# Patient Record
Sex: FEMALE | Race: WHITE | NOT HISPANIC OR LATINO | Employment: OTHER | URBAN - METROPOLITAN AREA
[De-identification: names, ages, dates, MRNs, and addresses within clinical notes are randomized per-mention and may not be internally consistent; named-entity substitution may affect disease eponyms.]

---

## 2017-01-23 ENCOUNTER — OFFICE VISIT (OUTPATIENT)
Dept: ORTHOPEDIC SURGERY | Facility: CLINIC | Age: 67
End: 2017-01-23

## 2017-01-23 VITALS — WEIGHT: 172 LBS | BODY MASS INDEX: 29.37 KG/M2 | HEIGHT: 64 IN

## 2017-01-23 DIAGNOSIS — Z96.653 STATUS POST TOTAL BILATERAL KNEE REPLACEMENT: ICD-10-CM

## 2017-01-23 DIAGNOSIS — R42 VERTIGO: ICD-10-CM

## 2017-01-23 DIAGNOSIS — R52 PAIN: Primary | ICD-10-CM

## 2017-01-23 DIAGNOSIS — IMO0002 BURSITIS/TENDONITIS, SHOULDER: ICD-10-CM

## 2017-01-23 PROCEDURE — 73030 X-RAY EXAM OF SHOULDER: CPT | Performed by: ORTHOPAEDIC SURGERY

## 2017-01-23 PROCEDURE — 99213 OFFICE O/P EST LOW 20 MIN: CPT | Performed by: ORTHOPAEDIC SURGERY

## 2017-01-23 PROCEDURE — 20610 DRAIN/INJ JOINT/BURSA W/O US: CPT | Performed by: ORTHOPAEDIC SURGERY

## 2017-01-23 RX ORDER — LIDOCAINE HYDROCHLORIDE 10 MG/ML
4 INJECTION, SOLUTION INFILTRATION; PERINEURAL
Status: COMPLETED | OUTPATIENT
Start: 2017-01-23 | End: 2017-01-23

## 2017-01-23 RX ORDER — BETAMETHASONE SODIUM PHOSPHATE AND BETAMETHASONE ACETATE 3; 3 MG/ML; MG/ML
6 INJECTION, SUSPENSION INTRA-ARTICULAR; INTRALESIONAL; INTRAMUSCULAR; SOFT TISSUE
Status: COMPLETED | OUTPATIENT
Start: 2017-01-23 | End: 2017-01-23

## 2017-01-23 RX ADMIN — BETAMETHASONE SODIUM PHOSPHATE AND BETAMETHASONE ACETATE 6 MG: 3; 3 INJECTION, SUSPENSION INTRA-ARTICULAR; INTRALESIONAL; INTRAMUSCULAR; SOFT TISSUE at 13:56

## 2017-01-23 RX ADMIN — LIDOCAINE HYDROCHLORIDE 4 ML: 10 INJECTION, SOLUTION INFILTRATION; PERINEURAL at 13:56

## 2017-01-23 NOTE — PROGRESS NOTES
Subjective: Left shoulder pain     Patient ID: Juan Pablo Hernandez is a 66 y.o. female.    Chief Complaint:    History of Present Illness 66-year-old female is seen for his left shoulder became symptomatic after fall approximately 4 weeks ago and then a second fall 2 weeks ago.  She is now having intermittent pain with any type of activities of daily living involving the left shoulder.       Social History     Occupational History   • Not on file.     Social History Main Topics   • Smoking status: Never Smoker   • Smokeless tobacco: Never Used   • Alcohol use No   • Drug use: No   • Sexual activity: Defer      Review of Systems   Constitutional: Negative for chills, diaphoresis, fever and unexpected weight change.   HENT: Negative for hearing loss, nosebleeds, sore throat and tinnitus.    Eyes: Negative for pain and visual disturbance.   Respiratory: Negative for cough, shortness of breath and wheezing.    Cardiovascular: Negative for chest pain and palpitations.   Gastrointestinal: Negative for abdominal pain, diarrhea, nausea and vomiting.   Endocrine: Negative for cold intolerance, heat intolerance and polydipsia.   Genitourinary: Negative for difficulty urinating, dysuria and hematuria.   Musculoskeletal: Positive for arthralgias, back pain and myalgias. Negative for joint swelling.   Skin: Negative for rash and wound.   Allergic/Immunologic: Negative for environmental allergies.   Neurological: Negative for dizziness, syncope and numbness.   Hematological: Does not bruise/bleed easily.   Psychiatric/Behavioral: Negative for dysphoric mood and sleep disturbance. The patient is not nervous/anxious.          Past Medical History   Diagnosis Date   • Anxiety    • Asthma    • CHF (congestive heart failure)    • Chronic pain disorder    • Chronic UTI    • Depression    • Diabetes    • GERD (gastroesophageal reflux disease)    • H/O CHF    • HTN (hypertension)    • Hyperlipidemia    • Incontinence    • Injury of back       spinal stenosis, chronic back pain   • Low back pain    • Lumbosacral disc disease    • MRSA infection (methicillin-resistant Staphylococcus aureus)      to left foot states approx 12-13 years ago    • Osteoarthritis    • Sleep apnea      uses cpap   • Spinal stenosis    • Stroke      pt states PMD has said she may be having mini strokes   • Vertigo      Past Surgical History   Procedure Laterality Date   • Hysterectomy     • Cholecystectomy open     • Total knee arthroplasty Bilateral    • Colonoscopy     • Endoscopy       Family History   Problem Relation Age of Onset   • Lung disease Mother    • Cancer Mother    • Heart disease Father    • Diabetes Paternal Aunt    • Diabetes Paternal Uncle    • Diabetes Paternal Grandmother    • Diabetes Paternal Grandfather          Objective:  There were no vitals filed for this visit.  Last 3 weights    01/23/17  1330   Weight: 172 lb (78 kg)     Body mass index is 29.52 kg/(m^2).       Ortho Exam  AP lateral and outlet view of the shoulder completely within normal limits showing no acute or chronic changes.  On exam there is no motor deficit good distal pulses.  Marked pain with abduction and extension against resistance.  Pain with internal rotation.  Negative Pittsburgh's test.    Assessment:       1. Pain    2. Status post total bilateral knee replacement    3. Vertigo    4. Bursitis/tendonitis, shoulder          Plan:      believe her dealing with the bursitis and tendinitis of the shoulders I will inject the left shoulder through the posterior portal 4 cc lidocaine 1 cc Celestone as she is diabetic.  Post                                                activity instructions given to the patient.    she still symptomatic in 4 weeks she is to call we'll set up an MRI.                                                                                                                                                                                                                                                                                       Work Status:    KEN query complete.    Orders:  Orders Placed This Encounter   Procedures   • Large Joint Arthrocentesis   • XR shoulder 2+ vw left       Medications:  No orders of the defined types were placed in this encounter.      Followup:  Return if symptoms worsen or fail to improve.    Large Joint Arthrocentesis  Date/Time: 1/23/2017 1:56 PM  Consent given by: patient  Site marked: site marked  Timeout: Immediately prior to procedure a time out was called to verify the correct patient, procedure, equipment, support staff and site/side marked as required   Supporting Documentation  Indications: pain   Procedure Details  Location: shoulder - L glenohumeral  Preparation: Patient was prepped and draped in the usual sterile fashion  Needle size: 22 G  Medications administered: 4 mL lidocaine 1 %; 6 mg betamethasone acetate-betamethasone sodium phosphate 6 (3-3) MG/ML  Patient tolerance: patient tolerated the procedure well with no immediate complications          Dragon transcription disclaimer     Much of this encounter note is an electronic transcription/translation of spoken language to printed text. The electronic translation of spoken language may permit erroneous, or at times, nonsensical words or phrases to be inadvertently transcribed. Although I have reviewed the note for such errors, some may still exist.

## 2017-01-23 NOTE — MR AVS SNAPSHOT
Juan Pablo S David   1/23/2017 1:30 PM   Office Visit    Dept Phone:  [phone]   Encounter #:  [id number]    Provider:  Cali Hartman MD   Department:  [facility]                Your Full Care Plan              Your Updated Medication List          This list is accurate as of: 1/23/17  2:05 PM.  Always use your most recent med list.                acetaminophen 325 MG tablet   Commonly known as:  TYLENOL   Take 2 tablets by mouth Every 6 (Six) Hours As Needed for mild pain (1-3) or moderate pain (4-6).       * albuterol 1.25 MG/3ML nebulizer solution   Commonly known as:  ACCUNEB       * albuterol 108 (90 BASE) MCG/ACT inhaler   Commonly known as:  PROVENTIL HFA;VENTOLIN HFA       ALLEGRA-D 24 HOUR 180-240 MG per 24 hr tablet   Generic drug:  fexofenadine-pseudoephedrine       amitriptyline 10 MG tablet   Commonly known as:  ELAVIL       baclofen 10 MG tablet   Commonly known as:  LIORESAL       estradiol 0.1 MG/GM vaginal cream   Commonly known as:  ESTRACE       fenofibrate 160 MG tablet       fluticasone-salmeterol 250-50 MCG/DOSE DISKUS   Commonly known as:  ADVAIR       hyoscyamine sulfate 0.125 MG tablet dispersible disintegrating tablet   Commonly known as:  ANASPAZ       meclizine 25 MG tablet   Commonly known as:  ANTIVERT       meloxicam 15 MG tablet   Commonly known as:  MOBIC       metFORMIN 500 MG tablet   Commonly known as:  GLUCOPHAGE   Take 1 tablet by mouth 2 (Two) Times a Day With Meals.       metoprolol succinate XL 25 MG 24 hr tablet   Commonly known as:  TOPROL XL   Take 1 tablet by mouth Daily.       montelukast 10 MG tablet   Commonly known as:  SINGULAIR       NASACORT ALLERGY 24HR 55 MCG/ACT nasal inhaler   Generic drug:  Triamcinolone Acetonide       neomycin-polymyxin-hydrocortisone 1 % solution otic solution   Commonly known as:  CORTISPORIN       omeprazole 40 MG capsule   Commonly known as:  priLOSEC       PARoxetine 40 MG tablet    "  Commonly known as:  PAXIL   Take 0.5 tablets by mouth Daily.       pregabalin 75 MG capsule   Commonly known as:  LYRICA       traMADol 50 MG tablet   Commonly known as:  ULTRAM       Vitamin D3 68937 UNITS capsule   Take 1 capsule by mouth Every 7 (Seven) Days.       * Notice:  This list has 2 medication(s) that are the same as other medications prescribed for you. Read the directions carefully, and ask your doctor or other care provider to review them with you.            We Performed the Following     Large Joint Arthrocentesis     XR shoulder 2+ vw left       You Were Diagnosed With        Codes Comments    Pain    -  Primary ICD-10-CM: R52  ICD-9-CM: 780.96       Instructions     None    Patient Instructions History      Upcoming Appointments     Visit Type Date Time Department    FOLLOW UP 1/23/2017  1:30 PM [facility] [place]    FOLLOW UP SLEEP CLINIC 1/31/2017  1:00 PM [facility]      MyChart Signup     Our records indicate that you have declined [facility]. If you would like to sign up for [facility], please email [email or web address] or call [phone] to obtain an activation code.             Other Info from Your Visit           Your Appointments     Jan 31, 2017  1:00 PM EST   Follow Up Sleep Clinic with [facility] SLEEP [facility] PROVIDER SCHEDULE   [facility]    [address] [place] [id number] [phone]            1.  If you are currently on a CPAP or BiPAP please bring in your SD card or memory card.  2.  If you are experiencing problems with your current mask, please bring your mask with you to your appointment.                Allergies     Augmentin [Amoxicillin-pot Clavulanate]      Avelox [Moxifloxacin]      Codeine      Morphine And Related      Penicillins      Sulfa Antibiotics        Reason for Visit     Left Shoulder - Pain           Vital Signs     Height Weight Body Mass Index Smoking Status          64\" " (162.6 cm) 172 lb (78 kg) 29.52 kg/m2 Never Smoker        Problems and Diagnoses Noted     Pain    -  Primary      Results     XR shoulder 2+ vw left

## 2017-01-31 ENCOUNTER — APPOINTMENT (OUTPATIENT)
Dept: SLEEP MEDICINE | Facility: HOSPITAL | Age: 67
End: 2017-01-31

## 2017-02-20 ENCOUNTER — OFFICE VISIT (OUTPATIENT)
Dept: ORTHOPEDIC SURGERY | Facility: CLINIC | Age: 67
End: 2017-02-20

## 2017-02-20 VITALS — BODY MASS INDEX: 29.19 KG/M2 | HEIGHT: 64 IN | WEIGHT: 171 LBS

## 2017-02-20 DIAGNOSIS — M65.30 TRIGGER FINGER, ACQUIRED: Primary | ICD-10-CM

## 2017-02-20 DIAGNOSIS — IMO0002 BURSITIS/TENDONITIS, SHOULDER: ICD-10-CM

## 2017-02-20 PROCEDURE — 99212 OFFICE O/P EST SF 10 MIN: CPT | Performed by: ORTHOPAEDIC SURGERY

## 2017-02-20 RX ORDER — AMITRIPTYLINE HYDROCHLORIDE 25 MG/1
TABLET, FILM COATED ORAL
COMMUNITY
Start: 2017-01-24 | End: 2017-10-04

## 2017-02-20 RX ORDER — HYDROCODONE BITARTRATE AND ACETAMINOPHEN 5; 325 MG/1; MG/1
1 TABLET ORAL EVERY 6 HOURS PRN
Qty: 30 TABLET | Refills: 0 | Status: SHIPPED | OUTPATIENT
Start: 2017-02-20 | End: 2017-03-27 | Stop reason: SDUPTHER

## 2017-02-20 RX ORDER — BUDESONIDE AND FORMOTEROL FUMARATE DIHYDRATE 160; 4.5 UG/1; UG/1
AEROSOL RESPIRATORY (INHALATION)
COMMUNITY
Start: 2017-02-16 | End: 2020-05-21 | Stop reason: HOSPADM

## 2017-02-20 RX ORDER — SITAGLIPTIN AND METFORMIN HYDROCHLORIDE 1000; 50 MG/1; MG/1
TABLET, FILM COATED, EXTENDED RELEASE ORAL 2 TIMES DAILY
COMMUNITY
Start: 2017-02-10 | End: 2018-01-19 | Stop reason: HOSPADM

## 2017-02-20 RX ORDER — TRAMADOL HYDROCHLORIDE 50 MG/1
50 TABLET ORAL EVERY 4 HOURS PRN
Qty: 30 TABLET | Refills: 0 | Status: SHIPPED | OUTPATIENT
Start: 2017-02-20 | End: 2017-12-28 | Stop reason: SDUPTHER

## 2017-02-20 NOTE — PROGRESS NOTES
Subjective: Left shoulder pain     Patient ID: Juan Pablo Hernandez is a 66 y.o. female.    Chief Complaint:    History of Present Illness patient returns with a left shoulder still symptomatic the cortisone injection given about 4 weeks ago offered minimal relief.       Social History     Occupational History   • Not on file.     Social History Main Topics   • Smoking status: Never Smoker   • Smokeless tobacco: Never Used   • Alcohol use No   • Drug use: No   • Sexual activity: Defer      Review of Systems   Constitutional: Negative for chills, diaphoresis, fever and unexpected weight change.   HENT: Negative for hearing loss, nosebleeds, sore throat and tinnitus.    Eyes: Negative for pain and visual disturbance.   Respiratory: Negative for cough, shortness of breath and wheezing.    Cardiovascular: Negative for chest pain and palpitations.   Gastrointestinal: Negative for abdominal pain, diarrhea, nausea and vomiting.   Endocrine: Negative for cold intolerance, heat intolerance and polydipsia.   Genitourinary: Negative for difficulty urinating, dysuria and hematuria.   Musculoskeletal: Positive for arthralgias and myalgias. Negative for joint swelling.   Skin: Negative for rash and wound.   Allergic/Immunologic: Negative for environmental allergies.   Neurological: Negative for dizziness, syncope and numbness.   Hematological: Does not bruise/bleed easily.   Psychiatric/Behavioral: Negative for dysphoric mood and sleep disturbance. The patient is not nervous/anxious.          Past Medical History   Diagnosis Date   • Anxiety    • Asthma    • CHF (congestive heart failure)    • Chronic pain disorder    • Chronic UTI    • Depression    • Diabetes    • GERD (gastroesophageal reflux disease)    • H/O CHF    • HTN (hypertension)    • Hyperlipidemia    • Incontinence    • Injury of back      spinal stenosis, chronic back pain   • Low back pain    • Lumbosacral disc disease    • MRSA infection (methicillin-resistant  Staphylococcus aureus)      to left foot states approx 12-13 years ago    • Osteoarthritis    • Sleep apnea      uses cpap   • Spinal stenosis    • Stroke      pt states PMD has said she may be having mini strokes   • Vertigo      Past Surgical History   Procedure Laterality Date   • Hysterectomy     • Cholecystectomy open     • Total knee arthroplasty Bilateral    • Colonoscopy     • Endoscopy       Family History   Problem Relation Age of Onset   • Lung disease Mother    • Cancer Mother    • Heart disease Father    • Diabetes Paternal Aunt    • Diabetes Paternal Uncle    • Diabetes Paternal Grandmother    • Diabetes Paternal Grandfather          Objective:  There were no vitals filed for this visit.  Last 3 weights    02/20/17  0958   Weight: 171 lb (77.6 kg)     Body mass index is 29.35 kg/(m^2).       Ortho Exam  there is no motor deficit good distal pulses.  She has marked weakness with abduction and extension against resistance with pain with internal rotation.    Assessment:       1. Trigger finger, acquired    2. Bursitis/tendonitis, shoulder          Plan:      we'll proceed with an MRI to fully evaluate the rotator cuff return after completed.      Work Status:    [employer] query complete.    Orders:  Orders Placed This Encounter   Procedures   • MRI Shoulder Left Without Contrast       Medications:  New Medications Ordered This Visit   Medications   • amitriptyline (ELAVIL) 25 MG tablet   • SYMBICORT 160-4.5 MCG/ACT inhaler   • JANUMET XR  MG tablet sustained-release 24 hour       Followup:  Return in about 2 weeks (around 3/6/2017).          Dragon transcription disclaimer     Much of this encounter note is an electronic transcription/translation of spoken language to printed text. The electronic translation of spoken language may permit erroneous, or at times, nonsensical words or phrases to be inadvertently transcribed. Although I have reviewed the note for such errors, some may still exist.

## 2017-03-02 ENCOUNTER — OFFICE VISIT (OUTPATIENT)
Dept: ORTHOPEDIC SURGERY | Facility: CLINIC | Age: 67
End: 2017-03-02

## 2017-03-02 DIAGNOSIS — M75.112 INCOMPLETE TEAR OF LEFT ROTATOR CUFF: Primary | ICD-10-CM

## 2017-03-02 DIAGNOSIS — M67.922 BICEPS TENDINOPATHY, LEFT: ICD-10-CM

## 2017-03-02 DIAGNOSIS — IMO0002 BURSITIS/TENDONITIS, SHOULDER: ICD-10-CM

## 2017-03-02 PROCEDURE — 99212 OFFICE O/P EST SF 10 MIN: CPT | Performed by: ORTHOPAEDIC SURGERY

## 2017-03-02 RX ORDER — PIOGLITAZONEHYDROCHLORIDE 30 MG/1
30 TABLET ORAL
COMMUNITY
Start: 2017-02-23 | End: 2018-01-19 | Stop reason: HOSPADM

## 2017-03-02 NOTE — PROGRESS NOTES
Subjective: Rotator cuff tendinopathy and biceps tendinopathy with glenohumeral DJD     Patient ID: Juan Pablo Hernandez is a 66 y.o. female.    Chief Complaint:    History of Present Illness patient returns with results of the MRI which show near 90% tear of the supraspinatus tendon and biceps tendinitis and tendinopathy of the shoulder.  X-rays done in the office again show DJD of the glenohumeral joint.  She still having significant pain discomfort with activities of daily living.  I reviewed the report which I personally reviewed with the patient.       Social History     Occupational History   • Not on file.     Social History Main Topics   • Smoking status: Never Smoker   • Smokeless tobacco: Never Used   • Alcohol use No   • Drug use: No   • Sexual activity: Defer      Review of Systems   Constitutional: Negative for chills, diaphoresis, fever and unexpected weight change.   HENT: Negative for hearing loss, nosebleeds, sore throat and tinnitus.    Eyes: Negative for pain and visual disturbance.   Respiratory: Negative for cough, shortness of breath and wheezing.    Cardiovascular: Negative for chest pain and palpitations.   Gastrointestinal: Negative for abdominal pain, diarrhea, nausea and vomiting.   Endocrine: Negative for cold intolerance, heat intolerance and polydipsia.   Genitourinary: Negative for difficulty urinating, dysuria and hematuria.   Musculoskeletal: Negative for arthralgias, joint swelling and myalgias.   Skin: Negative for rash and wound.   Allergic/Immunologic: Negative for environmental allergies.   Neurological: Negative for dizziness, syncope and numbness.   Hematological: Does not bruise/bleed easily.   Psychiatric/Behavioral: Negative for dysphoric mood and sleep disturbance. The patient is not nervous/anxious.    All other systems reviewed and are negative.        Past Medical History   Diagnosis Date   • Anxiety    • Asthma    • CHF (congestive heart failure)    • Chronic pain disorder     • Chronic UTI    • Depression    • Diabetes    • GERD (gastroesophageal reflux disease)    • H/O CHF    • HTN (hypertension)    • Hyperlipidemia    • Incontinence    • Injury of back      spinal stenosis, chronic back pain   • Low back pain    • Lumbosacral disc disease    • MRSA infection (methicillin-resistant Staphylococcus aureus)      to left foot states approx 12-13 years ago    • Osteoarthritis    • Sleep apnea      uses cpap   • Spinal stenosis    • Stroke      pt states PMD has said she may be having mini strokes   • Vertigo      Past Surgical History   Procedure Laterality Date   • Hysterectomy     • Cholecystectomy open     • Total knee arthroplasty Bilateral    • Colonoscopy     • Endoscopy       Family History   Problem Relation Age of Onset   • Lung disease Mother    • Cancer Mother    • Heart disease Father    • Diabetes Paternal Aunt    • Diabetes Paternal Uncle    • Diabetes Paternal Grandmother    • Diabetes Paternal Grandfather          Objective:  There were no vitals filed for this visit.  There were no vitals filed for this visit.  There is no height or weight on file to calculate BMI.       Ortho Exam  still having significant pain discomfort with any type of active range of motion.    Assessment:       1. Incomplete tear of left rotator cuff    2. Bursitis/tendonitis, shoulder    3. Biceps tendinopathy, left          Plan:      at this point I think her only option if she's having more pain she is well-known the live with his reverse shoulder.  With the extent of the tear damage to the rotator cuff and the biceps tendon with a mild to moderate glenohumeral arthritis of the reverse shoulder is indicated.  Discussed this with her as far as risk and benefits length the rehabilitation recovery.  She wants to wait until she gets secondary insurance to proceed with the surgical social call back when she is ready.      Work Status:    KEN query complete.    Orders:  No orders of the defined  types were placed in this encounter.      Medications:  New Medications Ordered This Visit   Medications   • pioglitazone (ACTOS) 30 MG tablet       Followup:  Return if symptoms worsen or fail to improve.          Dragon transcription disclaimer     Much of this encounter note is an electronic transcription/translation of spoken language to printed text. The electronic translation of spoken language may permit erroneous, or at times, nonsensical words or phrases to be inadvertently transcribed. Although I have reviewed the note for such errors, some may still exist.

## 2017-03-27 DIAGNOSIS — M25.50 POLYARTHRALGIA: Primary | ICD-10-CM

## 2017-03-27 RX ORDER — HYDROCODONE BITARTRATE AND ACETAMINOPHEN 5; 325 MG/1; MG/1
1 TABLET ORAL EVERY 6 HOURS PRN
Qty: 30 TABLET | Refills: 0 | Status: SHIPPED | OUTPATIENT
Start: 2017-03-27 | End: 2017-04-11 | Stop reason: SDDI

## 2017-04-04 ENCOUNTER — TELEPHONE (OUTPATIENT)
Dept: ORTHOPEDIC SURGERY | Facility: CLINIC | Age: 67
End: 2017-04-04

## 2017-10-04 ENCOUNTER — OFFICE VISIT (OUTPATIENT)
Dept: ORTHOPEDIC SURGERY | Facility: CLINIC | Age: 67
End: 2017-10-04

## 2017-10-04 DIAGNOSIS — Z96.651 HISTORY OF TOTAL RIGHT KNEE REPLACEMENT: ICD-10-CM

## 2017-10-04 DIAGNOSIS — M19.071 PRIMARY OSTEOARTHRITIS OF RIGHT FOOT: ICD-10-CM

## 2017-10-04 DIAGNOSIS — S93.491A SPRAIN OF ANTERIOR TALOFIBULAR LIGAMENT OF RIGHT ANKLE, INITIAL ENCOUNTER: ICD-10-CM

## 2017-10-04 DIAGNOSIS — R52 PAIN: Primary | ICD-10-CM

## 2017-10-04 PROCEDURE — 73610 X-RAY EXAM OF ANKLE: CPT | Performed by: NURSE PRACTITIONER

## 2017-10-04 PROCEDURE — 99213 OFFICE O/P EST LOW 20 MIN: CPT | Performed by: NURSE PRACTITIONER

## 2017-10-04 PROCEDURE — 73562 X-RAY EXAM OF KNEE 3: CPT | Performed by: NURSE PRACTITIONER

## 2017-10-04 NOTE — PROGRESS NOTES
Subjective:     Patient ID: Juan Pablo Hernandez is a 67 y.o. female.    Chief Complaint: Follow-up right knee pain, right ankle pain after injury on September 30, 2017, history of total knee right December 2014    History of Present Illness    Ms. Almeida is a 67-year-old female who is new patient to this APRN and presents with a family member and reported four-day history of fall onto right knee and twisted right ankle.  Reports that she believes that her right knee gave out on her however has been experiencing low back pain at the right side 2.  She has been seen in Dr. Amaral's office in past and received epidural injections approximately one year ago.  States that she has not had the copayment money to go back to Dr. Rutherford's office however will call to make an appointment.  She is verbalized concerns after falling to injury at her right knee.  She also is experiencing pain at the lateral aspect of her right ankle.  She notices bruising swelling at her right ankle.  She's had total knee replacements of bilateral knees the right knee was completed in December 2014 by Dr. Hartman.  She is seen by Dr. Hartman this year in February or March for bilateral shoulder symptoms secondary to falls.  She was last seen by Dr. Hartman for right leg and low back pain in December 2016.  MRI completed of lumbar spine.  Denies presence of all other concerns present this time.       Social History     Occupational History   • Not on file.     Social History Main Topics   • Smoking status: Never Smoker   • Smokeless tobacco: Never Used   • Alcohol use No   • Drug use: No   • Sexual activity: Defer      Past Medical History:   Diagnosis Date   • Anxiety    • Asthma    • CHF (congestive heart failure)    • Chronic pain disorder    • Chronic UTI    • Depression    • Diabetes    • GERD (gastroesophageal reflux disease)    • H/O CHF    • HTN (hypertension)    • Hyperlipidemia    • Incontinence    • Injury of back     spinal stenosis, chronic back  pain   • Low back pain    • Lumbosacral disc disease    • MRSA infection (methicillin-resistant Staphylococcus aureus)     to left foot states approx 12-13 years ago    • Osteoarthritis    • Sleep apnea     uses cpap   • Spinal stenosis    • Stroke     pt states PMD has said she may be having mini strokes   • Vertigo      Past Surgical History:   Procedure Laterality Date   • CHOLECYSTECTOMY OPEN     • COLONOSCOPY     • ENDOSCOPY     • HYSTERECTOMY     • TOTAL KNEE ARTHROPLASTY Bilateral        Family History   Problem Relation Age of Onset   • Lung disease Mother    • Cancer Mother    • Heart disease Father    • Diabetes Paternal Aunt    • Diabetes Paternal Uncle    • Diabetes Paternal Grandmother    • Diabetes Paternal Grandfather          Review of Systems   Constitutional: Negative for chills, diaphoresis, fever and unexpected weight change.   HENT: Negative for hearing loss, nosebleeds, sore throat and tinnitus.    Eyes: Negative for pain and visual disturbance.   Respiratory: Negative for cough, shortness of breath and wheezing.    Cardiovascular: Negative for chest pain and palpitations.   Gastrointestinal: Negative for abdominal pain, diarrhea, nausea and vomiting.   Endocrine: Negative for cold intolerance, heat intolerance and polydipsia.   Genitourinary: Negative for difficulty urinating, dysuria and hematuria.   Musculoskeletal: Positive for arthralgias and joint swelling. Negative for myalgias.   Skin: Negative for rash and wound.   Allergic/Immunologic: Negative for environmental allergies.   Neurological: Negative for dizziness, syncope and numbness.   Hematological: Does not bruise/bleed easily.   Psychiatric/Behavioral: Negative for dysphoric mood and sleep disturbance. The patient is not nervous/anxious.            Objective:  Physical Exam    General: No acute distress.  Eyes: conjunctiva clear; pupils equally round and reactive  ENT: external ears and nose atraumatic; oropharynx clear  CV: no  peripheral edema  Resp: normal respiratory effort  Skin: no rashes or wounds; normal turgor  Psych: mood and affect appropriate; recent and remote memory intact    There were no vitals filed for this visit.  There were no vitals filed for this visit.  There is no height or weight on file to calculate BMI.     Right Ankle Exam   Swelling: mild    Tenderness   The patient is experiencing tenderness in the ATF.        Range of Motion   Dorsiflexion: 25   Plantar flexion: 40   Inversion: 40   Eversion: 20     Muscle Strength   Dorsiflexion:  4/5  Plantar flexion:  4/5  Anterior tibial:  4/5  Posterior tibial:  4/5  Gastrocsoleus:  4/5  Peroneal muscle:  4/5    Tests   Anterior drawer: negative  Varus tilt: negative    Other   Erythema: absent  Scars: absent  Sensation: normal  Pulse: present     Comments:  Ecchymosis noted over lateral aspect ankle      Right Knee Exam     Tenderness   The patient is experiencing tenderness in the medial joint line.    Range of Motion   Extension: 5   Flexion: 100     Tests   Manish:  Medial - negative Lateral - negative  Lachman:  Anterior - 1+    Posterior - negative  Drawer:       Anterior - negative    Posterior - negative  Varus: negative  Valgus: negative    Other   Erythema: absent  Scars: present  Sensation: normal  Pulse: present  Swelling: mild  Other tests: no effusion present              Imaging:  Right Knee X-Ray  Indication: Pain    AP, Lateral, and [place] views    Findings:  No fracture  No bony lesion  Normal soft tissues  Implant well positioned, normal anatomical alignment, no evidence of prosthetic loosening     Prior studies were available for comparison.  Right Ankle X-Ray  Indication: Pain  Views: AP, Lateral, [place]  Findings:  No fracture  No bony lesion  Soft tissues normal  Osteoarthritic changes noted lateral malleolus, calcaneal heal osteophyte  No prior studies available for comparison.      Assessment:       1. Pain    2. Sprain of anterior talofibular  ligament of right ankle, initial encounter    3. Primary osteoarthritis of right foot    4. History of total right knee replacement          Plan:  1.  Discussed plan of care with patient.  Will be fitted for dry text hinge brace that she is able to wear to help with right knee stability and prevent it from giving out on her and her falling again.  2.  We'll provide her with a Aircast that she can fit into her shoe which will help with stability of that right ankle while healing.  Encouraged her to continue with range of motion activities to help strengthen that right ankle.  She may also continue with rest ice elevation for comfort and decreased swelling.  We'll plan to see her back in 3 weeks.  Patient verbalizes understanding of all information agrees with plan of care.  Denies other concerns present this time.  Orders:  Orders Placed This Encounter   Procedures   • XR Ankle 3+ View Right   • XR Knee 3+ View With [place] Right       KEN query complete.    Dragon transcription disclaimer     Much of this encounter note is an electronic transcription/translation of spoken language to printed text. The electronic translation of spoken language may permit erroneous, or at times, nonsensical words or phrases to be inadvertently transcribed. Although I have reviewed the note for such errors, some may still exist.  Procedures

## 2017-10-05 PROBLEM — S93.491A SPRAIN OF ANTERIOR TALOFIBULAR LIGAMENT OF RIGHT ANKLE: Status: ACTIVE | Noted: 2017-10-05

## 2017-10-05 PROBLEM — Z96.651 HISTORY OF TOTAL RIGHT KNEE REPLACEMENT: Status: ACTIVE | Noted: 2017-10-05

## 2017-10-05 PROBLEM — M19.071 PRIMARY OSTEOARTHRITIS OF RIGHT FOOT: Status: ACTIVE | Noted: 2017-10-05

## 2017-10-11 ENCOUNTER — TELEPHONE (OUTPATIENT)
Dept: ORTHOPEDIC SURGERY | Facility: CLINIC | Age: 67
End: 2017-10-11

## 2017-10-11 NOTE — TELEPHONE ENCOUNTER
States Mom has been in bed for the past 2 days, never got out of bed at all.  Messed with self and the bed and had laid in it.  She states she doped herself up and never got up.   She wanted to know what medication we rxed for mom.  I checked chart and there was no meds given at her office visit with Candis Hernandez.    She states she will call PCP and discuss this with them.  Request in the future to not give mom and medication.

## 2017-10-23 ENCOUNTER — TRANSCRIBE ORDERS (OUTPATIENT)
Dept: ADMINISTRATIVE | Facility: HOSPITAL | Age: 67
End: 2017-10-23

## 2017-10-23 ENCOUNTER — HOSPITAL ENCOUNTER (OUTPATIENT)
Dept: GENERAL RADIOLOGY | Facility: HOSPITAL | Age: 67
Discharge: HOME | End: 2017-10-23
Admitting: NURSE PRACTITIONER

## 2017-10-23 DIAGNOSIS — R31.21 ASYMPTOMATIC MICROSCOPIC HEMATURIA: ICD-10-CM

## 2017-10-23 DIAGNOSIS — R31.21 ASYMPTOMATIC MICROSCOPIC HEMATURIA: Primary | ICD-10-CM

## 2017-10-23 PROCEDURE — 74000 HC ABDOMEN KUB: CPT

## 2017-11-21 ENCOUNTER — HOSPITAL ENCOUNTER (EMERGENCY)
Facility: HOSPITAL | Age: 67
Discharge: HOME | End: 2017-11-21
Attending: EMERGENCY MEDICINE | Admitting: EMERGENCY MEDICINE

## 2017-11-21 ENCOUNTER — APPOINTMENT (OUTPATIENT)
Dept: GENERAL RADIOLOGY | Facility: HOSPITAL | Age: 67
End: 2017-11-21

## 2017-11-21 VITALS
WEIGHT: 171 LBS | HEIGHT: 64 IN | TEMPERATURE: 98.2 F | BODY MASS INDEX: 29.19 KG/M2 | SYSTOLIC BLOOD PRESSURE: 148 MMHG | RESPIRATION RATE: 20 BRPM | HEART RATE: 96 BPM | DIASTOLIC BLOOD PRESSURE: 76 MMHG | OXYGEN SATURATION: 94 %

## 2017-11-21 DIAGNOSIS — M54.50 CHRONIC LOW BACK PAIN WITHOUT SCIATICA, UNSPECIFIED BACK PAIN LATERALITY: ICD-10-CM

## 2017-11-21 DIAGNOSIS — G89.29 CHRONIC LOW BACK PAIN WITHOUT SCIATICA, UNSPECIFIED BACK PAIN LATERALITY: ICD-10-CM

## 2017-11-21 DIAGNOSIS — S20.221A CONTUSION OF RIGHT SIDE OF BACK, INITIAL ENCOUNTER: ICD-10-CM

## 2017-11-21 DIAGNOSIS — N39.0 ACUTE UTI: Primary | ICD-10-CM

## 2017-11-21 LAB
BACTERIA UR QL AUTO: ABNORMAL /HPF
BILIRUB UR QL STRIP: NEGATIVE
CLARITY UR: ABNORMAL
COLOR UR: YELLOW
GLUCOSE UR STRIP-MCNC: NEGATIVE MG/DL
HGB UR QL STRIP.AUTO: ABNORMAL
HYALINE CASTS UR QL AUTO: ABNORMAL /LPF
KETONES UR QL STRIP: ABNORMAL
LEUKOCYTE ESTERASE UR QL STRIP.AUTO: ABNORMAL
NITRITE UR QL STRIP: POSITIVE
PH UR STRIP.AUTO: 6 [PH] (ref 4.5–8)
PROT UR QL STRIP: NEGATIVE
RBC # UR: ABNORMAL /HPF
REF LAB TEST METHOD: ABNORMAL
SP GR UR STRIP: 1.01 (ref 1–1.03)
SQUAMOUS #/AREA URNS HPF: ABNORMAL /HPF
UROBILINOGEN UR QL STRIP: ABNORMAL
WBC UR QL AUTO: ABNORMAL /HPF

## 2017-11-21 PROCEDURE — 87186 SC STD MICRODIL/AGAR DIL: CPT | Performed by: EMERGENCY MEDICINE

## 2017-11-21 PROCEDURE — 99283 EMERGENCY DEPT VISIT LOW MDM: CPT

## 2017-11-21 PROCEDURE — 99284 EMERGENCY DEPT VISIT MOD MDM: CPT | Performed by: EMERGENCY MEDICINE

## 2017-11-21 PROCEDURE — 72072 X-RAY EXAM THORAC SPINE 3VWS: CPT

## 2017-11-21 PROCEDURE — 87086 URINE CULTURE/COLONY COUNT: CPT | Performed by: EMERGENCY MEDICINE

## 2017-11-21 PROCEDURE — 81001 URINALYSIS AUTO W/SCOPE: CPT | Performed by: EMERGENCY MEDICINE

## 2017-11-21 PROCEDURE — 73562 X-RAY EXAM OF KNEE 3: CPT

## 2017-11-21 RX ORDER — CEFUROXIME AXETIL 250 MG/1
500 TABLET ORAL ONCE
Status: COMPLETED | OUTPATIENT
Start: 2017-11-21 | End: 2017-11-21

## 2017-11-21 RX ORDER — CEFUROXIME AXETIL 500 MG/1
500 TABLET ORAL 2 TIMES DAILY
Qty: 14 TABLET | Refills: 0 | Status: SHIPPED | OUTPATIENT
Start: 2017-11-21 | End: 2017-11-28

## 2017-11-21 RX ORDER — TRAMADOL HYDROCHLORIDE 50 MG/1
100 TABLET ORAL ONCE
Status: COMPLETED | OUTPATIENT
Start: 2017-11-21 | End: 2017-11-21

## 2017-11-21 RX ADMIN — TRAMADOL HYDROCHLORIDE 100 MG: 50 TABLET, FILM COATED ORAL at 11:55

## 2017-11-21 RX ADMIN — CEFUROXIME AXETIL 500 MG: 250 TABLET ORAL at 11:55

## 2017-11-21 NOTE — ED PROVIDER NOTES
Subjective   Patient is a 67 y.o. female presenting with female genitourinary complaint.   History provided by:  Patient  Female  Problem   Primary symptoms comment: urinary frequency. There has been no fever. Associated symptoms include frequency. Pertinent negatives include no anorexia, no diaphoresis, no abdominal swelling, no abdominal pain, no constipation, no diarrhea, no nausea, no vomiting, no light-headedness and no dizziness. She has tried nothing for the symptoms. Sexual activity: non-contributory.     HPI Narrative:Ms Juan Pablo Hernandez is a 47-year-old white female who presents secondary to frequent urination, back pain and knee pain.  All of these symptoms are chronic however patient reports her urinary frequency increased 1 week ago.  Patient reports she slipped and fell off the toilet 2 days ago.  Impact on her upper back.  Daughter reports patient has bruising present.  Patient complains of back pain.  Patient also complains of right knee pain.  She denies any injury.  Patient has history of chronic back pain as well as chronic knee pain.  Patient presents for evaluation.        Review of Systems   Constitutional: Negative.  Negative for appetite change, diaphoresis and fever.   HENT: Negative.    Eyes: Negative.    Respiratory: Negative for cough, chest tightness, shortness of breath and wheezing.    Cardiovascular: Negative for chest pain, palpitations and leg swelling.   Gastrointestinal: Negative for abdominal pain, anorexia, constipation, diarrhea, nausea and vomiting.   Genitourinary: Positive for frequency. Negative for difficulty urinating, flank pain and hematuria.   Musculoskeletal: Negative.    Skin: Negative.  Negative for color change, pallor and rash.   Neurological: Negative.  Negative for dizziness, seizures, syncope, light-headedness and headaches.   Psychiatric/Behavioral: Negative.  Negative for agitation, behavioral problems and hallucinations.       Past Medical History:    Diagnosis Date   • Anxiety    • Asthma    • CHF (congestive heart failure)    • Chronic pain disorder    • Chronic UTI    • Depression    • Diabetes    • GERD (gastroesophageal reflux disease)    • H/O CHF    • HTN (hypertension)    • Hyperlipidemia    • Incontinence    • Injury of back     spinal stenosis, chronic back pain   • Low back pain    • Lumbosacral disc disease    • MRSA infection (methicillin-resistant Staphylococcus aureus)     to left foot states approx 12-13 years ago    • Osteoarthritis    • Sleep apnea     uses cpap   • Spinal stenosis    • Stroke     pt states PMD has said she may be having mini strokes   • Vertigo        Allergies   Allergen Reactions   • Augmentin [Amoxicillin-Pot Clavulanate]    • Avelox [Moxifloxacin]    • Codeine    • Morphine And Related    • Penicillins    • Sulfa Antibiotics        Past Surgical History:   Procedure Laterality Date   • CHOLECYSTECTOMY OPEN     • COLONOSCOPY     • ENDOSCOPY     • HYSTERECTOMY     • TOTAL KNEE ARTHROPLASTY Bilateral        Family History   Problem Relation Age of Onset   • Lung disease Mother    • Cancer Mother    • Heart disease Father    • Diabetes Paternal Aunt    • Diabetes Paternal Uncle    • Diabetes Paternal Grandmother    • Diabetes Paternal Grandfather        Social History     Social History   • Marital status:      Spouse name: N/A   • Number of children: N/A   • Years of education: N/A     Social History Main Topics   • Smoking status: Never Smoker   • Smokeless tobacco: Never Used   • Alcohol use No   • Drug use: No   • Sexual activity: Defer     Other Topics Concern   • None     Social History Narrative           Objective   Physical Exam   Constitutional: She is oriented to person, place, and time. She appears well-developed and well-nourished. No distress.   67-year-old white female laying in bed.  Patient is overweight.  She appears in fair overall health.  She does not appear in any distress.  She is accompanied by  "her daughter.   HENT:   Head: Normocephalic and atraumatic.   Right Ear: External ear normal.   Left Ear: External ear normal.   Nose: Nose normal.   Mouth/Throat: Oropharynx is clear and moist.   Eyes: Conjunctivae and EOM are normal. Pupils are equal, round, and reactive to light.   Neck: Normal range of motion. Neck supple.   Cardiovascular: Normal rate, regular rhythm, normal heart sounds and intact distal pulses.  Exam reveals no gallop and no friction rub.    No murmur heard.  Pulmonary/Chest: Effort normal and breath sounds normal.   Abdominal: Soft. Bowel sounds are normal. She exhibits no distension. There is no tenderness.   Musculoskeletal: Normal range of motion.        Thoracic back: She exhibits tenderness and bony tenderness. She exhibits no swelling, no edema and no deformity.        Back:    Neurological: She is alert and oriented to person, place, and time.   Skin: Skin is warm and dry. She is not diaphoretic.   Psychiatric: She has a normal mood and affect. Her behavior is normal.   Nursing note and vitals reviewed.      Procedures         ED Course  ED Course   Comment By Time   11/21/17  9:38 AM  Patient has multiple complaints but majority of which seem chronic.  Will obtain UA to check for infection.  Obtaining x-rays of T-spine and right knee. Jabier Palomares MD 11/21 [id number]   11/21/17  11:30 AM  UA shows evidence of UTI.  Will place on by mouth antibiotics.  X-ray of T-spine and knee do not show any evidence of acute injury. Jabier Palomares MD 11/21 [id number]   11/21/17  11:52 AM  Patient became angry that I had not given her pain medication per nurse Haylie.  She threatened to \"put her foot up his ass\". Patient then informed nursing that she is out of her tramadol.  I will give her a tramadol tablet in the ER.  I recommended she follow-up with a neurosurgeon and possibly pain management for her chronic pain issues.  I will not be prescribing Ultram for her.  I'm treating her UTI. Jabier FULLER" MD Jelani 11/21 1155      Labs Reviewed   URINALYSIS W/ CULTURE IF INDICATED - Abnormal; Notable for the following:        Result Value    Appearance, UA Slightly Cloudy (*)     Ketones, UA 40 mg/dL (2+) (*)     Blood, UA Trace (*)     Leuk Esterase, UA Moderate (2+) (*)     Nitrite, UA Positive (*)     Urobilinogen, UA 2.0 E.U./dL (*)     All other components within normal limits   URINALYSIS, MICROSCOPIC ONLY - Abnormal; Notable for the following:     RBC, UA 0-2 (*)     WBC, UA 13-20 (*)     Bacteria, UA 2+ (*)     Squamous Epithelial Cells, UA 3-6 (*)     All other components within normal limits   URINE CULTURE                 MDM  Number of Diagnoses or Management Options  Acute UTI: new and requires workup  Contusion of right side of back, initial encounter: new and requires workup     Amount and/or Complexity of Data Reviewed  Clinical lab tests: reviewed and ordered  Tests in the radiology section of CPT®: reviewed and ordered  Independent visualization of images, tracings, or specimens: yes    Risk of Complications, Morbidity, and/or Mortality  Presenting problems: low  Diagnostic procedures: moderate  Management options: low    Patient Progress  Patient progress: stable      Final diagnoses:   Acute UTI   Contusion of right side of back, initial encounter   Chronic low back pain without sciatica, unspecified back pain laterality            Jabier Palomares MD  11/21/17 1637       Jabier Palomares MD  11/21/17 [id number]

## 2017-11-21 NOTE — DISCHARGE INSTRUCTIONS
Medication as directed.  Continue current medications.  Plenty of fluids.  Follow-up with Dr. Zhang as above.  Follow-up with our [facility] for refills of your pain medication.  Recommend following up with pain management for your chronic pain issues.  Return to ED for medical emergencies.

## 2017-11-24 LAB
BACTERIA SPEC AEROBE CULT: ABNORMAL

## 2017-12-06 ENCOUNTER — TRANSCRIBE ORDERS (OUTPATIENT)
Dept: ADMINISTRATIVE | Facility: HOSPITAL | Age: 67
End: 2017-12-06

## 2017-12-06 DIAGNOSIS — M54.16 LUMBAR RADICULOPATHY: Primary | ICD-10-CM

## 2017-12-07 ENCOUNTER — OFFICE VISIT (OUTPATIENT)
Dept: ORTHOPEDIC SURGERY | Facility: CLINIC | Age: 67
End: 2017-12-07

## 2017-12-07 VITALS — WEIGHT: 174 LBS | BODY MASS INDEX: 29.71 KG/M2 | HEIGHT: 64 IN

## 2017-12-07 DIAGNOSIS — R52 PAIN: Primary | ICD-10-CM

## 2017-12-07 DIAGNOSIS — Z96.651 HISTORY OF TOTAL RIGHT KNEE REPLACEMENT: ICD-10-CM

## 2017-12-07 PROCEDURE — 99214 OFFICE O/P EST MOD 30 MIN: CPT | Performed by: ORTHOPAEDIC SURGERY

## 2017-12-07 RX ORDER — METHYLPREDNISOLONE 4 MG/1
TABLET ORAL
Qty: 21 TABLET | Refills: 0 | Status: SHIPPED | OUTPATIENT
Start: 2017-12-07 | End: 2017-12-28

## 2017-12-07 NOTE — PROGRESS NOTES
Subjective: Status post right total knee     Patient ID: Juan Pablo Hernandez is a 67 y.o. female.    Chief Complaint:    History of Present Illness 67 show female known to me having undergone a right total knee almost 10 years ago presents for evaluation of the knee which she is injured and recent falls in the past couple of months.  His had several falls in the now having rather intense pain in the right knee.  Was seen in the emergency room couple of weeks ago an x-ray and now presents here.  Complains of significant medial discomfort.       Social History     Occupational History   • Not on file.     Social History Main Topics   • Smoking status: Never Smoker   • Smokeless tobacco: Never Used   • Alcohol use No   • Drug use: No   • Sexual activity: Defer      Review of Systems   Constitutional: Negative for chills, diaphoresis, fever and unexpected weight change.   HENT: Negative for hearing loss, nosebleeds, sore throat and tinnitus.    Eyes: Negative for pain and visual disturbance.   Respiratory: Negative for cough, shortness of breath and wheezing.    Cardiovascular: Negative for chest pain and palpitations.   Gastrointestinal: Negative for abdominal pain, diarrhea, nausea and vomiting.   Endocrine: Negative for cold intolerance, heat intolerance and polydipsia.   Genitourinary: Negative for difficulty urinating, dysuria and hematuria.   Musculoskeletal: Positive for arthralgias and myalgias. Negative for joint swelling.   Skin: Negative for rash and wound.   Allergic/Immunologic: Negative for environmental allergies.   Neurological: Negative for dizziness, syncope and numbness.   Hematological: Does not bruise/bleed easily.   Psychiatric/Behavioral: Negative for dysphoric mood and sleep disturbance. The patient is not nervous/anxious.          Past Medical History:   Diagnosis Date   • Anxiety    • Asthma    • CHF (congestive heart failure)    • Chronic pain disorder    • Chronic UTI    • Depression    •  Diabetes    • GERD (gastroesophageal reflux disease)    • H/O CHF    • HTN (hypertension)    • Hyperlipidemia    • Incontinence    • Injury of back     spinal stenosis, chronic back pain   • Low back pain    • Lumbosacral disc disease    • MRSA infection (methicillin-resistant Staphylococcus aureus)     to left foot states approx 12-13 years ago    • Osteoarthritis    • Sleep apnea     uses cpap   • Spinal stenosis    • Stroke     pt states PMD has said she may be having mini strokes   • Vertigo      Past Surgical History:   Procedure Laterality Date   • CHOLECYSTECTOMY OPEN     • COLONOSCOPY     • ENDOSCOPY     • HYSTERECTOMY     • TOTAL KNEE ARTHROPLASTY Bilateral      Family History   Problem Relation Age of Onset   • Lung disease Mother    • Cancer Mother    • Heart disease Father    • Diabetes Paternal Aunt    • Diabetes Paternal Uncle    • Diabetes Paternal Grandmother    • Diabetes Paternal Grandfather          Objective:  There were no vitals filed for this visit.  Last 3 weights    12/07/17  1434   Weight: 78.9 kg (174 lb)     Body mass index is 30.34 kg/(m^2).       Ortho Exam  AP lateral sunrise view done at the hospital at the time of her injury reviewed by me show no acute or chronic changes.  Position of the implant is near anatomical.  She is alert and oriented ×3.  The right knee does show an abrasion on the anterior lateral aspect of the knee but there is no effusion or swelling.  She has 0-125° of motion with no instability throughout the arc of motion no patella subluxation.  Skin is cool to touch.  Calf is nontender.  Quad function is 5 over 5.  tenderness medially again but there is no instability.  Patient is currently taking meloxicam without any improvement in her symptoms.    Assessment:       1. Pain    2. History of total right knee replacement          Plan:      reviewed the x-ray findings and physical findings with the patient.  We'll begin a steroid pack followed by resumption of the  meloxicam.  Return to see me in 3 weeks      Work Status:    KEN query complete.    Orders:  No orders of the defined types were placed in this encounter.      Medications:  New Medications Ordered This Visit   Medications   • MethylPREDNISolone (MEDROL, AMY,) 4 MG tablet     Sig: Use as directed by package instructions     Dispense:  21 tablet     Refill:  0       Followup:  Return in about 3 weeks (around 12/28/2017).          Dragon transcription disclaimer     Much of this encounter note is an electronic transcription/translation of spoken language to printed text. The electronic translation of spoken language may permit erroneous, or at times, nonsensical words or phrases to be inadvertently transcribed. Although I have reviewed the note for such errors, some may still exist.

## 2017-12-12 ENCOUNTER — HOSPITAL ENCOUNTER (OUTPATIENT)
Dept: MRI IMAGING | Facility: HOSPITAL | Age: 67
Discharge: HOME | End: 2017-12-12
Admitting: NURSE PRACTITIONER

## 2017-12-12 DIAGNOSIS — M54.16 LUMBAR RADICULOPATHY: ICD-10-CM

## 2017-12-12 PROCEDURE — 72148 MRI LUMBAR SPINE W/O DYE: CPT

## 2017-12-28 ENCOUNTER — OFFICE VISIT (OUTPATIENT)
Dept: ORTHOPEDIC SURGERY | Facility: CLINIC | Age: 67
End: 2017-12-28

## 2017-12-28 VITALS — HEIGHT: 63 IN | WEIGHT: 171 LBS | BODY MASS INDEX: 30.3 KG/M2

## 2017-12-28 DIAGNOSIS — Z96.651 HISTORY OF TOTAL RIGHT KNEE REPLACEMENT: Primary | ICD-10-CM

## 2017-12-28 PROCEDURE — 99213 OFFICE O/P EST LOW 20 MIN: CPT | Performed by: ORTHOPAEDIC SURGERY

## 2017-12-28 RX ORDER — TRAMADOL HYDROCHLORIDE 50 MG/1
50 TABLET ORAL EVERY 4 HOURS PRN
Qty: 30 TABLET | Refills: 0 | Status: SHIPPED | OUTPATIENT
Start: 2017-12-28 | End: 2018-01-02 | Stop reason: SDUPTHER

## 2017-12-28 RX ORDER — NITROFURANTOIN 25; 75 MG/1; MG/1
100 CAPSULE ORAL 2 TIMES DAILY
COMMUNITY
End: 2018-01-19 | Stop reason: HOSPADM

## 2017-12-28 NOTE — PROGRESS NOTES
Subjective: Right knee pain     Patient ID: Juan Pablo Hernandez is a 67 y.o. female.    Chief Complaint:    History of Present Illness patient returns in follow-up after having her knee injected on the last visit with lidocaine and cortisone.  His noted no improvement in her symptoms whatsoever.       Social History     Occupational History   • Not on file.     Social History Main Topics   • Smoking status: Never Smoker   • Smokeless tobacco: Never Used   • Alcohol use No   • Drug use: No   • Sexual activity: Defer      Review of Systems   Constitutional: Negative for chills, diaphoresis, fever and unexpected weight change.   HENT: Negative for hearing loss, nosebleeds, sore throat and tinnitus.    Eyes: Negative for pain and visual disturbance.   Respiratory: Negative for cough, shortness of breath and wheezing.    Cardiovascular: Negative for chest pain and palpitations.   Gastrointestinal: Negative for abdominal pain, diarrhea, nausea and vomiting.   Endocrine: Negative for cold intolerance, heat intolerance and polydipsia.   Genitourinary: Negative for difficulty urinating, dysuria and hematuria.   Musculoskeletal: Positive for arthralgias, joint swelling and myalgias.   Skin: Negative for rash and wound.   Allergic/Immunologic: Negative for environmental allergies.   Neurological: Negative for dizziness, syncope and numbness.   Hematological: Does not bruise/bleed easily.   Psychiatric/Behavioral: Negative for dysphoric mood and sleep disturbance. The patient is not nervous/anxious.          Past Medical History:   Diagnosis Date   • Anxiety    • Asthma    • CHF (congestive heart failure)    • Chronic pain disorder    • Chronic UTI    • Depression    • Diabetes    • GERD (gastroesophageal reflux disease)    • H/O CHF    • HTN (hypertension)    • Hyperlipidemia    • Incontinence    • Injury of back     spinal stenosis, chronic back pain   • Low back pain    • Lumbosacral disc disease    • MRSA infection  (methicillin-resistant Staphylococcus aureus)     to left foot states approx 12-13 years ago    • Osteoarthritis    • Sleep apnea     uses cpap   • Spinal stenosis    • Stroke     pt states PMD has said she may be having mini strokes   • Vertigo      Past Surgical History:   Procedure Laterality Date   • CHOLECYSTECTOMY OPEN     • COLONOSCOPY     • ENDOSCOPY     • HYSTERECTOMY     • TOTAL KNEE ARTHROPLASTY Bilateral      Family History   Problem Relation Age of Onset   • Lung disease Mother    • Cancer Mother    • Heart disease Father    • Diabetes Paternal Aunt    • Diabetes Paternal Uncle    • Diabetes Paternal Grandmother    • Diabetes Paternal Grandfather          Objective:  There were no vitals filed for this visit.  Last 3 weights    12/28/17  1419   Weight: 77.6 kg (171 lb)     Body mass index is 30.29 kg/(m^2).       Ortho Exam  she is alert and oriented ×3.  The right knee shows no swelling effusion erythema.  There is no warmth to touch.  There is 125° of motion with no instability.  Quad function problem 500 Is nontender negative Homans.  No sensory loss.  No instability throughout the arc of motion no patella subluxation.  His been taking the meloxicam without any GI side effects but no resolution of her symptoms.  The discomfort severely limits her activities of daily living.    Assessment:       1. History of total right knee replacement          Plan:      I will proceed with a CT scan to rule out loosening of the prosthesis and will order inflammatory markers to rule out infection return after completion      Work Status:    KEN query complete.    Orders:  Orders Placed This Encounter   Procedures   • CT knee right wo contrast   • C-reactive Protein   • Sedimentation Rate   • CBC & Differential       Medications:      Followup:  Return in about 2 weeks (around 1/11/2018).          Dragon transcription disclaimer     Much of this encounter note is an electronic transcription/translation of spoken  language to printed text. The electronic translation of spoken language may permit erroneous, or at times, nonsensical words or phrases to be inadvertently transcribed. Although I have reviewed the note for such errors, some may still exist.

## 2018-01-02 RX ORDER — TRAMADOL HYDROCHLORIDE 50 MG/1
TABLET ORAL
Qty: 30 TABLET | Refills: 0 | Status: SHIPPED | OUTPATIENT
Start: 2018-01-02 | End: 2018-01-09 | Stop reason: SDUPTHER

## 2018-01-04 ENCOUNTER — APPOINTMENT (OUTPATIENT)
Dept: CT IMAGING | Facility: HOSPITAL | Age: 68
End: 2018-01-04
Attending: ORTHOPAEDIC SURGERY

## 2018-01-09 RX ORDER — TRAMADOL HYDROCHLORIDE 50 MG/1
50 TABLET ORAL EVERY 6 HOURS PRN
Qty: 30 TABLET | Refills: 0 | Status: SHIPPED | OUTPATIENT
Start: 2018-01-09 | End: 2018-01-19 | Stop reason: HOSPADM

## 2018-01-11 ENCOUNTER — APPOINTMENT (OUTPATIENT)
Dept: CT IMAGING | Facility: HOSPITAL | Age: 68
End: 2018-01-11
Attending: ORTHOPAEDIC SURGERY

## 2018-01-13 ENCOUNTER — APPOINTMENT (OUTPATIENT)
Dept: GENERAL RADIOLOGY | Facility: HOSPITAL | Age: 68
End: 2018-01-13

## 2018-01-13 ENCOUNTER — APPOINTMENT (OUTPATIENT)
Dept: CT IMAGING | Facility: HOSPITAL | Age: 68
End: 2018-01-13

## 2018-01-13 ENCOUNTER — HOSPITAL ENCOUNTER (INPATIENT)
Facility: HOSPITAL | Age: 68
LOS: 6 days | Discharge: NURSING, REHAB OR LONG-TERM CARE | End: 2018-01-19
Attending: EMERGENCY MEDICINE | Admitting: INTERNAL MEDICINE

## 2018-01-13 DIAGNOSIS — S72.401A CLOSED FRACTURE OF DISTAL END OF RIGHT FEMUR, UNSPECIFIED FRACTURE MORPHOLOGY, INITIAL ENCOUNTER (HCC): Primary | ICD-10-CM

## 2018-01-13 DIAGNOSIS — E11.9 TYPE 2 DIABETES MELLITUS WITHOUT COMPLICATION, WITHOUT LONG-TERM CURRENT USE OF INSULIN (HCC): ICD-10-CM

## 2018-01-13 LAB
ABO GROUP BLD: NORMAL
ALBUMIN SERPL-MCNC: 3.9 G/DL (ref 3.5–5.2)
ALBUMIN/GLOB SERPL: 1.2 G/DL
ALP SERPL-CCNC: 43 U/L (ref 40–129)
ALT SERPL W P-5'-P-CCNC: 12 U/L (ref 5–33)
ANION GAP SERPL CALCULATED.3IONS-SCNC: 15.6 MMOL/L
APTT PPP: 30.6 SECONDS (ref 24.3–38.1)
AST SERPL-CCNC: 24 U/L (ref 5–32)
BACTERIA UR QL AUTO: ABNORMAL /HPF
BASOPHILS # BLD AUTO: 0.04 10*3/MM3 (ref 0–0.2)
BASOPHILS NFR BLD AUTO: 0.3 % (ref 0–2)
BILIRUB SERPL-MCNC: 0.5 MG/DL (ref 0.2–1.2)
BILIRUB UR QL STRIP: NEGATIVE
BLD GP AB SCN SERPL QL: NEGATIVE
BUN BLD-MCNC: 13 MG/DL (ref 8–23)
BUN/CREAT SERPL: 19.4 (ref 7–25)
CALCIUM SPEC-SCNC: 9.4 MG/DL (ref 8.8–10.5)
CHLORIDE SERPL-SCNC: 96 MMOL/L (ref 98–107)
CLARITY UR: ABNORMAL
CO2 SERPL-SCNC: 27.4 MMOL/L (ref 22–29)
COLOR UR: YELLOW
CREAT BLD-MCNC: 0.67 MG/DL (ref 0.57–1)
DEPRECATED RDW RBC AUTO: 55.8 FL (ref 37–54)
EOSINOPHIL # BLD AUTO: 0.01 10*3/MM3 (ref 0.1–0.3)
EOSINOPHIL NFR BLD AUTO: 0.1 % (ref 0–4)
ERYTHROCYTE [DISTWIDTH] IN BLOOD BY AUTOMATED COUNT: 18.6 % (ref 11.5–14.5)
GFR SERPL CREATININE-BSD FRML MDRD: 88 ML/MIN/1.73
GLOBULIN UR ELPH-MCNC: 3.2 GM/DL
GLUCOSE BLD-MCNC: 99 MG/DL (ref 65–99)
GLUCOSE BLDC GLUCOMTR-MCNC: 173 MG/DL (ref 70–130)
GLUCOSE BLDC GLUCOMTR-MCNC: 97 MG/DL (ref 70–130)
GLUCOSE UR STRIP-MCNC: NEGATIVE MG/DL
HCT VFR BLD AUTO: 36.3 % (ref 37–47)
HGB BLD-MCNC: 11.8 G/DL (ref 12–16)
HGB UR QL STRIP.AUTO: ABNORMAL
HYALINE CASTS UR QL AUTO: ABNORMAL /LPF
IMM GRANULOCYTES # BLD: 0.06 10*3/MM3 (ref 0–0.03)
IMM GRANULOCYTES NFR BLD: 0.5 % (ref 0–0.5)
INR PPP: 1.09 (ref 0.9–1.1)
KETONES UR QL STRIP: ABNORMAL
LEUKOCYTE ESTERASE UR QL STRIP.AUTO: ABNORMAL
LYMPHOCYTES # BLD AUTO: 1.99 10*3/MM3 (ref 0.6–4.8)
LYMPHOCYTES NFR BLD AUTO: 15.7 % (ref 20–45)
MCH RBC QN AUTO: 27.1 PG (ref 27–31)
MCHC RBC AUTO-ENTMCNC: 32.5 G/DL (ref 31–37)
MCV RBC AUTO: 83.3 FL (ref 81–99)
MONOCYTES # BLD AUTO: 1.07 10*3/MM3 (ref 0–1)
MONOCYTES NFR BLD AUTO: 8.4 % (ref 3–8)
MUCOUS THREADS URNS QL MICRO: ABNORMAL /HPF
NEUTROPHILS # BLD AUTO: 9.5 10*3/MM3 (ref 1.5–8.3)
NEUTROPHILS NFR BLD AUTO: 75 % (ref 45–70)
NITRITE UR QL STRIP: POSITIVE
NRBC BLD MANUAL-RTO: 0 /100 WBC (ref 0–0)
PH UR STRIP.AUTO: 5.5 [PH] (ref 4.5–8)
PLATELET # BLD AUTO: 306 10*3/MM3 (ref 140–500)
PMV BLD AUTO: 10.3 FL (ref 7.4–10.4)
POTASSIUM BLD-SCNC: 4.4 MMOL/L (ref 3.5–5.2)
PROT SERPL-MCNC: 7.1 G/DL (ref 6–8.5)
PROT UR QL STRIP: NEGATIVE
PROTHROMBIN TIME: 14.1 SECONDS (ref 12.1–15)
RBC # BLD AUTO: 4.36 10*6/MM3 (ref 4.2–5.4)
RBC # UR: ABNORMAL /HPF
REF LAB TEST METHOD: ABNORMAL
RH BLD: POSITIVE
SODIUM BLD-SCNC: 139 MMOL/L (ref 136–145)
SP GR UR STRIP: 1.02 (ref 1–1.03)
SQUAMOUS #/AREA URNS HPF: ABNORMAL /HPF
UROBILINOGEN UR QL STRIP: ABNORMAL
WBC CLUMPS # UR AUTO: ABNORMAL /HPF
WBC NRBC COR # BLD: 12.67 10*3/MM3 (ref 4.8–10.8)
WBC UR QL AUTO: ABNORMAL /HPF

## 2018-01-13 PROCEDURE — 93005 ELECTROCARDIOGRAM TRACING: CPT | Performed by: EMERGENCY MEDICINE

## 2018-01-13 PROCEDURE — 63710000001 INSULIN ASPART PER 5 UNITS: Performed by: INTERNAL MEDICINE

## 2018-01-13 PROCEDURE — 82962 GLUCOSE BLOOD TEST: CPT

## 2018-01-13 PROCEDURE — 99285 EMERGENCY DEPT VISIT HI MDM: CPT

## 2018-01-13 PROCEDURE — 85025 COMPLETE CBC W/AUTO DIFF WBC: CPT | Performed by: EMERGENCY MEDICINE

## 2018-01-13 PROCEDURE — 94799 UNLISTED PULMONARY SVC/PX: CPT

## 2018-01-13 PROCEDURE — 81001 URINALYSIS AUTO W/SCOPE: CPT | Performed by: EMERGENCY MEDICINE

## 2018-01-13 PROCEDURE — 99222 1ST HOSP IP/OBS MODERATE 55: CPT | Performed by: INTERNAL MEDICINE

## 2018-01-13 PROCEDURE — 94640 AIRWAY INHALATION TREATMENT: CPT

## 2018-01-13 PROCEDURE — 73560 X-RAY EXAM OF KNEE 1 OR 2: CPT

## 2018-01-13 PROCEDURE — 80053 COMPREHEN METABOLIC PANEL: CPT | Performed by: EMERGENCY MEDICINE

## 2018-01-13 PROCEDURE — 85730 THROMBOPLASTIN TIME PARTIAL: CPT | Performed by: EMERGENCY MEDICINE

## 2018-01-13 PROCEDURE — 86850 RBC ANTIBODY SCREEN: CPT | Performed by: EMERGENCY MEDICINE

## 2018-01-13 PROCEDURE — 87186 SC STD MICRODIL/AGAR DIL: CPT | Performed by: EMERGENCY MEDICINE

## 2018-01-13 PROCEDURE — 73552 X-RAY EXAM OF FEMUR 2/>: CPT

## 2018-01-13 PROCEDURE — 86901 BLOOD TYPING SEROLOGIC RH(D): CPT | Performed by: EMERGENCY MEDICINE

## 2018-01-13 PROCEDURE — 73700 CT LOWER EXTREMITY W/O DYE: CPT

## 2018-01-13 PROCEDURE — 93010 ELECTROCARDIOGRAM REPORT: CPT | Performed by: INTERNAL MEDICINE

## 2018-01-13 PROCEDURE — 99284 EMERGENCY DEPT VISIT MOD MDM: CPT | Performed by: EMERGENCY MEDICINE

## 2018-01-13 PROCEDURE — 87086 URINE CULTURE/COLONY COUNT: CPT | Performed by: EMERGENCY MEDICINE

## 2018-01-13 PROCEDURE — 86900 BLOOD TYPING SEROLOGIC ABO: CPT | Performed by: EMERGENCY MEDICINE

## 2018-01-13 PROCEDURE — 85610 PROTHROMBIN TIME: CPT | Performed by: EMERGENCY MEDICINE

## 2018-01-13 PROCEDURE — 71045 X-RAY EXAM CHEST 1 VIEW: CPT

## 2018-01-13 RX ORDER — BISACODYL 10 MG
10 SUPPOSITORY, RECTAL RECTAL DAILY PRN
Status: DISCONTINUED | OUTPATIENT
Start: 2018-01-13 | End: 2018-01-19 | Stop reason: HOSPADM

## 2018-01-13 RX ORDER — PANTOPRAZOLE SODIUM 40 MG/1
40 TABLET, DELAYED RELEASE ORAL EVERY MORNING
Status: DISCONTINUED | OUTPATIENT
Start: 2018-01-14 | End: 2018-01-19 | Stop reason: HOSPADM

## 2018-01-13 RX ORDER — BACLOFEN 10 MG/1
10 TABLET ORAL DAILY
Status: DISCONTINUED | OUTPATIENT
Start: 2018-01-13 | End: 2018-01-19 | Stop reason: HOSPADM

## 2018-01-13 RX ORDER — CHOLECALCIFEROL (VITAMIN D3) 125 MCG
5 CAPSULE ORAL NIGHTLY
Status: DISCONTINUED | OUTPATIENT
Start: 2018-01-13 | End: 2018-01-19 | Stop reason: HOSPADM

## 2018-01-13 RX ORDER — PAROXETINE HYDROCHLORIDE 20 MG/1
20 TABLET, FILM COATED ORAL DAILY
Status: DISCONTINUED | OUTPATIENT
Start: 2018-01-13 | End: 2018-01-18

## 2018-01-13 RX ORDER — SODIUM CHLORIDE 9 MG/ML
40 INJECTION, SOLUTION INTRAVENOUS AS NEEDED
Status: DISCONTINUED | OUTPATIENT
Start: 2018-01-13 | End: 2018-01-19 | Stop reason: HOSPADM

## 2018-01-13 RX ORDER — ONDANSETRON 2 MG/ML
4 INJECTION INTRAMUSCULAR; INTRAVENOUS EVERY 6 HOURS PRN
Status: DISCONTINUED | OUTPATIENT
Start: 2018-01-13 | End: 2018-01-19 | Stop reason: HOSPADM

## 2018-01-13 RX ORDER — BISACODYL 5 MG/1
5 TABLET, DELAYED RELEASE ORAL DAILY PRN
Status: DISCONTINUED | OUTPATIENT
Start: 2018-01-13 | End: 2018-01-19 | Stop reason: HOSPADM

## 2018-01-13 RX ORDER — METOPROLOL SUCCINATE 25 MG/1
25 TABLET, EXTENDED RELEASE ORAL DAILY
Status: DISCONTINUED | OUTPATIENT
Start: 2018-01-13 | End: 2018-01-19 | Stop reason: HOSPADM

## 2018-01-13 RX ORDER — SODIUM CHLORIDE 0.9 % (FLUSH) 0.9 %
10 SYRINGE (ML) INJECTION AS NEEDED
Status: DISCONTINUED | OUTPATIENT
Start: 2018-01-13 | End: 2018-01-19 | Stop reason: HOSPADM

## 2018-01-13 RX ORDER — ACETAMINOPHEN 325 MG/1
650 TABLET ORAL EVERY 6 HOURS PRN
Status: DISCONTINUED | OUTPATIENT
Start: 2018-01-13 | End: 2018-01-19 | Stop reason: HOSPADM

## 2018-01-13 RX ORDER — MEPERIDINE HYDROCHLORIDE 25 MG/ML
25 INJECTION INTRAMUSCULAR; INTRAVENOUS; SUBCUTANEOUS ONCE
Status: COMPLETED | OUTPATIENT
Start: 2018-01-13 | End: 2018-01-13

## 2018-01-13 RX ORDER — BUDESONIDE AND FORMOTEROL FUMARATE DIHYDRATE 80; 4.5 UG/1; UG/1
2 AEROSOL RESPIRATORY (INHALATION)
Status: DISCONTINUED | OUTPATIENT
Start: 2018-01-13 | End: 2018-01-19 | Stop reason: HOSPADM

## 2018-01-13 RX ORDER — SODIUM CHLORIDE 0.9 % (FLUSH) 0.9 %
1-10 SYRINGE (ML) INJECTION AS NEEDED
Status: DISCONTINUED | OUTPATIENT
Start: 2018-01-13 | End: 2018-01-19 | Stop reason: HOSPADM

## 2018-01-13 RX ORDER — NICOTINE POLACRILEX 4 MG
15 LOZENGE BUCCAL
Status: DISCONTINUED | OUTPATIENT
Start: 2018-01-13 | End: 2018-01-19 | Stop reason: HOSPADM

## 2018-01-13 RX ORDER — DEXTROSE MONOHYDRATE 25 G/50ML
25 INJECTION, SOLUTION INTRAVENOUS
Status: DISCONTINUED | OUTPATIENT
Start: 2018-01-13 | End: 2018-01-19 | Stop reason: HOSPADM

## 2018-01-13 RX ORDER — ALBUTEROL SULFATE 2.5 MG/3ML
2.5 SOLUTION RESPIRATORY (INHALATION) EVERY 6 HOURS PRN
Status: DISCONTINUED | OUTPATIENT
Start: 2018-01-13 | End: 2018-01-15 | Stop reason: DRUGHIGH

## 2018-01-13 RX ORDER — TRAMADOL HYDROCHLORIDE 50 MG/1
TABLET ORAL
Status: COMPLETED
Start: 2018-01-13 | End: 2018-01-13

## 2018-01-13 RX ORDER — ALBUTEROL SULFATE 90 UG/1
2 AEROSOL, METERED RESPIRATORY (INHALATION) EVERY 4 HOURS PRN
Status: DISCONTINUED | OUTPATIENT
Start: 2018-01-13 | End: 2018-01-15 | Stop reason: RX

## 2018-01-13 RX ORDER — TRAMADOL HYDROCHLORIDE 50 MG/1
50 TABLET ORAL EVERY 6 HOURS PRN
Status: DISCONTINUED | OUTPATIENT
Start: 2018-01-13 | End: 2018-01-16

## 2018-01-13 RX ADMIN — BUDESONIDE AND FORMOTEROL FUMARATE DIHYDRATE 2 PUFF: 80; 4.5 AEROSOL RESPIRATORY (INHALATION) at 20:18

## 2018-01-13 RX ADMIN — INSULIN ASPART 2 UNITS: 100 INJECTION, SOLUTION INTRAVENOUS; SUBCUTANEOUS at 21:55

## 2018-01-13 RX ADMIN — Medication: at 22:00

## 2018-01-13 RX ADMIN — TRAMADOL HYDROCHLORIDE 50 MG: 50 TABLET, FILM COATED ORAL at 23:57

## 2018-01-13 RX ADMIN — MEPERIDINE HYDROCHLORIDE 25 MG: 25 INJECTION, SOLUTION INTRAMUSCULAR; INTRAVENOUS; SUBCUTANEOUS at 16:08

## 2018-01-13 RX ADMIN — BACLOFEN 10 MG: 10 TABLET ORAL at 18:30

## 2018-01-13 RX ADMIN — TRAMADOL HYDROCHLORIDE 50 MG: 50 TABLET, FILM COATED ORAL at 17:55

## 2018-01-13 RX ADMIN — PAROXETINE HYDROCHLORIDE 20 MG: 20 TABLET, FILM COATED ORAL at 18:30

## 2018-01-13 RX ADMIN — ACETAMINOPHEN 650 MG: 325 TABLET, FILM COATED ORAL at 20:49

## 2018-01-13 RX ADMIN — METOPROLOL SUCCINATE 25 MG: 25 TABLET, EXTENDED RELEASE ORAL at 18:30

## 2018-01-13 NOTE — PLAN OF CARE
Problem: Patient Care Overview (Adult)  Goal: Plan of Care Review  Outcome: Ongoing (interventions implemented as appropriate)      Problem: Pain, Acute (Adult)  Goal: Identify Related Risk Factors and Signs and Symptoms  Outcome: Ongoing (interventions implemented as appropriate)    Goal: Acceptable Pain Control/Comfort Level  Outcome: Ongoing (interventions implemented as appropriate)      Problem: Fall Risk (Adult)  Goal: Identify Related Risk Factors and Signs and Symptoms  Outcome: Ongoing (interventions implemented as appropriate)    Goal: Absence of Falls  Outcome: Ongoing (interventions implemented as appropriate)

## 2018-01-13 NOTE — ED NOTES
"Spoke with patients daughter on the phone. Daughter states that patients right knee has been painful and swollen. Patient was seen by Dr. Hartman in December for her knee and his exam was inconclusive and they are now waiting for an MRI. Daughter states that patient slipped out of her van 2 days ago on Thursday and landed on her right knee. Daughter states that she did not injure anything else but has had multiple previous falls. Daughter also states that patient started having confusion \"during the summer and has been worsening since.\"      Gilda Proctor RN  01/13/18 [id number]    "

## 2018-01-13 NOTE — H&P
[facility] HOSPITALIST     Linden Zhang MD    CHIEF COMPLAINT: right above knee pain    HISTORY OF PRESENT ILLNESS:    Patient Is a 67-year-old pleasant female presented to ER for right knee pain.  She had been having issues with right knee pain over several weeks and have been seeing her orthopedist plans for possible MRI to evaluate prior knee replacement.  However on Thursday she twisted her knee stepping out of the car with subsequent pain.  Pain persisted so she came to the ER today where it was noted that she had a distal femur fracture near the replacement and she has been admitted for further care.  Currently, she states that the pain is about a 5 out of 10. No other issues. Daughter at bedside reports intermittent confusion since the summer. Denies CP, SOB, n/v/d, fever or chills.       Past Medical History:   Diagnosis Date   • Anxiety    • Asthma    • CHF (congestive heart failure)    • Chronic pain disorder    • Chronic UTI    • Depression    • Diabetes    • GERD (gastroesophageal reflux disease)    • H/O CHF    • HTN (hypertension)    • Hyperlipidemia    • Incontinence    • Injury of back     spinal stenosis, chronic back pain   • Low back pain    • Lumbosacral disc disease    • MRSA infection (methicillin-resistant Staphylococcus aureus)     to left foot states approx 12-13 years ago    • Osteoarthritis    • Sleep apnea     uses cpap   • Spinal stenosis    • Stroke     pt states PMD has said she may be having mini strokes   • Vertigo      Past Surgical History:   Procedure Laterality Date   • CHOLECYSTECTOMY OPEN     • COLONOSCOPY     • ENDOSCOPY     • HYSTERECTOMY     • TOTAL KNEE ARTHROPLASTY Bilateral      Family History   Problem Relation Age of Onset   • Lung disease Mother    • Cancer Mother    • Heart disease Father    • Diabetes Paternal Aunt    • Diabetes Paternal Uncle    • Diabetes Paternal Grandmother    • Diabetes Paternal Grandfather      Social History   Substance  Use Topics   • Smoking status: Never Smoker   • Smokeless tobacco: Never Used   • Alcohol use No     Prescriptions Prior to Admission   Medication Sig Dispense Refill Last Dose   • acetaminophen (TYLENOL) 325 MG tablet Take 2 tablets by mouth Every 6 (Six) Hours As Needed for mild pain (1-3) or moderate pain (4-6). 60 tablet 0 Past Month at Unknown time   • albuterol (ACCUNEB) 1.25 MG/3ML nebulizer solution Inhale 1 ampule 2 (Two) Times a Day. Albuterol Sulfate NEBU; Patient Sig: Albuterol Sulfate NEBU inhale 2 puffs twice daily; 0; 18-Sep-2012; Active   1/13/2018 at Unknown time   • albuterol (PROVENTIL HFA;VENTOLIN HFA) 108 (90 BASE) MCG/ACT inhaler Inhale 2 puffs Every 4 (Four) Hours As Needed for wheezing.   1/13/2018 at Unknown time   • baclofen (LIORESAL) 10 MG tablet Take 10 mg by mouth Daily.   Past Week at Unknown time   • Cholecalciferol (VITAMIN D3) 97145 UNITS capsule Take 1 capsule by mouth Every 7 (Seven) Days. (Patient taking differently: Take 50,000 Units by mouth Every 7 (Seven) Days. Takes on mondays) 4 capsule 0 Past Week at Unknown time   • estradiol (ESTRACE) 0.1 MG/GM vaginal cream Insert 1 g into the vagina Daily.   Past Week at Unknown time   • fenofibrate 160 MG tablet Take 160 mg by mouth Daily.   1/12/2018 at Unknown time   • fluticasone-salmeterol (ADVAIR) 250-50 MCG/DOSE DISKUS Inhale 2 puffs Every 12 (Twelve) Hours.   1/13/2018 at Unknown time   • JANUMET XR  MG tablet sustained-release 24 hour 2 (Two) Times a Day.   1/12/2018 at Unknown time   • meloxicam (MOBIC) 15 MG tablet Take  by mouth daily.   1/12/2018 at Unknown time   • metoprolol succinate XL (TOPROL XL) 25 MG 24 hr tablet Take 1 tablet by mouth Daily. 30 tablet 0 Past Week at Unknown time   • omeprazole (priLOSEC) 40 MG capsule Take 40 mg by mouth Daily.   1/12/2018 at Unknown time   • PARoxetine (PAXIL) 40 MG tablet Take 0.5 tablets by mouth Daily. (Patient taking differently: Take 40 mg by mouth Daily.) 15 tablet 0  "Past Week at Unknown time   • pioglitazone (ACTOS) 30 MG tablet 30 mg.   1/12/2018 at Unknown time   • pregabalin (LYRICA) 75 MG capsule Take 150 mg by mouth 2 (Two) Times a Day.   1/12/2018 at Unknown time   • SYMBICORT 160-4.5 MCG/ACT inhaler    1/12/2018 at Unknown time   • traMADol (ULTRAM) 50 MG tablet Take 1 tablet by mouth Every 6 (Six) Hours As Needed for Moderate Pain . 30 tablet 0 1/13/2018 at Unknown time   • hyoscyamine sulfate (ANASPAZ) 0.125 MG tablet dispersible disintegrating tablet Take 125 mcg by mouth Every 4 (Four) Hours As Needed.   Unknown at Unknown time   • metFORMIN (GLUCOPHAGE) 500 MG tablet Take 1 tablet by mouth 2 (Two) Times a Day With Meals. 60 tablet 0 Taking   • nitrofurantoin, macrocrystal-monohydrate, (MACROBID) 100 MG capsule Take 100 mg by mouth 2 (Two) Times a Day.   Taking     Allergies:  Augmentin [amoxicillin-pot clavulanate]; Avelox [moxifloxacin]; Codeine; Morphine and related; Penicillins; and Sulfa antibiotics    REVIEW OF SYSTEMS:  Please see the above history of present illness for pertinent positives and negatives.  The remainder of the patient's systems have been reviewed and are negative.    Vital Signs  Temp:  [98.2 °F (36.8 °C)-99.3 °F (37.4 °C)] 99.3 °F (37.4 °C)  Heart Rate:  [] 108  Resp:  [16-20] 20  BP: (119-150)/() 127/68  Oxygen Therapy  SpO2: 94 %  Pulse Oximetry Type: Continuous  O2 Device: room air}  Body mass index is 29.88 kg/(m^2).  Flowsheet Rows         First Filed Value    Admission Height  160.2 cm (63.07\") Documented at 01/13/2018 1254    Admission Weight  76.7 kg (169 lb) Documented at 01/13/2018 1254             Physical Exam:  Physical Exam   Constitutional: Patient appears well-developed and well-nourished and in no acute distress   HEENT:   Head: Normocephalic and atraumatic.   Eyes:  Pupils are equal, round, and reactive to light. EOM are intact. Sclera are anicteric and non-injected.  Mouth and Throat: Patient has moist mucous " membranes. Oropharynx is clear of any erythema or exudate.     Neck: Neck supple. No JVD present. No thyromegaly present. No lymphadenopathy present.  Cardiovascular: Regular rate, regular rhythm, S1 normal and S2 normal.  Exam reveals no gallop and no friction rub.  No murmur heard.  Pulmonary/Chest: Lungs are clear to auscultation bilaterally. No respiratory distress. No wheezes. No rhonchi. No rales.   Abdominal: Soft. Bowel sounds are normal. No distension and no mass. There is no hepatosplenomegaly. There is no tenderness.   Musculoskeletal:TTP over right knee with decreased ROM  Extremities: No edema. Pulses are palpable in all 4 extremities.  Neurological: Patient is alert and oriented to person, place, and time. no focal deficits.  Skin: Skin is warm. No rash noted. Nails show no clubbing.  No cyanosis or erythema.     Results Review:    I reviewed the patient's new clinical results.  Lab Results (most recent)     Procedure Component Value Units Date/Time    CBC & Differential [[id number]] Collected:  01/13/18 1523    Specimen:  Blood Updated:  01/13/18 1534    Narrative:       The following orders were created for panel order CBC & Differential.  Procedure                               Abnormality         Status                     ---------                               -----------         ------                     CBC Auto Differential[[id number]]        Abnormal            Final result                 Please view results for these tests on the individual orders.    CBC Auto Differential [[id number]]  (Abnormal) Collected:  01/13/18 1523    Specimen:  Blood Updated:  01/13/18 1534     WBC 12.67 (H) 10*3/mm3      RBC 4.36 10*6/mm3      Hemoglobin 11.8 (L) g/dL      Hematocrit 36.3 (L) %      MCV 83.3 fL      MCH 27.1 pg      MCHC 32.5 g/dL      RDW 18.6 (H) %      RDW-SD 55.8 (H) fl      MPV 10.3 fL      Platelets 306 10*3/mm3      Neutrophil % 75.0 (H) %      Lymphocyte % 15.7 (L) %      Monocyte % 8.4 (H)  %      Eosinophil % 0.1 %      Basophil % 0.3 %      Immature Grans % 0.5 %      Neutrophils, Absolute 9.50 (H) 10*3/mm3      Lymphocytes, Absolute 1.99 10*3/mm3      Monocytes, Absolute 1.07 (H) 10*3/mm3      Eosinophils, Absolute 0.01 (L) 10*3/mm3      Basophils, Absolute 0.04 10*3/mm3      Immature Grans, Absolute 0.06 (H) 10*3/mm3      nRBC 0.0 /100 WBC     Protime-INR [[id number]]  (Normal) Collected:  01/13/18 1523    Specimen:  Blood Updated:  01/13/18 1543     Protime 14.1 Seconds      INR 1.09    Narrative:       Therapeutic Ranges for INR: 2.0-3.0 (PT 20-30)                              2.5-3.5 (PT 25-34)    aPTT [[id number]]  (Normal) Collected:  01/13/18 1523    Specimen:  Blood Updated:  01/13/18 1543     PTT 30.6 seconds     Narrative:       PTT = The equivalent PTT values for the therapeutic range of heparin levels at 0.1 to 0.7 U/ml are 53 to 110 seconds.    Comprehensive Metabolic Panel [[id number]]  (Abnormal) Collected:  01/13/18 1523    Specimen:  Blood Updated:  01/13/18 1549     Glucose 99 mg/dL      BUN 13 mg/dL      Creatinine 0.67 mg/dL      Sodium 139 mmol/L      Potassium 4.4 mmol/L      Chloride 96 (L) mmol/L      CO2 27.4 mmol/L      Calcium 9.4 mg/dL      Total Protein 7.1 g/dL      Albumin 3.90 g/dL      ALT (SGPT) 12 U/L      AST (SGOT) 24 U/L      Alkaline Phosphatase 43 U/L      Total Bilirubin 0.5 mg/dL      eGFR Non African Amer 88 mL/min/1.73      Globulin 3.2 gm/dL      A/G Ratio 1.2 g/dL      BUN/Creatinine Ratio 19.4     Anion Gap 15.6 mmol/L           Imaging Results (most recent)     Procedure Component Value Units Date/Time    XR Knee 1 or 2 View Right [[id number]] Updated:  01/13/18 1526    XR Chest 1 View [[id number]] Updated:  01/13/18 1528    XR Femur 2 View Right [[id number]] Updated:  01/13/18 1646    CT Lower Extremity Right Without Contrast [[id number]] Updated:  01/13/18 1706            ECG/EMG Results (most recent)     Procedure Component Value Units Date/Time    ECG 12  Lead [[id number]] Collected:  01/13/18 1512     Updated:  01/13/18 1514            Assessment/Plan     Right femur fracture with right knee replacement  -ortho consulted  -Plain film reviewed with distal femur fx near knee replacement  -CT knee per them for eval  -pre-op labs reviewed, medically seems ok for surgery although moderate risk  -pain control     HTN, HLD  -chronic, c/w home meds and BB    Asthma  -chronic  -no acute exacerbation    Type II DM  -chronic  -hold oral meds  -SSI with adjustments prn    GERD  -chronic  -c/w PPI    MARGARET  -non-comliant with CPAP at home    MIld dementia  -at baseline per daughter  -delerium and fall precautions    Chonic pain  -management as above    DVT-SCDs until surgery plans finalized    I discussed the patients findings and my recommendations with patient and family.     Nahun Zacarias DO  01/13/18  5:43 PM    Time:

## 2018-01-13 NOTE — ED PROVIDER NOTES
Subjective   History of Present Illness  History of Present Illness    Chief complaint: Knee pain and swelling    Location: Right knee    Quality/Severity:  Severe    Timing/Duration: Patient had right knee replacement approximately 10 years ago and has been having some pain in the knee for several months.  2 days ago the patient fell getting out of an automobile and injured the right knee.    Modifying Factors: Dementia    Associated Symptoms: Unable to bear weight due to the pain    Narrative: The patient is a 67-year-old white female who presents as noted above.  The patient presented via EMS without any family members.  It was immediately obvious that the patient had significant dementia as she could not accurately explained her current presentation or the exact timing of the injury.  The  was contacted by phone and relayed that the patient was being seen for right knee pain by Dr. Hartman.  The  also related that the patient injured her knee 2 days ago and has not been able to bear weight since.  The patient's current dementia is at baseline per family members.    Review of Systems   Unable to perform ROS: Dementia       Past Medical History:   Diagnosis Date   • Anxiety    • Asthma    • CHF (congestive heart failure)    • Chronic pain disorder    • Chronic UTI    • Depression    • Diabetes    • GERD (gastroesophageal reflux disease)    • H/O CHF    • HTN (hypertension)    • Hyperlipidemia    • Incontinence    • Injury of back     spinal stenosis, chronic back pain   • Low back pain    • Lumbosacral disc disease    • MRSA infection (methicillin-resistant Staphylococcus aureus)     to left foot states approx 12-13 years ago    • Osteoarthritis    • Sleep apnea     uses cpap   • Spinal stenosis    • Stroke     pt states PMD has said she may be having mini strokes   • Vertigo        Allergies   Allergen Reactions   • Augmentin [Amoxicillin-Pot Clavulanate]    • Avelox [Moxifloxacin]    • Codeine    •  Morphine And Related    • Penicillins    • Sulfa Antibiotics        Past Surgical History:   Procedure Laterality Date   • CHOLECYSTECTOMY OPEN     • COLONOSCOPY     • ENDOSCOPY     • HYSTERECTOMY     • TOTAL KNEE ARTHROPLASTY Bilateral        Family History   Problem Relation Age of Onset   • Lung disease Mother    • Cancer Mother    • Heart disease Father    • Diabetes Paternal Aunt    • Diabetes Paternal Uncle    • Diabetes Paternal Grandmother    • Diabetes Paternal Grandfather        Social History     Social History   • Marital status:      Spouse name: N/A   • Number of children: N/A   • Years of education: N/A     Social History Main Topics   • Smoking status: Never Smoker   • Smokeless tobacco: Never Used   • Alcohol use No   • Drug use: No   • Sexual activity: Defer     Other Topics Concern   • None     Social History Narrative           Objective   Physical Exam   Constitutional: She appears well-developed and well-nourished.   HENT:   Head: Normocephalic and atraumatic.   Eyes: Conjunctivae and EOM are normal.   Neck: Normal range of motion. Neck supple. No thyromegaly present.   Cardiovascular: Normal rate, regular rhythm and normal heart sounds.    No murmur heard.  Pulmonary/Chest: Effort normal and breath sounds normal. No respiratory distress. She has no wheezes. She has no rales.   Abdominal: Soft. Bowel sounds are normal. She exhibits no distension. There is no tenderness.   Musculoskeletal: Normal range of motion. She exhibits no edema.   The right knee does demonstrate an old surgical scar consistent with prior knee replacement.  There is moderate swelling of the joint along with a significant effusion.  Mild heat noted.  Decreased range of motion due to pain.  The neuromuscular vascular exams are intact distally.   Lymphadenopathy:     She has no cervical adenopathy.   Neurological: She is alert.   The patient is pleasantly confused and disoriented to time.   Skin: Skin is warm and dry.  No rash noted.   Nursing note and vitals reviewed.      Final diagnoses:   Closed fracture of distal end of right femur, unspecified fracture morphology, initial encounter       Procedures         ED Course  ED Course   Comment By Time   01/13/18, 2:58 PM  My contemporaneous wet read on the knee x-ray did show a fracture of the distal femur around the previously placed hardware.  Minimal displacement. Manish Dwyer MD 01/13 1459   01/13/18, 3:14 PM  Case and findings discussed with Dr. Moy Sanchez who agreed that the patient needed admission for further evaluation/treatment.  Preoperative orders entered and the EKG was reviewed at 1515 hrs. and showed a normal sinus rhythm with a rate of 99 bpm.  QRS complexes, ST segments and T waves all unremarkable.  Family will be contacted concerning the need for admission. Manish Dwyer MD 01/13 1515   01/13/18, 4:04 PM  Case and findings discussed with the hospitalist, Dr. Zacarias, who agreed that the patient's condition warranted admission for further treatment.  Patient and family agreeable to admission. Manish Dwyer MD 01/13 1605                  MDM  Number of Diagnoses or Management Options  Closed fracture of distal end of right femur, unspecified fracture morphology, initial encounter: new and requires workup     Amount and/or Complexity of Data Reviewed  Clinical lab tests: ordered and reviewed  Tests in the radiology section of CPT®: ordered and reviewed  Review and summarize past medical records: yes (Orthopedic office records reviewed)  Discuss the patient with other providers: yes  Independent visualization of images, tracings, or specimens: yes    Risk of Complications, Morbidity, and/or Mortality  Presenting problems: high  Diagnostic procedures: high  Management options: high    Critical Care  Total time providing critical care: < 30 minutes    Patient Progress  Patient progress: stable    Labs this visit  Lab Results (last 24 hours)      Procedure Component Value Units Date/Time    CBC & Differential [[id number]] Collected:  01/13/18 1523    Specimen:  Blood Updated:  01/13/18 1534    Narrative:       The following orders were created for panel order CBC & Differential.  Procedure                               Abnormality         Status                     ---------                               -----------         ------                     CBC Auto Differential[[id number]]        Abnormal            Final result                 Please view results for these tests on the individual orders.    Comprehensive Metabolic Panel [[id number]]  (Abnormal) Collected:  01/13/18 1523    Specimen:  Blood Updated:  01/13/18 1549     Glucose 99 mg/dL      BUN 13 mg/dL      Creatinine 0.67 mg/dL      Sodium 139 mmol/L      Potassium 4.4 mmol/L      Chloride 96 (L) mmol/L      CO2 27.4 mmol/L      Calcium 9.4 mg/dL      Total Protein 7.1 g/dL      Albumin 3.90 g/dL      ALT (SGPT) 12 U/L      AST (SGOT) 24 U/L      Alkaline Phosphatase 43 U/L      Total Bilirubin 0.5 mg/dL      eGFR Non African Amer 88 mL/min/1.73      Globulin 3.2 gm/dL      A/G Ratio 1.2 g/dL      BUN/Creatinine Ratio 19.4     Anion Gap 15.6 mmol/L     Protime-INR [[id number]]  (Normal) Collected:  01/13/18 1523    Specimen:  Blood Updated:  01/13/18 1543     Protime 14.1 Seconds      INR 1.09    Narrative:       Therapeutic Ranges for INR: 2.0-3.0 (PT 20-30)                              2.5-3.5 (PT 25-34)    aPTT [[id number]]  (Normal) Collected:  01/13/18 1523    Specimen:  Blood Updated:  01/13/18 1543     PTT 30.6 seconds     Narrative:       PTT = The equivalent PTT values for the therapeutic range of heparin levels at 0.1 to 0.7 U/ml are 53 to 110 seconds.    CBC Auto Differential [[id number]]  (Abnormal) Collected:  01/13/18 1523    Specimen:  Blood Updated:  01/13/18 1534     WBC 12.67 (H) 10*3/mm3      RBC 4.36 10*6/mm3      Hemoglobin 11.8 (L) g/dL      Hematocrit 36.3 (L) %      MCV 83.3  fL      MCH 27.1 pg      MCHC 32.5 g/dL      RDW 18.6 (H) %      RDW-SD 55.8 (H) fl      MPV 10.3 fL      Platelets 306 10*3/mm3      Neutrophil % 75.0 (H) %      Lymphocyte % 15.7 (L) %      Monocyte % 8.4 (H) %      Eosinophil % 0.1 %      Basophil % 0.3 %      Immature Grans % 0.5 %      Neutrophils, Absolute 9.50 (H) 10*3/mm3      Lymphocytes, Absolute 1.99 10*3/mm3      Monocytes, Absolute 1.07 (H) 10*3/mm3      Eosinophils, Absolute 0.01 (L) 10*3/mm3      Basophils, Absolute 0.04 10*3/mm3      Immature Grans, Absolute 0.06 (H) 10*3/mm3      nRBC 0.0 /100 WBC         Prescribed on discharge             Medication List      Notice     No changes were made to your prescriptions during this visit.        All lab results, imaging results and other tests were reviewed by Manish Dwyer MD and unless otherwise specified were found to be unremarkable.      Final diagnoses:   Closed fracture of distal end of right femur, unspecified fracture morphology, initial encounter            Manish Dwyer MD  01/13/18 0734

## 2018-01-14 LAB
ANION GAP SERPL CALCULATED.3IONS-SCNC: 15.8 MMOL/L
BUN BLD-MCNC: 16 MG/DL (ref 8–23)
BUN/CREAT SERPL: 22.5 (ref 7–25)
CALCIUM SPEC-SCNC: 8.6 MG/DL (ref 8.8–10.5)
CHLORIDE SERPL-SCNC: 97 MMOL/L (ref 98–107)
CO2 SERPL-SCNC: 26.2 MMOL/L (ref 22–29)
CREAT BLD-MCNC: 0.71 MG/DL (ref 0.57–1)
DEPRECATED RDW RBC AUTO: 55.1 FL (ref 37–54)
ERYTHROCYTE [DISTWIDTH] IN BLOOD BY AUTOMATED COUNT: 18.3 % (ref 11.5–14.5)
ERYTHROCYTE [SEDIMENTATION RATE] IN BLOOD: 38 MM/HR (ref 0–20)
GFR SERPL CREATININE-BSD FRML MDRD: 82 ML/MIN/1.73
GLUCOSE BLD-MCNC: 129 MG/DL (ref 65–99)
GLUCOSE BLDC GLUCOMTR-MCNC: 111 MG/DL (ref 70–130)
GLUCOSE BLDC GLUCOMTR-MCNC: 128 MG/DL (ref 70–130)
GLUCOSE BLDC GLUCOMTR-MCNC: 139 MG/DL (ref 70–130)
GLUCOSE BLDC GLUCOMTR-MCNC: 92 MG/DL (ref 70–130)
HCT VFR BLD AUTO: 34.3 % (ref 37–47)
HGB BLD-MCNC: 11 G/DL (ref 12–16)
MCH RBC QN AUTO: 26.5 PG (ref 27–31)
MCHC RBC AUTO-ENTMCNC: 32.1 G/DL (ref 31–37)
MCV RBC AUTO: 82.7 FL (ref 81–99)
PLATELET # BLD AUTO: 293 10*3/MM3 (ref 140–500)
PMV BLD AUTO: 10.3 FL (ref 7.4–10.4)
POTASSIUM BLD-SCNC: 3.8 MMOL/L (ref 3.5–5.2)
RBC # BLD AUTO: 4.15 10*6/MM3 (ref 4.2–5.4)
SODIUM BLD-SCNC: 139 MMOL/L (ref 136–145)
WBC NRBC COR # BLD: 9.99 10*3/MM3 (ref 4.8–10.8)

## 2018-01-14 PROCEDURE — 99232 SBSQ HOSP IP/OBS MODERATE 35: CPT | Performed by: INTERNAL MEDICINE

## 2018-01-14 PROCEDURE — 82962 GLUCOSE BLOOD TEST: CPT

## 2018-01-14 PROCEDURE — 85027 COMPLETE CBC AUTOMATED: CPT | Performed by: INTERNAL MEDICINE

## 2018-01-14 PROCEDURE — 94799 UNLISTED PULMONARY SVC/PX: CPT

## 2018-01-14 PROCEDURE — 86140 C-REACTIVE PROTEIN: CPT | Performed by: ORTHOPAEDIC SURGERY

## 2018-01-14 PROCEDURE — 80048 BASIC METABOLIC PNL TOTAL CA: CPT | Performed by: INTERNAL MEDICINE

## 2018-01-14 PROCEDURE — 85652 RBC SED RATE AUTOMATED: CPT | Performed by: ORTHOPAEDIC SURGERY

## 2018-01-14 RX ORDER — OXYCODONE HYDROCHLORIDE AND ACETAMINOPHEN 5; 325 MG/1; MG/1
TABLET ORAL
Status: COMPLETED
Start: 2018-01-14 | End: 2018-01-14

## 2018-01-14 RX ORDER — SODIUM CHLORIDE 9 MG/ML
125 INJECTION, SOLUTION INTRAVENOUS CONTINUOUS
Status: DISCONTINUED | OUTPATIENT
Start: 2018-01-14 | End: 2018-01-15

## 2018-01-14 RX ORDER — MEPERIDINE HYDROCHLORIDE 25 MG/ML
25 INJECTION INTRAMUSCULAR; INTRAVENOUS; SUBCUTANEOUS ONCE
Status: COMPLETED | OUTPATIENT
Start: 2018-01-14 | End: 2018-01-14

## 2018-01-14 RX ORDER — OXYCODONE HYDROCHLORIDE AND ACETAMINOPHEN 5; 325 MG/1; MG/1
1 TABLET ORAL EVERY 4 HOURS PRN
Status: DISCONTINUED | OUTPATIENT
Start: 2018-01-14 | End: 2018-01-16

## 2018-01-14 RX ORDER — MEPERIDINE HYDROCHLORIDE 25 MG/ML
25 INJECTION INTRAMUSCULAR; INTRAVENOUS; SUBCUTANEOUS 2 TIMES DAILY PRN
Status: DISPENSED | OUTPATIENT
Start: 2018-01-14 | End: 2018-01-17

## 2018-01-14 RX ADMIN — TRAMADOL HYDROCHLORIDE 50 MG: 50 TABLET, FILM COATED ORAL at 12:50

## 2018-01-14 RX ADMIN — OXYCODONE HYDROCHLORIDE AND ACETAMINOPHEN 1 TABLET: 5; 325 TABLET ORAL at 18:11

## 2018-01-14 RX ADMIN — ACETAMINOPHEN 650 MG: 325 TABLET, FILM COATED ORAL at 02:42

## 2018-01-14 RX ADMIN — SODIUM CHLORIDE 125 ML/HR: 9 INJECTION, SOLUTION INTRAVENOUS at 13:56

## 2018-01-14 RX ADMIN — MEPERIDINE HYDROCHLORIDE 25 MG: 25 INJECTION, SOLUTION INTRAMUSCULAR; INTRAVENOUS; SUBCUTANEOUS at 22:20

## 2018-01-14 RX ADMIN — PAROXETINE HYDROCHLORIDE 20 MG: 20 TABLET, FILM COATED ORAL at 12:50

## 2018-01-14 RX ADMIN — BACLOFEN 10 MG: 10 TABLET ORAL at 12:51

## 2018-01-14 RX ADMIN — METOPROLOL SUCCINATE 25 MG: 25 TABLET, EXTENDED RELEASE ORAL at 12:51

## 2018-01-14 RX ADMIN — BUDESONIDE AND FORMOTEROL FUMARATE DIHYDRATE 2 PUFF: 80; 4.5 AEROSOL RESPIRATORY (INHALATION) at 19:37

## 2018-01-14 RX ADMIN — PANTOPRAZOLE SODIUM 40 MG: 40 TABLET, DELAYED RELEASE ORAL at 06:15

## 2018-01-14 RX ADMIN — TRAMADOL HYDROCHLORIDE 50 MG: 50 TABLET, FILM COATED ORAL at 06:15

## 2018-01-14 RX ADMIN — SODIUM CHLORIDE 125 ML/HR: 9 INJECTION, SOLUTION INTRAVENOUS at 05:27

## 2018-01-14 RX ADMIN — BUDESONIDE AND FORMOTEROL FUMARATE DIHYDRATE 2 PUFF: 80; 4.5 AEROSOL RESPIRATORY (INHALATION) at 08:03

## 2018-01-14 RX ADMIN — MEPERIDINE HYDROCHLORIDE 25 MG: 25 INJECTION, SOLUTION INTRAMUSCULAR; INTRAVENOUS; SUBCUTANEOUS at 03:17

## 2018-01-14 NOTE — PLAN OF CARE
Problem: Patient Care Overview (Adult)  Goal: Discharge Needs Assessment  Outcome: Ongoing (interventions implemented as appropriate)   01/13/18 1724 01/13/18 1731 01/14/18 1457   Discharge Needs Assessment   Discharge Planning Comments --  --  Patient did use [facility] in 2016.    Living Environment   Transportation Available --  car --    Self-Care   Equipment Currently Used at Home bath bench;rollator;walker, rolling --  --

## 2018-01-14 NOTE — PROGRESS NOTES
"Hospitalist Team      Patient Care Team:  Linden Zhang MD as PCP - General  Linden Zhang MD as PCP - Family Medicine        Chief Complaint:  Slipped and fell and hurt right knee    Subjective    Reports that pain is better after shot of demerol. No other new issues. NO CP, n/v/d, fever or chills.     Objective    Vital Signs  Temp:  [98.2 °F (36.8 °C)-99.3 °F (37.4 °C)] 98.4 °F (36.9 °C)  Heart Rate:  [] 85  Resp:  [16-20] 18  BP: (119-150)/() 130/72  Oxygen Therapy  SpO2: 95 %  Pulse Oximetry Type: Intermittent  O2 Device: room air}  Flowsheet Rows         First Filed Value    Admission Height  160.2 cm (63.07\") Documented at 01/13/2018 1254    Admission Weight  76.7 kg (169 lb) Documented at 01/13/2018 1254            Physical Exam:    General Appearance:    Alert, cooperative, in no acute distress   Head:    Normocephalic, without obvious abnormality, atraumatic   Eyes:            Lids and lashes normal, conjunctivae and sclerae normal, no   icterus, no pallor, corneas clear, PERRLA               Back:     No kyphosis present, no scoliosis present, no skin lesions,       erythema or scars, no tenderness to percussion or                   palpation,   range of motion normal   Lungs:     Clear to auscultation,respirations regular, even and                   unlabored    Heart:    Regular rhythm and normal rate, normal S1 and S2, no            murmur, no gallop, no rub, no click               Extremities:   TTP over right knee with decreased ROM   Pulses:   Pulses palpable and equal bilaterally   Skin:   No bleeding, bruising or rash       Neurologic:   Cranial nerves 2 - 12 grossly intact, sensation intact, DTR        present and equal bilaterally       Results Review:     I reviewed the patient's new clinical results.    Lab Results (last 24 hours)     Procedure Component Value Units Date/Time    CBC & Differential [[id number]] Collected:  01/13/18 1523    Specimen:  Blood Updated:  01/13/18 " 1534    Narrative:       The following orders were created for panel order CBC & Differential.  Procedure                               Abnormality         Status                     ---------                               -----------         ------                     CBC Auto Differential[[id number]]        Abnormal            Final result                 Please view results for these tests on the individual orders.    CBC Auto Differential [[id number]]  (Abnormal) Collected:  01/13/18 1523    Specimen:  Blood Updated:  01/13/18 1534     WBC 12.67 (H) 10*3/mm3      RBC 4.36 10*6/mm3      Hemoglobin 11.8 (L) g/dL      Hematocrit 36.3 (L) %      MCV 83.3 fL      MCH 27.1 pg      MCHC 32.5 g/dL      RDW 18.6 (H) %      RDW-SD 55.8 (H) fl      MPV 10.3 fL      Platelets 306 10*3/mm3      Neutrophil % 75.0 (H) %      Lymphocyte % 15.7 (L) %      Monocyte % 8.4 (H) %      Eosinophil % 0.1 %      Basophil % 0.3 %      Immature Grans % 0.5 %      Neutrophils, Absolute 9.50 (H) 10*3/mm3      Lymphocytes, Absolute 1.99 10*3/mm3      Monocytes, Absolute 1.07 (H) 10*3/mm3      Eosinophils, Absolute 0.01 (L) 10*3/mm3      Basophils, Absolute 0.04 10*3/mm3      Immature Grans, Absolute 0.06 (H) 10*3/mm3      nRBC 0.0 /100 WBC     Protime-INR [[id number]]  (Normal) Collected:  01/13/18 1523    Specimen:  Blood Updated:  01/13/18 1543     Protime 14.1 Seconds      INR 1.09    Narrative:       Therapeutic Ranges for INR: 2.0-3.0 (PT 20-30)                              2.5-3.5 (PT 25-34)    aPTT [[id number]]  (Normal) Collected:  01/13/18 1523    Specimen:  Blood Updated:  01/13/18 1543     PTT 30.6 seconds     Narrative:       PTT = The equivalent PTT values for the therapeutic range of heparin levels at 0.1 to 0.7 U/ml are 53 to 110 seconds.    Comprehensive Metabolic Panel [[id number]]  (Abnormal) Collected:  01/13/18 1523    Specimen:  Blood Updated:  01/13/18 1549     Glucose 99 mg/dL      BUN 13 mg/dL      Creatinine 0.67 mg/dL       Sodium 139 mmol/L      Potassium 4.4 mmol/L      Chloride 96 (L) mmol/L      CO2 27.4 mmol/L      Calcium 9.4 mg/dL      Total Protein 7.1 g/dL      Albumin 3.90 g/dL      ALT (SGPT) 12 U/L      AST (SGOT) 24 U/L      Alkaline Phosphatase 43 U/L      Total Bilirubin 0.5 mg/dL      eGFR Non African Amer 88 mL/min/1.73      Globulin 3.2 gm/dL      A/G Ratio 1.2 g/dL      BUN/Creatinine Ratio 19.4     Anion Gap 15.6 mmol/L     POC Glucose Once [[id number]]  (Normal) Collected:  01/13/18 1751    Specimen:  Blood Updated:  01/13/18 1757     Glucose 97 mg/dL     Narrative:       Meter: [id number] : [id number] Mynor Mccray CNA Float    Urine Culture - Urine, Urine, Clean Catch [[id number]] Collected:  01/13/18 1848    Specimen:  Urine from Urine, Catheter Updated:  01/13/18 1938    Urinalysis With / Culture If Indicated - Urine, Catheter [[id number]]  (Abnormal) Collected:  01/13/18 1848    Specimen:  Urine from Urine, Catheter Updated:  01/13/18 2033     Color, UA Yellow     Appearance, UA Cloudy (A)     pH, UA 5.5     Specific Gravity, UA 1.025     Glucose, UA Negative     Ketones, UA 40 mg/dL (2+) (A)     Bilirubin, UA Negative     Blood, UA Moderate (2+) (A)     Protein, UA Negative     Leuk Esterase, UA Moderate (2+) (A)     Nitrite, UA Positive (A)     Urobilinogen, UA 0.2 E.U./dL    Urinalysis, Microscopic Only - Urine, Clean Catch [[id number]]  (Abnormal) Collected:  01/13/18 1848    Specimen:  Urine from Urine, Catheter Updated:  01/13/18 2033     RBC, UA Too Numerous to Count (A) /HPF      WBC, UA Too Numerous to Count (A) /HPF      Bacteria, UA 3+ (A) /HPF      Squamous Epithelial Cells, UA 0-2 /HPF      Hyaline Casts, UA None Seen /LPF      Mucus, UA Small/1+ (A) /HPF      WBC Clumps, UA Moderate/2+ /HPF      Methodology Manual Light Microscopy    POC Glucose Once [[id number]]  (Abnormal) Collected:  01/13/18 2108    Specimen:  Blood Updated:  01/13/18 2116     Glucose 173 (H) mg/dL     Narrative:        Meter: [id number] : [id number] Roberto Lagunas CNA    CBC (No Diff) [[id number]]  (Abnormal) Collected:  01/14/18 0354    Specimen:  Blood Updated:  01/14/18 0420     WBC 9.99 10*3/mm3      RBC 4.15 (L) 10*6/mm3      Hemoglobin 11.0 (L) g/dL      Hematocrit 34.3 (L) %      MCV 82.7 fL      MCH 26.5 (L) pg      MCHC 32.1 g/dL      RDW 18.3 (H) %      RDW-SD 55.1 (H) fl      MPV 10.3 fL      Platelets 293 10*3/mm3     Basic Metabolic Panel [[id number]]  (Abnormal) Collected:  01/14/18 0354    Specimen:  Blood Updated:  01/14/18 0442     Glucose 129 (H) mg/dL      BUN 16 mg/dL      Creatinine 0.71 mg/dL      Sodium 139 mmol/L      Potassium 3.8 mmol/L      Chloride 97 (L) mmol/L      CO2 26.2 mmol/L      Calcium 8.6 (L) mg/dL      eGFR Non African Amer 82 mL/min/1.73      BUN/Creatinine Ratio 22.5     Anion Gap 15.8 mmol/L     Narrative:       GFR Normal >60  Chronic Kidney Disease <60  Kidney Failure <15          Imaging Results (last 24 hours)     Procedure Component Value Units Date/Time    CT Lower Extremity Right Without Contrast [[id number]] Updated:  01/13/18 1706    XR Knee 1 or 2 View Right [[id number]] Collected:  01/14/18 0803     Updated:  01/14/18 0806    Narrative:       INDICATION: Knee pain after slipping and falling on the ice last night.  Previous knee replacement.     TECHNIQUE: 2 views the right knee were obtained     FINDINGS: A knee prosthesis is seen with satisfactory alignment and  position. There is an acute fracture of the distal femur just at the  upper and of the femoral prosthesis. The fracture is mildly displaced  with minimal apex anterior angulation. The tibial component is  unremarkable. No radiodense foreign bodies are seen.       Impression:       Acute fracture of the distal femur just above the femoral  component of the knee prosthesis as described above.     This report was finalized on 1/14/2018 8:04 AM by Dr. Jose Neri MD.       XR Chest 1 View [[id number]] Collected:  01/14/18  0804     Updated:  01/14/18 0807    Narrative:       INDICATION: Distal femoral fracture. Presurgical evaluation.     TECHNIQUE: A single AP view the chest was obtained     FINDINGS: Mild cardiomegaly is noted. Both lungs are fully expanded and  clear with normal vascular markings. No pleural fluid is seen.       Impression:       Mild cardiomegaly. No active disease     This report was finalized on 1/14/2018 8:05 AM by Dr. Jose Neri MD.       XR Femur 2 View Right [[id number]] Collected:  01/14/18 0805     Updated:  01/14/18 0808    Narrative:       INDICATION: Leg pain after slipping and falling on the ice last night     TECHNIQUE: 4 views of the right femur were obtained     FINDINGS: There is a fracture of the distal femur just above the femoral  component of the knee prosthesis. The fracture is obliquely oriented.  There is mild displacement and minimal apex anterior angulation. No  additional femoral fractures are seen. Diffuse small vessel  atherosclerotic calcification is seen in the S after a. Mild  degenerative changes are seen at the right hip.       Impression:       Minimally angulated mildly displaced distal femoral fracture     This report was finalized on 1/14/2018 8:06 AM by Dr. Jose Neri MD.                 ECG  personally by physician  ECG/EMG Results (most recent)     Procedure Component Value Units Date/Time    ECG 12 Lead [[id number]] Collected:  01/13/18 1512     Updated:  01/13/18 2232    Narrative:       RR Interval= 606 ms  MO Interval= 128 ms  QRSD Interval= 72 ms  QT Interval= 344 ms  QTc Interval= 442 ms  Heart Rate= 99 ms  P Axis= 27 deg  QRS Axis= 41 deg  T Wave Axis= 184 deg  I: 40 Axis= 36 deg  T: 40 Axis= 54 deg  ST Axis= 209 deg  SINUS RHYTHM  BORDERLINE REPOLARIZATION ABNORMALITY  NO SIGNIFICANT CHANGE FROM PREVIOUS ECG  Electronically Signed by:  Tara Avitia ([facility]) 13-Jan-2018 22:32:21  Date and Time of Study: 2018-01-13 15:12:47          Medication Review:   I have  reviewed the patient's current medication list    Current Facility-Administered Medications:   •  acetaminophen (TYLENOL) tablet 650 mg, 650 mg, Oral, Q6H PRN, Nahun Zacarias, DO, 650 mg at 01/14/18 0242  •  albuterol (PROVENTIL HFA;VENTOLIN HFA) inhaler 2 puff, 2 puff, Inhalation, Q4H PRN, Nahun Zacarias, DO  •  albuterol (PROVENTIL) nebulizer solution 0.083% 2.5 mg/3mL, 2.5 mg, Nebulization, Q6H PRN, Nahun Zacarias, DO  •  baclofen (LIORESAL) tablet 10 mg, 10 mg, Oral, Daily, Nahun Zacarias, DO, 10 mg at 01/13/18 1830  •  bisacodyl (DULCOLAX) EC tablet 5 mg, 5 mg, Oral, Daily PRN, Nahun Zacarias, DO  •  bisacodyl (DULCOLAX) suppository 10 mg, 10 mg, Rectal, Daily PRN, Nahun Zacarias, DO  •  budesonide-formoterol (SYMBICORT) 80-4.5 MCG/ACT inhaler 2 puff, 2 puff, Inhalation, BID - RT, Nahun Zacarias, DO, 2 puff at 01/14/18 0803  •  dextrose (D50W) solution 25 g, 25 g, Intravenous, Q15 Min PRN, Nahun Zacarias, DO  •  dextrose (GLUTOSE) oral gel 15 g, 15 g, Oral, Q15 Min PRN, Nahun Zacarias, DO  •  glucagon (GLUCAGEN) injection 1 mg, 1 mg, Subcutaneous, Q15 Min PRN, Nahun Zacarias, DO  •  insulin aspart (novoLOG) injection 0-7 Units, 0-7 Units, Subcutaneous, 4x Daily AC & at Bedtime, Nahun Zacarias, DO, 2 Units at 01/13/18 2155  •  melatonin tablet 5 mg, 5 mg, Oral, Nightly, Nahun Zacarias, DO  •  metoprolol succinate XL (TOPROL-XL) 24 hr tablet 25 mg, 25 mg, Oral, Daily, Nahun Zacarias, DO, 25 mg at 01/13/18 1830  •  ondansetron (ZOFRAN) injection 4 mg, 4 mg, Intravenous, Q6H PRN, Nahun Zacarias, DO  •  pantoprazole (PROTONIX) EC tablet 40 mg, 40 mg, Oral, QAM, Nahun Zacarias, , 40 mg at 01/14/18 0615  •  PARoxetine (PAXIL) tablet 20 mg, 20 mg, Oral, Daily, Nahun Zacarias DO, 20 mg at 01/13/18 1830  •  sodium chloride 0.9 % flush 1-10 mL, 1-10 mL, Intravenous, PRN, Nahun Zacarias, DO  •  Insert peripheral IV, , , Once **AND** sodium chloride 0.9 % flush 10 mL, 10 mL, Intravenous, PRN, Manish Dwyer MD  •  sodium chloride 0.9 % infusion 40  mL, 40 mL, Intravenous, PRN, Nahun Zacarias DO  •  sodium chloride 0.9 % infusion, 125 mL/hr, Intravenous, Continuous, Nahun Zacarias DO, Last Rate: 125 mL/hr at 01/14/18 0527, 125 mL/hr at 01/14/18 0527  •  traMADol (ULTRAM) tablet 50 mg, 50 mg, Oral, Q6H PRN, Nahun Zacarias DO, 50 mg at 01/14/18 0615      Assessment/Plan     Acute Right distal femur fracture with h/o right knee replacement  -ortho consulted, unsure of plans for surgery thus far   -Plain film reviewed with distal femur fx near knee replacement  -CT knee pending  -pre-op labs reviewed, medically seems ok for surgery although moderate risk  -pain control      HTN, HLD  -chronic, c/w home meds and BB     Asthma  -chronic  -no acute exacerbation     Type II DM  -chronic  -hold oral meds  -SSI with adjustments prn     GERD  -chronic  -c/w PPI     MARGARET  -non-comliant with CPAP at home     MIld dementia  -at baseline per daughter  -delerium and fall precautions     Chonic pain  -management as above     DVT-SCDs until surgery plans finalized    Plan for disposition:pending plans for surgical intervention, may need rehab   Nahun Zacarias DO  01/14/18  8:22 AM      Time:

## 2018-01-14 NOTE — PLAN OF CARE
Problem: Pain, Chronic (Adult)  Goal: Identify Related Risk Factors and Signs and Symptoms  Outcome: Outcome(s) achieved Date Met: 01/14/18 01/14/18 1753   Pain, Chronic   Related Risk Factors (Chronic Pain) disease process   Signs and Symptoms (Chronic Pain) abnormal posturing/positioning;fear of reinjury;verbalization of pain/discomfort for a prolonged time period;verbalization of pain descriptors     Goal: Acceptable Pain Control/Comfort Level  Outcome: Ongoing (interventions implemented as appropriate)   01/14/18 1753   Pain, Chronic (Adult)   Acceptable Pain Control/Comfort Level making progress toward outcome

## 2018-01-14 NOTE — NURSING NOTE
Discharge Planning Assessment   [place]     Patient Name: Juan Pablo Hernandez  MRN: [id number]  Today's Date: 1/14/2018    Admit Date: 1/13/2018          Discharge Needs Assessment       01/14/18 1441    Discharge Needs Assessment    Discharge Planning Comments CM spoke with patient's daughter as patient does have some reported Dementia.  Patient lives with her  who is a farmer.  Spouse not only cares for patient, but also cares for his 95  yr old mother.  Patient has walker/cane/shower chair at home.  Would like for [facility] after surgery.  Patient uses [facility] in [facility], no issues paying for medications.  Denies information on living duff.  [facility].  No discharge needs at this time.  Will continue to follow.             Discharge Plan       01/14/18 1445    Case Management/Social Work Plan    Plan Possible [facility]    Patient/Family In Agreement With Plan yes    Additional Comments CM spoke with patient's daughter as patient does have some reported Dementia.  Patient lives with her  who is a farmer.  Spouse not only cares for patient, but also cares for his 95  yr old mother.  Patient has walker/cane/shower chair at home.  Would like for [facility] to go to [facility] for [facility]hab after surgery.  Patient uses [facility] in [facility], no issues paying for medications.  Denies information on living duff.  [facility].  No discharge needs at this time.  Will continue to follow.         Discharge Placement     Facility/Agency Request Status Selected? Address Phone Number Fax Number    [facility] Pending - No Request Sent     [id number] [place] [address] [phone]        Tanvir Gutiérrez RN 1/14/2018 14:47    Referral made to [facility], will await return call.                             Demographic Summary     None            Functional Status     None            Psychosocial     None            Abuse/Neglect     None            Legal     None            Substance Abuse     None            Patient Forms     None          Tanvir Gutiérrez RN

## 2018-01-14 NOTE — NURSING NOTE
Called Dr. Zacarias patient complains of pain 10 of 10.Tylenol 650 mg and Ultram 50 mg had already been given and were not due. stated to give the Tylenol early.Then called back  And advised Demerol 25 mg had been ordered for a 1 time dose.I administered med.

## 2018-01-14 NOTE — PLAN OF CARE
Problem: Patient Care Overview (Adult)  Goal: Discharge Needs Assessment  Outcome: Ongoing (interventions implemented as appropriate)   01/13/18 1724 01/13/18 1731 01/14/18 1447   Discharge Needs Assessment   Discharge Planning Comments --  --  CM spoke with patient's daughter as patient does have some reported Dementia. Patient lives with her  who is a farmer. Spouse not only cares for patient, but also cares for his 95 yr old mother. Patient has walker/cane/shower chair at home. Would like for patient to go to [facility] floor for rehab after surgery. Patient uses [facility] in [facility], no issues paying for medications. Denies information on living duff. Will make referral to [facility]. No discharge needs at this time. Will continue to follow.    Living Environment   Transportation Available --  car --    Self-Care   Equipment Currently Used at Home bath bench;rollator;walker, rolling --  --

## 2018-01-14 NOTE — PLAN OF CARE
Problem: Patient Care Overview (Adult)  Goal: Plan of Care Review  Outcome: Ongoing (interventions implemented as appropriate)   01/14/18 1744   Coping/Psychosocial Response Interventions   Plan Of Care Reviewed With patient   Patient Care Overview   Progress no change   Outcome Evaluation   Outcome Summary/Follow up Plan Patient awaiting surgery. Patient not voiding much however bladder scan amount was almost zero. Chronic pain and new fracture pain required an increase in pain meds. Demerol and percocet ordered PRN. Possibly having surgery tomorrow pending Dr. Newberry speaking with other members of his treatment team. Immobilizer place on right leg.     Goal: Adult Individualization and Mutuality  Outcome: Ongoing (interventions implemented as appropriate)   01/14/18 1744   Individualization   Patient Specific Goals Pain management   Patient Specific Interventions Percocet and Demerol ordered for pain management     Goal: Discharge Needs Assessment  Outcome: Ongoing (interventions implemented as appropriate)   01/14/18 1744   Discharge Needs Assessment   Concerns To Be Addressed denies needs/concerns at this time       Problem: Fall Risk (Adult)  Goal: Identify Related Risk Factors and Signs and Symptoms  Outcome: Outcome(s) achieved Date Met: 01/14/18 01/13/18 7366   Fall Risk   Fall Risk: Related Risk Factors age-related changes;confusion/agitation;gait/mobility problems   Fall Risk: Signs and Symptoms presence of risk factors     Goal: Absence of Falls  Outcome: Ongoing (interventions implemented as appropriate)   01/14/18 1744   Fall Risk (Adult)   Absence of Falls making progress toward outcome       Problem: Orthopaedic Fracture (Adult)  Goal: Signs and Symptoms of Listed Potential Problems Will be Absent or Manageable (Orthopaedic Fracture)  Outcome: Ongoing (interventions implemented as appropriate)   01/14/18 1744   Orthopaedic Fracture   Problems Assessed (Orthopaedic Fracture) all   Problems Present  (Orthopaedic Fracture) functional deficit/ self-care deficit;pain;situational response       Problem: Pressure Ulcer Risk (Jules Scale) (Adult,Obstetrics,Pediatric)  Goal: Identify Related Risk Factors and Signs and Symptoms  Outcome: Outcome(s) achieved Date Met: 01/14/18 01/14/18 1744   Pressure Ulcer Risk (Jules Scale)   Related Risk Factors (Pressure Ulcer Risk (Jules Scale)) cognitive impairment;fluid intake inadequate;mobility impaired;nutritional deficiencies     Goal: Skin Integrity  Outcome: Ongoing (interventions implemented as appropriate)   01/14/18 1744   Pressure Ulcer Risk (Jules Scale) (Adult,Obstetrics,Pediatric)   Skin Integrity making progress toward outcome       Problem: Perioperative Period (Adult)  Goal: Signs and Symptoms of Listed Potential Problems Will be Absent or Manageable (Perioperative Period)  Outcome: Ongoing (interventions implemented as appropriate)   01/14/18 1744   Perioperative Period   Problems Assessed (Perioperative Period) all   Problems Present (Perioperative Period) pain;situational response       Problem: Anxiety (Adult)  Goal: Identify Related Risk Factors and Signs and Symptoms  Outcome: Outcome(s) achieved Date Met: 01/14/18 01/14/18 1744   Anxiety   Related Risk Factors (Anxiety) pain;prognosis/treatment plan;prolonged waiting  (waiting due to surgery scheduling)   Signs and Symptoms (Anxiety) apprehension/being worried;preoccupation     Goal: Reduction/Resolution  Outcome: Ongoing (interventions implemented as appropriate)   01/14/18 1744   Anxiety (Adult)   Reduction/Resolution making progress toward outcome

## 2018-01-14 NOTE — SIGNIFICANT NOTE
01/14/18 1137   Rehab Treatment   Discipline speech language pathologist     Completed swallow screen per nursing referral as patient states has difficulty swallowing liquids at times and has had this problem for years. Review of chart indicates functional swallow per nursing assessment, MBS completed in December 2016 with only one episode of shallow penetration of thins on 1/6 trials with spontaneous clearance. Rec pt to take small drinks and prepare to swallow for which she spontaneously demonstrated at that time. No need for a formal clinical swallow eval indicated at this time. Pt currently NPO for possible surgery today.     Lida Benítez, MS, CCC-SLP

## 2018-01-15 ENCOUNTER — ANESTHESIA (OUTPATIENT)
Dept: PERIOP | Facility: HOSPITAL | Age: 68
End: 2018-01-15

## 2018-01-15 ENCOUNTER — ANESTHESIA EVENT (OUTPATIENT)
Dept: PERIOP | Facility: HOSPITAL | Age: 68
End: 2018-01-15

## 2018-01-15 ENCOUNTER — APPOINTMENT (OUTPATIENT)
Dept: GENERAL RADIOLOGY | Facility: HOSPITAL | Age: 68
End: 2018-01-15

## 2018-01-15 LAB
ANION GAP SERPL CALCULATED.3IONS-SCNC: 11.3 MMOL/L
BACTERIA SPEC AEROBE CULT: ABNORMAL
BACTERIA SPEC AEROBE CULT: ABNORMAL
BASOPHILS # BLD AUTO: 0.04 10*3/MM3 (ref 0–0.2)
BASOPHILS NFR BLD AUTO: 0.5 % (ref 0–2)
BUN BLD-MCNC: 14 MG/DL (ref 8–23)
BUN/CREAT SERPL: 24.6 (ref 7–25)
CALCIUM SPEC-SCNC: 7.8 MG/DL (ref 8.8–10.5)
CHLORIDE SERPL-SCNC: 106 MMOL/L (ref 98–107)
CO2 SERPL-SCNC: 24.7 MMOL/L (ref 22–29)
CREAT BLD-MCNC: 0.57 MG/DL (ref 0.57–1)
CRP SERPL-MCNC: 6.58 MG/DL (ref 0–0.5)
DEPRECATED RDW RBC AUTO: 56.8 FL (ref 37–54)
EOSINOPHIL # BLD AUTO: 0.24 10*3/MM3 (ref 0.1–0.3)
EOSINOPHIL NFR BLD AUTO: 3 % (ref 0–4)
ERYTHROCYTE [DISTWIDTH] IN BLOOD BY AUTOMATED COUNT: 18.4 % (ref 11.5–14.5)
GFR SERPL CREATININE-BSD FRML MDRD: 105 ML/MIN/1.73
GLUCOSE BLD-MCNC: 117 MG/DL (ref 65–99)
GLUCOSE BLDC GLUCOMTR-MCNC: 108 MG/DL (ref 70–130)
GLUCOSE BLDC GLUCOMTR-MCNC: 146 MG/DL (ref 70–130)
GLUCOSE BLDC GLUCOMTR-MCNC: 86 MG/DL (ref 70–130)
GLUCOSE BLDC GLUCOMTR-MCNC: 87 MG/DL (ref 70–130)
GLUCOSE BLDC GLUCOMTR-MCNC: 89 MG/DL (ref 70–130)
GLUCOSE BLDC GLUCOMTR-MCNC: 92 MG/DL (ref 70–130)
HCT VFR BLD AUTO: 31.2 % (ref 37–47)
HGB BLD-MCNC: 10 G/DL (ref 12–16)
IMM GRANULOCYTES # BLD: 0.03 10*3/MM3 (ref 0–0.03)
IMM GRANULOCYTES NFR BLD: 0.4 % (ref 0–0.5)
LYMPHOCYTES # BLD AUTO: 1.92 10*3/MM3 (ref 0.6–4.8)
LYMPHOCYTES NFR BLD AUTO: 24.1 % (ref 20–45)
MCH RBC QN AUTO: 27 PG (ref 27–31)
MCHC RBC AUTO-ENTMCNC: 32.1 G/DL (ref 31–37)
MCV RBC AUTO: 84.1 FL (ref 81–99)
MONOCYTES # BLD AUTO: 0.7 10*3/MM3 (ref 0–1)
MONOCYTES NFR BLD AUTO: 8.8 % (ref 3–8)
NEUTROPHILS # BLD AUTO: 5.04 10*3/MM3 (ref 1.5–8.3)
NEUTROPHILS NFR BLD AUTO: 63.2 % (ref 45–70)
NRBC BLD MANUAL-RTO: 0 /100 WBC (ref 0–0)
PLATELET # BLD AUTO: 248 10*3/MM3 (ref 140–500)
PMV BLD AUTO: 10.4 FL (ref 7.4–10.4)
POTASSIUM BLD-SCNC: 3.4 MMOL/L (ref 3.5–5.2)
RBC # BLD AUTO: 3.71 10*6/MM3 (ref 4.2–5.4)
SODIUM BLD-SCNC: 142 MMOL/L (ref 136–145)
WBC NRBC COR # BLD: 7.97 10*3/MM3 (ref 4.8–10.8)

## 2018-01-15 PROCEDURE — C1713 ANCHOR/SCREW BN/BN,TIS/BN: HCPCS | Performed by: ORTHOPAEDIC SURGERY

## 2018-01-15 PROCEDURE — 99222 1ST HOSP IP/OBS MODERATE 55: CPT | Performed by: ORTHOPAEDIC SURGERY

## 2018-01-15 PROCEDURE — 25010000002 PHENYLEPHRINE PER 1 ML: Performed by: NURSE ANESTHETIST, CERTIFIED REGISTERED

## 2018-01-15 PROCEDURE — 0QSB04Z REPOSITION RIGHT LOWER FEMUR WITH INTERNAL FIXATION DEVICE, OPEN APPROACH: ICD-10-PCS | Performed by: ORTHOPAEDIC SURGERY

## 2018-01-15 PROCEDURE — 25010000002 ONDANSETRON PER 1 MG: Performed by: NURSE ANESTHETIST, CERTIFIED REGISTERED

## 2018-01-15 PROCEDURE — 25010000002 KETOROLAC TROMETHAMINE PER 15 MG: Performed by: NURSE ANESTHETIST, CERTIFIED REGISTERED

## 2018-01-15 PROCEDURE — 25010000002 PROPOFOL 10 MG/ML EMULSION: Performed by: NURSE ANESTHETIST, CERTIFIED REGISTERED

## 2018-01-15 PROCEDURE — 25010000002 VANCOMYCIN PER 500 MG: Performed by: ORTHOPAEDIC SURGERY

## 2018-01-15 PROCEDURE — 73560 X-RAY EXAM OF KNEE 1 OR 2: CPT

## 2018-01-15 PROCEDURE — 82962 GLUCOSE BLOOD TEST: CPT

## 2018-01-15 PROCEDURE — 73552 X-RAY EXAM OF FEMUR 2/>: CPT

## 2018-01-15 PROCEDURE — 27514 TREATMENT OF THIGH FRACTURE: CPT | Performed by: ORTHOPAEDIC SURGERY

## 2018-01-15 PROCEDURE — 80048 BASIC METABOLIC PNL TOTAL CA: CPT | Performed by: INTERNAL MEDICINE

## 2018-01-15 PROCEDURE — 94799 UNLISTED PULMONARY SVC/PX: CPT

## 2018-01-15 PROCEDURE — 25010000002 MIDAZOLAM PER 1 MG: Performed by: NURSE ANESTHETIST, CERTIFIED REGISTERED

## 2018-01-15 PROCEDURE — 85025 COMPLETE CBC W/AUTO DIFF WBC: CPT | Performed by: INTERNAL MEDICINE

## 2018-01-15 PROCEDURE — 25010000002 VANCOMYCIN HCL IN NACL 1.25-0.9 GM/250ML-% SOLUTION: Performed by: ORTHOPAEDIC SURGERY

## 2018-01-15 PROCEDURE — 99232 SBSQ HOSP IP/OBS MODERATE 35: CPT | Performed by: HOSPITALIST

## 2018-01-15 DEVICE — IMPLANTABLE DEVICE: Type: IMPLANTABLE DEVICE | Site: FEMUR | Status: FUNCTIONAL

## 2018-01-15 RX ORDER — PROPOFOL 10 MG/ML
VIAL (ML) INTRAVENOUS CONTINUOUS PRN
Status: DISCONTINUED | OUTPATIENT
Start: 2018-01-15 | End: 2018-01-15 | Stop reason: SURG

## 2018-01-15 RX ORDER — ALBUTEROL SULFATE 2.5 MG/3ML
2.5 SOLUTION RESPIRATORY (INHALATION) EVERY 4 HOURS PRN
Status: DISCONTINUED | OUTPATIENT
Start: 2018-01-15 | End: 2018-01-19 | Stop reason: HOSPADM

## 2018-01-15 RX ORDER — FENTANYL CITRATE 50 UG/ML
25 INJECTION, SOLUTION INTRAMUSCULAR; INTRAVENOUS AS NEEDED
Status: DISCONTINUED | OUTPATIENT
Start: 2018-01-15 | End: 2018-01-15 | Stop reason: HOSPADM

## 2018-01-15 RX ORDER — SODIUM CHLORIDE 0.9 % (FLUSH) 0.9 %
3 SYRINGE (ML) INJECTION AS NEEDED
Status: DISCONTINUED | OUTPATIENT
Start: 2018-01-15 | End: 2018-01-15

## 2018-01-15 RX ORDER — BUPIVACAINE HYDROCHLORIDE 5 MG/ML
INJECTION, SOLUTION EPIDURAL; INTRACAUDAL AS NEEDED
Status: DISCONTINUED | OUTPATIENT
Start: 2018-01-15 | End: 2018-01-15 | Stop reason: SURG

## 2018-01-15 RX ORDER — VANCOMYCIN HYDROCHLORIDE
15 ONCE
Status: COMPLETED | OUTPATIENT
Start: 2018-01-15 | End: 2018-01-15

## 2018-01-15 RX ORDER — MIDAZOLAM HYDROCHLORIDE 1 MG/ML
2 INJECTION INTRAMUSCULAR; INTRAVENOUS
Status: DISCONTINUED | OUTPATIENT
Start: 2018-01-15 | End: 2018-01-15

## 2018-01-15 RX ORDER — KETAMINE HYDROCHLORIDE 100 MG/ML
INJECTION INTRAMUSCULAR; INTRAVENOUS AS NEEDED
Status: DISCONTINUED | OUTPATIENT
Start: 2018-01-15 | End: 2018-01-15 | Stop reason: SURG

## 2018-01-15 RX ORDER — MORPHINE SULFATE 1 MG/ML
1 INJECTION, SOLUTION EPIDURAL; INTRATHECAL; INTRAVENOUS EVERY 4 HOURS PRN
Status: DISCONTINUED | OUTPATIENT
Start: 2018-01-15 | End: 2018-01-15

## 2018-01-15 RX ORDER — OXYCODONE AND ACETAMINOPHEN 10; 325 MG/1; MG/1
1 TABLET ORAL EVERY 4 HOURS PRN
Status: DISCONTINUED | OUTPATIENT
Start: 2018-01-15 | End: 2018-01-16

## 2018-01-15 RX ORDER — VANCOMYCIN HYDROCHLORIDE
15 ONCE
Status: COMPLETED | OUTPATIENT
Start: 2018-01-16 | End: 2018-01-16

## 2018-01-15 RX ORDER — SODIUM CHLORIDE 9 MG/ML
100 INJECTION, SOLUTION INTRAVENOUS CONTINUOUS
Status: DISCONTINUED | OUTPATIENT
Start: 2018-01-15 | End: 2018-01-15

## 2018-01-15 RX ORDER — ONDANSETRON 2 MG/ML
4 INJECTION INTRAMUSCULAR; INTRAVENOUS ONCE AS NEEDED
Status: COMPLETED | OUTPATIENT
Start: 2018-01-15 | End: 2018-01-15

## 2018-01-15 RX ORDER — NALOXONE HCL 0.4 MG/ML
0.4 VIAL (ML) INJECTION
Status: DISCONTINUED | OUTPATIENT
Start: 2018-01-15 | End: 2018-01-15

## 2018-01-15 RX ORDER — PROMETHAZINE HYDROCHLORIDE 25 MG/1
25 SUPPOSITORY RECTAL ONCE AS NEEDED
Status: DISCONTINUED | OUTPATIENT
Start: 2018-01-15 | End: 2018-01-15 | Stop reason: HOSPADM

## 2018-01-15 RX ORDER — ACETAMINOPHEN 500 MG
1000 TABLET ORAL ONCE
Status: COMPLETED | OUTPATIENT
Start: 2018-01-15 | End: 2018-01-15

## 2018-01-15 RX ORDER — SODIUM CHLORIDE, SODIUM LACTATE, POTASSIUM CHLORIDE, CALCIUM CHLORIDE 600; 310; 30; 20 MG/100ML; MG/100ML; MG/100ML; MG/100ML
1000 INJECTION, SOLUTION INTRAVENOUS CONTINUOUS PRN
Status: DISCONTINUED | OUTPATIENT
Start: 2018-01-15 | End: 2018-01-19 | Stop reason: HOSPADM

## 2018-01-15 RX ORDER — PREGABALIN 75 MG/1
150 CAPSULE ORAL EVERY 12 HOURS SCHEDULED
Status: DISCONTINUED | OUTPATIENT
Start: 2018-01-15 | End: 2018-01-16

## 2018-01-15 RX ORDER — SODIUM CHLORIDE 0.9 % (FLUSH) 0.9 %
1-10 SYRINGE (ML) INJECTION AS NEEDED
Status: DISCONTINUED | OUTPATIENT
Start: 2018-01-15 | End: 2018-01-15

## 2018-01-15 RX ORDER — PROMETHAZINE HYDROCHLORIDE 25 MG/1
25 TABLET ORAL ONCE AS NEEDED
Status: DISCONTINUED | OUTPATIENT
Start: 2018-01-15 | End: 2018-01-15 | Stop reason: HOSPADM

## 2018-01-15 RX ORDER — LIDOCAINE HYDROCHLORIDE 10 MG/ML
0.5 INJECTION, SOLUTION INFILTRATION; PERINEURAL ONCE AS NEEDED
Status: DISCONTINUED | OUTPATIENT
Start: 2018-01-15 | End: 2018-01-15

## 2018-01-15 RX ORDER — PREGABALIN 75 MG/1
150 CAPSULE ORAL ONCE
Status: COMPLETED | OUTPATIENT
Start: 2018-01-15 | End: 2018-01-15

## 2018-01-15 RX ORDER — SODIUM CHLORIDE 9 MG/ML
INJECTION, SOLUTION INTRAVENOUS AS NEEDED
Status: DISCONTINUED | OUTPATIENT
Start: 2018-01-15 | End: 2018-01-15 | Stop reason: HOSPADM

## 2018-01-15 RX ORDER — VANCOMYCIN HYDROCHLORIDE
15 ONCE
Status: DISCONTINUED | OUTPATIENT
Start: 2018-01-16 | End: 2018-01-15 | Stop reason: ALTCHOICE

## 2018-01-15 RX ORDER — PROMETHAZINE HYDROCHLORIDE 25 MG/ML
6.25 INJECTION, SOLUTION INTRAMUSCULAR; INTRAVENOUS ONCE AS NEEDED
Status: DISCONTINUED | OUTPATIENT
Start: 2018-01-15 | End: 2018-01-15 | Stop reason: HOSPADM

## 2018-01-15 RX ORDER — ONDANSETRON 2 MG/ML
4 INJECTION INTRAMUSCULAR; INTRAVENOUS ONCE AS NEEDED
Status: DISCONTINUED | OUTPATIENT
Start: 2018-01-15 | End: 2018-01-15 | Stop reason: HOSPADM

## 2018-01-15 RX ORDER — SODIUM CHLORIDE, SODIUM LACTATE, POTASSIUM CHLORIDE, CALCIUM CHLORIDE 600; 310; 30; 20 MG/100ML; MG/100ML; MG/100ML; MG/100ML
75 INJECTION, SOLUTION INTRAVENOUS CONTINUOUS
Status: DISCONTINUED | OUTPATIENT
Start: 2018-01-15 | End: 2018-01-16

## 2018-01-15 RX ORDER — FAMOTIDINE 10 MG/ML
20 INJECTION, SOLUTION INTRAVENOUS
Status: DISCONTINUED | OUTPATIENT
Start: 2018-01-15 | End: 2018-01-15

## 2018-01-15 RX ORDER — KETOROLAC TROMETHAMINE 30 MG/ML
INJECTION, SOLUTION INTRAMUSCULAR; INTRAVENOUS AS NEEDED
Status: DISCONTINUED | OUTPATIENT
Start: 2018-01-15 | End: 2018-01-15 | Stop reason: SURG

## 2018-01-15 RX ORDER — MIDAZOLAM HYDROCHLORIDE 1 MG/ML
1 INJECTION INTRAMUSCULAR; INTRAVENOUS
Status: DISCONTINUED | OUTPATIENT
Start: 2018-01-15 | End: 2018-01-15

## 2018-01-15 RX ORDER — CEFUROXIME AXETIL 250 MG/1
500 TABLET ORAL EVERY 12 HOURS SCHEDULED
Status: DISCONTINUED | OUTPATIENT
Start: 2018-01-15 | End: 2018-01-19 | Stop reason: HOSPADM

## 2018-01-15 RX ORDER — SODIUM CHLORIDE 9 MG/ML
40 INJECTION, SOLUTION INTRAVENOUS AS NEEDED
Status: DISCONTINUED | OUTPATIENT
Start: 2018-01-15 | End: 2018-01-15

## 2018-01-15 RX ORDER — ONDANSETRON 2 MG/ML
INJECTION INTRAMUSCULAR; INTRAVENOUS AS NEEDED
Status: DISCONTINUED | OUTPATIENT
Start: 2018-01-15 | End: 2018-01-15 | Stop reason: SURG

## 2018-01-15 RX ORDER — OXYCODONE HCL 10 MG/1
10 TABLET, FILM COATED, EXTENDED RELEASE ORAL ONCE
Status: COMPLETED | OUTPATIENT
Start: 2018-01-15 | End: 2018-01-15

## 2018-01-15 RX ORDER — PANTOPRAZOLE SODIUM 20 MG/1
40 TABLET, DELAYED RELEASE ORAL ONCE
Status: COMPLETED | OUTPATIENT
Start: 2018-01-15 | End: 2018-01-15

## 2018-01-15 RX ADMIN — MIDAZOLAM HYDROCHLORIDE 1 MG: 1 INJECTION, SOLUTION INTRAMUSCULAR; INTRAVENOUS at 14:34

## 2018-01-15 RX ADMIN — OXYCODONE HYDROCHLORIDE 10 MG: 10 TABLET, FILM COATED, EXTENDED RELEASE ORAL at 13:22

## 2018-01-15 RX ADMIN — BUPIVACAINE HYDROCHLORIDE 3 ML: 5 INJECTION, SOLUTION EPIDURAL; INTRACAUDAL; PERINEURAL at 14:46

## 2018-01-15 RX ADMIN — VANCOMYCIN HYDROCHLORIDE 1250 MG: 1 INJECTION, POWDER, LYOPHILIZED, FOR SOLUTION INTRAVENOUS at 13:47

## 2018-01-15 RX ADMIN — MEPERIDINE HYDROCHLORIDE 25 MG: 25 INJECTION, SOLUTION INTRAMUSCULAR; INTRAVENOUS; SUBCUTANEOUS at 08:23

## 2018-01-15 RX ADMIN — BUDESONIDE AND FORMOTEROL FUMARATE DIHYDRATE 2 PUFF: 80; 4.5 AEROSOL RESPIRATORY (INHALATION) at 09:22

## 2018-01-15 RX ADMIN — KETAMINE HYDROCHLORIDE 10 MG: 100 INJECTION INTRAMUSCULAR; INTRAVENOUS at 14:44

## 2018-01-15 RX ADMIN — ACETAMINOPHEN 1000 MG: 500 TABLET, FILM COATED ORAL at 13:22

## 2018-01-15 RX ADMIN — CEFUROXIME AXETIL 500 MG: 250 TABLET ORAL at 20:25

## 2018-01-15 RX ADMIN — MEPERIDINE HYDROCHLORIDE 25 MG: 25 INJECTION, SOLUTION INTRAMUSCULAR; INTRAVENOUS; SUBCUTANEOUS at 21:57

## 2018-01-15 RX ADMIN — PREGABALIN 150 MG: 75 CAPSULE ORAL at 20:24

## 2018-01-15 RX ADMIN — PREGABALIN 150 MG: 75 CAPSULE ORAL at 13:22

## 2018-01-15 RX ADMIN — FAMOTIDINE 20 MG: 10 INJECTION, SOLUTION INTRAVENOUS at 14:07

## 2018-01-15 RX ADMIN — SODIUM CHLORIDE, POTASSIUM CHLORIDE, SODIUM LACTATE AND CALCIUM CHLORIDE 1000 ML: 600; 310; 30; 20 INJECTION, SOLUTION INTRAVENOUS at 12:30

## 2018-01-15 RX ADMIN — Medication 5 MG: at 20:24

## 2018-01-15 RX ADMIN — KETAMINE HYDROCHLORIDE 10 MG: 100 INJECTION INTRAMUSCULAR; INTRAVENOUS at 14:42

## 2018-01-15 RX ADMIN — PANTOPRAZOLE SODIUM 20 MG: 40 TABLET, DELAYED RELEASE ORAL at 13:36

## 2018-01-15 RX ADMIN — PHENYLEPHRINE HYDROCHLORIDE 50 MCG: 10 INJECTION INTRAVENOUS at 15:53

## 2018-01-15 RX ADMIN — SODIUM CHLORIDE, POTASSIUM CHLORIDE, SODIUM LACTATE AND CALCIUM CHLORIDE 75 ML/HR: 600; 310; 30; 20 INJECTION, SOLUTION INTRAVENOUS at 17:48

## 2018-01-15 RX ADMIN — PROPOFOL 50 MCG/KG/MIN: 10 INJECTION, EMULSION INTRAVENOUS at 15:17

## 2018-01-15 RX ADMIN — KETOROLAC TROMETHAMINE 30 MG: 30 INJECTION INTRAMUSCULAR; INTRAVENOUS at 16:27

## 2018-01-15 RX ADMIN — METOPROLOL SUCCINATE 25 MG: 25 TABLET, EXTENDED RELEASE ORAL at 17:49

## 2018-01-15 RX ADMIN — PAROXETINE HYDROCHLORIDE 20 MG: 20 TABLET, FILM COATED ORAL at 17:50

## 2018-01-15 RX ADMIN — ONDANSETRON 4 MG: 2 INJECTION, SOLUTION INTRAMUSCULAR; INTRAVENOUS at 14:07

## 2018-01-15 RX ADMIN — BUDESONIDE AND FORMOTEROL FUMARATE DIHYDRATE 2 PUFF: 80; 4.5 AEROSOL RESPIRATORY (INHALATION) at 21:47

## 2018-01-15 RX ADMIN — ONDANSETRON 4 MG: 2 INJECTION, SOLUTION INTRAMUSCULAR; INTRAVENOUS at 15:15

## 2018-01-15 RX ADMIN — OXYCODONE HYDROCHLORIDE AND ACETAMINOPHEN 1 TABLET: 10; 325 TABLET ORAL at 20:24

## 2018-01-15 RX ADMIN — PHENYLEPHRINE HYDROCHLORIDE 50 MCG: 10 INJECTION INTRAVENOUS at 16:04

## 2018-01-15 RX ADMIN — SODIUM CHLORIDE 125 ML/HR: 9 INJECTION, SOLUTION INTRAVENOUS at 06:24

## 2018-01-15 RX ADMIN — PHENYLEPHRINE HYDROCHLORIDE 50 MCG: 10 INJECTION INTRAVENOUS at 15:46

## 2018-01-15 RX ADMIN — PANTOPRAZOLE SODIUM 40 MG: 20 TABLET, DELAYED RELEASE ORAL at 13:22

## 2018-01-15 RX ADMIN — BACLOFEN 10 MG: 10 TABLET ORAL at 17:50

## 2018-01-15 NOTE — PLAN OF CARE
Problem: Patient Care Overview (Adult)  Goal: Plan of Care Review  Outcome: Ongoing (interventions implemented as appropriate)   01/15/18 4705   Coping/Psychosocial Response Interventions   Plan Of Care Reviewed With patient   Patient Care Overview   Progress no change   Outcome Evaluation   Outcome Summary/Follow up Plan patient stable awaiting procedure, vs stable.     Goal: Adult Individualization and Mutuality  Outcome: Ongoing (interventions implemented as appropriate)      Problem: Perioperative Period (Adult)  Goal: Signs and Symptoms of Listed Potential Problems Will be Absent or Manageable (Perioperative Period)  Outcome: Ongoing (interventions implemented as appropriate)

## 2018-01-15 NOTE — PLAN OF CARE
Problem: Patient Care Overview (Adult)  Goal: Adult Individualization and Mutuality  Outcome: Ongoing (interventions implemented as appropriate)   01/15/18 1485   Individualization   Patient Specific Interventions appropriate interventions implemented as needed

## 2018-01-15 NOTE — PLAN OF CARE
Problem: Patient Care Overview (Adult)  Goal: Plan of Care Review  Outcome: Ongoing (interventions implemented as appropriate)   01/15/18 0128   Coping/Psychosocial Response Interventions   Plan Of Care Reviewed With patient   Outcome Evaluation   Outcome Summary/Follow up Plan continue home inhaler

## 2018-01-15 NOTE — PLAN OF CARE
Problem: Perioperative Period (Adult)  Goal: Signs and Symptoms of Listed Potential Problems Will be Absent or Manageable (Perioperative Period)  Outcome: Ongoing (interventions implemented as appropriate)   01/15/18 1710   Perioperative Period   Problems Assessed (Perioperative Period) all   Problems Present (Perioperative Period) none

## 2018-01-15 NOTE — CONSULTS
"Adult Nutrition  Assessment/PES    Patient Name:  Juan Pablo Hernandez  YOB: 1950  MRN: [id number]  Admit Date:  1/13/2018    Assessment Date:  1/15/2018    Comments:  Advance diet as indicated post op. Will cont to follow for education and po.          Reason for Assessment       01/15/18 1258    Reason for Assessment    Reason For Assessment/Visit nurse/nurse practitioner consult    Identified At Risk By Screening Criteria MST SCORE 2+    Endocrine DM    Ortho Fracture   hx TKR Dec     Pulmonary/Critical Care Asthma   hx            Data Eval/ Notes       01/15/18 1259     Notes    Note Spoke w pt who swabbing mouth, NKFA, denies restrictions at home, has dentures but not with her. This summer she reports 202# but knows she has lost unintentionally reports gets full easier.             Nutrition/Diet History       01/15/18 1300    Nutrition/Diet History    Patient Reported Diet/Restrictions/Preferences regular    Factors Affecting Nutritional Intake Factors    Appetite Early Satiety            Anthropometrics       01/15/18 1312    Anthropometrics    RD Documented Current Weight  76.5 kg (168 lb 10.4 oz)    Anthropometrics (Special Considerations)    RD Calculated BMI (kg/m2) 29.8    Usual Body Weight (UBW)    Weight Loss Time Frame reports loss from 202 down to 168 however EMR data shows 171 1 yr ago and 2 months ago    Body Mass Index (BMI)    BMI Grade 25 - 29.9 - overweight            Labs/Tests/Procedures/Meds       01/15/18 1313    Labs/Tests/Procedures/Meds    Labs/Tests Review Reviewed;Glucose;K+    Medication Review Reviewed, pertinent;Insulin;Other (comment)   melatonin              Estimated/Assessed Needs       01/15/18 1313    Calculation Measurements    Weight Used For Calculations 76.5 kg (168 lb 10.4 oz)    Height Used for Calculations 1.6 m (5' 3\")    Estimated/Assessed Energy Needs    Energy Need Method Horry-St Himaor    Age 67    RMR (Horry-St. Jeor Equation) " 1264.12    Activity Factors ([place]olph St Randall)  Confined to bed  1.2    Estimated Kcal Range  1516 kcal    170 gm CHO, 45% kcal     Estimated/Assessed Protein Needs    Weight Used for Protein Calculation 76.5 kg (168 lb 10.4 oz)    Protein (gm/kg) 1.1    1.1 Gm Protein (gm) 84.15    Estimated/Assessed Fluid Needs    Fluid Need Method RDA method    RDA Method (mL)  1516            Nutrition Prescription Ordered       01/15/18 1315    Nutrition Prescription PO    Current PO Diet NPO            Evaluation of Received Nutrient/Fluid Intake       01/15/18 1315    Evaluation of Received Nutrient/Fluid Intake    Nutrition Delivered Fluid Evaluation    Fluid Intake Evaluation    Oral Fluid (mL) 300    Total Fluid Intake (mL) 300    % Fluid Needs 20    PO Evaluation    Number of Meals 1    % PO Intake 75            Problem/Interventions:        Problem 1       01/15/18 1317    Nutrition Diagnoses Problem 1    Problem 1 Limited Adherence to Diet Modifications    Etiology (related to) Factors Affecting Nutrition    Signs/Symptoms (evidenced by) Other (comment)   reports no diet restrictions at home                    Intervention Goal       01/15/18 1318    Intervention Goal    General Provide information regarding MNT for treatment/condition    PO Establish PO    Weight No significant weight loss            Nutrition Intervention       01/15/18 1318    Nutrition Intervention    RD/Tech Action Interview for preference;Follow Tx progress            Nutrition Prescription       01/15/18 1318    Nutrition Prescription PO    PO Prescription --   diet as indicated post op            Education/Evaluation       01/15/18 1319    Education    Education Other (comment)   NPO today for possible surgery. Will follow for edu needs.     Monitor/Evaluation    Monitor Per protocol;I&O;PO intake;Pertinent labs;Weight   no d/c needs identified at this time        Electronically signed by:  Renetta Crouch RD  01/15/18 1:19 PM

## 2018-01-15 NOTE — PLAN OF CARE
Problem: Patient Care Overview (Adult)  Goal: Plan of Care Review  Outcome: Ongoing (interventions implemented as appropriate)   01/15/18 1360   Coping/Psychosocial Response Interventions   Plan Of Care Reviewed With patient   Patient Care Overview   Progress improving   Outcome Evaluation   Outcome Summary/Follow up Plan vss. no complaints of pain at this time. will continue to monitor

## 2018-01-15 NOTE — ANESTHESIA POSTPROCEDURE EVALUATION
Patient: Juan Pablo Hernandez    Procedure Summary     Date Anesthesia Start Anesthesia Stop Room / Location    01/15/18 1506 1659 [facility] / [facility]       Procedure Diagnosis Surgeon Provider    FEMUR DISTAL OPEN REDUCTION INTERNAL FIXATION (Right Thigh) Closed fracture of distal end of right femur, unspecified fracture morphology, initial encounter  (Closed fracture of distal end of right femur, unspecified fracture morphology, initial encounter [S72.401A]) MD Thuy Stevens CRNA          Anesthesia Type: spinal  Last vitals  BP   142/79 (01/15/18 1700)   Temp   97.8 °F (36.6 °C) (01/15/18 1656)   Pulse   78 (01/15/18 1700)   Resp   16 (01/15/18 1700)     SpO2   100 % (01/15/18 1700)     Post Anesthesia Care and Evaluation    Patient location during evaluation: PHASE II  Patient participation: complete - patient participated  Level of consciousness: awake and alert  Pain management: adequate  Airway patency: patent  Anesthetic complications: No anesthetic complications  PONV Status: none  Cardiovascular status: acceptable and hemodynamically stable  Respiratory status: acceptable  Hydration status: acceptable

## 2018-01-15 NOTE — PLAN OF CARE
Problem: Patient Care Overview (Adult)  Goal: Plan of Care Review  Outcome: Ongoing (interventions implemented as appropriate)      Problem: Fall Risk (Adult)  Goal: Absence of Falls  Outcome: Ongoing (interventions implemented as appropriate)      Problem: Orthopaedic Fracture (Adult)  Goal: Signs and Symptoms of Listed Potential Problems Will be Absent or Manageable (Orthopaedic Fracture)  Outcome: Ongoing (interventions implemented as appropriate)      Problem: Pressure Ulcer Risk (Jules Scale) (Adult,Obstetrics,Pediatric)  Goal: Skin Integrity  Outcome: Ongoing (interventions implemented as appropriate)      Problem: Perioperative Period (Adult)  Goal: Signs and Symptoms of Listed Potential Problems Will be Absent or Manageable (Perioperative Period)  Outcome: Outcome(s) achieved Date Met: 01/15/18      Problem: Anxiety (Adult)  Goal: Reduction/Resolution  Outcome: Outcome(s) achieved Date Met: 01/15/18      Problem: Pain, Chronic (Adult)  Goal: Acceptable Pain Control/Comfort Level  Outcome: Ongoing (interventions implemented as appropriate)

## 2018-01-15 NOTE — NURSING NOTE
"Continued Stay Note  [facility] [place]     Patient Name: Juan Pablo Hernandez  MRN: [id number]  Today's Date: 1/15/2018    Admit Date: 1/13/2018          Discharge Plan       01/15/18 1036    Case Management/Social Work Plan    Additional Comments Spoke with patient.  \"I think Dr Newberry and Dr Hartman are going to do surgery together today on me.\"  will continue to follow              Discharge Codes     None            Elaine Cisneros RN    "

## 2018-01-15 NOTE — PAYOR COMM NOTE
"Esau Hernandez (67 y.o. Female)     ATTN: NURSE REVIEWER  REF#[id number]  [facility]. PLEASE REPLY TO LIT PARKER AT FAX#[phone] OR PHONE#[phone]. THANK YOU.       Date of Birth Social Security Number Address Home Phone MRN    1950  [id number] [address] [phone] [id number]    Judaism Marital Status          Judaism        Admission Date Admission Type Admitting Provider Attending Provider Department, Room/Bed    1/13/18 Emergency Rell, Nahun ERICKSON, Nahun Guerrier,  [facility] OR, [facility] OR/OR    Discharge Date Discharge Disposition Discharge Destination                      Attending Provider: Nahun Zacarias DO     Allergies:  Augmentin [Amoxicillin-pot Clavulanate], Avelox [Moxifloxacin], Codeine, Morphine And Related, Penicillins, Sulfa Antibiotics    Isolation:  None   Infection:  None   Code Status:  FULL    Ht:  160 cm (63\")   Wt:  76.5 kg (168 lb 10.4 oz)    Admission Cmt:  None   Principal Problem:  None                Active Insurance as of 1/13/2018     Primary Coverage     Payor Plan Insurance Group Employer/Plan Group    [facility] MEDICARE REPLACEMENT [facility]      Payor Plan Address Payor Plan Phone Number Effective From Effective To    [address] [address] [phone] 1/2/2018     [place] [address]       Subscriber Name Subscriber Birth Date Member ID       ESAU HERNANDEZ 1950 [id number]           Secondary Coverage     Payor Plan Insurance Group Employer/Plan Group    [facility] [place] [facility]      Payor Plan Address Payor Plan Phone Number Effective From Effective To    [address] [address]  11/1/2017     [place] [address]       Subscriber Name Subscriber Birth Date Member ID       ESAU HERNANDEZ 1950 [id number]                 Emergency Contacts      (Rel.) Home Phone Work Phone Mobile Phone    Jazzy Walters (Daughter) -- [phone]    " Daniel Hernandez (Spouse) [phone] -- --               History & Physical      Nahun Zacarias DO at 1/13/2018  5:43 PM          [facility] HOSPITALIST     Linden Zhang MD    CHIEF COMPLAINT: right above knee pain    HISTORY OF PRESENT ILLNESS:    Patient Is a 67-year-old pleasant female presented to ER for right knee pain.  She had been having issues with right knee pain over several weeks and have been seeing her orthopedist plans for possible MRI to evaluate prior knee replacement.  However on Thursday she twisted her knee stepping out of the car with subsequent pain.  Pain persisted so she came to the ER today where it was noted that she had a distal femur fracture near the replacement and she has been admitted for further care.  Currently, she states that the pain is about a 5 out of 10. No other issues. Daughter at bedside reports intermittent confusion since the summer. Denies CP, SOB, n/v/d, fever or chills.       Past Medical History:   Diagnosis Date   • Anxiety    • Asthma    • CHF (congestive heart failure)    • Chronic pain disorder    • Chronic UTI    • Depression    • Diabetes    • GERD (gastroesophageal reflux disease)    • H/O CHF    • HTN (hypertension)    • Hyperlipidemia    • Incontinence    • Injury of back     spinal stenosis, chronic back pain   • Low back pain    • Lumbosacral disc disease    • MRSA infection (methicillin-resistant Staphylococcus aureus)     to left foot states approx 12-13 years ago    • Osteoarthritis    • Sleep apnea     uses cpap   • Spinal stenosis    • Stroke     pt states PMD has said she may be having mini strokes   • Vertigo      Past Surgical History:   Procedure Laterality Date   • CHOLECYSTECTOMY OPEN     • COLONOSCOPY     • ENDOSCOPY     • HYSTERECTOMY     • TOTAL KNEE ARTHROPLASTY Bilateral      Family History   Problem Relation Age of Onset   • Lung disease Mother    • Cancer Mother    • Heart disease Father    • Diabetes Paternal Aunt     • Diabetes Paternal Uncle    • Diabetes Paternal Grandmother    • Diabetes Paternal Grandfather      Social History   Substance Use Topics   • Smoking status: Never Smoker   • Smokeless tobacco: Never Used   • Alcohol use No     Prescriptions Prior to Admission   Medication Sig Dispense Refill Last Dose   • acetaminophen (TYLENOL) 325 MG tablet Take 2 tablets by mouth Every 6 (Six) Hours As Needed for mild pain (1-3) or moderate pain (4-6). 60 tablet 0 Past Month at Unknown time   • albuterol (ACCUNEB) 1.25 MG/3ML nebulizer solution Inhale 1 ampule 2 (Two) Times a Day. Albuterol Sulfate NEBU; Patient Sig: Albuterol Sulfate NEBU inhale 2 puffs twice daily; 0; 18-Sep-2012; Active   1/13/2018 at Unknown time   • albuterol (PROVENTIL HFA;VENTOLIN HFA) 108 (90 BASE) MCG/ACT inhaler Inhale 2 puffs Every 4 (Four) Hours As Needed for wheezing.   1/13/2018 at Unknown time   • baclofen (LIORESAL) 10 MG tablet Take 10 mg by mouth Daily.   Past Week at Unknown time   • Cholecalciferol (VITAMIN D3) 85437 UNITS capsule Take 1 capsule by mouth Every 7 (Seven) Days. (Patient taking differently: Take 50,000 Units by mouth Every 7 (Seven) Days. Takes on mondays) 4 capsule 0 Past Week at Unknown time   • estradiol (ESTRACE) 0.1 MG/GM vaginal cream Insert 1 g into the vagina Daily.   Past Week at Unknown time   • fenofibrate 160 MG tablet Take 160 mg by mouth Daily.   1/12/2018 at Unknown time   • fluticasone-salmeterol (ADVAIR) 250-50 MCG/DOSE DISKUS Inhale 2 puffs Every 12 (Twelve) Hours.   1/13/2018 at Unknown time   • JANUMET XR  MG tablet sustained-release 24 hour 2 (Two) Times a Day.   1/12/2018 at Unknown time   • meloxicam (MOBIC) 15 MG tablet Take  by mouth daily.   1/12/2018 at Unknown time   • metoprolol succinate XL (TOPROL XL) 25 MG 24 hr tablet Take 1 tablet by mouth Daily. 30 tablet 0 Past Week at Unknown time   • omeprazole (priLOSEC) 40 MG capsule Take 40 mg by mouth Daily.   1/12/2018 at Unknown time   •  "PARoxetine (PAXIL) 40 MG tablet Take 0.5 tablets by mouth Daily. (Patient taking differently: Take 40 mg by mouth Daily.) 15 tablet 0 Past Week at Unknown time   • pioglitazone (ACTOS) 30 MG tablet 30 mg.   1/12/2018 at Unknown time   • pregabalin (LYRICA) 75 MG capsule Take 150 mg by mouth 2 (Two) Times a Day.   1/12/2018 at Unknown time   • SYMBICORT 160-4.5 MCG/ACT inhaler    1/12/2018 at Unknown time   • traMADol (ULTRAM) 50 MG tablet Take 1 tablet by mouth Every 6 (Six) Hours As Needed for Moderate Pain . 30 tablet 0 1/13/2018 at Unknown time   • hyoscyamine sulfate (ANASPAZ) 0.125 MG tablet dispersible disintegrating tablet Take 125 mcg by mouth Every 4 (Four) Hours As Needed.   Unknown at Unknown time   • metFORMIN (GLUCOPHAGE) 500 MG tablet Take 1 tablet by mouth 2 (Two) Times a Day With Meals. 60 tablet 0 Taking   • nitrofurantoin, macrocrystal-monohydrate, (MACROBID) 100 MG capsule Take 100 mg by mouth 2 (Two) Times a Day.   Taking     Allergies:  Augmentin [amoxicillin-pot clavulanate]; Avelox [moxifloxacin]; Codeine; Morphine and related; Penicillins; and Sulfa antibiotics    REVIEW OF SYSTEMS:  Please see the above history of present illness for pertinent positives and negatives.  The remainder of the patient's systems have been reviewed and are negative.    Vital Signs  Temp:  [98.2 °F (36.8 °C)-99.3 °F (37.4 °C)] 99.3 °F (37.4 °C)  Heart Rate:  [] 108  Resp:  [16-20] 20  BP: (119-150)/() 127/68  Oxygen Therapy  SpO2: 94 %  Pulse Oximetry Type: Continuous  O2 Device: room air}  Body mass index is 29.88 kg/(m^2).  Flowsheet Rows         First Filed Value    Admission Height  160.2 cm (63.07\") Documented at 01/13/2018 1254    Admission Weight  76.7 kg (169 lb) Documented at 01/13/2018 1254             Physical Exam:  Physical Exam   Constitutional: Patient appears well-developed and well-nourished and in no acute distress   HEENT:   Head: Normocephalic and atraumatic.   Eyes:  Pupils are " equal, round, and reactive to light. EOM are intact. Sclera are anicteric and non-injected.  Mouth and Throat: Patient has moist mucous membranes. Oropharynx is clear of any erythema or exudate.     Neck: Neck supple. No JVD present. No thyromegaly present. No lymphadenopathy present.  Cardiovascular: Regular rate, regular rhythm, S1 normal and S2 normal.  Exam reveals no gallop and no friction rub.  No murmur heard.  Pulmonary/Chest: Lungs are clear to auscultation bilaterally. No respiratory distress. No wheezes. No rhonchi. No rales.   Abdominal: Soft. Bowel sounds are normal. No distension and no mass. There is no hepatosplenomegaly. There is no tenderness.   Musculoskeletal:TTP over right knee with decreased ROM  Extremities: No edema. Pulses are palpable in all 4 extremities.  Neurological: Patient is alert and oriented to person, place, and time. no focal deficits.  Skin: Skin is warm. No rash noted. Nails show no clubbing.  No cyanosis or erythema.     Results Review:    I reviewed the patient's new clinical results.  Lab Results (most recent)     Procedure Component Value Units Date/Time    CBC & Differential [[id number]] Collected:  01/13/18 1523    Specimen:  Blood Updated:  01/13/18 1534    Narrative:       The following orders were created for panel order CBC & Differential.  Procedure                               Abnormality         Status                     ---------                               -----------         ------                     CBC Auto Differential[[id number]]        Abnormal            Final result                 Please view results for these tests on the individual orders.    CBC Auto Differential [[id number]]  (Abnormal) Collected:  01/13/18 1523    Specimen:  Blood Updated:  01/13/18 1534     WBC 12.67 (H) 10*3/mm3      RBC 4.36 10*6/mm3      Hemoglobin 11.8 (L) g/dL      Hematocrit 36.3 (L) %      MCV 83.3 fL      MCH 27.1 pg      MCHC 32.5 g/dL      RDW 18.6 (H) %      RDW-SD  55.8 (H) fl      MPV 10.3 fL      Platelets 306 10*3/mm3      Neutrophil % 75.0 (H) %      Lymphocyte % 15.7 (L) %      Monocyte % 8.4 (H) %      Eosinophil % 0.1 %      Basophil % 0.3 %      Immature Grans % 0.5 %      Neutrophils, Absolute 9.50 (H) 10*3/mm3      Lymphocytes, Absolute 1.99 10*3/mm3      Monocytes, Absolute 1.07 (H) 10*3/mm3      Eosinophils, Absolute 0.01 (L) 10*3/mm3      Basophils, Absolute 0.04 10*3/mm3      Immature Grans, Absolute 0.06 (H) 10*3/mm3      nRBC 0.0 /100 WBC     Protime-INR [[id number]]  (Normal) Collected:  01/13/18 1523    Specimen:  Blood Updated:  01/13/18 1543     Protime 14.1 Seconds      INR 1.09    Narrative:       Therapeutic Ranges for INR: 2.0-3.0 (PT 20-30)                              2.5-3.5 (PT 25-34)    aPTT [[id number]]  (Normal) Collected:  01/13/18 1523    Specimen:  Blood Updated:  01/13/18 1543     PTT 30.6 seconds     Narrative:       PTT = The equivalent PTT values for the therapeutic range of heparin levels at 0.1 to 0.7 U/ml are 53 to 110 seconds.    Comprehensive Metabolic Panel [[id number]]  (Abnormal) Collected:  01/13/18 1523    Specimen:  Blood Updated:  01/13/18 1549     Glucose 99 mg/dL      BUN 13 mg/dL      Creatinine 0.67 mg/dL      Sodium 139 mmol/L      Potassium 4.4 mmol/L      Chloride 96 (L) mmol/L      CO2 27.4 mmol/L      Calcium 9.4 mg/dL      Total Protein 7.1 g/dL      Albumin 3.90 g/dL      ALT (SGPT) 12 U/L      AST (SGOT) 24 U/L      Alkaline Phosphatase 43 U/L      Total Bilirubin 0.5 mg/dL      eGFR Non African Amer 88 mL/min/1.73      Globulin 3.2 gm/dL      A/G Ratio 1.2 g/dL      BUN/Creatinine Ratio 19.4     Anion Gap 15.6 mmol/L           Imaging Results (most recent)     Procedure Component Value Units Date/Time    XR Knee 1 or 2 View Right [[id number]] Updated:  01/13/18 1526    XR Chest 1 View [[id number]] Updated:  01/13/18 1528    XR Femur 2 View Right [[id number]] Updated:  01/13/18 1646    CT Lower Extremity Right Without  Contrast [[id number]] Updated:  01/13/18 1706            ECG/EMG Results (most recent)     Procedure Component Value Units Date/Time    ECG 12 Lead [[id number]] Collected:  01/13/18 1512     Updated:  01/13/18 1514            Assessment/Plan     Right femur fracture with right knee replacement  -ortho consulted  -Plain film reviewed with distal femur fx near knee replacement  -CT knee per them for eval  -pre-op labs reviewed, medically seems ok for surgery although moderate risk  -pain control     HTN, HLD  -chronic, c/w home meds and BB    Asthma  -chronic  -no acute exacerbation    Type II DM  -chronic  -hold oral meds  -SSI with adjustments prn    GERD  -chronic  -c/w PPI    MARGARET  -non-comliant with CPAP at home    MIld dementia  -at baseline per daughter  -delerium and fall precautions    Chonic pain  -management as above    DVT-SCDs until surgery plans finalized    I discussed the patients findings and my recommendations with patient and family.     Nahun Zacarias DO  01/13/18  5:43 PM    Time:        Electronically signed by Nahun Zacarias DO at 1/13/2018  5:53 PM        Vital Signs (last 72 hrs)       01/12 0700  -  01/13 0659 01/13 0700  -  01/14 0659 01/14 0700  -  01/15 0659 01/15 0700  -  01/15 1542   Most Recent    Temp (°F)   98.2 -  99.3    98.4 -  98.7       98.4 (36.9)    Heart Rate   88 -  108    77 -  89    72 -  86     85    Resp   16 -  20    18 -  20    18 -  20     18    BP   119/78 -  150/98    114/70 -  143/82    158/82 -  172/85     158/82    SpO2 (%)   93 -  98    92 -  97    94 -  98     96          Hospital Medications (all)       Dose Frequency Start End    acetaminophen (TYLENOL) tablet 1,000 mg 1,000 mg Once 1/15/2018 1/15/2018    Sig - Route: Take 2 tablets by mouth 1 (One) Time. - Oral    acetaminophen (TYLENOL) tablet 650 mg (MAR Hold) ((MAR Hold) since 1/15/2018 12:27 PM) 650 mg Every 6 Hours PRN 1/13/2018     Sig - Route: Take 2 tablets by mouth Every 6 (Six) Hours As Needed for Mild  Pain  or Moderate Pain . - Oral    albuterol (PROVENTIL) nebulizer solution 0.083% 2.5 mg/3mL (MAR Hold) ((MAR Hold) since 1/15/2018 12:27 PM) 2.5 mg Every 4 Hours PRN 1/15/2018     Sig - Route: Take 2.5 mg by nebulization Every 4 (Four) Hours As Needed for Wheezing or Shortness of Air. - Nebulization    baclofen (LIORESAL) tablet 10 mg (MAR Hold) ((MAR Hold) since 1/15/2018 12:27 PM) 10 mg Daily 1/13/2018     Sig - Route: Take 1 tablet by mouth Daily. - Oral    bisacodyl (DULCOLAX) EC tablet 5 mg (MAR Hold) ((MAR Hold) since 1/15/2018 12:27 PM) 5 mg Daily PRN 1/13/2018     Sig - Route: Take 1 tablet by mouth Daily As Needed for Constipation. - Oral    bisacodyl (DULCOLAX) suppository 10 mg (MAR Hold) ((MAR Hold) since 1/15/2018 12:27 PM) 10 mg Daily PRN 1/13/2018     Sig - Route: Insert 1 suppository into the rectum Daily As Needed for Constipation. - Rectal    budesonide-formoterol (SYMBICORT) 80-4.5 MCG/ACT inhaler 2 puff (MAR Hold) ((MAR Hold) since 1/15/2018 12:27 PM) 2 puff 2 Times Daily - RT 1/13/2018     Sig - Route: Inhale 2 puffs 2 (Two) Times a Day. - Inhalation    dextrose (D50W) solution 25 g (MAR Hold) ((MAR Hold) since 1/15/2018 12:27 PM) 25 g Every 15 Minutes PRN 1/13/2018     Sig - Route: Infuse 50 mL into a venous catheter Every 15 (Fifteen) Minutes As Needed for Low Blood Sugar (Blood Sugar Less Than 70, Patient Has IV Access - Unresponsive, NPO or Unable To Safely Swallow). - Intravenous    dextrose (GLUTOSE) oral gel 15 g (MAR Hold) ((MAR Hold) since 1/15/2018 12:27 PM) 15 g Every 15 Minutes PRN 1/13/2018     Sig - Route: Take 15 g by mouth Every 15 (Fifteen) Minutes As Needed for Low Blood Sugar (Blood Sugar Less Than 70, Patient Alert, Is Not NPO & Can Safely Swallow). - Oral    famotidine (PEPCID) injection 20 mg 20 mg 60 Minutes Pre-Op 1/15/2018     Sig - Route: Infuse 2 mL into a venous catheter 60 Minutes Prior to Surgery. - Intravenous    glucagon (GLUCAGEN) injection 1 mg (MAR Hold)  ((MAR Hold) since 1/15/2018 12:27 PM) 1 mg Every 15 Minutes PRN 1/13/2018     Sig - Route: Inject 1 mg under the skin Every 15 (Fifteen) Minutes As Needed (Blood Glucose Less Than 70 - Patient Without IV Access - Unresponsive, NPO or Unable To Safely Swallow). - Subcutaneous    insulin aspart (novoLOG) injection 0-7 Units (MAR Hold) ((MAR Hold) since 1/15/2018 12:27 PM) 0-7 Units 4 Times Daily Before Meals & Nightly 1/13/2018     Sig - Route: Inject 0-7 Units under the skin 4 (Four) Times a Day Before Meals & at Bedtime. - Subcutaneous    lactated ringers infusion 1,000 mL 1,000 mL Continuous PRN 1/15/2018     Sig - Route: Infuse 1,000 mL into a venous catheter Continuous As Needed (Start Prior to Surgery). - Intravenous    lidocaine (XYLOCAINE) 1 % injection 0.5 mL 0.5 mL Once As Needed 1/15/2018     Sig - Route: Inject 0.5 mL into the skin 1 (One) Time As Needed (Prior to IV Insertion). - Intradermal    melatonin 5 MG sublingual tablet  - ADS Override Pull   1/13/2018 1/13/2018    Notes to Pharmacy: Created by cabinet override    melatonin tablet 5 mg (MAR Hold) ((MAR Hold) since 1/15/2018 12:27 PM) 5 mg Nightly 1/13/2018     Sig - Route: Take 1 tablet by mouth Every Night. - Oral    meperidine (DEMEROL) injection 25 mg 25 mg Once 1/13/2018 1/13/2018    Sig - Route: Infuse 1 mL into a venous catheter 1 (One) Time. - Intravenous    meperidine (DEMEROL) injection 25 mg 25 mg Once 1/14/2018 1/14/2018    Sig - Route: Infuse 1 mL into a venous catheter 1 (One) Time. - Intravenous    meperidine (DEMEROL) injection 25 mg (MAR Hold) ((MAR Hold) since 1/15/2018 12:27 PM) 25 mg 2 Times Daily PRN 1/14/2018 1/17/2018    Sig - Route: Infuse 1 mL into a venous catheter 2 (Two) Times a Day As Needed for Severe Pain . - Intravenous    metoprolol succinate XL (TOPROL-XL) 24 hr tablet 25 mg 25 mg Daily 1/13/2018     Sig - Route: Take 1 tablet by mouth Daily. - Oral    midazolam (VERSED) injection 1 mg 1 mg Every 5 Minutes PRN  "1/15/2018     Sig - Route: Infuse 1 mL into a venous catheter Every 5 (Five) Minutes As Needed for Anxiety (Max 4mg midazolam TOTAL). - Intravenous    Linked Group 1:  \"Or\" Linked Group Details        midazolam (VERSED) injection 2 mg 2 mg Every 5 Minutes PRN 1/15/2018     Sig - Route: Infuse 2 mL into a venous catheter Every 5 (Five) Minutes As Needed for Anxiety (Max 4mg midazolam TOTAL). - Intravenous    Linked Group 1:  \"Or\" Linked Group Details        ondansetron (ZOFRAN) injection 4 mg (MAR Hold) ((MAR Hold) since 1/15/2018 12:27 PM) 4 mg Every 6 Hours PRN 1/13/2018     Sig - Route: Infuse 2 mL into a venous catheter Every 6 (Six) Hours As Needed for Nausea or Vomiting. - Intravenous    ondansetron (ZOFRAN) injection 4 mg 4 mg Once As Needed 1/15/2018 1/15/2018    Sig - Route: Infuse 2 mL into a venous catheter 1 (One) Time As Needed for Nausea or Vomiting. - Intravenous    oxyCODONE (oxyCONTIN) 12 hr tablet 10 mg 10 mg Once 1/15/2018 1/15/2018    Sig - Route: Take 1 tablet by mouth 1 (One) Time. - Oral    oxyCODONE-acetaminophen (PERCOCET) 5-325 MG per tablet 1 tablet (MAR Hold) ((MAR Hold) since 1/15/2018 12:27 PM) 1 tablet Every 4 Hours PRN 1/14/2018 1/24/2018    Sig - Route: Take 1 tablet by mouth Every 4 (Four) Hours As Needed for Moderate Pain  or Severe Pain . - Oral    pantoprazole (PROTONIX) EC tablet 40 mg 40 mg Every Morning 1/14/2018     Sig - Route: Take 2 tablets by mouth Every Morning. - Oral    pantoprazole (PROTONIX) EC tablet 40 mg 40 mg Once 1/15/2018 1/15/2018    Sig - Route: Take 2 tablets by mouth 1 (One) Time. - Oral    PARoxetine (PAXIL) tablet 20 mg (MAR Hold) ((MAR Hold) since 1/15/2018 12:27 PM) 20 mg Daily 1/13/2018     Sig - Route: Take 1 tablet by mouth Daily. - Oral    pregabalin (LYRICA) capsule 150 mg 150 mg Once 1/15/2018 1/15/2018    Sig - Route: Take 2 capsules by mouth 1 (One) Time. - Oral    sodium chloride 0.9 % flush 1-10 mL (MAR Hold) ((MAR Hold) since 1/15/2018 12:27 " "PM) 1-10 mL As Needed 1/13/2018     Sig - Route: Infuse 1-10 mL into a venous catheter As Needed for Line Care. - Intravenous    sodium chloride 0.9 % flush 1-10 mL 1-10 mL As Needed 1/15/2018     Sig - Route: Infuse 1-10 mL into a venous catheter As Needed for Line Care. - Intravenous    sodium chloride 0.9 % flush 10 mL (MAR Hold) ((MAR Hold) since 1/15/2018 12:27 PM) 10 mL As Needed 1/13/2018     Sig - Route: Infuse 10 mL into a venous catheter As Needed for Line Care. - Intravenous    Linked Group 2:  \"And\" Linked Group Details        sodium chloride 0.9 % flush 3 mL 3 mL As Needed 1/15/2018     Sig - Route: Infuse 3 mL into a venous catheter As Needed for Line Care. - Intravenous    sodium chloride 0.9 % infusion 40 mL (MAR Hold) ((MAR Hold) since 1/15/2018 12:27 PM) 40 mL As Needed 1/13/2018     Sig - Route: Infuse 40 mL into a venous catheter As Needed for Line Care. - Intravenous    sodium chloride 0.9 % infusion 40 mL 40 mL As Needed 1/15/2018     Sig - Route: Infuse 40 mL into a venous catheter As Needed for Line Care. - Intravenous    sodium chloride 0.9 % infusion 125 mL/hr Continuous 1/14/2018     Sig - Route: Infuse 125 mL/hr into a venous catheter Continuous. - Intravenous    traMADol (ULTRAM) tablet 50 mg (MAR Hold) ((MAR Hold) since 1/15/2018 12:27 PM) 50 mg Every 6 Hours PRN 1/13/2018     Sig - Route: Take 1 tablet by mouth Every 6 (Six) Hours As Needed for Moderate Pain . - Oral    vancomycin IVPB 1250 mg in 250 mL NS (premix) 15 mg/kg × 76.5 kg Once 1/15/2018 1/15/2018    Sig - Route: Infuse 250 mL into a venous catheter 1 (One) Time. - Intravenous    albuterol (PROVENTIL HFA;VENTOLIN HFA) inhaler 2 puff (Discontinued) 2 puff Every 4 Hours PRN 1/13/2018 1/15/2018    Sig - Route: Inhale 2 puffs Every 4 (Four) Hours As Needed for Wheezing. - Inhalation    Reason for Discontinue: Availability    albuterol (PROVENTIL) nebulizer solution 0.083% 2.5 mg/3mL (Discontinued) 2.5 mg Every 6 Hours PRN " 1/13/2018 1/15/2018    Sig - Route: Take 2.5 mg by nebulization Every 6 (Six) Hours As Needed for Wheezing or Shortness of Air. - Nebulization    Reason for Discontinue: Dose adjustment          Blood Administration Record     None        Lab Results (last 72 hours)     Procedure Component Value Units Date/Time    CBC & Differential [[id number]] Collected:  01/13/18 1523    Specimen:  Blood Updated:  01/13/18 1534    Narrative:       The following orders were created for panel order CBC & Differential.  Procedure                               Abnormality         Status                     ---------                               -----------         ------                     CBC Auto Differential[[id number]]        Abnormal            Final result                 Please view results for these tests on the individual orders.    CBC Auto Differential [[id number]]  (Abnormal) Collected:  01/13/18 1523    Specimen:  Blood Updated:  01/13/18 1534     WBC 12.67 (H) 10*3/mm3      RBC 4.36 10*6/mm3      Hemoglobin 11.8 (L) g/dL      Hematocrit 36.3 (L) %      MCV 83.3 fL      MCH 27.1 pg      MCHC 32.5 g/dL      RDW 18.6 (H) %      RDW-SD 55.8 (H) fl      MPV 10.3 fL      Platelets 306 10*3/mm3      Neutrophil % 75.0 (H) %      Lymphocyte % 15.7 (L) %      Monocyte % 8.4 (H) %      Eosinophil % 0.1 %      Basophil % 0.3 %      Immature Grans % 0.5 %      Neutrophils, Absolute 9.50 (H) 10*3/mm3      Lymphocytes, Absolute 1.99 10*3/mm3      Monocytes, Absolute 1.07 (H) 10*3/mm3      Eosinophils, Absolute 0.01 (L) 10*3/mm3      Basophils, Absolute 0.04 10*3/mm3      Immature Grans, Absolute 0.06 (H) 10*3/mm3      nRBC 0.0 /100 WBC     Protime-INR [[id number]]  (Normal) Collected:  01/13/18 1523    Specimen:  Blood Updated:  01/13/18 1543     Protime 14.1 Seconds      INR 1.09    Narrative:       Therapeutic Ranges for INR: 2.0-3.0 (PT 20-30)                              2.5-3.5 (PT 25-34)    aPTT [[id number]]  (Normal) Collected:   01/13/18 1523    Specimen:  Blood Updated:  01/13/18 1543     PTT 30.6 seconds     Narrative:       PTT = The equivalent PTT values for the therapeutic range of heparin levels at 0.1 to 0.7 U/ml are 53 to 110 seconds.    Comprehensive Metabolic Panel [[id number]]  (Abnormal) Collected:  01/13/18 1523    Specimen:  Blood Updated:  01/13/18 1549     Glucose 99 mg/dL      BUN 13 mg/dL      Creatinine 0.67 mg/dL      Sodium 139 mmol/L      Potassium 4.4 mmol/L      Chloride 96 (L) mmol/L      CO2 27.4 mmol/L      Calcium 9.4 mg/dL      Total Protein 7.1 g/dL      Albumin 3.90 g/dL      ALT (SGPT) 12 U/L      AST (SGOT) 24 U/L      Alkaline Phosphatase 43 U/L      Total Bilirubin 0.5 mg/dL      eGFR Non African Amer 88 mL/min/1.73      Globulin 3.2 gm/dL      A/G Ratio 1.2 g/dL      BUN/Creatinine Ratio 19.4     Anion Gap 15.6 mmol/L     POC Glucose Once [[id number]]  (Normal) Collected:  01/13/18 1751    Specimen:  Blood Updated:  01/13/18 1757     Glucose 97 mg/dL     Narrative:       Meter: [id number] : [id number] Mynor Mccray CNA Float    Urinalysis With / Culture If Indicated - Urine, Catheter [[id number]]  (Abnormal) Collected:  01/13/18 1848    Specimen:  Urine from Urine, Catheter Updated:  01/13/18 2033     Color, UA Yellow     Appearance, UA Cloudy (A)     pH, UA 5.5     Specific Gravity, UA 1.025     Glucose, UA Negative     Ketones, UA 40 mg/dL (2+) (A)     Bilirubin, UA Negative     Blood, UA Moderate (2+) (A)     Protein, UA Negative     Leuk Esterase, UA Moderate (2+) (A)     Nitrite, UA Positive (A)     Urobilinogen, UA 0.2 E.U./dL    Urinalysis, Microscopic Only - Urine, Clean Catch [[id number]]  (Abnormal) Collected:  01/13/18 1848    Specimen:  Urine from Urine, Catheter Updated:  01/13/18 2033     RBC, UA Too Numerous to Count (A) /HPF      WBC, UA Too Numerous to Count (A) /HPF      Bacteria, UA 3+ (A) /HPF      Squamous Epithelial Cells, UA 0-2 /HPF      Hyaline Casts, UA None Seen /LPF      Mucus,  UA Small/1+ (A) /HPF      WBC Clumps, UA Moderate/2+ /HPF      Methodology Manual Light Microscopy    POC Glucose Once [[id number]]  (Abnormal) Collected:  01/13/18 2108    Specimen:  Blood Updated:  01/13/18 2116     Glucose 173 (H) mg/dL     Narrative:       Meter: [id number] : [id number] Roberto Lagunas CNA    CBC (No Diff) [[id number]]  (Abnormal) Collected:  01/14/18 0354    Specimen:  Blood Updated:  01/14/18 0420     WBC 9.99 10*3/mm3      RBC 4.15 (L) 10*6/mm3      Hemoglobin 11.0 (L) g/dL      Hematocrit 34.3 (L) %      MCV 82.7 fL      MCH 26.5 (L) pg      MCHC 32.1 g/dL      RDW 18.3 (H) %      RDW-SD 55.1 (H) fl      MPV 10.3 fL      Platelets 293 10*3/mm3     Basic Metabolic Panel [[id number]]  (Abnormal) Collected:  01/14/18 0354    Specimen:  Blood Updated:  01/14/18 0442     Glucose 129 (H) mg/dL      BUN 16 mg/dL      Creatinine 0.71 mg/dL      Sodium 139 mmol/L      Potassium 3.8 mmol/L      Chloride 97 (L) mmol/L      CO2 26.2 mmol/L      Calcium 8.6 (L) mg/dL      eGFR Non African Amer 82 mL/min/1.73      BUN/Creatinine Ratio 22.5     Anion Gap 15.8 mmol/L     Narrative:       GFR Normal >60  Chronic Kidney Disease <60  Kidney Failure <15    POC Glucose Once [[id number]]  (Abnormal) Collected:  01/14/18 0823    Specimen:  Blood Updated:  01/14/18 0829     Glucose 139 (H) mg/dL     Narrative:       Meter: [id number] : [id number] Mynor Pacheco RN    POC Glucose Once [[id number]]  (Normal) Collected:  01/14/18 1204    Specimen:  Blood Updated:  01/14/18 1211     Glucose 92 mg/dL     Narrative:       Meter: [id number] : [id number] Mynor Pacheco RN    Sedimentation Rate [[id number]]  (Abnormal) Collected:  01/14/18 1422    Specimen:  Blood Updated:  01/14/18 1438     Sed Rate 38 (H) mm/hr     POC Glucose Once [[id number]]  (Normal) Collected:  01/14/18 1643    Specimen:  Blood Updated:  01/14/18 1650     Glucose 111 mg/dL     Narrative:       Meter: [id number] : [id number] Lashanda RANDLE  ASSISTANT    POC Glucose Once [[id number]]  (Normal) Collected:  01/14/18 2032    Specimen:  Blood Updated:  01/14/18 2038     Glucose 128 mg/dL     Narrative:       Meter: [id number] : [id number] Rogersbyron Atsudillo NURSING ASSISTANT    CBC & Differential [[id number]] Collected:  01/15/18 0401    Specimen:  Blood Updated:  01/15/18 0433    Narrative:       The following orders were created for panel order CBC & Differential.  Procedure                               Abnormality         Status                     ---------                               -----------         ------                     CBC Auto Differential[[id number]]        Abnormal            Final result                 Please view results for these tests on the individual orders.    CBC Auto Differential [[id number]]  (Abnormal) Collected:  01/15/18 0401    Specimen:  Blood Updated:  01/15/18 0433     WBC 7.97 10*3/mm3      RBC 3.71 (L) 10*6/mm3      Hemoglobin 10.0 (L) g/dL      Hematocrit 31.2 (L) %      MCV 84.1 fL      MCH 27.0 pg      MCHC 32.1 g/dL      RDW 18.4 (H) %      RDW-SD 56.8 (H) fl      MPV 10.4 fL      Platelets 248 10*3/mm3      Neutrophil % 63.2 %      Lymphocyte % 24.1 %      Monocyte % 8.8 (H) %      Eosinophil % 3.0 %      Basophil % 0.5 %      Immature Grans % 0.4 %      Neutrophils, Absolute 5.04 10*3/mm3      Lymphocytes, Absolute 1.92 10*3/mm3      Monocytes, Absolute 0.70 10*3/mm3      Eosinophils, Absolute 0.24 10*3/mm3      Basophils, Absolute 0.04 10*3/mm3      Immature Grans, Absolute 0.03 10*3/mm3      nRBC 0.0 /100 WBC     Basic Metabolic Panel [[id number]]  (Abnormal) Collected:  01/15/18 0401    Specimen:  Blood Updated:  01/15/18 0511     Glucose 117 (H) mg/dL      BUN 14 mg/dL      Creatinine 0.57 mg/dL      Sodium 142 mmol/L      Potassium 3.4 (L) mmol/L      Chloride 106 mmol/L      CO2 24.7 mmol/L      Calcium 7.8 (L) mg/dL      eGFR Non African Amer 105 mL/min/1.73      BUN/Creatinine Ratio 24.6     Anion Gap 11.3  mmol/L     Narrative:       GFR Normal >60  Chronic Kidney Disease <60  Kidney Failure <15    POC Glucose Once [[id number]]  (Normal) Collected:  01/15/18 0713    Specimen:  Blood Updated:  01/15/18 0723     Glucose 108 mg/dL     Narrative:       Meter: [id number] : [id number] Clarence Rojas NURSING ASSISTANT    C-reactive Protein [[id number]]  (Abnormal) Collected:  01/14/18 1422    Specimen:  Blood Updated:  01/15/18 0758     C-Reactive Protein 6.58 (H) mg/dL     Urine Culture - Urine, Urine, Clean Catch [[id number]]  (Abnormal)  (Susceptibility) Collected:  01/13/18 1848    Specimen:  Urine from Urine, Catheter Updated:  01/15/18 0901     Urine Culture --      >100,000 CFU/mL Escherichia coli (A)    Susceptibility      Escherichia coli     LORNA     Ampicillin <=2 ug/ml Susceptible     Ampicillin + Sulbactam <=2 ug/ml Susceptible     Cefazolin <=4 ug/ml Susceptible     Cefepime <=1 ug/ml Susceptible     Ceftriaxone <=1 ug/ml Susceptible     Ciprofloxacin >=4 ug/ml Resistant     Ertapenem <=0.5 ug/ml Susceptible     Gentamicin <=1 ug/ml Susceptible     Levofloxacin >=8 ug/ml Resistant     Nitrofurantoin <=16 ug/ml Susceptible     Piperacillin + Tazobactam <=4 ug/ml Susceptible     Tetracycline <=1 ug/ml Susceptible     Trimethoprim + Sulfamethoxazole <=20 ug/ml Susceptible                    POC Glucose Once [[id number]]  (Normal) Collected:  01/15/18 1135    Specimen:  Blood Updated:  01/15/18 1144     Glucose 87 mg/dL     Narrative:       Meter: [id number] : [id number] Clarence Rojas NURSING ASSISTANT    POC Glucose Once [[id number]]  (Normal) Collected:  01/15/18 1321    Specimen:  Blood Updated:  01/15/18 1335     Glucose 89 mg/dL     Narrative:       Meter: [id number] : [id number] Juan Ramon Rader RN    POC Glucose Once [[id number]]  (Normal) Collected:  01/15/18 1515    Specimen:  Blood Updated:  01/15/18 1523     Glucose 92 mg/dL     Narrative:       Meter: [id number] : [id number] Dhiraj Coreas RN           Imaging Results (last 72 hours)     Procedure Component Value Units Date/Time    XR Knee 1 or 2 View Right [[id number]] Collected:  01/14/18 0803     Updated:  01/14/18 0806    Narrative:       INDICATION: Knee pain after slipping and falling on the ice last night.  Previous knee replacement.     TECHNIQUE: 2 views the right knee were obtained     FINDINGS: A knee prosthesis is seen with satisfactory alignment and  position. There is an acute fracture of the distal femur just at the  upper and of the femoral prosthesis. The fracture is mildly displaced  with minimal apex anterior angulation. The tibial component is  unremarkable. No radiodense foreign bodies are seen.       Impression:       Acute fracture of the distal femur just above the femoral  component of the knee prosthesis as described above.     This report was finalized on 1/14/2018 8:04 AM by Dr. Jose Neri MD.       XR Chest 1 View [[id number]] Collected:  01/14/18 0804     Updated:  01/14/18 0807    Narrative:       INDICATION: Distal femoral fracture. Presurgical evaluation.     TECHNIQUE: A single AP view the chest was obtained     FINDINGS: Mild cardiomegaly is noted. Both lungs are fully expanded and  clear with normal vascular markings. No pleural fluid is seen.       Impression:       Mild cardiomegaly. No active disease     This report was finalized on 1/14/2018 8:05 AM by Dr. Jose Neri MD.       XR Femur 2 View Right [[id number]] Collected:  01/14/18 0805     Updated:  01/14/18 0808    Narrative:       INDICATION: Leg pain after slipping and falling on the ice last night     TECHNIQUE: 4 views of the right femur were obtained     FINDINGS: There is a fracture of the distal femur just above the femoral  component of the knee prosthesis. The fracture is obliquely oriented.  There is mild displacement and minimal apex anterior angulation. No  additional femoral fractures are seen. Diffuse small vessel  atherosclerotic calcification is  seen in the S after a. Mild  degenerative changes are seen at the right hip.       Impression:       Minimally angulated mildly displaced distal femoral fracture     This report was finalized on 1/14/2018 8:06 AM by Dr. Jose Neri MD.       CT Lower Extremity Right Without Contrast [[id number]] Collected:  01/15/18 0844     Updated:  01/15/18 0855    Narrative:       CT RIGHT KNEE, 01/13/2018:     HISTORY:  67-year-old female admitted to the hospital through the ED today with a  right distal femur fracture above the right knee arthroplasty. Slipped  and fell on ice last evening.     TECHNIQUE:  Axial CT images of the right knee beginning at the distal third femoral  shaft level extending to the proximal third of the tibia and fibula with  multiplanar image reconstruction. Radiation dose reduction techniques  were utilized, which may include automated exposure control and/or  exposure modulation based on body size. Previous CT and radionuclide  cardiac imaging studies here in the last year: 0.     FINDINGS:  The images confirm the presence of a nondisplaced acute oblique fracture  across the distal metaphysis of the femur. The fracture extends into the  upper and medial margin of the medial femoral condyle above the level of  the arthroplasty, but no additional significant condylar or  intracondylar distal fracture extension is demonstrated.     Right total knee arthroplasty components appear well-positioned and well  seated. There is a probable small knee joint effusion. No definite acute  fracture of the visualized proximal tibia or fibula is seen.       Impression:       1. Acute nondisplaced oblique fracture across the distal metaphysis of  the femur as detailed above. Fracture extends into the upper and medial  margin of the medial femoral condyle, but no other condylar or  intercondylar extension is seen.  2. Well-positioned right total knee arthroplasty.     This report was finalized on 1/15/2018 8:53 AM by  "Dr. Lang Corona MD.             ECG/EMG Results (last 72 hours)     Procedure Component Value Units Date/Time    ECG 12 Lead [[id number]] Collected:  01/13/18 1512     Updated:  01/13/18 2232    Narrative:       RR Interval= 606 ms  MN Interval= 128 ms  QRSD Interval= 72 ms  QT Interval= 344 ms  QTc Interval= 442 ms  Heart Rate= 99 ms  P Axis= 27 deg  QRS Axis= 41 deg  T Wave Axis= 184 deg  I: 40 Axis= 36 deg  T: 40 Axis= 54 deg  ST Axis= 209 deg  SINUS RHYTHM  BORDERLINE REPOLARIZATION ABNORMALITY  NO SIGNIFICANT CHANGE FROM PREVIOUS ECG  Electronically Signed by:  Tara Avitia ([facility]) 13-Jan-2018 22:32:21  Date and Time of Study: 2018-01-13 15:12:47          Ventilator/Non-Invasive Ventilation Settings     None        Operative/Procedure Notes (last 72 hours) (Notes from 1/12/2018  3:42 PM through 1/15/2018  3:42 PM)     No notes of this type exist for this encounter.           Physician Progress Notes (last 72 hours) (Notes from 1/12/2018  3:43 PM through 1/15/2018  3:43 PM)      Nahun Zacarias DO at 1/14/2018  8:22 AM  Version 1 of 1         Hospitalist Team      Patient Care Team:  Linden Zhang MD as PCP - General  Linden Zhang MD as PCP - Family Medicine        Chief Complaint:  Slipped and fell and hurt right knee    Subjective    Reports that pain is better after shot of demerol. No other new issues. NO CP, n/v/d, fever or chills.     Objective    Vital Signs  Temp:  [98.2 °F (36.8 °C)-99.3 °F (37.4 °C)] 98.4 °F (36.9 °C)  Heart Rate:  [] 85  Resp:  [16-20] 18  BP: (119-150)/() 130/72  Oxygen Therapy  SpO2: 95 %  Pulse Oximetry Type: Intermittent  O2 Device: room air}  Flowsheet Rows         First Filed Value    Admission Height  160.2 cm (63.07\") Documented at 01/13/2018 1254    Admission Weight  76.7 kg (169 lb) Documented at 01/13/2018 1254            Physical Exam:    General Appearance:    Alert, cooperative, in no acute distress   Head:    Normocephalic, without obvious " abnormality, atraumatic   Eyes:            Lids and lashes normal, conjunctivae and sclerae normal, no   icterus, no pallor, corneas clear, PERRLA               Back:     No kyphosis present, no scoliosis present, no skin lesions,       erythema or scars, no tenderness to percussion or                   palpation,   range of motion normal   Lungs:     Clear to auscultation,respirations regular, even and                   unlabored    Heart:    Regular rhythm and normal rate, normal S1 and S2, no            murmur, no gallop, no rub, no click               Extremities:   TTP over right knee with decreased ROM   Pulses:   Pulses palpable and equal bilaterally   Skin:   No bleeding, bruising or rash       Neurologic:   Cranial nerves 2 - 12 grossly intact, sensation intact, DTR        present and equal bilaterally       Results Review:     I reviewed the patient's new clinical results.    Lab Results (last 24 hours)     Procedure Component Value Units Date/Time    CBC & Differential [[id number]] Collected:  01/13/18 1523    Specimen:  Blood Updated:  01/13/18 1534    Narrative:       The following orders were created for panel order CBC & Differential.  Procedure                               Abnormality         Status                     ---------                               -----------         ------                     CBC Auto Differential[[id number]]        Abnormal            Final result                 Please view results for these tests on the individual orders.    CBC Auto Differential [[id number]]  (Abnormal) Collected:  01/13/18 1523    Specimen:  Blood Updated:  01/13/18 1534     WBC 12.67 (H) 10*3/mm3      RBC 4.36 10*6/mm3      Hemoglobin 11.8 (L) g/dL      Hematocrit 36.3 (L) %      MCV 83.3 fL      MCH 27.1 pg      MCHC 32.5 g/dL      RDW 18.6 (H) %      RDW-SD 55.8 (H) fl      MPV 10.3 fL      Platelets 306 10*3/mm3      Neutrophil % 75.0 (H) %      Lymphocyte % 15.7 (L) %      Monocyte % 8.4 (H) %       Eosinophil % 0.1 %      Basophil % 0.3 %      Immature Grans % 0.5 %      Neutrophils, Absolute 9.50 (H) 10*3/mm3      Lymphocytes, Absolute 1.99 10*3/mm3      Monocytes, Absolute 1.07 (H) 10*3/mm3      Eosinophils, Absolute 0.01 (L) 10*3/mm3      Basophils, Absolute 0.04 10*3/mm3      Immature Grans, Absolute 0.06 (H) 10*3/mm3      nRBC 0.0 /100 WBC     Protime-INR [[id number]]  (Normal) Collected:  01/13/18 1523    Specimen:  Blood Updated:  01/13/18 1543     Protime 14.1 Seconds      INR 1.09    Narrative:       Therapeutic Ranges for INR: 2.0-3.0 (PT 20-30)                              2.5-3.5 (PT 25-34)    aPTT [[id number]]  (Normal) Collected:  01/13/18 1523    Specimen:  Blood Updated:  01/13/18 1543     PTT 30.6 seconds     Narrative:       PTT = The equivalent PTT values for the therapeutic range of heparin levels at 0.1 to 0.7 U/ml are 53 to 110 seconds.    Comprehensive Metabolic Panel [[id number]]  (Abnormal) Collected:  01/13/18 1523    Specimen:  Blood Updated:  01/13/18 1549     Glucose 99 mg/dL      BUN 13 mg/dL      Creatinine 0.67 mg/dL      Sodium 139 mmol/L      Potassium 4.4 mmol/L      Chloride 96 (L) mmol/L      CO2 27.4 mmol/L      Calcium 9.4 mg/dL      Total Protein 7.1 g/dL      Albumin 3.90 g/dL      ALT (SGPT) 12 U/L      AST (SGOT) 24 U/L      Alkaline Phosphatase 43 U/L      Total Bilirubin 0.5 mg/dL      eGFR Non African Amer 88 mL/min/1.73      Globulin 3.2 gm/dL      A/G Ratio 1.2 g/dL      BUN/Creatinine Ratio 19.4     Anion Gap 15.6 mmol/L     POC Glucose Once [[id number]]  (Normal) Collected:  01/13/18 1751    Specimen:  Blood Updated:  01/13/18 1757     Glucose 97 mg/dL     Narrative:       Meter: [id number] : [id number] Mynor Mccray CNA Float    Urine Culture - Urine, Urine, Clean Catch [[id number]] Collected:  01/13/18 1848    Specimen:  Urine from Urine, Catheter Updated:  01/13/18 1938    Urinalysis With / Culture If Indicated - Urine, Catheter [[id number]]  (Abnormal)  Collected:  01/13/18 1848    Specimen:  Urine from Urine, Catheter Updated:  01/13/18 2033     Color, UA Yellow     Appearance, UA Cloudy (A)     pH, UA 5.5     Specific Gravity, UA 1.025     Glucose, UA Negative     Ketones, UA 40 mg/dL (2+) (A)     Bilirubin, UA Negative     Blood, UA Moderate (2+) (A)     Protein, UA Negative     Leuk Esterase, UA Moderate (2+) (A)     Nitrite, UA Positive (A)     Urobilinogen, UA 0.2 E.U./dL    Urinalysis, Microscopic Only - Urine, Clean Catch [[id number]]  (Abnormal) Collected:  01/13/18 1848    Specimen:  Urine from Urine, Catheter Updated:  01/13/18 2033     RBC, UA Too Numerous to Count (A) /HPF      WBC, UA Too Numerous to Count (A) /HPF      Bacteria, UA 3+ (A) /HPF      Squamous Epithelial Cells, UA 0-2 /HPF      Hyaline Casts, UA None Seen /LPF      Mucus, UA Small/1+ (A) /HPF      WBC Clumps, UA Moderate/2+ /HPF      Methodology Manual Light Microscopy    POC Glucose Once [[id number]]  (Abnormal) Collected:  01/13/18 2108    Specimen:  Blood Updated:  01/13/18 2116     Glucose 173 (H) mg/dL     Narrative:       Meter: [id number] : [id number] Roberto Lagunas CNA    CBC (No Diff) [[id number]]  (Abnormal) Collected:  01/14/18 0354    Specimen:  Blood Updated:  01/14/18 0420     WBC 9.99 10*3/mm3      RBC 4.15 (L) 10*6/mm3      Hemoglobin 11.0 (L) g/dL      Hematocrit 34.3 (L) %      MCV 82.7 fL      MCH 26.5 (L) pg      MCHC 32.1 g/dL      RDW 18.3 (H) %      RDW-SD 55.1 (H) fl      MPV 10.3 fL      Platelets 293 10*3/mm3     Basic Metabolic Panel [[id number]]  (Abnormal) Collected:  01/14/18 0354    Specimen:  Blood Updated:  01/14/18 0442     Glucose 129 (H) mg/dL      BUN 16 mg/dL      Creatinine 0.71 mg/dL      Sodium 139 mmol/L      Potassium 3.8 mmol/L      Chloride 97 (L) mmol/L      CO2 26.2 mmol/L      Calcium 8.6 (L) mg/dL      eGFR Non African Amer 82 mL/min/1.73      BUN/Creatinine Ratio 22.5     Anion Gap 15.8 mmol/L     Narrative:       GFR Normal >60  Chronic  Kidney Disease <60  Kidney Failure <15          Imaging Results (last 24 hours)     Procedure Component Value Units Date/Time    CT Lower Extremity Right Without Contrast [[id number]] Updated:  01/13/18 1706    XR Knee 1 or 2 View Right [[id number]] Collected:  01/14/18 0803     Updated:  01/14/18 0806    Narrative:       INDICATION: Knee pain after slipping and falling on the ice last night.  Previous knee replacement.     TECHNIQUE: 2 views the right knee were obtained     FINDINGS: A knee prosthesis is seen with satisfactory alignment and  position. There is an acute fracture of the distal femur just at the  upper and of the femoral prosthesis. The fracture is mildly displaced  with minimal apex anterior angulation. The tibial component is  unremarkable. No radiodense foreign bodies are seen.       Impression:       Acute fracture of the distal femur just above the femoral  component of the knee prosthesis as described above.     This report was finalized on 1/14/2018 8:04 AM by Dr. Jose Neri MD.       XR Chest 1 View [[id number]] Collected:  01/14/18 0804     Updated:  01/14/18 0807    Narrative:       INDICATION: Distal femoral fracture. Presurgical evaluation.     TECHNIQUE: A single AP view the chest was obtained     FINDINGS: Mild cardiomegaly is noted. Both lungs are fully expanded and  clear with normal vascular markings. No pleural fluid is seen.       Impression:       Mild cardiomegaly. No active disease     This report was finalized on 1/14/2018 8:05 AM by Dr. Jose Neri MD.       XR Femur 2 View Right [[id number]] Collected:  01/14/18 0805     Updated:  01/14/18 0808    Narrative:       INDICATION: Leg pain after slipping and falling on the ice last night     TECHNIQUE: 4 views of the right femur were obtained     FINDINGS: There is a fracture of the distal femur just above the femoral  component of the knee prosthesis. The fracture is obliquely oriented.  There is mild displacement and minimal apex  anterior angulation. No  additional femoral fractures are seen. Diffuse small vessel  atherosclerotic calcification is seen in the S after a. Mild  degenerative changes are seen at the right hip.       Impression:       Minimally angulated mildly displaced distal femoral fracture     This report was finalized on 1/14/2018 8:06 AM by Dr. Jose Neri MD.                 ECG  personally by physician  ECG/EMG Results (most recent)     Procedure Component Value Units Date/Time    ECG 12 Lead [[id number]] Collected:  01/13/18 1512     Updated:  01/13/18 2232    Narrative:       RR Interval= 606 ms  SC Interval= 128 ms  QRSD Interval= 72 ms  QT Interval= 344 ms  QTc Interval= 442 ms  Heart Rate= 99 ms  P Axis= 27 deg  QRS Axis= 41 deg  T Wave Axis= 184 deg  I: 40 Axis= 36 deg  T: 40 Axis= 54 deg  ST Axis= 209 deg  SINUS RHYTHM  BORDERLINE REPOLARIZATION ABNORMALITY  NO SIGNIFICANT CHANGE FROM PREVIOUS ECG  Electronically Signed by:  Tara Avitia ([facility]) 13-Jan-2018 22:32:21  Date and Time of Study: 2018-01-13 15:12:47          Medication Review:   I have reviewed the patient's current medication list    Current Facility-Administered Medications:   •  acetaminophen (TYLENOL) tablet 650 mg, 650 mg, Oral, Q6H PRN, Nahun Zacarias, DO, 650 mg at 01/14/18 0242  •  albuterol (PROVENTIL HFA;VENTOLIN HFA) inhaler 2 puff, 2 puff, Inhalation, Q4H PRN, Nahun Zacarias, DO  •  albuterol (PROVENTIL) nebulizer solution 0.083% 2.5 mg/3mL, 2.5 mg, Nebulization, Q6H PRN, Nahun Zacarias, DO  •  baclofen (LIORESAL) tablet 10 mg, 10 mg, Oral, Daily, Nahun Zacarias, DO, 10 mg at 01/13/18 1830  •  bisacodyl (DULCOLAX) EC tablet 5 mg, 5 mg, Oral, Daily PRN, Nahun Zacarias, DO  •  bisacodyl (DULCOLAX) suppository 10 mg, 10 mg, Rectal, Daily PRN, Nahun Zacarias, DO  •  budesonide-formoterol (SYMBICORT) 80-4.5 MCG/ACT inhaler 2 puff, 2 puff, Inhalation, BID - RT, Nahun Zacarias, DO, 2 puff at 01/14/18 0803  •  dextrose (D50W) solution 25 g, 25 g, Intravenous, Q15  Min PRN, Nahun Zacarias, DO  •  dextrose (GLUTOSE) oral gel 15 g, 15 g, Oral, Q15 Min PRN, Nahun Zacarias, DO  •  glucagon (GLUCAGEN) injection 1 mg, 1 mg, Subcutaneous, Q15 Min PRN, Nahun Zacarias, DO  •  insulin aspart (novoLOG) injection 0-7 Units, 0-7 Units, Subcutaneous, 4x Daily AC & at Bedtime, Nahun Zacarias, DO, 2 Units at 01/13/18 2155  •  melatonin tablet 5 mg, 5 mg, Oral, Nightly, Nahun Zacarias, DO  •  metoprolol succinate XL (TOPROL-XL) 24 hr tablet 25 mg, 25 mg, Oral, Daily, Nahun Zacarias, DO, 25 mg at 01/13/18 1830  •  ondansetron (ZOFRAN) injection 4 mg, 4 mg, Intravenous, Q6H PRN, Nahun Zacarias, DO  •  pantoprazole (PROTONIX) EC tablet 40 mg, 40 mg, Oral, QAM, Nahun Zacarias, DO, 40 mg at 01/14/18 0615  •  PARoxetine (PAXIL) tablet 20 mg, 20 mg, Oral, Daily, Nahun Zacarias, DO, 20 mg at 01/13/18 1830  •  sodium chloride 0.9 % flush 1-10 mL, 1-10 mL, Intravenous, PRN, Nahun Zacarias, DO  •  Insert peripheral IV, , , Once **AND** sodium chloride 0.9 % flush 10 mL, 10 mL, Intravenous, PRN, Manish Dwyre MD  •  sodium chloride 0.9 % infusion 40 mL, 40 mL, Intravenous, PRN, Nahun Zacarias, DO  •  sodium chloride 0.9 % infusion, 125 mL/hr, Intravenous, Continuous, Nahun Zacarias, DO, Last Rate: 125 mL/hr at 01/14/18 0527, 125 mL/hr at 01/14/18 0527  •  traMADol (ULTRAM) tablet 50 mg, 50 mg, Oral, Q6H PRN, Nahun Zacarias, DO, 50 mg at 01/14/18 0615      Assessment/Plan     Acute Right distal femur fracture with h/o right knee replacement  -ortho consulted, unsure of plans for surgery thus far   -Plain film reviewed with distal femur fx near knee replacement  -CT knee pending  -pre-op labs reviewed, medically seems ok for surgery although moderate risk  -pain control      HTN, HLD  -chronic, c/w home meds and BB     Asthma  -chronic  -no acute exacerbation     Type II DM  -chronic  -hold oral meds  -SSI with adjustments prn     GERD  -chronic  -c/w PPI     MARGARET  -non-comliant with CPAP at home     MIld dementia  -at  baseline per daughter  -delerium and fall precautions     Chonic pain  -management as above     DVT-SCDs until surgery plans finalized    Plan for disposition:pending plans for surgical intervention, may need rehab   Nahun Zacarias DO  01/14/18  8:22 AM      Time:      Electronically signed by Nahun Zacarias DO at 1/14/2018  8:27 AM           Consult Notes (last 72 hours) (Notes from 1/12/2018  3:43 PM through 1/15/2018  3:43 PM)      Moy Newberry MD at 1/15/2018 10:49 AM  Version 1 of 1     Consult Orders:    1. Inpatient Consult to Orthopedic Surgery [[id number]] ordered by Nahun Zacarias DO at 01/13/18 1622                 Orthopedic Consult      Patient: Juan Pablo Hernandez    Date of Admission: 1/13/2018 12:51 PM    YOB: 1950    Medical Record Number: [id number]    Attending Physician: Nahun Zacarias DO  Consulting Physician: Dr Moy Newberry      Chief Complaints: Closed fracture of distal end of right femur, unspecified fracture morphology, initial encounter [S72.401A]      History of Present Illness: 67 y.o. female admitted to [facility], [place], operated by [facility] to services of Nahun Zacarias DO with Closed fracture of distal end of right femur, unspecified fracture morphology, initial encounter [S72.401A]. I was consulted for further evaluation and treatment.  Patient states that she has been having pain in her right knee for the last 7 years, worse over the last 2 years, she recently followed up with Dr. Hartman who initially put her total knee replacement at the end of December and they were waiting for CT scan and inflammatory markers to evaluate the knee replacement itself.  Patient states she had an acute onset of increased pain after a fall on Thursday night, landed on her right knee, immediate onset of pain and swelling, limited ability to bear weight.  She was brought to [facility] for Further Evaluation on Saturday and Noted to Have a Nondisplaced Fracture of Her Distal Femur.  Patient rates  current level of pain as a 7-8 out of 10, sharp in nature, exacerbated with any attempts at motion of the knee or local pressure over the leg, minimal improvement with rest, moderate improvement with IV pain medication as well as Percocet.  Denies associated numbness or tingling right lower extremity, denies fevers chills or sweats, denies redness or warmth over her knee.  Denies any referred pain proximally to the hip or distally to her ankle.  Denies any pain or issues and her left lower extremity or bilateral upper extremities.     Allergies   Allergen Reactions   • Augmentin [Amoxicillin-Pot Clavulanate]    • Avelox [Moxifloxacin]    • Codeine    • Morphine And Related    • Penicillins    • Sulfa Antibiotics        Medications:   Home Medications:  Prescriptions Prior to Admission   Medication Sig Dispense Refill Last Dose   • acetaminophen (TYLENOL) 325 MG tablet Take 2 tablets by mouth Every 6 (Six) Hours As Needed for mild pain (1-3) or moderate pain (4-6). 60 tablet 0 Past Month at Unknown time   • albuterol (ACCUNEB) 1.25 MG/3ML nebulizer solution Inhale 1 ampule 2 (Two) Times a Day. Albuterol Sulfate NEBU; Patient Sig: Albuterol Sulfate NEBU inhale 2 puffs twice daily; 0; 18-Sep-2012; Active   1/13/2018 at Unknown time   • albuterol (PROVENTIL HFA;VENTOLIN HFA) 108 (90 BASE) MCG/ACT inhaler Inhale 2 puffs Every 4 (Four) Hours As Needed for wheezing.   1/13/2018 at Unknown time   • baclofen (LIORESAL) 10 MG tablet Take 10 mg by mouth Daily.   Past Week at Unknown time   • Cholecalciferol (VITAMIN D3) 71630 UNITS capsule Take 1 capsule by mouth Every 7 (Seven) Days. (Patient taking differently: Take 50,000 Units by mouth Every 7 (Seven) Days. Takes on mondays) 4 capsule 0 Past Week at Unknown time   • estradiol (ESTRACE) 0.1 MG/GM vaginal cream Insert 1 g into the vagina Daily.   Past Week at Unknown time   • fenofibrate 160 MG tablet Take 160 mg by mouth Daily.   1/12/2018 at Unknown time   •  fluticasone-salmeterol (ADVAIR) 250-50 MCG/DOSE DISKUS Inhale 2 puffs Every 12 (Twelve) Hours.   1/13/2018 at Unknown time   • JANUMET XR  MG tablet sustained-release 24 hour 2 (Two) Times a Day.   1/12/2018 at Unknown time   • meloxicam (MOBIC) 15 MG tablet Take  by mouth daily.   1/12/2018 at Unknown time   • metoprolol succinate XL (TOPROL XL) 25 MG 24 hr tablet Take 1 tablet by mouth Daily. 30 tablet 0 Past Week at Unknown time   • omeprazole (priLOSEC) 40 MG capsule Take 40 mg by mouth Daily.   1/12/2018 at Unknown time   • PARoxetine (PAXIL) 40 MG tablet Take 0.5 tablets by mouth Daily. (Patient taking differently: Take 40 mg by mouth Daily.) 15 tablet 0 Past Week at Unknown time   • pioglitazone (ACTOS) 30 MG tablet 30 mg.   1/12/2018 at Unknown time   • pregabalin (LYRICA) 75 MG capsule Take 150 mg by mouth 2 (Two) Times a Day.   1/12/2018 at Unknown time   • SYMBICORT 160-4.5 MCG/ACT inhaler    1/12/2018 at Unknown time   • traMADol (ULTRAM) 50 MG tablet Take 1 tablet by mouth Every 6 (Six) Hours As Needed for Moderate Pain . 30 tablet 0 1/13/2018 at Unknown time   • hyoscyamine sulfate (ANASPAZ) 0.125 MG tablet dispersible disintegrating tablet Take 125 mcg by mouth Every 4 (Four) Hours As Needed.   Unknown at Unknown time   • metFORMIN (GLUCOPHAGE) 500 MG tablet Take 1 tablet by mouth 2 (Two) Times a Day With Meals. 60 tablet 0 Taking   • nitrofurantoin, macrocrystal-monohydrate, (MACROBID) 100 MG capsule Take 100 mg by mouth 2 (Two) Times a Day.   Taking       Current Medications:  Scheduled Meds:  baclofen 10 mg Oral Daily   budesonide-formoterol 2 puff Inhalation BID - RT   insulin aspart 0-7 Units Subcutaneous 4x Daily AC & at Bedtime   melatonin 5 mg Oral Nightly   metoprolol succinate XL 25 mg Oral Daily   pantoprazole 40 mg Oral QAM   PARoxetine 20 mg Oral Daily     Continuous Infusions:  sodium chloride 125 mL/hr Last Rate: 125 mL/hr (01/15/18 0624)     PRN Meds:.•  acetaminophen  •   albuterol  •  bisacodyl  •  bisacodyl  •  dextrose  •  dextrose  •  glucagon (human recombinant)  •  meperidine  •  ondansetron  •  oxyCODONE-acetaminophen  •  sodium chloride  •  Insert peripheral IV **AND** sodium chloride  •  sodium chloride  •  traMADol       Past Medical History:   Diagnosis Date   • Anxiety    • Asthma    • CHF (congestive heart failure)    • Chronic pain disorder    • Chronic UTI    • Depression    • Diabetes    • GERD (gastroesophageal reflux disease)    • H/O CHF    • HTN (hypertension)    • Hyperlipidemia    • Incontinence    • Injury of back     spinal stenosis, chronic back pain   • Low back pain    • Lumbosacral disc disease    • MRSA infection (methicillin-resistant Staphylococcus aureus)     to left foot states approx 12-13 years ago    • Osteoarthritis    • Sleep apnea     uses cpap   • Spinal stenosis    • Stroke     pt states PMD has said she may be having mini strokes   • Vertigo         Past Surgical History:   Procedure Laterality Date   • CHOLECYSTECTOMY OPEN     • COLONOSCOPY     • ENDOSCOPY     • HYSTERECTOMY     • TOTAL KNEE ARTHROPLASTY Bilateral         Social History     Occupational History   • Not on file.     Social History Main Topics   • Smoking status: Never Smoker   • Smokeless tobacco: Never Used   • Alcohol use No   • Drug use: No   • Sexual activity: Defer    Social History     Social History Narrative        Family History   Problem Relation Age of Onset   • Lung disease Mother    • Cancer Mother    • Heart disease Father    • Diabetes Paternal Aunt    • Diabetes Paternal Uncle    • Diabetes Paternal Grandmother    • Diabetes Paternal Grandfather          Review of Systems:   HEENT: Patient denies any headaches, vision changes, change in hearing, or tinnitus, Patient denies any rhinorrhea,epistaxis, sinus pain, mouth or dental problems, sore throat or hoarseness, or dysphagia  Pulmonary: Patient denies any cough, congestion, SOA, or wheezing  Cardiovascular:  Patient denies any chest pain, dyspnea, palpitations, weakness, intolerance of exercise, varicosities, swelling of extremities, known murmur  Gastrointestinal:  Patient denies nausea, vomiting, diarrhea, constipation, loss  of appetite, change in appetite, dysphagia, gas, heartburn, melena, change in bowel habits, use of laxatives or other drugs to alter the function of the gastrointestinal tract.  Genital/Urinary: Patient denies dysuria, change in color of urine, change in frequency of urination, pain with urgency, incontinence, retention, or nocturia.  Musculoskeletal: Patient denies increased warmth; redness; or swelling of joints; does note pain in right knee as noted in history of present illness.  Neurological: Patient denies dizziness, tremor, ataxia, difficulty in speaking, change in speech, paresthesia, loss of sensation, seizures, syncope, changes in memory.  Endocrine system: Patient denies tremors, palpitations, intolerance of heat or cold, polyuria, polydipsia, polyphagia, diaphoresis, exophthalmos, or goiter.  Psychological: Patient denies thoughts/plans or harming self or other; depression,  insomnia, night terrors, marbin, memory loss, disorientation.  Skin: Patient denies any bruising, rashes, discoloration, pruritus, wounds, ulcers, decubiti, changes in the hair or nails  Hematopoietic: Patient denies history of spontaneous or excessive bleeding, epistaxis, hematuria, melena, fatigue, enlarged or tender lymph nodes, pallor, history of anemia.    Physical Exam: 67 y.o. female  Vitals:    01/14/18 1946 01/15/18 0002 01/15/18 0635 01/15/18 0922   BP: 114/70 125/72 143/82    BP Location: Right arm Right arm Right arm    Patient Position: Lying Lying Lying    Pulse: 77 77 88 72   Resp: 20 20 20 20   Temp: 98.4 °F (36.9 °C) 98.5 °F (36.9 °C) 98.4 °F (36.9 °C)    TempSrc: Oral Oral Oral    SpO2: 94% 92% 97% 98%   Weight:       Height:         General Appearance:    Alert, cooperative, in no acute distress                       Head:    Normocephalic, without obvious abnormality, atraumatic   Eyes:            Lids and lashes normal, conjunctivae and sclerae normal, no   icterus, no pallor, corneas clear, PERRLA   Ears:    Ears appear intact with no abnormalities noted   Throat:   No oral lesions, no thrush, oral mucosa moist   Neck:   No adenopathy, supple, trachea midline, no thyromegaly, no   carotid bruit, no JVD   Back:     No kyphosis present, no scoliosis present, no skin lesions,      erythema or scars, no tenderness to percussion or                   palpation,   range of motion normal   Lungs:     Clear to auscultation,respirations regular, even and                  unlabored    Heart:    Regular rhythm and normal rate, normal S1 and S2, no            murmur, no gallop, no rub, no click   Chest Wall:    No abnormalities observed   Abdomen:     Normal bowel sounds, no masses, no organomegaly, soft        non-tender, non-distended, no guarding, no rebound                tenderness   Rectal:     Deferred   Extremities:   Tenderness noted at Right distal femur with moderate soft tissue swelling appreciated, mild ecchymosis noted, allergies to range knee secondary to pain.  No hip or groin pain on gentle log roll exam, patient is able to dorsiflex and plantarflex right ankle 4-5 strength.  Positive sensation light touch all discretions right foot symmetric to the left..  Moves all remaining extremities well, no edema, no cyanosis, no redness   Pulses:   Pulses palpable and equal bilaterally   Skin:   No bleeding, bruising or rash   Lymph nodes:   No palpable adenopathy   Neurologic:   Cranial nerves 2 - 12 grossly intact, sensation intact, DTR       present and equal bilaterally           Diagnostic Tests:  Admission on 01/13/2018   Component Date Value Ref Range Status   • Glucose 01/13/2018 99  65 - 99 mg/dL Final   • BUN 01/13/2018 13  8 - 23 mg/dL Final   • Creatinine 01/13/2018 0.67  0.57 - 1.00 mg/dL Final   •  Sodium 01/13/2018 139  136 - 145 mmol/L Final   • Potassium 01/13/2018 4.4  3.5 - 5.2 mmol/L Final   • Chloride 01/13/2018 96* 98 - 107 mmol/L Final   • CO2 01/13/2018 27.4  22.0 - 29.0 mmol/L Final   • Calcium 01/13/2018 9.4  8.8 - 10.5 mg/dL Final   • Total Protein 01/13/2018 7.1  6.0 - 8.5 g/dL Final   • Albumin 01/13/2018 3.90  3.50 - 5.20 g/dL Final   • ALT (SGPT) 01/13/2018 12  5 - 33 U/L Final   • AST (SGOT) 01/13/2018 24  5 - 32 U/L Final   • Alkaline Phosphatase 01/13/2018 43  40 - 129 U/L Final   • Total Bilirubin 01/13/2018 0.5  0.2 - 1.2 mg/dL Final   • eGFR Non African Amer 01/13/2018 88  >60 mL/min/1.73 Final   • Globulin 01/13/2018 3.2  gm/dL Final   • A/G Ratio 01/13/2018 1.2  g/dL Final   • BUN/Creatinine Ratio 01/13/2018 19.4  7.0 - 25.0 Final   • Anion Gap 01/13/2018 15.6  mmol/L Final   • Protime 01/13/2018 14.1  12.1 - 15.0 Seconds Final   • INR 01/13/2018 1.09  0.90 - 1.10 Final   • PTT 01/13/2018 30.6  24.3 - 38.1 seconds Final   • Color, UA 01/13/2018 Yellow  Yellow, Straw Final   • Appearance, UA 01/13/2018 Cloudy* Clear Final   • pH, UA 01/13/2018 5.5  4.5 - 8.0 Final   • Specific Gravity, UA 01/13/2018 1.025  1.003 - 1.030 Final   • Glucose, UA 01/13/2018 Negative  Negative Final   • Ketones, UA 01/13/2018 40 mg/dL (2+)* Negative, 80 mg/dL (3+), >=160 mg/dL (4+) Final   • Bilirubin, UA 01/13/2018 Negative  Negative Final   • Blood, UA 01/13/2018 Moderate (2+)* Negative Final   • Protein, UA 01/13/2018 Negative  Negative Final   • Leuk Esterase, UA 01/13/2018 Moderate (2+)* Negative Final   • Nitrite, UA 01/13/2018 Positive* Negative Final   • Urobilinogen, UA 01/13/2018 0.2 E.U./dL  0.2 - 1.0 E.U./dL Final   • ABO Type 01/13/2018 O   Final   • RH type 01/13/2018 Positive   Final   • Antibody Screen 01/13/2018 Negative   Final   • WBC 01/13/2018 12.67* 4.80 - 10.80 10*3/mm3 Final   • RBC 01/13/2018 4.36  4.20 - 5.40 10*6/mm3 Final   • Hemoglobin 01/13/2018 11.8* 12.0 - 16.0 g/dL Final   •  Hematocrit 01/13/2018 36.3* 37.0 - 47.0 % Final   • MCV 01/13/2018 83.3  81.0 - 99.0 fL Final   • MCH 01/13/2018 27.1  27.0 - 31.0 pg Final   • MCHC 01/13/2018 32.5  31.0 - 37.0 g/dL Final   • RDW 01/13/2018 18.6* 11.5 - 14.5 % Final   • RDW-SD 01/13/2018 55.8* 37.0 - 54.0 fl Final   • MPV 01/13/2018 10.3  7.4 - 10.4 fL Final   • Platelets 01/13/2018 306  140 - 500 10*3/mm3 Final   • Neutrophil % 01/13/2018 75.0* 45.0 - 70.0 % Final   • Lymphocyte % 01/13/2018 15.7* 20.0 - 45.0 % Final   • Monocyte % 01/13/2018 8.4* 3.0 - 8.0 % Final   • Eosinophil % 01/13/2018 0.1  0.0 - 4.0 % Final   • Basophil % 01/13/2018 0.3  0.0 - 2.0 % Final   • Immature Grans % 01/13/2018 0.5  0.0 - 0.5 % Final   • Neutrophils, Absolute 01/13/2018 9.50* 1.50 - 8.30 10*3/mm3 Final   • Lymphocytes, Absolute 01/13/2018 1.99  0.60 - 4.80 10*3/mm3 Final   • Monocytes, Absolute 01/13/2018 1.07* 0.00 - 1.00 10*3/mm3 Final   • Eosinophils, Absolute 01/13/2018 0.01* 0.10 - 0.30 10*3/mm3 Final   • Basophils, Absolute 01/13/2018 0.04  0.00 - 0.20 10*3/mm3 Final   • Immature Grans, Absolute 01/13/2018 0.06* 0.00 - 0.03 10*3/mm3 Final   • nRBC 01/13/2018 0.0  0.0 - 0.0 /100 WBC Final   • Glucose 01/13/2018 97  70 - 130 mg/dL Final   • RBC, UA 01/13/2018 Too Numerous to Count* None Seen /HPF Final   • WBC, UA 01/13/2018 Too Numerous to Count* None Seen /HPF Final   • Bacteria, UA 01/13/2018 3+* None Seen /HPF Final   • Squamous Epithelial Cells, UA 01/13/2018 0-2  None Seen, 0-2 /HPF Final   • Hyaline Casts, UA 01/13/2018 None Seen  None Seen /LPF Final   • Mucus, UA 01/13/2018 Small/1+* None Seen, Trace /HPF Final   • WBC Clumps, UA 01/13/2018 Moderate/2+  None Seen /HPF Final   • Methodology 01/13/2018 Manual Light Microscopy   Final   • Urine Culture 01/13/2018 >100,000 CFU/mL Escherichia coli*  Final   • Glucose 01/13/2018 173* 70 - 130 mg/dL Final   • WBC 01/14/2018 9.99  4.80 - 10.80 10*3/mm3 Final   • RBC 01/14/2018 4.15* 4.20 - 5.40 10*6/mm3 Final    • Hemoglobin 01/14/2018 11.0* 12.0 - 16.0 g/dL Final   • Hematocrit 01/14/2018 34.3* 37.0 - 47.0 % Final   • MCV 01/14/2018 82.7  81.0 - 99.0 fL Final   • MCH 01/14/2018 26.5* 27.0 - 31.0 pg Final   • MCHC 01/14/2018 32.1  31.0 - 37.0 g/dL Final   • RDW 01/14/2018 18.3* 11.5 - 14.5 % Final   • RDW-SD 01/14/2018 55.1* 37.0 - 54.0 fl Final   • MPV 01/14/2018 10.3  7.4 - 10.4 fL Final   • Platelets 01/14/2018 293  140 - 500 10*3/mm3 Final   • Glucose 01/14/2018 129* 65 - 99 mg/dL Final   • BUN 01/14/2018 16  8 - 23 mg/dL Final   • Creatinine 01/14/2018 0.71  0.57 - 1.00 mg/dL Final   • Sodium 01/14/2018 139  136 - 145 mmol/L Final   • Potassium 01/14/2018 3.8  3.5 - 5.2 mmol/L Final   • Chloride 01/14/2018 97* 98 - 107 mmol/L Final   • CO2 01/14/2018 26.2  22.0 - 29.0 mmol/L Final   • Calcium 01/14/2018 8.6* 8.8 - 10.5 mg/dL Final   • eGFR Non African Amer 01/14/2018 82  >60 mL/min/1.73 Final   • BUN/Creatinine Ratio 01/14/2018 22.5  7.0 - 25.0 Final   • Anion Gap 01/14/2018 15.8  mmol/L Final   • Glucose 01/14/2018 139* 70 - 130 mg/dL Final   • Glucose 01/14/2018 92  70 - 130 mg/dL Final   • Sed Rate 01/14/2018 38* 0 - 20 mm/hr Final   • C-Reactive Protein 01/14/2018 6.58* 0.00 - 0.50 mg/dL Final   • Glucose 01/14/2018 111  70 - 130 mg/dL Final   • Glucose 01/14/2018 128  70 - 130 mg/dL Final   • Glucose 01/15/2018 117* 65 - 99 mg/dL Final   • BUN 01/15/2018 14  8 - 23 mg/dL Final   • Creatinine 01/15/2018 0.57  0.57 - 1.00 mg/dL Final   • Sodium 01/15/2018 142  136 - 145 mmol/L Final   • Potassium 01/15/2018 3.4* 3.5 - 5.2 mmol/L Final   • Chloride 01/15/2018 106  98 - 107 mmol/L Final   • CO2 01/15/2018 24.7  22.0 - 29.0 mmol/L Final   • Calcium 01/15/2018 7.8* 8.8 - 10.5 mg/dL Final   • eGFR Non African Amer 01/15/2018 105  >60 mL/min/1.73 Final   • BUN/Creatinine Ratio 01/15/2018 24.6  7.0 - 25.0 Final   • Anion Gap 01/15/2018 11.3  mmol/L Final   • WBC 01/15/2018 7.97  4.80 - 10.80 10*3/mm3 Final   • RBC  01/15/2018 3.71* 4.20 - 5.40 10*6/mm3 Final   • Hemoglobin 01/15/2018 10.0* 12.0 - 16.0 g/dL Final   • Hematocrit 01/15/2018 31.2* 37.0 - 47.0 % Final   • MCV 01/15/2018 84.1  81.0 - 99.0 fL Final   • MCH 01/15/2018 27.0  27.0 - 31.0 pg Final   • MCHC 01/15/2018 32.1  31.0 - 37.0 g/dL Final   • RDW 01/15/2018 18.4* 11.5 - 14.5 % Final   • RDW-SD 01/15/2018 56.8* 37.0 - 54.0 fl Final   • MPV 01/15/2018 10.4  7.4 - 10.4 fL Final   • Platelets 01/15/2018 248  140 - 500 10*3/mm3 Final   • Neutrophil % 01/15/2018 63.2  45.0 - 70.0 % Final   • Lymphocyte % 01/15/2018 24.1  20.0 - 45.0 % Final   • Monocyte % 01/15/2018 8.8* 3.0 - 8.0 % Final   • Eosinophil % 01/15/2018 3.0  0.0 - 4.0 % Final   • Basophil % 01/15/2018 0.5  0.0 - 2.0 % Final   • Immature Grans % 01/15/2018 0.4  0.0 - 0.5 % Final   • Neutrophils, Absolute 01/15/2018 5.04  1.50 - 8.30 10*3/mm3 Final   • Lymphocytes, Absolute 01/15/2018 1.92  0.60 - 4.80 10*3/mm3 Final   • Monocytes, Absolute 01/15/2018 0.70  0.00 - 1.00 10*3/mm3 Final   • Eosinophils, Absolute 01/15/2018 0.24  0.10 - 0.30 10*3/mm3 Final   • Basophils, Absolute 01/15/2018 0.04  0.00 - 0.20 10*3/mm3 Final   • Immature Grans, Absolute 01/15/2018 0.03  0.00 - 0.03 10*3/mm3 Final   • nRBC 01/15/2018 0.0  0.0 - 0.0 /100 WBC Final   • Glucose 01/15/2018 108  70 - 130 mg/dL Final     Xr Femur 2 View Right    Result Date: 1/14/2018  Narrative: INDICATION: Leg pain after slipping and falling on the ice last night  TECHNIQUE: 4 views of the right femur were obtained  FINDINGS: There is a fracture of the distal femur just above the femoral component of the knee prosthesis. The fracture is obliquely oriented. There is mild displacement and minimal apex anterior angulation. No additional femoral fractures are seen. Diffuse small vessel atherosclerotic calcification is seen in the S after a. Mild degenerative changes are seen at the right hip.      Impression: Minimally angulated mildly displaced distal  femoral fracture  This report was finalized on 1/14/2018 8:06 AM by Dr. Jose Neri MD.      Xr Knee 1 Or 2 View Right    Result Date: 1/14/2018  Narrative: INDICATION: Knee pain after slipping and falling on the ice last night. Previous knee replacement.  TECHNIQUE: 2 views the right knee were obtained  FINDINGS: A knee prosthesis is seen with satisfactory alignment and position. There is an acute fracture of the distal femur just at the upper and of the femoral prosthesis. The fracture is mildly displaced with minimal apex anterior angulation. The tibial component is unremarkable. No radiodense foreign bodies are seen.      Impression: Acute fracture of the distal femur just above the femoral component of the knee prosthesis as described above.  This report was finalized on 1/14/2018 8:04 AM by Dr. Jose Neri MD.      Xr Chest 1 View    Result Date: 1/14/2018  Narrative: INDICATION: Distal femoral fracture. Presurgical evaluation.  TECHNIQUE: A single AP view the chest was obtained  FINDINGS: Mild cardiomegaly is noted. Both lungs are fully expanded and clear with normal vascular markings. No pleural fluid is seen.      Impression: Mild cardiomegaly. No active disease  This report was finalized on 1/14/2018 8:05 AM by Dr. Jose Neri MD.      Ct Lower Extremity Right Without Contrast    Result Date: 1/15/2018  Narrative: CT RIGHT KNEE, 01/13/2018:  HISTORY: 67-year-old female admitted to the hospital through the ED today with a right distal femur fracture above the right knee arthroplasty. Slipped and fell on ice last evening.  TECHNIQUE: Axial CT images of the right knee beginning at the distal third femoral shaft level extending to the proximal third of the tibia and fibula with multiplanar image reconstruction. Radiation dose reduction techniques were utilized, which may include automated exposure control and/or exposure modulation based on body size. Previous CT and radionuclide cardiac imaging studies  here in the last year: 0.  FINDINGS: The images confirm the presence of a nondisplaced acute oblique fracture across the distal metaphysis of the femur. The fracture extends into the upper and medial margin of the medial femoral condyle above the level of the arthroplasty, but no additional significant condylar or intracondylar distal fracture extension is demonstrated.  Right total knee arthroplasty components appear well-positioned and well seated. There is a probable small knee joint effusion. No definite acute fracture of the visualized proximal tibia or fibula is seen.      Impression: 1. Acute nondisplaced oblique fracture across the distal metaphysis of the femur as detailed above. Fracture extends into the upper and medial margin of the medial femoral condyle, but no other condylar or intercondylar extension is seen. 2. Well-positioned right total knee arthroplasty.  This report was finalized on 1/15/2018 8:53 AM by Dr. Lang Corona MD.        Assessment:  Patient Active Problem List   Diagnosis   • Mental status change   • Altered mental status   • Primary osteoarthritis of right foot   • Sprain of anterior talofibular ligament of right ankle   • History of total right knee replacement   • Closed fracture of right distal femur           Plan:  The patient voiced understanding of the risks, benefits, and alternative forms of treatment that were discussed and the patient consents to proceed with right distal femur open reduction internal fixation and all associated procedures.  I reviewed details of the procedure as well as risks benefits and alternatives of the procedure with the patient  with the risks including not limited to neurovascular damage, bleeding, infection, loss of motion, weakness, stiffness, instability, nonunion, malunion, chronic pain, continued pain and total knee arthroplasty, foot drop, hardware prominence, DVT, pulmonary embolus, death, stroke, complex regional pain syndrome,  myocardial infarction, need for additional procedures.  Patient understood all these had all questions answered prior to consenting to proceed with surgery.  No guarantees were given regarding results of the procedure.        Date: 1/15/2018    Moy Newberry MD      Dictated utilizing Dragon dictation       Electronically signed by Moy Newberry MD at 1/15/2018 10:53 AM

## 2018-01-15 NOTE — ANESTHESIA PROCEDURE NOTES
Spinal Block    Patient location during procedure: pre-op  Start Time: 1/15/2018 2:40 PM  Stop Time: 1/15/2018 2:47 PM  Indication:at surgeon's request  Performed By  CRNA: NATALIIA ALEXANDER  Preanesthetic Checklist  Completed: patient identified, surgical consent, pre-op evaluation, timeout performed, IV checked, risks and benefits discussed and monitors and equipment checked  Spinal Block Prep:  Patient Position:sitting  Sterile Tech:cap, gloves, gown, mask and sterile barriers  Prep:Betadine and Chloraprep  Patient Monitoring:blood pressure monitoring, continuous pulse oximetry and EKG  Spinal Block Procedure  Approach:midline  Guidance:landmark technique  Location:L3-L4  Needle Type:Sprotte  Needle Gauge:24 G  Placement of Spinal needle event:cerebrospinal fluid aspirated  Paresthesia: no  Fluid Appearance:clear  Post Assessment  Patient Tolerance:patient tolerated the procedure well with no apparent complications  Complications no

## 2018-01-15 NOTE — ANESTHESIA PREPROCEDURE EVALUATION
Anesthesia Evaluation     Patient summary reviewed and Nursing notes reviewed   no history of anesthetic complications:  NPO Solid Status: > 8 hours  NPO Liquid Status: > 8 hours     Airway   Mallampati: II  TM distance: >3 FB  Neck ROM: full  Dental    (+) edentulous    Pulmonary - normal exam    breath sounds clear to auscultation  (+) asthma, sleep apnea (noncompliant with CPAP),   Cardiovascular - normal exam  Exercise tolerance: poor (<4 METS)    ECG reviewed  Rhythm: regular  Rate: normal    (+) hypertension well controlled, CHF, hyperlipidemia    ROS comment: (SR) . SINUS RHYTHM  (REPB) . BORDERLINE REPOLARIZATION ABNORMALITY    Neuro/Psych  (+) TIA (pt states PMD has said she may be having mini strokes), numbness, psychiatric history Depression,     (-) CVA  GI/Hepatic/Renal/Endo    (+) obesity,  GERD well controlled, renal disease (Chronic UTI), diabetes mellitus type 2 well controlled,     Musculoskeletal     (+) back pain (Spinal stenosis), chronic pain,   Abdominal   (+) obese,    Substance History - negative use     OB/GYN          Other   (+) arthritis (OA)                                           Anesthesia Plan    ASA 3     spinal     Anesthetic plan and risks discussed with patient.  Use of blood products discussed with patient  Consented to blood products.

## 2018-01-15 NOTE — PROGRESS NOTES
"Hospitalist Team      Patient Care Team:  Linden Zhang MD as PCP - General  Linden Zhang MD as PCP - Family Medicine        Chief Complaint:  F/U right distal femur fracture s/p fall    Subjective    Interval History and ROS:     Patient is post-op and notes she is feeling \"fine\". She notes her leg is still numb from surgery. She denies any SOA, CP or N/V.  She is tolerating her diet and is afebrile.       Objective    Vital Signs  Temp:  [98.4 °F (36.9 °C)-98.5 °F (36.9 °C)] 98.4 °F (36.9 °C)  Heart Rate:  [72-88] 85  Resp:  [18-20] 18  BP: (114-158)/(70-82) 158/82  Oxygen Therapy  SpO2: 94 %  Pulse Oximetry Type: Intermittent  O2 Device: room air     Flowsheet Rows         First Filed Value    Admission Height  160.2 cm (63.07\") Documented at 01/13/2018 1254    Admission Weight  76.7 kg (169 lb) Documented at 01/13/2018 1254            Physical Exam:  Constitutional: Patient appears well-developed and well-nourished and in no acute distress   HEENT:   Head: Normocephalic and atraumatic.   Eyes:  EOM are intact. Sclera are anicteric and non-injected.  Mouth and Throat: Patient has moist mucous membranes.     Neck: Neck supple. No lymphadenopathy present.  Cardiovascular: Regular rate, regular rhythm, S1 normal and S2 normal.  Exam reveals no gallop and no friction rub.  No murmur heard.  Pulmonary/Chest: Lungs are clear to auscultation bilaterally. No respiratory distress. No wheezes. No rhonchi. No rales.   Abdominal: Soft. Bowel sounds are normal. There is no tenderness.   Musculoskeletal: Normal Muscle tone  Extremities: No edema noted. Pulses are palpable in all 4 extremities. Dressing to right hip intact.  Neurological: Patient is alert and oriented to person, place, and time.   Skin: Skin is warm. No rash noted. Nails show no clubbing.  No cyanosis or erythema.      Results Review:     I reviewed the patient's new clinical results.    Lab Results (last 24 hours)     Procedure Component Value Units " Date/Time    Sedimentation Rate [[id number]]  (Abnormal) Collected:  01/14/18 1422    Specimen:  Blood Updated:  01/14/18 1438     Sed Rate 38 (H) mm/hr     POC Glucose Once [[id number]]  (Normal) Collected:  01/14/18 1643    Specimen:  Blood Updated:  01/14/18 1650     Glucose 111 mg/dL     Narrative:       Meter: [id number] : [id number] Lashanda Olvera NURSING ASSISTANT    POC Glucose Once [[id number]]  (Normal) Collected:  01/14/18 2032    Specimen:  Blood Updated:  01/14/18 2038     Glucose 128 mg/dL     Narrative:       Meter: [id number] : [id number] Ken Astudillo NURSING ASSISTANT    CBC & Differential [[id number]] Collected:  01/15/18 0401    Specimen:  Blood Updated:  01/15/18 0433    Narrative:       The following orders were created for panel order CBC & Differential.  Procedure                               Abnormality         Status                     ---------                               -----------         ------                     CBC Auto Differential[[id number]]        Abnormal            Final result                 Please view results for these tests on the individual orders.    CBC Auto Differential [[id number]]  (Abnormal) Collected:  01/15/18 0401    Specimen:  Blood Updated:  01/15/18 0433     WBC 7.97 10*3/mm3      RBC 3.71 (L) 10*6/mm3      Hemoglobin 10.0 (L) g/dL      Hematocrit 31.2 (L) %      MCV 84.1 fL      MCH 27.0 pg      MCHC 32.1 g/dL      RDW 18.4 (H) %      RDW-SD 56.8 (H) fl      MPV 10.4 fL      Platelets 248 10*3/mm3      Neutrophil % 63.2 %      Lymphocyte % 24.1 %      Monocyte % 8.8 (H) %      Eosinophil % 3.0 %      Basophil % 0.5 %      Immature Grans % 0.4 %      Neutrophils, Absolute 5.04 10*3/mm3      Lymphocytes, Absolute 1.92 10*3/mm3      Monocytes, Absolute 0.70 10*3/mm3      Eosinophils, Absolute 0.24 10*3/mm3      Basophils, Absolute 0.04 10*3/mm3      Immature Grans, Absolute 0.03 10*3/mm3      nRBC 0.0 /100 WBC     Basic Metabolic Panel [[id number]]   (Abnormal) Collected:  01/15/18 0401    Specimen:  Blood Updated:  01/15/18 0511     Glucose 117 (H) mg/dL      BUN 14 mg/dL      Creatinine 0.57 mg/dL      Sodium 142 mmol/L      Potassium 3.4 (L) mmol/L      Chloride 106 mmol/L      CO2 24.7 mmol/L      Calcium 7.8 (L) mg/dL      eGFR Non African Amer 105 mL/min/1.73      BUN/Creatinine Ratio 24.6     Anion Gap 11.3 mmol/L     Narrative:       GFR Normal >60  Chronic Kidney Disease <60  Kidney Failure <15    POC Glucose Once [[id number]]  (Normal) Collected:  01/15/18 0713    Specimen:  Blood Updated:  01/15/18 0723     Glucose 108 mg/dL     Narrative:       Meter: [id number] : [id number] Clarence Rojas NURSING ASSISTANT    C-reactive Protein [[id number]]  (Abnormal) Collected:  01/14/18 1422    Specimen:  Blood Updated:  01/15/18 0758     C-Reactive Protein 6.58 (H) mg/dL     Urine Culture - Urine, Urine, Clean Catch [[id number]]  (Abnormal)  (Susceptibility) Collected:  01/13/18 1848    Specimen:  Urine from Urine, Catheter Updated:  01/15/18 0901     Urine Culture --      >100,000 CFU/mL Escherichia coli (A)    Susceptibility      Escherichia coli     LORNA     Ampicillin <=2 ug/ml Susceptible     Ampicillin + Sulbactam <=2 ug/ml Susceptible     Cefazolin <=4 ug/ml Susceptible     Cefepime <=1 ug/ml Susceptible     Ceftriaxone <=1 ug/ml Susceptible     Ciprofloxacin >=4 ug/ml Resistant     Ertapenem <=0.5 ug/ml Susceptible     Gentamicin <=1 ug/ml Susceptible     Levofloxacin >=8 ug/ml Resistant     Nitrofurantoin <=16 ug/ml Susceptible     Piperacillin + Tazobactam <=4 ug/ml Susceptible     Tetracycline <=1 ug/ml Susceptible     Trimethoprim + Sulfamethoxazole <=20 ug/ml Susceptible                    POC Glucose Once [[id number]]  (Normal) Collected:  01/15/18 1135    Specimen:  Blood Updated:  01/15/18 1144     Glucose 87 mg/dL     Narrative:       Meter: [id number] : [id number] Clarence Fordie NURSING ASSISTANT    POC Glucose Once [[id number]]  (Normal)  Collected:  01/15/18 1321    Specimen:  Blood Updated:  01/15/18 1335     Glucose 89 mg/dL     Narrative:       Meter: [id number] : [id number] Juan Ramon Rader RN          Imaging Results (last 24 hours)     Procedure Component Value Units Date/Time    CT Lower Extremity Right Without Contrast [[id number]] Collected:  01/15/18 0844     Updated:  01/15/18 0855    Narrative:       CT RIGHT KNEE, 01/13/2018:     HISTORY:  67-year-old female admitted to the hospital through the ED today with a  right distal femur fracture above the right knee arthroplasty. Slipped  and fell on ice last evening.     TECHNIQUE:  Axial CT images of the right knee beginning at the distal third femoral  shaft level extending to the proximal third of the tibia and fibula with  multiplanar image reconstruction. Radiation dose reduction techniques  were utilized, which may include automated exposure control and/or  exposure modulation based on body size. Previous CT and radionuclide  cardiac imaging studies here in the last year: 0.     FINDINGS:  The images confirm the presence of a nondisplaced acute oblique fracture  across the distal metaphysis of the femur. The fracture extends into the  upper and medial margin of the medial femoral condyle above the level of  the arthroplasty, but no additional significant condylar or  intracondylar distal fracture extension is demonstrated.     Right total knee arthroplasty components appear well-positioned and well  seated. There is a probable small knee joint effusion. No definite acute  fracture of the visualized proximal tibia or fibula is seen.       Impression:       1. Acute nondisplaced oblique fracture across the distal metaphysis of  the femur as detailed above. Fracture extends into the upper and medial  margin of the medial femoral condyle, but no other condylar or  intercondylar extension is seen.  2. Well-positioned right total knee arthroplasty.     This report was finalized on  1/15/2018 8:53 AM by Dr. Lang Corona MD.           ECG/EMG Results (most recent)     Procedure Component Value Units Date/Time    ECG 12 Lead [[id number]] Collected:  01/13/18 1512     Updated:  01/13/18 2232    Narrative:       RR Interval= 606 ms  OR Interval= 128 ms  QRSD Interval= 72 ms  QT Interval= 344 ms  QTc Interval= 442 ms  Heart Rate= 99 ms  P Axis= 27 deg  QRS Axis= 41 deg  T Wave Axis= 184 deg  I: 40 Axis= 36 deg  T: 40 Axis= 54 deg  ST Axis= 209 deg  SINUS RHYTHM  BORDERLINE REPOLARIZATION ABNORMALITY  NO SIGNIFICANT CHANGE FROM PREVIOUS ECG  Electronically Signed by:  Tara Avitia ([facility]) 13-Jan-2018 22:32:21  Date and Time of Study: 2018-01-13 15:12:47          Medication Review:   I have reviewed the patient's current medication list    Current Facility-Administered Medications:   •  [MAR Hold] acetaminophen (TYLENOL) tablet 650 mg, 650 mg, Oral, Q6H PRN, Nahun Zacarias, DO, 650 mg at 01/14/18 0242  •  [MAR Hold] albuterol (PROVENTIL) nebulizer solution 0.083% 2.5 mg/3mL, 2.5 mg, Nebulization, Q4H PRN, Nahun Zacarias, DO  •  [MAR Hold] baclofen (LIORESAL) tablet 10 mg, 10 mg, Oral, Daily, Nahun Zacarias, DO, 10 mg at 01/14/18 1251  •  [MAR Hold] bisacodyl (DULCOLAX) EC tablet 5 mg, 5 mg, Oral, Daily PRN, Nahun Zacarias, DO  •  [MAR Hold] bisacodyl (DULCOLAX) suppository 10 mg, 10 mg, Rectal, Daily PRN, Nahun Zacarias, DO  •  [MAR Hold] budesonide-formoterol (SYMBICORT) 80-4.5 MCG/ACT inhaler 2 puff, 2 puff, Inhalation, BID - RT, Nahun Zacarias, DO, 2 puff at 01/15/18 0922  •  [MAR Hold] dextrose (D50W) solution 25 g, 25 g, Intravenous, Q15 Min PRN, Nahun Zacarias, DO  •  [MAR Hold] dextrose (GLUTOSE) oral gel 15 g, 15 g, Oral, Q15 Min PRN, Nahun Zacarias, DO  •  famotidine (PEPCID) injection 20 mg, 20 mg, Intravenous, 60 Min Pre-Op, Dick Dillon CRNA  •  [MAR Hold] glucagon (GLUCAGEN) injection 1 mg, 1 mg, Subcutaneous, Q15 Min PRN, Nahun Zacarias, DO  •  [MAR Hold] insulin aspart (novoLOG)  injection 0-7 Units, 0-7 Units, Subcutaneous, 4x Daily AC & at Bedtime, Nahun M [place], DO, 2 Units at 01/13/18 2155  •  [MAR Hold] melatonin tablet 5 mg, 5 mg, Oral, Nightly, Lee M Riddle, DO  •  [MAR Hold] meperidine (DEMEROL) injection 25 mg, 25 mg, Intravenous, BID PRN, Nahun M Riddle, DO, 25 mg at 01/15/18 0823  •  metoprolol succinate XL (TOPROL-XL) 24 hr tablet 25 mg, 25 mg, Oral, Daily, Lee M Riddle, DO, 25 mg at 01/14/18 1251  •  midazolam (VERSED) injection 1 mg, 1 mg, Intravenous, Q5 Min PRN **OR** midazolam (VERSED) injection 2 mg, 2 mg, Intravenous, Q5 Min PRN, Dick Dillon CRNA  •  [MAR Hold] ondansetron (ZOFRAN) injection 4 mg, 4 mg, Intravenous, Q6H PRN, [facility] [place], DO  •  ondansetron (ZOFRAN) injection 4 mg, 4 mg, Intravenous, Once PRN, Dick Dillon CRNA  •  [MAR Hold] oxyCODONE-acetaminophen (PERCOCET) 5-325 MG per tablet 1 tablet, 1 tablet, Oral, Q4H PRN, Moy Newberry MD, 1 tablet at 01/14/18 1811  •  [MAR Hold] pantoprazole (PROTONIX) EC tablet 40 mg, 40 mg, Oral, QAM, Lee M Riddle, DO, 20 mg at 01/15/18 1336  •  [MAR Hold] PARoxetine (PAXIL) tablet 20 mg, 20 mg, Oral, Daily, Lee M Riddle, DO, 20 mg at 01/14/18 1250  •  [MAR Hold] sodium chloride 0.9 % flush 1-10 mL, 1-10 mL, Intravenous, PRN, Nahun ERICKSON [place], DO  •  sodium chloride 0.9 % flush 1-10 mL, 1-10 mL, Intravenous, PRN, Dick Dillon CRNA  •  Insert peripheral IV, , , Once **AND** [MAR Hold] sodium chloride 0.9 % flush 10 mL, 10 mL, Intravenous, PRN, Manish Dwyer MD  •  [MAR Hold] sodium chloride 0.9 % infusion 40 mL, 40 mL, Intravenous, PRN, Nahun Zacarias DO  •  sodium chloride 0.9 % infusion 40 mL, 40 mL, Intravenous, PRN, Dick Dillon CRNA  •  sodium chloride 0.9 % infusion, 125 mL/hr, Intravenous, Continuous, Nahun Zacarias DO, Last Rate: 125 mL/hr at 01/15/18 0624, 125 mL/hr at 01/15/18 0624  •  [MAR Hold] traMADol (ULTRAM) tablet 50 mg, 50 mg, Oral, Q6H PRN, Nahun Zacarias DO,  50 mg at 01/14/18 1250  •  vancomycin IVPB 1250 mg in 250 mL NS (premix), 15 mg/kg, Intravenous, Once, Moy Newberry MD      Assessment/Plan     1. Acute right distal femur fracture s/p fall: Patient slipped and fell prior to admission.  CT was done and patient was taken to OR today to undergo ORIF. Dr. Newberry managing. Patient doing well post-op and no pain at this time.    2. E-coli UTI: Patient note PCN causes a rash (anaphylaxis with avelox).  Will start ceftin po. E-coli is resistant to FQ.    3. Asthma: On Symbicort here, no acute issues. Will confirm with patient's pharmacy that she is on Symbicort and not Advair as both are listed on her med rec.    4. DM-2:  Patient was NPO for surgery so home po meds are on hold.  Continue to monitor Accu checks and SSI, bedsides are ok here. Resume po meds in AM if patient continues to tolerate po well post-op.    5. Hypertension: BP has been mostly WNL, was a little elevated pre-op. Patient is on home Toprol XL. Continue to monitor.    6. Dyslipidemia: Patient can resume fenofibrate at discharge.    7. MARGARET: Noncompliant with CPAP at home.    8. Mild dementia: No acute issues here pre-op. Monitor closely post-op. On no chronic medications for this.     9. Chronic Pain: NSAIDs held pre-op, on home tramadol PRN and can resume home lyrica post-op if ok with ortho. Will be on narcotics post-op managed by surgery. Monitor.    10. DVT prophy: Per ortho post-op.    Plan for disposition: PT/OT will see post-op, may need rehab.    Katherine Marinelli MD  01/15/18  1:48 PM

## 2018-01-15 NOTE — CONSULTS
Orthopedic Consult      Patient: Juan Pablo Hernandez    Date of Admission: 1/13/2018 12:51 PM    YOB: 1950    Medical Record Number: [id number]    Attending Physician: Nahun Zacarias DO  Consulting Physician: Dr Moy Newberry      Chief Complaints: Closed fracture of distal end of right femur, unspecified fracture morphology, initial encounter [S72.401A]      History of Present Illness: 67 y.o. female admitted to [facility] to services of Nahun Zacarias DO with Closed fracture of distal end of right femur, unspecified fracture morphology, initial encounter [S72.401A]. I was consulted for further evaluation and treatment.  Patient states that she has been having pain in her right knee for the last 7 years, worse over the last 2 years, she recently followed up with Dr. Hartman who initially put her total knee replacement at the end of December and they were waiting for CT scan and inflammatory markers to evaluate the knee replacement itself.  Patient states she had an acute onset of increased pain after a fall on Thursday night, landed on her right knee, immediate onset of pain and swelling, limited ability to bear weight.  She was brought to [facility] for Further Evaluation on Saturday and Noted to Have a Nondisplaced Fracture of Her Distal Femur.  Patient rates current level of pain as a 7-8 out of 10, sharp in nature, exacerbated with any attempts at motion of the knee or local pressure over the leg, minimal improvement with rest, moderate improvement with IV pain medication as well as Percocet.  Denies associated numbness or tingling right lower extremity, denies fevers chills or sweats, denies redness or warmth over her knee.  Denies any referred pain proximally to the hip or distally to her ankle.  Denies any pain or issues and her left lower extremity or bilateral upper extremities.     Allergies   Allergen Reactions   • Augmentin [Amoxicillin-Pot Clavulanate]    • Avelox  [Moxifloxacin]    • Codeine    • Morphine And Related    • Penicillins    • Sulfa Antibiotics        Medications:   Home Medications:  Prescriptions Prior to Admission   Medication Sig Dispense Refill Last Dose   • acetaminophen (TYLENOL) 325 MG tablet Take 2 tablets by mouth Every 6 (Six) Hours As Needed for mild pain (1-3) or moderate pain (4-6). 60 tablet 0 Past Month at Unknown time   • albuterol (ACCUNEB) 1.25 MG/3ML nebulizer solution Inhale 1 ampule 2 (Two) Times a Day. Albuterol Sulfate NEBU; Patient Sig: Albuterol Sulfate NEBU inhale 2 puffs twice daily; 0; 18-Sep-2012; Active   1/13/2018 at Unknown time   • albuterol (PROVENTIL HFA;VENTOLIN HFA) 108 (90 BASE) MCG/ACT inhaler Inhale 2 puffs Every 4 (Four) Hours As Needed for wheezing.   1/13/2018 at Unknown time   • baclofen (LIORESAL) 10 MG tablet Take 10 mg by mouth Daily.   Past Week at Unknown time   • Cholecalciferol (VITAMIN D3) 85720 UNITS capsule Take 1 capsule by mouth Every 7 (Seven) Days. (Patient taking differently: Take 50,000 Units by mouth Every 7 (Seven) Days. Takes on mondays) 4 capsule 0 Past Week at Unknown time   • estradiol (ESTRACE) 0.1 MG/GM vaginal cream Insert 1 g into the vagina Daily.   Past Week at Unknown time   • fenofibrate 160 MG tablet Take 160 mg by mouth Daily.   1/12/2018 at Unknown time   • fluticasone-salmeterol (ADVAIR) 250-50 MCG/DOSE DISKUS Inhale 2 puffs Every 12 (Twelve) Hours.   1/13/2018 at Unknown time   • JANUMET XR  MG tablet sustained-release 24 hour 2 (Two) Times a Day.   1/12/2018 at Unknown time   • meloxicam (MOBIC) 15 MG tablet Take  by mouth daily.   1/12/2018 at Unknown time   • metoprolol succinate XL (TOPROL XL) 25 MG 24 hr tablet Take 1 tablet by mouth Daily. 30 tablet 0 Past Week at Unknown time   • omeprazole (priLOSEC) 40 MG capsule Take 40 mg by mouth Daily.   1/12/2018 at Unknown time   • PARoxetine (PAXIL) 40 MG tablet Take 0.5 tablets by mouth Daily. (Patient taking differently: Take  40 mg by mouth Daily.) 15 tablet 0 Past Week at Unknown time   • pioglitazone (ACTOS) 30 MG tablet 30 mg.   1/12/2018 at Unknown time   • pregabalin (LYRICA) 75 MG capsule Take 150 mg by mouth 2 (Two) Times a Day.   1/12/2018 at Unknown time   • SYMBICORT 160-4.5 MCG/ACT inhaler    1/12/2018 at Unknown time   • traMADol (ULTRAM) 50 MG tablet Take 1 tablet by mouth Every 6 (Six) Hours As Needed for Moderate Pain . 30 tablet 0 1/13/2018 at Unknown time   • hyoscyamine sulfate (ANASPAZ) 0.125 MG tablet dispersible disintegrating tablet Take 125 mcg by mouth Every 4 (Four) Hours As Needed.   Unknown at Unknown time   • metFORMIN (GLUCOPHAGE) 500 MG tablet Take 1 tablet by mouth 2 (Two) Times a Day With Meals. 60 tablet 0 Taking   • nitrofurantoin, macrocrystal-monohydrate, (MACROBID) 100 MG capsule Take 100 mg by mouth 2 (Two) Times a Day.   Taking       Current Medications:  Scheduled Meds:  baclofen 10 mg Oral Daily   budesonide-formoterol 2 puff Inhalation BID - RT   insulin aspart 0-7 Units Subcutaneous 4x Daily AC & at Bedtime   melatonin 5 mg Oral Nightly   metoprolol succinate XL 25 mg Oral Daily   pantoprazole 40 mg Oral QAM   PARoxetine 20 mg Oral Daily     Continuous Infusions:  sodium chloride 125 mL/hr Last Rate: 125 mL/hr (01/15/18 0624)     PRN Meds:.•  acetaminophen  •  albuterol  •  bisacodyl  •  bisacodyl  •  dextrose  •  dextrose  •  glucagon (human recombinant)  •  meperidine  •  ondansetron  •  oxyCODONE-acetaminophen  •  sodium chloride  •  Insert peripheral IV **AND** sodium chloride  •  sodium chloride  •  traMADol       Past Medical History:   Diagnosis Date   • Anxiety    • Asthma    • CHF (congestive heart failure)    • Chronic pain disorder    • Chronic UTI    • Depression    • Diabetes    • GERD (gastroesophageal reflux disease)    • H/O CHF    • HTN (hypertension)    • Hyperlipidemia    • Incontinence    • Injury of back     spinal stenosis, chronic back pain   • Low back pain    •  Lumbosacral disc disease    • MRSA infection (methicillin-resistant Staphylococcus aureus)     to left foot states approx 12-13 years ago    • Osteoarthritis    • Sleep apnea     uses cpap   • Spinal stenosis    • Stroke     pt states PMD has said she may be having mini strokes   • Vertigo         Past Surgical History:   Procedure Laterality Date   • CHOLECYSTECTOMY OPEN     • COLONOSCOPY     • ENDOSCOPY     • HYSTERECTOMY     • TOTAL KNEE ARTHROPLASTY Bilateral         Social History     Occupational History   • Not on file.     Social History Main Topics   • Smoking status: Never Smoker   • Smokeless tobacco: Never Used   • Alcohol use No   • Drug use: No   • Sexual activity: Defer    Social History     Social History Narrative        Family History   Problem Relation Age of Onset   • Lung disease Mother    • Cancer Mother    • Heart disease Father    • Diabetes Paternal Aunt    • Diabetes Paternal Uncle    • Diabetes Paternal Grandmother    • Diabetes Paternal Grandfather          Review of Systems:   HEENT: Patient denies any headaches, vision changes, change in hearing, or tinnitus, Patient denies any rhinorrhea,epistaxis, sinus pain, mouth or dental problems, sore throat or hoarseness, or dysphagia  Pulmonary: Patient denies any cough, congestion, SOA, or wheezing  Cardiovascular: Patient denies any chest pain, dyspnea, palpitations, weakness, intolerance of exercise, varicosities, swelling of extremities, known murmur  Gastrointestinal:  Patient denies nausea, vomiting, diarrhea, constipation, loss  of appetite, change in appetite, dysphagia, gas, heartburn, melena, change in bowel habits, use of laxatives or other drugs to alter the function of the gastrointestinal tract.  Genital/Urinary: Patient denies dysuria, change in color of urine, change in frequency of urination, pain with urgency, incontinence, retention, or nocturia.  Musculoskeletal: Patient denies increased warmth; redness; or swelling of  joints; does note pain in right knee as noted in history of present illness.  Neurological: Patient denies dizziness, tremor, ataxia, difficulty in speaking, change in speech, paresthesia, loss of sensation, seizures, syncope, changes in memory.  Endocrine system: Patient denies tremors, palpitations, intolerance of heat or cold, polyuria, polydipsia, polyphagia, diaphoresis, exophthalmos, or goiter.  Psychological: Patient denies thoughts/plans or harming self or other; depression,  insomnia, night terrors, marbin, memory loss, disorientation.  Skin: Patient denies any bruising, rashes, discoloration, pruritus, wounds, ulcers, decubiti, changes in the hair or nails  Hematopoietic: Patient denies history of spontaneous or excessive bleeding, epistaxis, hematuria, melena, fatigue, enlarged or tender lymph nodes, pallor, history of anemia.    Physical Exam: 67 y.o. female  Vitals:    01/14/18 1946 01/15/18 0002 01/15/18 0635 01/15/18 0922   BP: 114/70 125/72 143/82    BP Location: Right arm Right arm Right arm    Patient Position: Lying Lying Lying    Pulse: 77 77 88 72   Resp: 20 20 20 20   Temp: 98.4 °F (36.9 °C) 98.5 °F (36.9 °C) 98.4 °F (36.9 °C)    TempSrc: Oral Oral Oral    SpO2: 94% 92% 97% 98%   Weight:       Height:         General Appearance:    Alert, cooperative, in no acute distress                      Head:    Normocephalic, without obvious abnormality, atraumatic   Eyes:            Lids and lashes normal, conjunctivae and sclerae normal, no   icterus, no pallor, corneas clear, PERRLA   Ears:    Ears appear intact with no abnormalities noted   Throat:   No oral lesions, no thrush, oral mucosa moist   Neck:   No adenopathy, supple, trachea midline, no thyromegaly, no   carotid bruit, no JVD   Back:     No kyphosis present, no scoliosis present, no skin lesions,      erythema or scars, no tenderness to percussion or                   palpation,   range of motion normal   Lungs:     Clear to  auscultation,respirations regular, even and                  unlabored    Heart:    Regular rhythm and normal rate, normal S1 and S2, no            murmur, no gallop, no rub, no click   Chest Wall:    No abnormalities observed   Abdomen:     Normal bowel sounds, no masses, no organomegaly, soft        non-tender, non-distended, no guarding, no rebound                tenderness   Rectal:     Deferred   Extremities:   Tenderness noted at Right distal femur with moderate soft tissue swelling appreciated, mild ecchymosis noted, allergies to range knee secondary to pain.  No hip or groin pain on gentle log roll exam, patient is able to dorsiflex and plantarflex right ankle 4-5 strength.  Positive sensation light touch all discretions right foot symmetric to the left..  Moves all remaining extremities well, no edema, no cyanosis, no redness   Pulses:   Pulses palpable and equal bilaterally   Skin:   No bleeding, bruising or rash   Lymph nodes:   No palpable adenopathy   Neurologic:   Cranial nerves 2 - 12 grossly intact, sensation intact, DTR       present and equal bilaterally           Diagnostic Tests:  Admission on 01/13/2018   Component Date Value Ref Range Status   • Glucose 01/13/2018 99  65 - 99 mg/dL Final   • BUN 01/13/2018 13  8 - 23 mg/dL Final   • Creatinine 01/13/2018 0.67  0.57 - 1.00 mg/dL Final   • Sodium 01/13/2018 139  136 - 145 mmol/L Final   • Potassium 01/13/2018 4.4  3.5 - 5.2 mmol/L Final   • Chloride 01/13/2018 96* 98 - 107 mmol/L Final   • CO2 01/13/2018 27.4  22.0 - 29.0 mmol/L Final   • Calcium 01/13/2018 9.4  8.8 - 10.5 mg/dL Final   • Total Protein 01/13/2018 7.1  6.0 - 8.5 g/dL Final   • Albumin 01/13/2018 3.90  3.50 - 5.20 g/dL Final   • ALT (SGPT) 01/13/2018 12  5 - 33 U/L Final   • AST (SGOT) 01/13/2018 24  5 - 32 U/L Final   • Alkaline Phosphatase 01/13/2018 43  40 - 129 U/L Final   • Total Bilirubin 01/13/2018 0.5  0.2 - 1.2 mg/dL Final   • eGFR Non African Amer 01/13/2018 88  >60  mL/min/1.73 Final   • Globulin 01/13/2018 3.2  gm/dL Final   • A/G Ratio 01/13/2018 1.2  g/dL Final   • BUN/Creatinine Ratio 01/13/2018 19.4  7.0 - 25.0 Final   • Anion Gap 01/13/2018 15.6  mmol/L Final   • Protime 01/13/2018 14.1  12.1 - 15.0 Seconds Final   • INR 01/13/2018 1.09  0.90 - 1.10 Final   • PTT 01/13/2018 30.6  24.3 - 38.1 seconds Final   • Color, UA 01/13/2018 Yellow  Yellow, Straw Final   • Appearance, UA 01/13/2018 Cloudy* Clear Final   • pH, UA 01/13/2018 5.5  4.5 - 8.0 Final   • Specific Gravity, UA 01/13/2018 1.025  1.003 - 1.030 Final   • Glucose, UA 01/13/2018 Negative  Negative Final   • Ketones, UA 01/13/2018 40 mg/dL (2+)* Negative, 80 mg/dL (3+), >=160 mg/dL (4+) Final   • Bilirubin, UA 01/13/2018 Negative  Negative Final   • Blood, UA 01/13/2018 Moderate (2+)* Negative Final   • Protein, UA 01/13/2018 Negative  Negative Final   • Leuk Esterase, UA 01/13/2018 Moderate (2+)* Negative Final   • Nitrite, UA 01/13/2018 Positive* Negative Final   • Urobilinogen, UA 01/13/2018 0.2 E.U./dL  0.2 - 1.0 E.U./dL Final   • ABO Type 01/13/2018 O   Final   • RH type 01/13/2018 Positive   Final   • Antibody Screen 01/13/2018 Negative   Final   • WBC 01/13/2018 12.67* 4.80 - 10.80 10*3/mm3 Final   • RBC 01/13/2018 4.36  4.20 - 5.40 10*6/mm3 Final   • Hemoglobin 01/13/2018 11.8* 12.0 - 16.0 g/dL Final   • Hematocrit 01/13/2018 36.3* 37.0 - 47.0 % Final   • MCV 01/13/2018 83.3  81.0 - 99.0 fL Final   • MCH 01/13/2018 27.1  27.0 - 31.0 pg Final   • MCHC 01/13/2018 32.5  31.0 - 37.0 g/dL Final   • RDW 01/13/2018 18.6* 11.5 - 14.5 % Final   • RDW-SD 01/13/2018 55.8* 37.0 - 54.0 fl Final   • MPV 01/13/2018 10.3  7.4 - 10.4 fL Final   • Platelets 01/13/2018 306  140 - 500 10*3/mm3 Final   • Neutrophil % 01/13/2018 75.0* 45.0 - 70.0 % Final   • Lymphocyte % 01/13/2018 15.7* 20.0 - 45.0 % Final   • Monocyte % 01/13/2018 8.4* 3.0 - 8.0 % Final   • Eosinophil % 01/13/2018 0.1  0.0 - 4.0 % Final   • Basophil %  01/13/2018 0.3  0.0 - 2.0 % Final   • Immature Grans % 01/13/2018 0.5  0.0 - 0.5 % Final   • Neutrophils, Absolute 01/13/2018 9.50* 1.50 - 8.30 10*3/mm3 Final   • Lymphocytes, Absolute 01/13/2018 1.99  0.60 - 4.80 10*3/mm3 Final   • Monocytes, Absolute 01/13/2018 1.07* 0.00 - 1.00 10*3/mm3 Final   • Eosinophils, Absolute 01/13/2018 0.01* 0.10 - 0.30 10*3/mm3 Final   • Basophils, Absolute 01/13/2018 0.04  0.00 - 0.20 10*3/mm3 Final   • Immature Grans, Absolute 01/13/2018 0.06* 0.00 - 0.03 10*3/mm3 Final   • nRBC 01/13/2018 0.0  0.0 - 0.0 /100 WBC Final   • Glucose 01/13/2018 97  70 - 130 mg/dL Final   • RBC, UA 01/13/2018 Too Numerous to Count* None Seen /HPF Final   • WBC, UA 01/13/2018 Too Numerous to Count* None Seen /HPF Final   • Bacteria, UA 01/13/2018 3+* None Seen /HPF Final   • Squamous Epithelial Cells, UA 01/13/2018 0-2  None Seen, 0-2 /HPF Final   • Hyaline Casts, UA 01/13/2018 None Seen  None Seen /LPF Final   • Mucus, UA 01/13/2018 Small/1+* None Seen, Trace /HPF Final   • WBC Clumps, UA 01/13/2018 Moderate/2+  None Seen /HPF Final   • Methodology 01/13/2018 Manual Light Microscopy   Final   • Urine Culture 01/13/2018 >100,000 CFU/mL Escherichia coli*  Final   • Glucose 01/13/2018 173* 70 - 130 mg/dL Final   • WBC 01/14/2018 9.99  4.80 - 10.80 10*3/mm3 Final   • RBC 01/14/2018 4.15* 4.20 - 5.40 10*6/mm3 Final   • Hemoglobin 01/14/2018 11.0* 12.0 - 16.0 g/dL Final   • Hematocrit 01/14/2018 34.3* 37.0 - 47.0 % Final   • MCV 01/14/2018 82.7  81.0 - 99.0 fL Final   • MCH 01/14/2018 26.5* 27.0 - 31.0 pg Final   • MCHC 01/14/2018 32.1  31.0 - 37.0 g/dL Final   • RDW 01/14/2018 18.3* 11.5 - 14.5 % Final   • RDW-SD 01/14/2018 55.1* 37.0 - 54.0 fl Final   • MPV 01/14/2018 10.3  7.4 - 10.4 fL Final   • Platelets 01/14/2018 293  140 - 500 10*3/mm3 Final   • Glucose 01/14/2018 129* 65 - 99 mg/dL Final   • BUN 01/14/2018 16  8 - 23 mg/dL Final   • Creatinine 01/14/2018 0.71  0.57 - 1.00 mg/dL Final   • Sodium  01/14/2018 139  136 - 145 mmol/L Final   • Potassium 01/14/2018 3.8  3.5 - 5.2 mmol/L Final   • Chloride 01/14/2018 97* 98 - 107 mmol/L Final   • CO2 01/14/2018 26.2  22.0 - 29.0 mmol/L Final   • Calcium 01/14/2018 8.6* 8.8 - 10.5 mg/dL Final   • eGFR Non African Amer 01/14/2018 82  >60 mL/min/1.73 Final   • BUN/Creatinine Ratio 01/14/2018 22.5  7.0 - 25.0 Final   • Anion Gap 01/14/2018 15.8  mmol/L Final   • Glucose 01/14/2018 139* 70 - 130 mg/dL Final   • Glucose 01/14/2018 92  70 - 130 mg/dL Final   • Sed Rate 01/14/2018 38* 0 - 20 mm/hr Final   • C-Reactive Protein 01/14/2018 6.58* 0.00 - 0.50 mg/dL Final   • Glucose 01/14/2018 111  70 - 130 mg/dL Final   • Glucose 01/14/2018 128  70 - 130 mg/dL Final   • Glucose 01/15/2018 117* 65 - 99 mg/dL Final   • BUN 01/15/2018 14  8 - 23 mg/dL Final   • Creatinine 01/15/2018 0.57  0.57 - 1.00 mg/dL Final   • Sodium 01/15/2018 142  136 - 145 mmol/L Final   • Potassium 01/15/2018 3.4* 3.5 - 5.2 mmol/L Final   • Chloride 01/15/2018 106  98 - 107 mmol/L Final   • CO2 01/15/2018 24.7  22.0 - 29.0 mmol/L Final   • Calcium 01/15/2018 7.8* 8.8 - 10.5 mg/dL Final   • eGFR Non African Amer 01/15/2018 105  >60 mL/min/1.73 Final   • BUN/Creatinine Ratio 01/15/2018 24.6  7.0 - 25.0 Final   • Anion Gap 01/15/2018 11.3  mmol/L Final   • WBC 01/15/2018 7.97  4.80 - 10.80 10*3/mm3 Final   • RBC 01/15/2018 3.71* 4.20 - 5.40 10*6/mm3 Final   • Hemoglobin 01/15/2018 10.0* 12.0 - 16.0 g/dL Final   • Hematocrit 01/15/2018 31.2* 37.0 - 47.0 % Final   • MCV 01/15/2018 84.1  81.0 - 99.0 fL Final   • MCH 01/15/2018 27.0  27.0 - 31.0 pg Final   • MCHC 01/15/2018 32.1  31.0 - 37.0 g/dL Final   • RDW 01/15/2018 18.4* 11.5 - 14.5 % Final   • RDW-SD 01/15/2018 56.8* 37.0 - 54.0 fl Final   • MPV 01/15/2018 10.4  7.4 - 10.4 fL Final   • Platelets 01/15/2018 248  140 - 500 10*3/mm3 Final   • Neutrophil % 01/15/2018 63.2  45.0 - 70.0 % Final   • Lymphocyte % 01/15/2018 24.1  20.0 - 45.0 % Final   •  Monocyte % 01/15/2018 8.8* 3.0 - 8.0 % Final   • Eosinophil % 01/15/2018 3.0  0.0 - 4.0 % Final   • Basophil % 01/15/2018 0.5  0.0 - 2.0 % Final   • Immature Grans % 01/15/2018 0.4  0.0 - 0.5 % Final   • Neutrophils, Absolute 01/15/2018 5.04  1.50 - 8.30 10*3/mm3 Final   • Lymphocytes, Absolute 01/15/2018 1.92  0.60 - 4.80 10*3/mm3 Final   • Monocytes, Absolute 01/15/2018 0.70  0.00 - 1.00 10*3/mm3 Final   • Eosinophils, Absolute 01/15/2018 0.24  0.10 - 0.30 10*3/mm3 Final   • Basophils, Absolute 01/15/2018 0.04  0.00 - 0.20 10*3/mm3 Final   • Immature Grans, Absolute 01/15/2018 0.03  0.00 - 0.03 10*3/mm3 Final   • nRBC 01/15/2018 0.0  0.0 - 0.0 /100 WBC Final   • Glucose 01/15/2018 108  70 - 130 mg/dL Final     Xr Femur 2 View Right    Result Date: 1/14/2018  Narrative: INDICATION: Leg pain after slipping and falling on the ice last night  TECHNIQUE: 4 views of the right femur were obtained  FINDINGS: There is a fracture of the distal femur just above the femoral component of the knee prosthesis. The fracture is obliquely oriented. There is mild displacement and minimal apex anterior angulation. No additional femoral fractures are seen. Diffuse small vessel atherosclerotic calcification is seen in the S after a. Mild degenerative changes are seen at the right hip.      Impression: Minimally angulated mildly displaced distal femoral fracture  This report was finalized on 1/14/2018 8:06 AM by Dr. Jose Neri MD.      Xr Knee 1 Or 2 View Right    Result Date: 1/14/2018  Narrative: INDICATION: Knee pain after slipping and falling on the ice last night. Previous knee replacement.  TECHNIQUE: 2 views the right knee were obtained  FINDINGS: A knee prosthesis is seen with satisfactory alignment and position. There is an acute fracture of the distal femur just at the upper and of the femoral prosthesis. The fracture is mildly displaced with minimal apex anterior angulation. The tibial component is unremarkable. No  radiodense foreign bodies are seen.      Impression: Acute fracture of the distal femur just above the femoral component of the knee prosthesis as described above.  This report was finalized on 1/14/2018 8:04 AM by Dr. Jose Neri MD.      Xr Chest 1 View    Result Date: 1/14/2018  Narrative: INDICATION: Distal femoral fracture. Presurgical evaluation.  TECHNIQUE: A single AP view the chest was obtained  FINDINGS: Mild cardiomegaly is noted. Both lungs are fully expanded and clear with normal vascular markings. No pleural fluid is seen.      Impression: Mild cardiomegaly. No active disease  This report was finalized on 1/14/2018 8:05 AM by Dr. Jose Neri MD.      Ct Lower Extremity Right Without Contrast    Result Date: 1/15/2018  Narrative: CT RIGHT KNEE, 01/13/2018:  HISTORY: 67-year-old female admitted to the hospital through the ED today with a right distal femur fracture above the right knee arthroplasty. Slipped and fell on ice last evening.  TECHNIQUE: Axial CT images of the right knee beginning at the distal third femoral shaft level extending to the proximal third of the tibia and fibula with multiplanar image reconstruction. Radiation dose reduction techniques were utilized, which may include automated exposure control and/or exposure modulation based on body size. Previous CT and radionuclide cardiac imaging studies here in the last year: 0.  FINDINGS: The images confirm the presence of a nondisplaced acute oblique fracture across the distal metaphysis of the femur. The fracture extends into the upper and medial margin of the medial femoral condyle above the level of the arthroplasty, but no additional significant condylar or intracondylar distal fracture extension is demonstrated.  Right total knee arthroplasty components appear well-positioned and well seated. There is a probable small knee joint effusion. No definite acute fracture of the visualized proximal tibia or fibula is seen.      Impression:  1. Acute nondisplaced oblique fracture across the distal metaphysis of the femur as detailed above. Fracture extends into the upper and medial margin of the medial femoral condyle, but no other condylar or intercondylar extension is seen. 2. Well-positioned right total knee arthroplasty.  This report was finalized on 1/15/2018 8:53 AM by Dr. Lang Corona MD.        Assessment:  Patient Active Problem List   Diagnosis   • Mental status change   • Altered mental status   • Primary osteoarthritis of right foot   • Sprain of anterior talofibular ligament of right ankle   • History of total right knee replacement   • Closed fracture of right distal femur           Plan:  The patient voiced understanding of the risks, benefits, and alternative forms of treatment that were discussed and the patient consents to proceed with right distal femur open reduction internal fixation and all associated procedures.  I reviewed details of the procedure as well as risks benefits and alternatives of the procedure with the patient  with the risks including not limited to neurovascular damage, bleeding, infection, loss of motion, weakness, stiffness, instability, nonunion, malunion, chronic pain, continued pain and total knee arthroplasty, foot drop, hardware prominence, DVT, pulmonary embolus, death, stroke, complex regional pain syndrome, myocardial infarction, need for additional procedures.  Patient understood all these had all questions answered prior to consenting to proceed with surgery.  No guarantees were given regarding results of the procedure.        Date: 1/15/2018    Moy Newberry MD      Dictated utilizing Dragon dictation

## 2018-01-16 ENCOUNTER — HOSPITAL ENCOUNTER (OUTPATIENT)
Dept: CT IMAGING | Facility: HOSPITAL | Age: 68
End: 2018-01-16
Attending: ORTHOPAEDIC SURGERY

## 2018-01-16 LAB
ANION GAP SERPL CALCULATED.3IONS-SCNC: 9.6 MMOL/L
BUN BLD-MCNC: 13 MG/DL (ref 8–23)
BUN/CREAT SERPL: 21.3 (ref 7–25)
CALCIUM SPEC-SCNC: 7.6 MG/DL (ref 8.8–10.5)
CHLORIDE SERPL-SCNC: 106 MMOL/L (ref 98–107)
CO2 SERPL-SCNC: 24.4 MMOL/L (ref 22–29)
CREAT BLD-MCNC: 0.61 MG/DL (ref 0.57–1)
DEPRECATED RDW RBC AUTO: 58.2 FL (ref 37–54)
ERYTHROCYTE [DISTWIDTH] IN BLOOD BY AUTOMATED COUNT: 18.5 % (ref 11.5–14.5)
GFR SERPL CREATININE-BSD FRML MDRD: 98 ML/MIN/1.73
GLUCOSE BLD-MCNC: 120 MG/DL (ref 65–99)
GLUCOSE BLDC GLUCOMTR-MCNC: 168 MG/DL (ref 70–130)
GLUCOSE BLDC GLUCOMTR-MCNC: 184 MG/DL (ref 70–130)
GLUCOSE BLDC GLUCOMTR-MCNC: 194 MG/DL (ref 70–130)
GLUCOSE BLDC GLUCOMTR-MCNC: 75 MG/DL (ref 70–130)
HCT VFR BLD AUTO: 29.1 % (ref 37–47)
HGB BLD-MCNC: 9.1 G/DL (ref 12–16)
MCH RBC QN AUTO: 26.9 PG (ref 27–31)
MCHC RBC AUTO-ENTMCNC: 31.3 G/DL (ref 31–37)
MCV RBC AUTO: 86.1 FL (ref 81–99)
PLATELET # BLD AUTO: 240 10*3/MM3 (ref 140–500)
PMV BLD AUTO: 10.5 FL (ref 7.4–10.4)
POTASSIUM BLD-SCNC: 3.8 MMOL/L (ref 3.5–5.2)
RBC # BLD AUTO: 3.38 10*6/MM3 (ref 4.2–5.4)
SODIUM BLD-SCNC: 140 MMOL/L (ref 136–145)
WBC NRBC COR # BLD: 8.33 10*3/MM3 (ref 4.8–10.8)

## 2018-01-16 PROCEDURE — 97162 PT EVAL MOD COMPLEX 30 MIN: CPT

## 2018-01-16 PROCEDURE — 94799 UNLISTED PULMONARY SVC/PX: CPT

## 2018-01-16 PROCEDURE — 63710000001 INSULIN ASPART PER 5 UNITS: Performed by: INTERNAL MEDICINE

## 2018-01-16 PROCEDURE — 25010000002 VANCOMYCIN HCL IN NACL 1.25-0.9 GM/250ML-% SOLUTION: Performed by: ORTHOPAEDIC SURGERY

## 2018-01-16 PROCEDURE — 85027 COMPLETE CBC AUTOMATED: CPT | Performed by: ORTHOPAEDIC SURGERY

## 2018-01-16 PROCEDURE — 82962 GLUCOSE BLOOD TEST: CPT

## 2018-01-16 PROCEDURE — 80048 BASIC METABOLIC PNL TOTAL CA: CPT | Performed by: ORTHOPAEDIC SURGERY

## 2018-01-16 PROCEDURE — 99232 SBSQ HOSP IP/OBS MODERATE 35: CPT | Performed by: HOSPITALIST

## 2018-01-16 RX ORDER — HYDROCODONE BITARTRATE AND ACETAMINOPHEN 5; 325 MG/1; MG/1
1 TABLET ORAL EVERY 4 HOURS PRN
Status: DISCONTINUED | OUTPATIENT
Start: 2018-01-16 | End: 2018-01-19 | Stop reason: HOSPADM

## 2018-01-16 RX ORDER — PREGABALIN 75 MG/1
75 CAPSULE ORAL EVERY 12 HOURS SCHEDULED
Status: DISCONTINUED | OUTPATIENT
Start: 2018-01-16 | End: 2018-01-19 | Stop reason: HOSPADM

## 2018-01-16 RX ADMIN — VANCOMYCIN HYDROCHLORIDE 1250 MG: 1 INJECTION, POWDER, LYOPHILIZED, FOR SOLUTION INTRAVENOUS at 03:01

## 2018-01-16 RX ADMIN — INSULIN ASPART 2 UNITS: 100 INJECTION, SOLUTION INTRAVENOUS; SUBCUTANEOUS at 21:09

## 2018-01-16 RX ADMIN — METOPROLOL SUCCINATE 25 MG: 25 TABLET, EXTENDED RELEASE ORAL at 08:47

## 2018-01-16 RX ADMIN — INSULIN ASPART 2 UNITS: 100 INJECTION, SOLUTION INTRAVENOUS; SUBCUTANEOUS at 11:42

## 2018-01-16 RX ADMIN — CEFUROXIME AXETIL 500 MG: 250 TABLET ORAL at 20:31

## 2018-01-16 RX ADMIN — ACETAMINOPHEN 650 MG: 325 TABLET, FILM COATED ORAL at 20:30

## 2018-01-16 RX ADMIN — APIXABAN 2.5 MG: 2.5 TABLET, FILM COATED ORAL at 20:30

## 2018-01-16 RX ADMIN — OXYCODONE HYDROCHLORIDE AND ACETAMINOPHEN 1 TABLET: 10; 325 TABLET ORAL at 03:27

## 2018-01-16 RX ADMIN — OXYCODONE HYDROCHLORIDE AND ACETAMINOPHEN 1 TABLET: 10; 325 TABLET ORAL at 08:47

## 2018-01-16 RX ADMIN — INSULIN ASPART 2 UNITS: 100 INJECTION, SOLUTION INTRAVENOUS; SUBCUTANEOUS at 17:13

## 2018-01-16 RX ADMIN — APIXABAN 2.5 MG: 2.5 TABLET, FILM COATED ORAL at 10:49

## 2018-01-16 RX ADMIN — BACLOFEN 10 MG: 10 TABLET ORAL at 08:47

## 2018-01-16 RX ADMIN — PREGABALIN 75 MG: 75 CAPSULE ORAL at 20:30

## 2018-01-16 RX ADMIN — PREGABALIN 150 MG: 75 CAPSULE ORAL at 08:47

## 2018-01-16 RX ADMIN — CEFUROXIME AXETIL 500 MG: 250 TABLET ORAL at 08:47

## 2018-01-16 RX ADMIN — PAROXETINE HYDROCHLORIDE 20 MG: 20 TABLET, FILM COATED ORAL at 08:47

## 2018-01-16 RX ADMIN — TRAMADOL HYDROCHLORIDE 50 MG: 50 TABLET, FILM COATED ORAL at 06:56

## 2018-01-16 RX ADMIN — BUDESONIDE AND FORMOTEROL FUMARATE DIHYDRATE 2 PUFF: 80; 4.5 AEROSOL RESPIRATORY (INHALATION) at 22:54

## 2018-01-16 RX ADMIN — BUDESONIDE AND FORMOTEROL FUMARATE DIHYDRATE 2 PUFF: 80; 4.5 AEROSOL RESPIRATORY (INHALATION) at 09:48

## 2018-01-16 RX ADMIN — PANTOPRAZOLE SODIUM 40 MG: 40 TABLET, DELAYED RELEASE ORAL at 06:56

## 2018-01-16 RX ADMIN — Medication 5 MG: at 20:32

## 2018-01-16 NOTE — NURSING NOTE
Called [facility] in [place], [place]. Spoke with Lulu, Pharmacist. Verified that pt. takes Lyrica 75mg BID and Symbicort 160mg strength. Pt. does not have Advair on profile there. Called per Dr. Marinelli request. Reported findings to Dr. Marinelli in person on unit.    Juana Avalos RN 1/16/2018 4:05 PM

## 2018-01-16 NOTE — PLAN OF CARE
Problem: Patient Care Overview (Adult)  Goal: Plan of Care Review  Outcome: Ongoing (interventions implemented as appropriate)   01/16/18 1737   Coping/Psychosocial Response Interventions   Plan Of Care Reviewed With patient   Patient Care Overview   Progress no change   Outcome Evaluation   Outcome Summary/Follow up Plan VSS. Pt. resting in bed most of shift, likely medication-induced somnolence. Pt arouses easily to voice. Oriented x4. Medications have been adjusted per MD. SpO2 continuous, sats >90.        Problem: Fall Risk (Adult)  Goal: Absence of Falls  Outcome: Ongoing (interventions implemented as appropriate)   01/16/18 1737   Fall Risk (Adult)   Absence of Falls making progress toward outcome       Problem: Orthopaedic Fracture (Adult)  Goal: Signs and Symptoms of Listed Potential Problems Will be Absent or Manageable (Orthopaedic Fracture)  Outcome: Ongoing (interventions implemented as appropriate)   01/16/18 1737   Orthopaedic Fracture   Problems Assessed (Orthopaedic Fracture) all   Problems Present (Orthopaedic Fracture) functional deficit/ self-care deficit;pain;situational response       Problem: Pressure Ulcer Risk (Jules Scale) (Adult,Obstetrics,Pediatric)  Goal: Skin Integrity  Outcome: Ongoing (interventions implemented as appropriate)   01/16/18 1737   Pressure Ulcer Risk (Jules Scale) (Adult,Obstetrics,Pediatric)   Skin Integrity making progress toward outcome       Problem: Pain, Chronic (Adult)  Goal: Acceptable Pain Control/Comfort Level  Outcome: Ongoing (interventions implemented as appropriate)   01/16/18 1737   Pain, Chronic (Adult)   Acceptable Pain Control/Comfort Level making progress toward outcome

## 2018-01-16 NOTE — PLAN OF CARE
Problem: Inpatient Physical Therapy  Goal: Bed Mobility Goal STG- PT   01/16/18 1128   Bed Mobility PT STG   Bed Mobility PT STG, Date Established 01/16/18   Bed Mobility PT STG, Time to Achieve 5 days   Bed Mobility PT STG, Activity Type supine to sit/sit to supine   Bed Mobility PT STG, [place] Level moderate assist (50% patient effort)     Goal: Transfer Training Goal 1 STG- PT   01/16/18 1128   Transfer Training PT STG   Transfer Training PT STG, Date Established 01/16/18   Transfer Training PT STG, Time to Achieve 5 days   Transfer Training PT STG, Activity Type sit to stand/stand to sit   Transfer Training PT STG, [place] Level moderate assist (50% patient effort)   Transfer Training PT STG, Assist Device (with appropriate assistive device)     Goal: Transfer Training Goal 2 STG- PT   01/16/18 1128   Transfer Training 2 PT STG   Transfer Training PT 2 STG, Date Established 01/16/18   Transfer Training PT 2 STG, Time to Achieve 5 days   Transfer Training PT 2 STG, Activity Type bed to chair /chair to bed   Transfer Training PT 2 STG, [place] Level maximum assist (25% patient effort)   Transfer Training PT 2 STG, Assist Device (with appropriate assistive device)

## 2018-01-16 NOTE — THERAPY EVALUATION
Acute Care - Physical Therapy Initial Evaluation  [facility]     Patient Name: Juan Pablo Hernandez  : 1950  MRN: [id number]  Today's Date: 2018   Onset of Illness/Injury or Date of Surgery Date: 18  Date of Referral to PT: 01/15/18  Referring Physician: Dr. Newberry      Admit Date: 2018     Visit Dx:    ICD-10-CM ICD-9-CM   1. Closed fracture of distal end of right femur, unspecified fracture morphology, initial encounter S72.401A 821.20     Patient Active Problem List   Diagnosis   • Mental status change   • Altered mental status   • Primary osteoarthritis of right foot   • Sprain of anterior talofibular ligament of right ankle   • History of total right knee replacement   • Closed fracture of right distal femur     Past Medical History:   Diagnosis Date   • Anxiety    • Asthma    • CHF (congestive heart failure)    • Chronic pain disorder    • Chronic UTI    • Depression    • Diabetes    • GERD (gastroesophageal reflux disease)    • H/O CHF    • HTN (hypertension)    • Hyperlipidemia    • Incontinence    • Injury of back     spinal stenosis, chronic back pain   • Low back pain    • Lumbosacral disc disease    • MRSA infection (methicillin-resistant Staphylococcus aureus)     to left foot states approx 12-13 years ago    • Osteoarthritis    • Sleep apnea     uses cpap   • Spinal stenosis    • Stroke     pt states PMD has said she may be having mini strokes   • Vertigo      Past Surgical History:   Procedure Laterality Date   • CHOLECYSTECTOMY OPEN     • COLONOSCOPY     • ENDOSCOPY     • HYSTERECTOMY     • TOTAL KNEE ARTHROPLASTY Bilateral           PT ASSESSMENT (last 72 hours)      PT Evaluation       18 0947 18 0847    Rehab Evaluation    Document Type evaluation  -BP     Subjective Information agree to therapy;no complaints  -BP     Patient Effort, Rehab Treatment fair  -BP     Symptoms Noted During/After Treatment fatigue;other (see comments)   Lethargy  -BP     Symptoms Noted  Comment Patient lethargic throughout evaluation. Requires significant verbal cues to remain alert and awake. Patient pre medicated for treatment and states medication is the cause for lethargy. Notified RN following treatment.   -BP     General Information    Patient Profile Review yes  -BP     Onset of Illness/Injury or Date of Surgery Date 01/13/18  -BP     Referring Physician Dr. Newberry  -BP     General Observations Patient supine in bed with HOB elevated. Knee immobilizer on. In no acute distres.   -BP     Pertinent History Of Current Problem Patient presents s/p fall at home. Found to have a distal femur fracture. Now s/p ORIF. Patient to be TTWB with knee immobiler on per order. Per patient report she completes mobility with use of rollator. Unable to obtain full prior level of functiona secondary to patient lethargy.   -BP     Precautions/Limitations fall precautions;other (see comments);brace on when up   TTWB R LE  -BP     Prior Level of Function --   see prior level of function  -BP     Equipment Currently Used at Home walker, rolling;rollator;shower chair;bath bench   BSC  -BP     Plans/Goals Discussed With patient;agreed upon  -BP     Risks Reviewed patient:;LOB;increased discomfort  -BP     Benefits Reviewed patient:;improve function;increase independence;increase strength  -BP     Barriers to Rehab --   lethargy  -BP     Living Environment    Lives With spouse  -BP     Living Arrangements house  -BP     Home Accessibility bed and bath on same level;stairs to enter home  -BP     Number of Stairs to Enter Home 1   curb stair   -BP     Stair Railings at Home none  -BP     Living Environment Comment Patient reports one small threshold type step into home.   -BP     Clinical Impression    Date of Referral to PT 01/15/18  -BP     PT Diagnosis Decreased functional mobility  -BP     Criteria for Skilled Therapeutic Interventions Met yes;treatment indicated  -BP     Rehab Potential good, to achieve stated  therapy goals  -BP     Predicted Duration of Therapy Intervention (days/wks) 3-5 days   -BP     Pain Assessment    Pain Assessment No/denies pain  -BP     Cognitive Assessment/Intervention    Current Cognitive/Communication Assessment impaired  -BP     Orientation Status oriented to;place;person;time;situation  -BP     Follows Commands/Answers Questions able to follow single-step instructions;needs cueing;needs increased time;needs repetition;50% of the time   Decreased ability to follow commands d/t lethargy  -BP     Personal Safety decreased insight to deficits  -BP     Personal Safety Interventions gait belt;nonskid shoes/slippers when out of bed;fall prevention program maintained  -BP     ROM (Range of Motion)    General ROM Detail B UE AROM functional. L LE AROM WFL. R ankle AROM WFL. R ankle AROM WFL.   -BP     MMT (Manual Muscle Testing)    General MMT Assessment Detail Unable to MMT B UE's secondary to lethargy and difficulty following commands. L LE gross MMT 4+/5.   -BP     Bed Mobility, Assessment/Treatment    Bed Mobility, Assistive Device bed rails;head of bed elevated;draw sheet  -BP     Bed Mobility, Scoot/Bridge, [place] maximum assist (25% patient effort);2 person assist required;verbal cues required  -BP     Bed Mob, Supine to Sit, [place] maximum assist (25% patient effort);2 person assist required;verbal cues required  -BP     Bed Mob, Sit to Supine, [place] dependent (less than 25% patient effort);2 person assist required;verbal cues required  -BP     Bed Mobility, Comment Verbal cues for sequencing. Patient with diffiuclty following commands. Requiresing significantly increased time to intiaite and complete transfer.   -BP     Transfer Assessment/Treatment    Transfers, Sit-Stand [place] maximum assist (25% patient effort);dependent (less than 25% patient effort);2 person assist required;verbal cues required  -BP     Transfers, Stand-Sit [place] maximum assist (25%  patient effort);2 person assist required;dependent (less than 25% patient effort)  -BP     Transfers, Sit-Stand-Sit, Assist Device rolling walker  -BP     Transfer, Maintain Weight Bearing Status unable to maintain weight bearing status  -BP     Transfer, Comment Attempted sit to/from stand transfer x 2 from elevated bed surface. Patient unable to maintain TTWB status during transfer or in static standing. Unable to correct with verbal cues or assist. Patient requires verbal cues for hand placement however unable to correct. Patient demonstrates significant posterior lean with each transfer. Verbal cues for anterior weight shift. Unable to correct. Max A x 2 for static standing.   -BP     Motor Skills/Interventions    Additional Documentation Balance Skills Training (Group)  -BP     Balance Skills Training    Standing-Level of Assistance Maximum assistance;Dependent;x2   static sitting   -BP     Static Standing Balance Support assistive device  -BP     Sensory Assessment/Intervention    Sensory Impairment --   Denies numbness and tingling B LE's   -BP     Positioning and Restraints    Pre-Treatment Position in bed  -BP     Post Treatment Position bed  -BP     In Bed notified nsg;supine;call light within reach;encouraged to call for assist  -BP       User Key  (r) = Recorded By, (t) = Taken By, (c) = Cosigned By    Initials Name Provider Type    BP Shayy Pack, PT Physical Therapist    ZIA Leavitt, RN Registered Nurse    ELIO Yanez, RN Registered Nurse    AS Juana Avalos RN Registered Nurse    ST Shari Hightower, RN Registered Nurse          Physical Therapy Education     Title: PT OT SLP Therapies (Active)     Topic: Physical Therapy (Active)     Point: Mobility training (Active)    Learning Progress Summary    Learner Readiness Method Response Comment Documented by Status   Patient Acceptance E NR  BP 01/16/18 1124 Active               Point: Precautions (Active)    Learning Progress Summary     Learner Readiness Method Response Comment Documented by Status   Patient Acceptance E NR  BP 01/16/18 1124 Active                      User Key     Initials Effective Dates Name Provider Type Discipline     12/01/15 -  Shayy Pack, PT Physical Therapist PT                PT Recommendation and Plan  Anticipated Equipment Needs At Discharge:  (will continue to assess)  Anticipated Discharge Disposition: other (see comments) (Will continue to assess)  Planned Therapy Interventions: bed mobility training, home exercise program, patient/family education, strengthening, transfer training  PT Frequency: daily, 2 times/day  Plan of Care Review  Plan Of Care Reviewed With: patient  Outcome Summary/Follow up Plan: PT Eval Complete: Patient performs supine to/from sit transfer with max A/dependent x 2 and sit tofrom stand transfers with max A/dependent x 2. Patient unable to maintain TTWB during transfer or while in static standing. Patient requires max A x 2 to maintain static standing x approximately 10 seconds. Significant posterior lean noted during transfers. Patient lethargic throughout with diffiuclty following commands. Patient would benefit from skilled PT services to address deficits in functional mobility and maximize function. Will continue to assess discharge needs, will likely required SNF. Pending functional progress may reqire convalescent stay until weight bearing status changes. Plan to see patient QD/BID while in the hospital           IP PT Goals       01/16/18 1128          Bed Mobility PT STG    Bed Mobility PT STG, Date Established 01/16/18  -BP      Bed Mobility PT STG, Time to Achieve 5 days  -BP      Bed Mobility PT STG, Activity Type supine to sit/sit to supine  -BP      Bed Mobility PT STG, [place] Level moderate assist (50% patient effort)  -BP      Transfer Training PT STG    Transfer Training PT STG, Date Established 01/16/18  -BP      Transfer Training PT STG, Time to Achieve 5  days  -BP      Transfer Training PT STG, Activity Type sit to stand/stand to sit  -BP      Transfer Training PT STG, [place] Level moderate assist (50% patient effort)  -BP      Transfer Training PT STG, Assist Device --   with appropriate assistive device  -BP      Transfer Training 2 PT STG    Transfer Training PT 2 STG, Date Established 01/16/18  -BP      Transfer Training PT 2 STG, Time to Achieve 5 days  -BP      Transfer Training PT 2 STG, Activity Type bed to chair /chair to bed  -BP      Transfer Training PT 2 STG, [place] Level maximum assist (25% patient effort)  -BP      Transfer Training PT 2 STG, Assist Device --   with appropriate assistive device  -BP        User Key  (r) = Recorded By, (t) = Taken By, (c) = Cosigned By    Initials Name Provider Type    BP Shayy Pack PT Physical Therapist                Outcome Measures       01/16/18 0947          How much help from another person do you currently need...    Turning from your back to your side while in flat bed without using bedrails? 1  -BP      Moving from lying on back to sitting on the side of a flat bed without bedrails? 1  -BP      Moving to and from a bed to a chair (including a wheelchair)? 1  -BP      Standing up from a chair using your arms (e.g., wheelchair, bedside chair)? 1  -BP      Climbing 3-5 steps with a railing? 1  -BP      To walk in hospital room? 1  -BP      AM-PAC 6 Clicks Score 6  -BP      Functional Assessment    Outcome Measure Options AM-PAC 6 Clicks Basic Mobility (PT)  -BP        User Key  (r) = Recorded By, (t) = Taken By, (c) = Cosigned By    Initials Name Provider Type    BP Shayy Pack PT Physical Therapist           Time Calculation:         PT Charges       01/16/18 1129          Time Calculation    Start Time 0947  -BP      PT Received On 01/16/18  -BP      PT - Next Appointment 01/16/18  -BP        User Key  (r) = Recorded By, (t) = Taken By, (c) = Cosigned By    Initials Name Provider Type     BP Shayy Pack, PT Physical Therapist          Therapy Charges for Today     Code Description Service Date Service Provider Modifiers Qty    90821414766 HC PT EVAL MOD COMPLEXITY 2 1/16/2018 Shayy Pack, PT GP 1    42902866988 HC PT THER SUPP EA 15 MIN 1/16/2018 Shayy Pack, PT GP 1          PT G-Codes  Outcome Measure Options: AM-PAC 6 Clicks Basic Mobility (PT)      Shayy Pack, PT  1/16/2018

## 2018-01-16 NOTE — PROGRESS NOTES
POD# 1 s/p ORIF right distal femur fracture    Subjective: Patient states pain well-controlled this point time.  Denies fevers chills or sweats overnight, denies residual numbness or tingling right lower extremity.    Objective:  Vitals:    01/15/18 2330 01/16/18 0640 01/16/18 0948 01/16/18 1111   BP: 114/73 106/66  124/71   BP Location:    Right arm   Patient Position:    Lying   Pulse: 72 78 88 79   Resp: 17 9 16 16   Temp: 98 °F (36.7 °C) 98.4 °F (36.9 °C)  98 °F (36.7 °C)   TempSrc: Oral Oral  Oral   SpO2: 96% 96% 94% 96%   Weight:       Height:             Results from last 7 days  Lab Units 01/16/18  0431 01/15/18  0401 01/14/18  0354   WBC 10*3/mm3 8.33 7.97 9.99   HEMOGLOBIN g/dL 9.1* 10.0* 11.0*   HEMATOCRIT % 29.1* 31.2* 34.3*   PLATELETS 10*3/mm3 240 248 293         Results from last 7 days  Lab Units 01/16/18  0431 01/15/18  0401 01/14/18  0354   SODIUM mmol/L 140 142 139   POTASSIUM mmol/L 3.8 3.4* 3.8   CHLORIDE mmol/L 106 106 97*   CO2 mmol/L 24.4 24.7 26.2   BUN mg/dL 13 14 16   CREATININE mg/dL 0.61 0.57 0.71   GLUCOSE mg/dL 120* 117* 129*   CALCIUM mg/dL 7.6* 7.8* 8.6*       Exam:    Right leg-dressing clean dry and intact   Flex and extend toes and ankle   Brisk cap refill all digits   Compartments soft and easily compressible   Positive sensation light touch all distibution's right foot   No calf pain, negative Homans sign    Impression: s/p ORIF right distal femur fracture     Plan:  1. PT/OT- ambulate, toe-touch weightbearing right lower extremity, knee immobilizer at all times  2. Pain control- Percocet  3. DVT prophylaxis- Lovenox  4. Wound care- dressing change tomorrow  5. Disposition- home health versus rehabilitation pending progress with therapy and pain control

## 2018-01-16 NOTE — PLAN OF CARE
Problem: Patient Care Overview (Adult)  Goal: Plan of Care Review   01/16/18 1124   Coping/Psychosocial Response Interventions   Plan Of Care Reviewed With patient   Outcome Evaluation   Outcome Summary/Follow up Plan PT Eval Complete: Patient performs supine to/from sit transfer with max A/dependent x 2 and sit tofrom stand transfers with max A/dependent x 2. Patient unable to maintain TTWB during transfer or while in static standing. Patient requires max A x 2 to maintain static standing x approximately 10 seconds. Significant posterior lean noted during transfers. Patient lethargic throughout with diffiuclty following commands. Patient would benefit from skilled PT services to address deficits in functional mobility and maximize function. Recommend lina lift for transfers at this time. Will continue to assess discharge needs, will likely require SNF. Pending functional progress may require convalescent stay until weight bearing status changes. Plan to see patient QD/BID while in the hospital

## 2018-01-16 NOTE — PROGRESS NOTES
"Hospitalist Team      Patient Care Team:  Linden Zhang MD as PCP - General  Linden Zhang MD as PCP - Family Medicine        Chief Complaint:  F/U right distal femur fracture s/p fall    Subjective    Interval History and ROS:     Patient is very drowsy today and was not like this yesterday after surgery.  She notes she feels tired.  She notes mild pain in her right hip. She denies any CP, SOA or N/V. She is afebrile and BP is WNL.      Objective    Vital Signs  Temp:  [97.5 °F (36.4 °C)-98.4 °F (36.9 °C)] 98 °F (36.7 °C)  Heart Rate:  [62-88] 79  Resp:  [9-18] 16  BP: (106-172)/(60-92) 124/71  Oxygen Therapy  SpO2: 96 %  Pulse Oximetry Type: Continuous  O2 Device: room air    Flowsheet Rows         First Filed Value    Admission Height  160.2 cm (63.07\") Documented at 01/13/2018 1254    Admission Weight  76.7 kg (169 lb) Documented at 01/13/2018 1254            Physical Exam:  Constitutional: Patient appears well-developed and well-nourished and in no acute distress, drowsy.   HEENT:   Head: Normocephalic and atraumatic.   Eyes:  EOM are intact. Sclera are anicteric and non-injected.  Mouth and Throat: Patient has moist mucous membranes.     Neck: Neck supple. No lymphadenopathy present.  Cardiovascular: Regular rate, regular rhythm, S1 normal and S2 normal.  Exam reveals no gallop and no friction rub.  No murmur heard.  Pulmonary/Chest: Lungs are clear to auscultation bilaterally. No respiratory distress. No wheezes. No rhonchi. No rales.   Abdominal: Soft. Bowel sounds are normal. There is no tenderness.   Extremities: No edema noted. Pulses are palpable in all 4 extremities. Dressing to right hip intact.  Neurological: Patient is alert and oriented to person, place, and time but is very drowsy, I have to ask her questions multiple times to get her to open her eyes and give me an answer.   Skin: Skin is warm. No rash noted. Nails show no clubbing. No cyanosis or erythema.    Results Review:     I " reviewed the patient's new clinical results.    Lab Results (last 24 hours)     Procedure Component Value Units Date/Time    POC Glucose Once [[id number]]  (Normal) Collected:  01/15/18 1321    Specimen:  Blood Updated:  01/15/18 1335     Glucose 89 mg/dL     Narrative:       Meter: [id number] : [id number] Juan Ramon Rader RN    POC Glucose Once [[id number]]  (Normal) Collected:  01/15/18 1515    Specimen:  Blood Updated:  01/15/18 1523     Glucose 92 mg/dL     Narrative:       Meter: [id number] : [id number] Dhiraj Coreas RN    POC Glucose Once [[id number]]  (Normal) Collected:  01/15/18 1754    Specimen:  Blood Updated:  01/15/18 1800     Glucose 86 mg/dL     Narrative:       Meter: [id number] : [id number] Clarence Rojas NURSING ASSISTANT    POC Glucose Once [[id number]]  (Abnormal) Collected:  01/15/18 2015    Specimen:  Blood Updated:  01/15/18 2025     Glucose 146 (H) mg/dL     Narrative:       Meter: [id number] : [id number] Devon CHERRY    CBC (No Diff) [[id number]]  (Abnormal) Collected:  01/16/18 0431    Specimen:  Blood Updated:  01/16/18 0459     WBC 8.33 10*3/mm3      RBC 3.38 (L) 10*6/mm3      Hemoglobin 9.1 (L) g/dL      Hematocrit 29.1 (L) %      MCV 86.1 fL      MCH 26.9 (L) pg      MCHC 31.3 g/dL      RDW 18.5 (H) %      RDW-SD 58.2 (H) fl      MPV 10.5 (H) fL      Platelets 240 10*3/mm3     Basic Metabolic Panel [[id number]]  (Abnormal) Collected:  01/16/18 0431    Specimen:  Blood Updated:  01/16/18 0539     Glucose 120 (H) mg/dL      BUN 13 mg/dL      Creatinine 0.61 mg/dL      Sodium 140 mmol/L      Potassium 3.8 mmol/L      Chloride 106 mmol/L      CO2 24.4 mmol/L      Calcium 7.6 (L) mg/dL      eGFR Non African Amer 98 mL/min/1.73      BUN/Creatinine Ratio 21.3     Anion Gap 9.6 mmol/L     Narrative:       GFR Normal >60  Chronic Kidney Disease <60  Kidney Failure <15    POC Glucose Once [[id number]]  (Normal) Collected:  01/16/18 0736    Specimen:  Blood Updated:  01/16/18  0743     Glucose 75 mg/dL     Narrative:       Meter: [id number] : [id number] Bhupendra Oconnell NURSING ASSISTANT    POC Glucose Once [[id number]]  (Abnormal) Collected:  01/16/18 1114    Specimen:  Blood Updated:  01/16/18 1123     Glucose 194 (H) mg/dL     Narrative:       Meter: [id number] : [id number] Bhupendra Oconnell NURSING ASSISTANT          Imaging Results (last 24 hours)     Procedure Component Value Units Date/Time    XR Knee 1 or 2 View Right [[id number]] Collected:  01/16/18 0943     Updated:  01/16/18 0946    Narrative:       POSTOP RIGHT KNEE, 01/15/2018:     HISTORY:   ORIF right distal femur fracture.     TECHNIQUE:  AP and crosstable lateral radiographs of the right knee were obtained  portably following surgery.     FINDINGS:  Lateral plate and screw fixation hardware appears well positioned  traversing well aligned distal femur fracture above the patient's  arthroplasty.       Impression:       Satisfactory postoperative appearance following right knee surgery.     This report was finalized on 1/16/2018 9:44 AM by Dr. Lang Corona MD.       XR Femur 2 View Right [[id number]] Collected:  01/16/18 0944     Updated:  01/16/18 0947    Narrative:       FLUOROSCOPY DURING RIGHT KNEE SURGERY, 01/15/2018:     HISTORY:  Fluoroscopy during ORIF right distal femur fracture.     REPORT:  C-arm fluoroscopy was provided by radiology personnel in the OR during  right knee surgery.. Fluoroscopy time was recorded as 57 seconds. 8 spot  film images were recorded for documentation purposes. Please see  operative note for details.     This report was finalized on 1/16/2018 9:45 AM by Dr. Lang Corona MD.           ECG/EMG Results (most recent)     Procedure Component Value Units Date/Time    ECG 12 Lead [[id number]] Collected:  01/13/18 1512     Updated:  01/13/18 2232    Narrative:       RR Interval= 606 ms  VT Interval= 128 ms  QRSD Interval= 72 ms  QT Interval= 344 ms  QTc Interval= 442 ms  Heart Rate=  99 ms  P Axis= 27 deg  QRS Axis= 41 deg  T Wave Axis= 184 deg  I: 40 Axis= 36 deg  T: 40 Axis= 54 deg  ST Axis= 209 deg  SINUS RHYTHM  BORDERLINE REPOLARIZATION ABNORMALITY  NO SIGNIFICANT CHANGE FROM PREVIOUS ECG  Electronically Signed by:  Tara Avitia ([facility]) 13-Jan-2018 22:32:21  Date and Time of Study: 2018-01-13 15:12:47          Medication Review:   I have reviewed the patient's current medication list    Current Facility-Administered Medications:   •  acetaminophen (TYLENOL) tablet 650 mg, 650 mg, Oral, Q6H PRN, Nahun Zacarias, DO, 650 mg at 01/14/18 0242  •  albuterol (PROVENTIL) nebulizer solution 0.083% 2.5 mg/3mL, 2.5 mg, Nebulization, Q4H PRN, Nahun Zacarias, DO  •  apixaban (ELIQUIS) tablet 2.5 mg, 2.5 mg, Oral, Q12H, Moy Newberry MD, 2.5 mg at 01/16/18 1049  •  baclofen (LIORESAL) tablet 10 mg, 10 mg, Oral, Daily, Nahun Zacarias, DO, 10 mg at 01/16/18 0847  •  bisacodyl (DULCOLAX) EC tablet 5 mg, 5 mg, Oral, Daily PRN, Nahun Zacarias, DO  •  bisacodyl (DULCOLAX) suppository 10 mg, 10 mg, Rectal, Daily PRN, Nahun Zacarias, DO  •  budesonide-formoterol (SYMBICORT) 80-4.5 MCG/ACT inhaler 2 puff, 2 puff, Inhalation, BID - RT, Nahun Zacarias, DO, 2 puff at 01/16/18 0948  •  cefuroxime (CEFTIN) tablet 500 mg, 500 mg, Oral, Q12H, Katherine Marinelli MD, 500 mg at 01/16/18 0847  •  dextrose (D50W) solution 25 g, 25 g, Intravenous, Q15 Min PRN, Nahun Zacarias, DO  •  dextrose (GLUTOSE) oral gel 15 g, 15 g, Oral, Q15 Min PRN, Nahun Zacarias, DO  •  glucagon (GLUCAGEN) injection 1 mg, 1 mg, Subcutaneous, Q15 Min PRN, Nahun Zacarias DO  •  insulin aspart (novoLOG) injection 0-7 Units, 0-7 Units, Subcutaneous, 4x Daily AC & at Bedtime, Nahun Zacarias DO, 2 Units at 01/16/18 1142  •  lactated ringers infusion 1,000 mL, 1,000 mL, Intravenous, Continuous PRN, Thuy Taylor CRNA, Last Rate: 25 mL/hr at 01/15/18 1456, 1,000 mL at 01/15/18 1456  •  melatonin tablet 5 mg, 5 mg, Oral, Nightly, Nahun Zacarias DO, 5 mg at  01/15/18 2024  •  meperidine (DEMEROL) injection 25 mg, 25 mg, Intravenous, BID PRN, Nahun Zacarias, DO, 25 mg at 01/15/18 2157  •  metoprolol succinate XL (TOPROL-XL) 24 hr tablet 25 mg, 25 mg, Oral, Daily, Nahun Zacarias, DO, 25 mg at 01/16/18 0847  •  ondansetron (ZOFRAN) injection 4 mg, 4 mg, Intravenous, Q6H PRN, Nahun Zacarias, DO  •  oxyCODONE-acetaminophen (PERCOCET)  MG per tablet 1 tablet, 1 tablet, Oral, Q4H PRN, Moy Newberry MD, 1 tablet at 01/16/18 0847  •  oxyCODONE-acetaminophen (PERCOCET) 5-325 MG per tablet 1 tablet, 1 tablet, Oral, Q4H PRN, Moy Newberry MD, 1 tablet at 01/14/18 1811  •  pantoprazole (PROTONIX) EC tablet 40 mg, 40 mg, Oral, QAM, Nahun Zacarias, DO, 40 mg at 01/16/18 0656  •  PARoxetine (PAXIL) tablet 20 mg, 20 mg, Oral, Daily, Nahun Zacarias, DO, 20 mg at 01/16/18 0847  •  pregabalin (LYRICA) capsule 150 mg, 150 mg, Oral, Q12H, Moy Newberry MD, 150 mg at 01/16/18 0847  •  sodium chloride 0.9 % flush 1-10 mL, 1-10 mL, Intravenous, PRN, Nahun Zacarias, DO  •  Insert peripheral IV, , , Once **AND** sodium chloride 0.9 % flush 10 mL, 10 mL, Intravenous, PRN, Manish Dwyer MD  •  sodium chloride 0.9 % infusion 40 mL, 40 mL, Intravenous, PRN, Nahun Zacarias, DO  •  traMADol (ULTRAM) tablet 50 mg, 50 mg, Oral, Q6H PRN, Nahun Zacarias, DO, 50 mg at 01/16/18 0656      Assessment/Plan     1. Acute right distal femur fracture s/p fall: Patient slipped and fell prior to admission.  CT was done and patient was taken to OR yesterday to undergo ORIF. Dr. Newberry managing. Patient drowsy today and had difficulty participating with PT/OT, she was not this drowsy last night.  I will D/C her percocet and start her on norco 5mg tabs.  She is on her home dose of lyrica so I will leave that for now. Monitor. TTWB to RLE.     2. E-coli UTI: Patient note PCN causes a rash (anaphylaxis with avelox).  On ceftin po. E-coli is resistant to FQ.    3. Normocytic Anemia: Likely multifactorial  related to injury, IVFs, surgery and blood draws. Monitor.     4. Asthma: On Symbicort here, no acute issues. Will confirm with patient's pharmacy that she is on Symbicort and not Advair as both are listed on her med rec.     5. DM-2:  Patient was NPO for surgery so home po meds were held.  Patient is drowsy and not eating a lot today secondary to this and bedsides are ok so will continue to hold. Continue to monitor Accu checks and SSI.      6. Hypertension: BP mostly WNL. Patient is on home Toprol XL. Continue to monitor.     7. Dyslipidemia: Patient can resume fenofibrate at discharge.     8. MARGARET: Noncompliant with CPAP at home.     9. Mild dementia: No acute issues here pre-op. On no chronic medications for this.      10. Chronic Pain: NSAIDs held, home Lyrica resumed by ortho post-op. On narcotics post-op as in #1 above. Monitor.     11. DVT prophy: On eliquis.    Plan for disposition: Likely will need STR    Katherine Marinelli MD  01/16/18  1:23 PM      Addendum: Nursing verified with patient's pharmacy. She is on symbicort not advair and is only on lyrica 75mg BID, will adjust lyrica dose here.

## 2018-01-16 NOTE — SIGNIFICANT NOTE
01/16/18 1352   Rehab Treatment   Discipline physical therapist   Treatment Not Performed other (see comments)  (Attempted PT treatment. Patient continues to be too lethargic to actively participate for a sustained time frame. Will check back in the AM. )

## 2018-01-16 NOTE — PLAN OF CARE
Problem: Patient Care Overview (Adult)  Goal: Plan of Care Review  Outcome: Ongoing (interventions implemented as appropriate)   01/16/18 0250   Coping/Psychosocial Response Interventions   Plan Of Care Reviewed With patient   Patient Care Overview   Progress no change       Problem: Fall Risk (Adult)  Goal: Absence of Falls  Outcome: Ongoing (interventions implemented as appropriate)

## 2018-01-16 NOTE — PROGRESS NOTES
Patient: Juan Pablo Hernandez  Procedure(s):  FEMUR DISTAL OPEN REDUCTION INTERNAL FIXATION  Anesthesia type: [unfilled]    Patient location: [facility] Floor  Last vitals:   Vitals:    01/16/18 1111   BP: 124/71   Pulse: 79   Resp: 16   Temp: 98 °F (36.7 °C)   SpO2: 96%     Level of consciousness: awake, alert and oriented    Post-anesthesia pain: adequate analgesia  Airway patency: patent  Respiratory: unassisted  Cardiovascular: stable and blood pressure at baseline  Hydration: euvolemic    Anesthetic complications: no

## 2018-01-17 ENCOUNTER — APPOINTMENT (OUTPATIENT)
Dept: GENERAL RADIOLOGY | Facility: HOSPITAL | Age: 68
End: 2018-01-17

## 2018-01-17 LAB
BACTERIA UR QL AUTO: ABNORMAL /HPF
BILIRUB UR QL STRIP: NEGATIVE
CLARITY UR: CLEAR
COLOR UR: YELLOW
GLUCOSE BLDC GLUCOMTR-MCNC: 101 MG/DL (ref 70–130)
GLUCOSE BLDC GLUCOMTR-MCNC: 129 MG/DL (ref 70–130)
GLUCOSE BLDC GLUCOMTR-MCNC: 132 MG/DL (ref 70–130)
GLUCOSE BLDC GLUCOMTR-MCNC: 146 MG/DL (ref 70–130)
GLUCOSE UR STRIP-MCNC: NEGATIVE MG/DL
HCT VFR BLD AUTO: 29.6 % (ref 37–47)
HGB BLD-MCNC: 9.4 G/DL (ref 12–16)
HGB UR QL STRIP.AUTO: ABNORMAL
HYALINE CASTS UR QL AUTO: ABNORMAL /LPF
KETONES UR QL STRIP: NEGATIVE
LEUKOCYTE ESTERASE UR QL STRIP.AUTO: NEGATIVE
NITRITE UR QL STRIP: NEGATIVE
PH UR STRIP.AUTO: 6 [PH] (ref 4.5–8)
PROT UR QL STRIP: NEGATIVE
RBC # UR: ABNORMAL /HPF
REF LAB TEST METHOD: ABNORMAL
SP GR UR STRIP: 1.02 (ref 1–1.03)
SQUAMOUS #/AREA URNS HPF: ABNORMAL /HPF
UROBILINOGEN UR QL STRIP: ABNORMAL
WBC UR QL AUTO: ABNORMAL /HPF

## 2018-01-17 PROCEDURE — 99232 SBSQ HOSP IP/OBS MODERATE 35: CPT | Performed by: HOSPITALIST

## 2018-01-17 PROCEDURE — 82962 GLUCOSE BLOOD TEST: CPT

## 2018-01-17 PROCEDURE — 81001 URINALYSIS AUTO W/SCOPE: CPT | Performed by: HOSPITALIST

## 2018-01-17 PROCEDURE — 97166 OT EVAL MOD COMPLEX 45 MIN: CPT

## 2018-01-17 PROCEDURE — 85018 HEMOGLOBIN: CPT | Performed by: HOSPITALIST

## 2018-01-17 PROCEDURE — 94799 UNLISTED PULMONARY SVC/PX: CPT

## 2018-01-17 PROCEDURE — 97110 THERAPEUTIC EXERCISES: CPT

## 2018-01-17 PROCEDURE — 85014 HEMATOCRIT: CPT | Performed by: HOSPITALIST

## 2018-01-17 PROCEDURE — 71046 X-RAY EXAM CHEST 2 VIEWS: CPT

## 2018-01-17 RX ADMIN — PANTOPRAZOLE SODIUM 40 MG: 40 TABLET, DELAYED RELEASE ORAL at 06:42

## 2018-01-17 RX ADMIN — APIXABAN 2.5 MG: 2.5 TABLET, FILM COATED ORAL at 09:06

## 2018-01-17 RX ADMIN — PREGABALIN 75 MG: 75 CAPSULE ORAL at 20:55

## 2018-01-17 RX ADMIN — CEFUROXIME AXETIL 500 MG: 250 TABLET ORAL at 09:06

## 2018-01-17 RX ADMIN — BACLOFEN 10 MG: 10 TABLET ORAL at 09:06

## 2018-01-17 RX ADMIN — HYDROCODONE BITARTRATE AND ACETAMINOPHEN 1 TABLET: 5; 325 TABLET ORAL at 06:54

## 2018-01-17 RX ADMIN — BUDESONIDE AND FORMOTEROL FUMARATE DIHYDRATE 2 PUFF: 80; 4.5 AEROSOL RESPIRATORY (INHALATION) at 19:31

## 2018-01-17 RX ADMIN — PREGABALIN 75 MG: 75 CAPSULE ORAL at 09:06

## 2018-01-17 RX ADMIN — HYDROCODONE BITARTRATE AND ACETAMINOPHEN 1 TABLET: 5; 325 TABLET ORAL at 21:42

## 2018-01-17 RX ADMIN — Medication 5 MG: at 20:56

## 2018-01-17 RX ADMIN — METFORMIN HYDROCHLORIDE 1000 MG: 500 TABLET ORAL at 18:00

## 2018-01-17 RX ADMIN — APIXABAN 2.5 MG: 2.5 TABLET, FILM COATED ORAL at 20:56

## 2018-01-17 RX ADMIN — CEFUROXIME AXETIL 500 MG: 250 TABLET ORAL at 20:56

## 2018-01-17 RX ADMIN — BUDESONIDE AND FORMOTEROL FUMARATE DIHYDRATE 2 PUFF: 80; 4.5 AEROSOL RESPIRATORY (INHALATION) at 08:22

## 2018-01-17 RX ADMIN — METOPROLOL SUCCINATE 25 MG: 25 TABLET, EXTENDED RELEASE ORAL at 09:07

## 2018-01-17 RX ADMIN — HYDROCODONE BITARTRATE AND ACETAMINOPHEN 1 TABLET: 5; 325 TABLET ORAL at 00:45

## 2018-01-17 RX ADMIN — ACETAMINOPHEN 650 MG: 325 TABLET, FILM COATED ORAL at 15:33

## 2018-01-17 RX ADMIN — HYDROCODONE BITARTRATE AND ACETAMINOPHEN 1 TABLET: 5; 325 TABLET ORAL at 13:23

## 2018-01-17 RX ADMIN — PAROXETINE HYDROCHLORIDE 20 MG: 20 TABLET, FILM COATED ORAL at 09:06

## 2018-01-17 NOTE — NURSING NOTE
Continued Stay Note  KASSIDY Ford     Patient Name: Juan Pablo Hernandez  MRN: [id number]  Today's Date: 1/17/2018    Admit Date: 1/13/2018          Discharge Plan       01/17/18 1340    Case Management/Social Work Plan    Additional Comments Spoke with Mrs Hernandez concerning inpatient rehab.  She prefers [facility] for this. Spoke with Ara at [facility] concerning referral.  They will accept patient and she will begin precertification process.  Will continue to follow               Discharge Codes     None            Elaine Cisneros RN

## 2018-01-17 NOTE — PLAN OF CARE
Problem: Patient Care Overview (Adult)  Goal: Plan of Care Review  Outcome: Ongoing (interventions implemented as appropriate)   01/17/18 5906   Coping/Psychosocial Response Interventions   Plan Of Care Reviewed With patient   Patient Care Overview   Progress improving   Outcome Evaluation   Outcome Summary/Follow up Plan Patient doing well. Blood sugars stable, started on Metformin this evening. Had a UA and chest xray done this evening. No new orders.      Goal: Adult Individualization and Mutuality  Outcome: Ongoing (interventions implemented as appropriate)    Goal: Discharge Needs Assessment  Outcome: Ongoing (interventions implemented as appropriate)      Problem: Fall Risk (Adult)  Goal: Absence of Falls  Outcome: Ongoing (interventions implemented as appropriate)      Problem: Pressure Ulcer Risk (Jules Scale) (Adult,Obstetrics,Pediatric)  Goal: Skin Integrity  Outcome: Ongoing (interventions implemented as appropriate)      Problem: Pain, Chronic (Adult)  Goal: Acceptable Pain Control/Comfort Level  Outcome: Ongoing (interventions implemented as appropriate)

## 2018-01-17 NOTE — NURSING NOTE
Per Dr. Oakley request, spoke with Franky from [facility]. The patient does not have a prescription for Metformin but does as of 12/4/17 for the Janumet XR 50-100mg Extended release PO 2 pills 2xday and Actos 30 mg PO 1xday.

## 2018-01-17 NOTE — THERAPY TREATMENT NOTE
Acute Care - Physical Therapy Treatment Note   Mayte Sandoval     Patient Name: Juan Pablo Hernandez  : 1950  MRN: [id number]  Today's Date: 2018  Onset of Illness/Injury or Date of Surgery Date: 18  Date of Referral to PT: 01/15/18  Referring Physician: Dr. Newberry    Admit Date: 2018    Visit Dx:    ICD-10-CM ICD-9-CM   1. Closed fracture of distal end of right femur, unspecified fracture morphology, initial encounter S72.401A 821.20     Patient Active Problem List   Diagnosis   • Mental status change   • Altered mental status   • Primary osteoarthritis of right foot   • Sprain of anterior talofibular ligament of right ankle   • History of total right knee replacement   • Closed fracture of right distal femur               Adult Rehabilitation Note       18 0901          Rehab Assessment/Intervention    Discipline physical therapist  -BP      Document Type therapy note (daily note)  -BP      Subjective Information agree to therapy;complains of;pain  -BP      Patient Effort, Rehab Treatment adequate  -BP      Symptoms Noted During/After Treatment none  -BP      Symptoms Noted Comment Patient awake and alert throughout treatment session today.   -BP      Precautions/Limitations Fall precautions; brace on when up ;other (see comments)   TTWB R LE  -BP      Specific Treatment Considerations Patient reports she has a ramp into her home. She states it is w/c accessible once in the home and she does own a w/c. Per Dr. Newberry, knee immobilizer to be on with all mobility. No ROM to R knee.   -BP      Recorded by [BP] Shayy Pack, PT      Pain Assessment    Pain Assessment 0-10  -BP      Pain Score 7  -BP      Pain Type Acute pain;Surgical pain  -BP      Recorded by [BP] Shayy Pack, PT      Cognitive Assessment/Intervention    Follows Commands/Answers Questions able to follow single-step instructions;needs cueing  -BP      Personal Safety WNL/WFL  -BP      Personal Safety Interventions gait  belt;nonskid shoes/slippers when out of bed;fall prevention program maintained  -BP      Recorded by [BP] Shayy Pack, PT      Bed Mobility, Assessment/Treatment    Bed Mobility, Assistive Device bed rails;head of bed elevated  -BP      Bed Mobility, supine to sit minimum assist (75% patient effort);moderate assist (50% patient effort);verbal cues required  -BP      Bed Mobility, Comment Verbal cues for sequencing. Patient requires increased time to complte transfer.   -BP      Recorded by [BP] Shayy Pack, PT      Transfer Assessment/Treatment    Transfers, Sit-Stand [place] moderate assist (50% patient effort);2 person assist required;verbal cues required  -BP      Transfers, Stand-Sit [place] moderate assist (50% patient effort);2 person assist required;verbal cues required  -BP      Transfers, Sit-Stand-Sit, Assist Device rolling walker  -BP      Transfer, Comment Patient unable to maintain TTWB during transfer from sit to stand. With verbal cues and assist patient able to maintain TTWB with static standing. With attempts to perform pivot transfer patient places too much weight through R LE. Nursing present to move bed and place recliner behind patient.    -BP      Recorded by [BP] Shayy Pack, PT      Gait Assessment/Treatment    Gait, Comment unable to assess. Unable to maintain TTWB in order to attempt forward gait training.   -BP      Recorded by [BP] Shayy Pack PT      Positioning and Restraints    Pre-Treatment Position in bed  -BP      Post Treatment Position chair  -BP      In Chair reclined;call light within reach;encouraged to call for assist;with family/caregiver;with nsg  -BP      Recorded by [BP] Shayy Pack, PT        User Key  (r) = Recorded By, (t) = Taken By, (c) = Cosigned By    Initials Name Effective Dates    BP Shayy Pack, PT 12/01/15 -                 IP PT Goals       01/16/18 1128          Bed Mobility PT STG    Bed Mobility PT STG, Date  Established 01/16/18  -BP      Bed Mobility PT STG, Time to Achieve 5 days  -BP      Bed Mobility PT STG, Activity Type supine to sit/sit to supine  -BP      Bed Mobility PT STG, [place] Level moderate assist (50% patient effort)  -BP      Transfer Training PT STG    Transfer Training PT STG, Date Established 01/16/18  -BP      Transfer Training PT STG, Time to Achieve 5 days  -BP      Transfer Training PT STG, Activity Type sit to stand/stand to sit  -BP      Transfer Training PT STG, [place] Level moderate assist (50% patient effort)  -BP      Transfer Training PT STG, Assist Device --   with appropriate assistive device  -BP      Transfer Training 2 PT STG    Transfer Training PT 2 STG, Date Established 01/16/18  -BP      Transfer Training PT 2 STG, Time to Achieve 5 days  -BP      Transfer Training PT 2 STG, Activity Type bed to chair /chair to bed  -BP      Transfer Training PT 2 STG, [place] Level maximum assist (25% patient effort)  -BP      Transfer Training PT 2 STG, Assist Device --   with appropriate assistive device  -BP        User Key  (r) = Recorded By, (t) = Taken By, (c) = Cosigned By    Initials Name Provider Type    BP Shayy Pack, PT Physical Therapist          Physical Therapy Education     Title: PT OT SLP Therapies (Active)     Topic: Physical Therapy (Active)     Point: Mobility training (Done)    Learning Progress Summary    Learner Readiness Method Response Comment Documented by Status   Patient Acceptance E VU  BP 01/17/18 1032 Done    Acceptance E NR  BP 01/16/18 1124 Active               Point: Precautions (Done)    Learning Progress Summary    Learner Readiness Method Response Comment Documented by Status   Patient Acceptance E VU  BP 01/17/18 1032 Done    Acceptance E NR  BP 01/16/18 1124 Active                      User Key     Initials Effective Dates Name Provider Type Discipline    BP 12/01/15 -  Shayy Pack, PT Physical Therapist PT                    PT  Recommendation and Plan  Anticipated Equipment Needs At Discharge:  (will continue to assess)  Anticipated Discharge Disposition: other (see comments) (Will continue to assess)  Planned Therapy Interventions: bed mobility training, home exercise program, patient/family education, strengthening, transfer training  PT Frequency: daily, 2 times/day  Plan of Care Review  Plan Of Care Reviewed With: patient  Progress: improving  Outcome Summary/Follow up Plan: PT: patient more alert and able to actively participate in therapy today. Patient requires mod A x 2 to transfer supine to sit and mod A x 2 for sit to/from stand transfers. Patient unable to maintain TTWB status during sit to/from stand transfer however susana to correct shortly with verbal cues and assist while in static standing. Unable to maintain weight bearing status with attempt to perform pivot transfer to chair. Third person required to move bed and place recliner behind patient. Plan to attempt stand pivot transfer x 1-2 more times however may transition to attempts at sit pivot or slide board.           Outcome Measures       01/17/18 0901 01/16/18 0947       How much help from another person do you currently need...    Turning from your back to your side while in flat bed without using bedrails? 2  -BP 1  -BP     Moving from lying on back to sitting on the side of a flat bed without bedrails? 2  -BP 1  -BP     Moving to and from a bed to a chair (including a wheelchair)? 1  -BP 1  -BP     Standing up from a chair using your arms (e.g., wheelchair, bedside chair)? 2  -BP 1  -BP     Climbing 3-5 steps with a railing? 1  -BP 1  -BP     To walk in hospital room? 1  -BP 1  -BP     AM-PAC 6 Clicks Score 9  -BP 6  -BP     Functional Assessment    Outcome Measure Options AM-PAC 6 Clicks Basic Mobility (PT)  -BP AM-PAC 6 Clicks Basic Mobility (PT)  -BP       User Key  (r) = Recorded By, (t) = Taken By, (c) = Cosigned By    Initials Name Provider Type    BP  Shayy Pack PT Physical Therapist           Time Calculation:         PT Charges       01/17/18 1035          Time Calculation    Start Time 0901  -BP      Stop Time 0921  -BP      Time Calculation (min) 20 min  -BP      PT Received On 01/17/18  -BP      PT - Next Appointment 01/17/18  -BP        User Key  (r) = Recorded By, (t) = Taken By, (c) = Cosigned By    Initials Name Provider Type    BP Shayy Pack, SEVEN Physical Therapist          Therapy Charges for Today     Code Description Service Date Service Provider Modifiers Qty    65440878536 HC PT EVAL MOD COMPLEXITY 2 1/16/2018 Shayy Pack, PT GP 1    26309277894 HC PT THER SUPP EA 15 MIN 1/16/2018 Shayy Pack, PT GP 1    95095834448 HC PT THER PROC EA 15 MIN 1/17/2018 Shayy Pack, PT GP 1          PT G-Codes  Outcome Measure Options: AM-PAC 6 Clicks Basic Mobility (PT)    Shayy Pack PT  1/17/2018

## 2018-01-17 NOTE — THERAPY TREATMENT NOTE
Acute Care - Physical Therapy Treatment Note   [place]     Patient Name: Juan Pablo Hernandez  : 1950  MRN: [id number]  Today's Date: 2018  Onset of Illness/Injury or Date of Surgery Date: 18  Date of Referral to PT: 01/15/18  Referring Physician: Yves    Admit Date: 2018    Visit Dx:    ICD-10-CM ICD-9-CM   1. Closed fracture of distal end of right femur, unspecified fracture morphology, initial encounter S72.401A 821.20     Patient Active Problem List   Diagnosis   • Mental status change   • Altered mental status   • Primary osteoarthritis of right foot   • Sprain of anterior talofibular ligament of right ankle   • History of total right knee replacement   • Closed fracture of right distal femur               Adult Rehabilitation Note       18 1140 18 0901       Rehab Assessment/Intervention    Discipline physical therapist  -BP physical therapist  -BP     Document Type therapy note (daily note)  -BP therapy note (daily note)  -BP     Subjective Information agree to therapy;complains of;pain  -BP agree to therapy;complains of;pain  -BP     Patient Effort, Rehab Treatment adequate  -BP adequate  -BP     Symptoms Noted During/After Treatment increased pain;fatigue  -BP none  -BP     Symptoms Noted Comment  Patient awake and alert throughout treatment session today.   -BP     Precautions/Limitations fall precautions;other (see comments);brace on when up   TTWB R LE  -BP other (see comments);fall precautions   TTWB R LE  -BP     Specific Treatment Considerations Dr. Newberry present prior to initiating treatment. Reinforced TTWB status.   -BP Patient reports she has a ramp into her home. She states it is w/c accessible once in the home and she does own a w/c. Per Dr. Newberry, knee immobilizer to be on with all mobility. No ROM to R knee.   -BP     Recorded by [BP] Shayy Pack, PT [BP] Shayy Pack, PT     Pain Assessment    Pain Assessment 0-10  -BP 0-10  -BP     Pain  Score 6  -BP 7  -BP     Pain Type Acute pain;Surgical pain  -BP Acute pain;Surgical pain  -BP     Pain Location Knee  -BP      Pain Orientation Right  -BP      Pain Intervention(s) Repositioned  -BP      Response to Interventions tolerated  -BP      Recorded by [BP] Shayy Pack, PT [BP] Shayy Pack, PT     Cognitive Assessment/Intervention    Current Cognitive/Communication Assessment functional  -BP      Follows Commands/Answers Questions able to follow single-step instructions  -BP able to follow single-step instructions;needs cueing  -BP     Personal Safety  WNL/WFL  -BP     Personal Safety Interventions gait belt;nonskid shoes/slippers when out of bed  -BP gait belt;nonskid shoes/slippers when out of bed;fall prevention program maintained  -BP     Recorded by [BP] Shayy Pack, PT [BP] Shayy Pack PT     Bed Mobility, Assessment/Treatment    Bed Mobility, Assistive Device  bed rails;head of bed elevated  -BP     Bed Mobility, Scoot/Bridge, [place] minimum assist (75% patient effort);verbal cues required   scooting along EOB in sitting  -BP --  -BP     Bed Mob, Supine to Sit, [place]  minimum assist (75% patient effort);moderate assist (50% patient effort);verbal cues required  -BP     Bed Mob, Sit to Supine, [place] moderate assist (50% patient effort);2 person assist required;verbal cues required;minimum assist (75% patient effort)  -BP      Bed Mobility, Comment Verbal cues required for sequencing required. Extended time to required. Patient able to scoot alone EOB while in sitting with min A. Able to maintain TTWB status while scooting.   -BP Verbal cues for sequencing. Patient requires increased time to complte transfer.   -BP     Recorded by [BP] Shayy Pack, PT [BP] Shayy Pack, PT     Transfer Assessment/Treatment    Transfers, Sit-Stand [place] moderate assist (50% patient effort);2 person assist required;verbal cues required  -BP moderate assist (50%  patient effort);2 person assist required;verbal cues required  -BP     Transfers, Stand-Sit [place] minimum assist (75% patient effort);2 person assist required;verbal cues required  -BP moderate assist (50% patient effort);2 person assist required;verbal cues required  -BP     Transfers, Sit-Stand-Sit, Assist Device rolling walker  -BP rolling walker  -BP     Transfer, Comment Verbal cues required for hand placement. Patient requires assist and verbal cues to maintain TTWB status. Unable to perform pivot transfer. Second person required to move chair and place bed behind patient. Plan to try slide board transfer at next session.   -BP Patient unable to maintain TTWB during transfer from sit to stand. With verbal cues and assist patient able to maintain TTWB with static standing. With attempts to perform pivot transfer patient places too much weight through R LE. Nursing present to move bed and place recliner behind patient.    -BP     Recorded by [BP] Shayy Pack, PT [BP] Shayy Pack, PT     Gait Assessment/Treatment    Gait, Comment  unable to assess. Unable to maintain TTWB in order to attempt forward gait training.   -BP     Recorded by  [BP] Shayy Pack PT     Positioning and Restraints    Pre-Treatment Position sitting in chair/recliner  -BP in bed  -BP     Post Treatment Position bed  -BP chair  -BP     In Bed supine;call light within reach;encouraged to call for assist  -BP      In Chair  reclined;call light within reach;encouraged to call for assist;with family/caregiver;with nsg  -BP     Recorded by [BP] Shayy Pack, PT [BP] Shayy Pack PT       User Key  (r) = Recorded By, (t) = Taken By, (c) = Cosigned By    Initials Name Effective Dates    BP Shayy Pack, PT 12/01/15 -                 IP PT Goals       01/16/18 1128          Bed Mobility PT STG    Bed Mobility PT STG, Date Established 01/16/18  -BP      Bed Mobility PT STG, Time to Achieve 5 days  -BP      Bed  Mobility PT STG, Activity Type supine to sit/sit to supine  -BP      Bed Mobility PT STG, [place] Level moderate assist (50% patient effort)  -BP      Transfer Training PT STG    Transfer Training PT STG, Date Established 01/16/18  -BP      Transfer Training PT STG, Time to Achieve 5 days  -BP      Transfer Training PT STG, Activity Type sit to stand/stand to sit  -BP      Transfer Training PT STG, [place] Level moderate assist (50% patient effort)  -BP      Transfer Training PT STG, Assist Device --   with appropriate assistive device  -BP      Transfer Training 2 PT STG    Transfer Training PT 2 STG, Date Established 01/16/18  -BP      Transfer Training PT 2 STG, Time to Achieve 5 days  -BP      Transfer Training PT 2 STG, Activity Type bed to chair /chair to bed  -BP      Transfer Training PT 2 STG, [place] Level maximum assist (25% patient effort)  -BP      Transfer Training PT 2 STG, Assist Device --   with appropriate assistive device  -BP        User Key  (r) = Recorded By, (t) = Taken By, (c) = Cosigned By    Initials Name Provider Type    BP Shayy Pack, SEVEN Physical Therapist          Physical Therapy Education     Title: PT OT SLP Therapies (Active)     Topic: Physical Therapy (Active)     Point: Mobility training (Done)    Learning Progress Summary    Learner Readiness Method Response Comment Documented by Status   Patient Acceptance E VU  BP 01/17/18 1449 Done    Acceptance E VU  BP 01/17/18 1032 Done    Acceptance E NR  BP 01/16/18 1124 Active               Point: Precautions (Done)    Learning Progress Summary    Learner Readiness Method Response Comment Documented by Status   Patient Acceptance E VU  BP 01/17/18 1449 Done    Acceptance E VU  BP 01/17/18 1032 Done    Acceptance E NR  BP 01/16/18 1124 Active                      User Key     Initials Effective Dates Name Provider Type Discipline    BP 12/01/15 -  Shayy Pack PT Physical Therapist PT                    PT  Recommendation and Plan  Anticipated Equipment Needs At Discharge:  (will continue to assess)  Anticipated Discharge Disposition: other (see comments) (Will continue to assess)  Planned Therapy Interventions: bed mobility training, home exercise program, patient/family education, strengthening, transfer training  PT Frequency: daily, 2 times/day  Plan of Care Review  Plan Of Care Reviewed With: patient  Progress: improving  Outcome Summary/Follow up Plan: PT: Patient performs sit to supine transfer with mod A x 2 and sit to/from stand transfer with mod A x 2. Patient requires cues and assist to maintain TTWB while in standing. Unable to maintain weight bearing status with attempts to transfer. Chair moved and bed placed behind patient. Will initiate slide board transfer training at next session. Patient able to scoot along EOB with min A while maintaining TTWB status          Outcome Measures       01/17/18 1140 01/17/18 0920 01/17/18 0901    How much help from another person do you currently need...    Turning from your back to your side while in flat bed without using bedrails? 2  -BP  2  -BP    Moving from lying on back to sitting on the side of a flat bed without bedrails? 2  -BP  2  -BP    Moving to and from a bed to a chair (including a wheelchair)? 1  -BP  1  -BP    Standing up from a chair using your arms (e.g., wheelchair, bedside chair)? 2  -BP  2  -BP    Climbing 3-5 steps with a railing? 1  -BP  1  -BP    To walk in hospital room? 1  -BP  1  -BP    AM-PAC 6 Clicks Score 9  -BP  9  -BP    How much help from another is currently needed...    Putting on and taking off regular lower body clothing?  2  -SD     Bathing (including washing, rinsing, and drying)  2  -SD     Toileting (which includes using toilet bed pan or urinal)  2  -SD     Putting on and taking off regular upper body clothing  4  -SD     Taking care of personal grooming (such as brushing teeth)  4  -SD     Eating meals  4  -SD     Score  18   -SD     Functional Assessment    Outcome Measure Options AM-PAC 6 Clicks Basic Mobility (PT)  -BP AM-PAC 6 Clicks Daily Activity (OT)  -SD AM-PAC 6 Clicks Basic Mobility (PT)  -BP      01/16/18 0947          How much help from another person do you currently need...    Turning from your back to your side while in flat bed without using bedrails? 1  -BP      Moving from lying on back to sitting on the side of a flat bed without bedrails? 1  -BP      Moving to and from a bed to a chair (including a wheelchair)? 1  -BP      Standing up from a chair using your arms (e.g., wheelchair, bedside chair)? 1  -BP      Climbing 3-5 steps with a railing? 1  -BP      To walk in hospital room? 1  -BP      AM-PAC 6 Clicks Score 6  -BP      Functional Assessment    Outcome Measure Options AM-PAC 6 Clicks Basic Mobility (PT)  -BP        User Key  (r) = Recorded By, (t) = Taken By, (c) = Cosigned By    Initials Name Provider Type    TUCKER Mcdaniels, OTR Occupational Therapist    BP Shayy Pack PT Physical Therapist           Time Calculation:         PT Charges       01/17/18 1451 01/17/18 1035       Time Calculation    Start Time 1140  -BP 0901  -BP     Stop Time 1151  -BP 0921  -BP     Time Calculation (min) 11 min  -BP 20 min  -BP     PT Received On 01/17/18  -BP 01/17/18  -BP     PT - Next Appointment 01/18/18  -BP 01/17/18  -BP       User Key  (r) = Recorded By, (t) = Taken By, (c) = Cosigned By    Initials Name Provider Type    BP Shayy Pack, SEVEN Physical Therapist          Therapy Charges for Today     Code Description Service Date Service Provider Modifiers Qty    10916006667 HC PT EVAL MOD COMPLEXITY 2 1/16/2018 Shayy Pack, PT GP 1    84643899353 HC PT THER SUPP EA 15 MIN 1/16/2018 Shayy Pack, PT GP 1    97506860663 HC PT THER PROC EA 15 MIN 1/17/2018 Shayy Pack, PT GP 1    81882028726 HC PT THER PROC EA 15 MIN 1/17/2018 Shayy Pack, PT GP 1    68636721735 HC PT THER SUPP EA 15 MIN  1/17/2018 Shayy Pack, PT GP 1          PT G-Codes  Outcome Measure Options: AM-PAC 6 Clicks Basic Mobility (PT)    Shayy Pack, PT  1/17/2018

## 2018-01-17 NOTE — PLAN OF CARE
Problem: Patient Care Overview (Adult)  Goal: Plan of Care Review   01/17/18 1449   Coping/Psychosocial Response Interventions   Plan Of Care Reviewed With patient   Patient Care Overview   Progress improving   Outcome Evaluation   Outcome Summary/Follow up Plan PT: Patient performs sit to supine transfer with mod A x 2 and sit to/from stand transfer with mod A x 2. Patient requires cues and assist to maintain TTWB while in standing. Unable to maintain weight bearing status with attempts to transfer. Chair moved and bed placed behind patient. Will initiate slide board transfer training at next session. Patient able to scoot along EOB with min A while maintaining TTWB status

## 2018-01-17 NOTE — PLAN OF CARE
Problem: Patient Care Overview (Adult)  Goal: Plan of Care Review  Outcome: Ongoing (interventions implemented as appropriate)   01/17/18 0149   Coping/Psychosocial Response Interventions   Plan Of Care Reviewed With patient   Outcome Evaluation   Outcome Summary/Follow up Plan continue inhaler

## 2018-01-17 NOTE — PROGRESS NOTES
POD# 2 s/p ORIF right distal femur fracture    Subjective: Patient states pain well-controlled this point time.  Denies chills or sweats overnight, denies residual numbness or tingling right lower extremity. Making slow progress with PT    Objective:  Vitals:    01/16/18 2254 01/16/18 2321 01/17/18 0426 01/17/18 0633   BP:  110/73 139/74 147/78   BP Location:       Patient Position:       Pulse: 90 87 78 68   Resp: 16 17 18 18   Temp:  99 °F (37.2 °C) 98.9 °F (37.2 °C) 98.9 °F (37.2 °C)   TempSrc:  Oral Oral Oral   SpO2: 96% 93% 94% 96%   Weight:       Height:             Results from last 7 days  Lab Units 01/17/18  0353 01/16/18  0431 01/15/18  0401 01/14/18  0354   WBC 10*3/mm3  --  8.33 7.97 9.99   HEMOGLOBIN g/dL 9.4* 9.1* 10.0* 11.0*   HEMATOCRIT % 29.6* 29.1* 31.2* 34.3*   PLATELETS 10*3/mm3  --  240 248 293         Results from last 7 days  Lab Units 01/16/18  0431 01/15/18  0401 01/14/18  0354   SODIUM mmol/L 140 142 139   POTASSIUM mmol/L 3.8 3.4* 3.8   CHLORIDE mmol/L 106 106 97*   CO2 mmol/L 24.4 24.7 26.2   BUN mg/dL 13 14 16   CREATININE mg/dL 0.61 0.57 0.71   GLUCOSE mg/dL 120* 117* 129*   CALCIUM mg/dL 7.6* 7.8* 8.6*       Exam:    Right leg-dressing clean dry and intact   Flex and extend toes and ankle   Brisk cap refill all digits   Compartments soft and easily compressible   Positive sensation light touch all distibution's right foot   No calf pain, negative Homans sign    Impression: s/p ORIF right distal femur fracture     Plan:  1. PT/OT- ambulate, toe-touch weightbearing right lower extremity, knee immobilizer at all times  2. Pain control- Percocet  3. DVT prophylaxis- Lovenox  4. Wound care- dressing change tomorrow  5. Disposition- home health versus rehabilitation pending progress with therapy and pain control

## 2018-01-17 NOTE — THERAPY EVALUATION
Acute Care - Occupational Therapy Initial Evaluation  KASSIDY Ford     Patient Name: Juan Pablo Hernandez  : 1950  MRN: [id number]  Today's Date: 2018  Onset of Illness/Injury or Date of Surgery Date: 18  Date of Referral to OT: 18  Referring Physician: Yves    Admit Date: 2018       ICD-10-CM ICD-9-CM   1. Closed fracture of distal end of right femur, unspecified fracture morphology, initial encounter S72.401A 821.20     Patient Active Problem List   Diagnosis   • Mental status change   • Altered mental status   • Primary osteoarthritis of right foot   • Sprain of anterior talofibular ligament of right ankle   • History of total right knee replacement   • Closed fracture of right distal femur     Past Medical History:   Diagnosis Date   • Anxiety    • Asthma    • CHF (congestive heart failure)    • Chronic pain disorder    • Chronic UTI    • Depression    • Diabetes    • GERD (gastroesophageal reflux disease)    • H/O CHF    • HTN (hypertension)    • Hyperlipidemia    • Incontinence    • Injury of back     spinal stenosis, chronic back pain   • Low back pain    • Lumbosacral disc disease    • MRSA infection (methicillin-resistant Staphylococcus aureus)     to left foot states approx 12-13 years ago    • Osteoarthritis    • Sleep apnea     uses cpap   • Spinal stenosis    • Stroke     pt states PMD has said she may be having mini strokes   • Vertigo      Past Surgical History:   Procedure Laterality Date   • CHOLECYSTECTOMY OPEN     • COLONOSCOPY     • ENDOSCOPY     • HYSTERECTOMY     • TOTAL KNEE ARTHROPLASTY Bilateral           OT ASSESSMENT FLOWSHEET (last 72 hours)      OT Evaluation       18 1223 18 0920          Document Type  evaluation  -SD    Subjective Information  agree to therapy;complains of;pain  -SD    Patient Effort, Rehab Treatment  adequate  -SD    Symptoms Noted During/After Treatment      Symptoms Noted Comment         Patient Profile Review  yes  -SD     Onset of Illness/Injury or Date of Surgery Date  01/13/18  -SD    Referring Physician  Yves  -SD    Pertinent History Of Current Problem  Patient presents s/p fall at home. Found to have a distal femur fracture. Now s/p ORIF. Patient to be TTWB with knee immobiler.  Pt reports independence with adl tasks and mobility with use of rollator prior to her injury.    -SD    Precautions/Limitations  non-weight bearing status;brace on when up;fall precautions   TTWB of RLE  -SD    Prior Level of Function  independent:;ADL's;all household mobility  -SD    Equipment Currently Used at Home  shower chair;ramp;wheelchair;walker, rolling;walker, standard   BSC,  AE, rollator  -SD    Plans/Goals Discussed With  patient;agreed upon  -SD    Risks Reviewed  patient:;LOB;increased discomfort   TTWB status reviewed  -SD    Benefits Reviewed  patient:;improve function  -SD    Barriers to Rehab  --   TTWB status impacts transfers/mobility  -SD       Lives With  spouse  -SD    Living Arrangements  house  -SD    Home Accessibility  bed and bath on same level;ramps present at home;stairs within home   ramp in garage  -SD    Number of Stairs to Enter Home  1  -SD    Living Environment Comment  Pt states she has good support from family/friends as needed.  -SD       Date of Referral to OT  01/17/18  -SD    OT Diagnosis  right distal femur ORIF  -SD    Prognosis  good  -SD    Functional Level At Time Of Evaluation  Pt requires mod/max assist for lower body adl activity currenlty due to TTWB status and use of knee immobilizer.  Pt required min assist for bed mobility and mod assist for sit to stand transfer.  Pt has difficulty maintaining her TTWB status in a static standing position using a walker for support due to impaired balance and decreased strength.   -SD    Impairments Found (describe specific impairments)  gait, locomotion, and balance;ROM   pain  -SD    Patient/Family Goals Statement  none stated  -SD    Criteria for Skilled  Therapeutic Interventions Met  yes;treatment indicated  -SD    Rehab Potential  good, to achieve stated therapy goals  -SD    Therapy Frequency  3-5 times/wk  -SD    Predicted Duration of Therapy Intervention (days/wks)  anticipate discharge to SNF in 2-3 days  -SD    Anticipated Equipment Needs At Discharge  wheelchair   pt may need to use a sliding board for transfers  -SD    Anticipated Discharge Disposition skilled nursing facility  -SD skilled nursing facility  -SD       Pain Assessment  0-10  -SD    Pain Score  7  -SD    Post Pain Score  7  -SD    Pain Type  Acute pain;Surgical pain  -SD    Pain Location  Knee  -SD    Pain Orientation  Right  -SD    Pain Intervention(s)  Repositioned  -SD       Current Cognitive/Communication Assessment  functional  -SD    Orientation Status  oriented to;person;place;situation  -SD    Follows Commands/Answers Questions  able to follow single-step instructions  -SD    Personal Safety      Personal Safety Interventions  gait belt;fall prevention program maintained;nonskid shoes/slippers when out of bed;supervised activity  -SD       General ROM Detail  RUE wfl.  LUE wfl with exception of left elbow (pt with prior elbow injury with resulting limited extension)  -SD       General MMT Assessment Detail  BUE 4/5, with exception of left elbow 3+/5  -SD          Bed Mobility, Assistive Device  bed rails;head of bed elevated  -SD    Bed Mobility, Scoot/Bridge, [place]  minimum assist (75% patient effort)  -SD    Bed Mobility, Comment         Transfers, Sit-Stand [place]  moderate assist (50% patient effort);2 person assist required  -SD    Transfers, Stand-Sit [place]  moderate assist (50% patient effort);2 person assist required  -SD    Transfers, Sit-Stand-Sit, Assist Device  rolling walker  -SD    Transfer, Comment  Pt had difficulty maintaining her TTWB status.  -SD       Functional Mobility- Comment  Pt unable to hop or perform pivot transfer.    -SD       UB  Bathing Assess/Train, Equality Level  supervision required;set up required  -SD       LB Bathing Assess/Train, Equality Level  moderate assist (50% patient effort)  -SD    LB Bathing Assess/Train, Impairments  pain;ROM decreased;strength decreased;impaired balance   RLE  -SD       UB Dressing Assess/Train, Equality  supervision required;set up required  -SD       LB Dressing Assess/Train, Equality  maximum assist (25% patient effort)  -SD    LB Dressing Assess/Train, Impairments  pain;ROM decreased;strength decreased;impaired balance   RLE  -SD       Planned Therapy Interventions  ADL retraining;adaptive equipment training;transfer training  -SD       Pre-Treatment Position  in bed  -SD    Post Treatment Position  chair  -SD    In Chair  reclined;call light within reach;encouraged to call for assist;notified nsg;R knee immobilizer  -SD      User Key  (r) = Recorded By, (t) = Taken By, (c) = Cosigned By    Initials Name Effective Dates    SD Leobardo Mcdaniels OTR 06/22/16 -            Occupational Therapy Education     Title: PT OT SLP Therapies (Active)     Topic: Occupational Therapy (Done)     Point: ADL training (Done)    Description: Instruct learner(s) on proper safety adaptation and remediation techniques during self care or transfers.   Instruct in proper use of assistive devices.    Learning Progress Summary    Learner Readiness Method Response Comment Documented by Status   Patient Acceptance E VU Education regarding OT services, TTWB status and transfer training. SD 01/17/18 1209 Done                      User Key     Initials Effective Dates Name Provider Type Discipline    SD 06/22/16 -  Leobardo Mcdaniels, OTR Occupational Therapist OT                  OT Recommendation and Plan  Anticipated Equipment Needs At Discharge: wheelchair (pt may need to use a sliding board for transfers)  Anticipated Discharge Disposition: skilled nursing facility  Planned Therapy Interventions: ADL retraining,  adaptive equipment training, transfer training  Therapy Frequency: 3-5 times/wk  Plan of Care Review  Plan Of Care Reviewed With: patient  Outcome Summary/Follow up Plan: OT evaluation completed.  Pt requires mod/max assist for lower body adl activity currenlty due to TTWB status and use of knee immobilizer.  Pt required min assist for bed mobility and mod assist for sit to stand transfer.  Pt has difficulty maintaining her TTWB status in a static standing position using a walker for support due to impaired balance and decreased strength.           OT Goals       01/17/18 1211          Static Standing Balance OT STG    Static Standing Balance OT STG, Date Established 01/17/18  -SD      Static Standing Balance OT STG, Time to Achieve by discharge  -SD      Static Standing Balance OT STG, [place] Level minimum assist (75% patient effort)  -SD      Static Standing Balance OT STG, Assist Device assistive device  -SD      Static Standing Balance OT STG, Additional Goal Pt to maintain TTWB status in static standing position with min assist to allow for caregiver assistance with adl activity  -SD      Patient Education OT STG    Patient Education OT STG, Date Established 01/17/18  -SD      Patient Education OT STG, Time to Achieve by discharge  -SD      Patient Education OT STG, Education Type adaptive equipment mgmt  -SD      Patient Education OT STG, Education Understanding verbalizes understanding  -SD      Patient Education OT STG, Additional Goal Pt to verbalize compensatory strategies and use of AE for lower body adl's (pt has AE from prior surgeries)  -SD      Patient Education OT LTG    Patient Education OT LTG, Date Established 01/17/18  -SD      Patient Education OT LTG, Time to Achieve by discharge  -SD      Patient Education OT LTG, Education Type precautions per surgeon  -SD      Patient Education OT LTG, Education Understanding verbalizes understanding;demonstrates adequately  -SD      Patient Education  OT LTG, Additional Goal pt to verbalize/demonstrate compliance with TTWB status of RLE during standing/transfers  -SD        User Key  (r) = Recorded By, (t) = Taken By, (c) = Cosigned By    Initials Name Provider Type    ASHUTOSH Hanson Occupational Therapist                Outcome Measures       01/17/18 0920      How much help from another is currently needed...    Putting on and taking off regular lower body clothing? 2  -SD      Bathing (including washing, rinsing, and drying) 2  -SD      Toileting (which includes using toilet bed pan or urinal) 2  -SD      Putting on and taking off regular upper body clothing 4  -SD      Taking care of personal grooming (such as brushing teeth) 4  -SD      Eating meals 4  -SD      Score 18  -SD      Functional Assessment    Outcome Measure Options AM-PAC 6 Clicks Daily Activity (OT)  -SD        User Key  (r) = Recorded By, (t) = Taken By, (c) = Cosigned By    Initials Name Provider Type    TUCKER Mcdaniels OTR Occupational Therapist    BP Shayy Pack, PT Physical Therapist          Time Calculation:   OT Start Time: 0856    Therapy Charges for Today     Code Description Service Date Service Provider Modifiers Qty    09185426300 HC OT EVAL MOD COMPLEXITY 2 1/17/2018 Leobardo Mcdaniels OTR GO 1               ASHUTOSH Reyes  1/17/2018

## 2018-01-17 NOTE — PROGRESS NOTES
"Hospitalist Team      Patient Care Team:  Linden Zhang MD as PCP - General  Linden Zhang MD as PCP - Family Medicine        Chief Complaint:  F/U right distal femur fracture s/p fall    Subjective    Interval History and ROS:     Patient notes she is feeling ok today.  She notes some mucus in her throat this AM and feels a little \"wheezy\" and is asking for her albuterol treatment.  She denies any SOA or CP or heart palpitations.  She denies any N/V and is eating well. She had a fever overnight that has not recurred.  She is completely awake and no longer drowsy this AM.       Objective    Vital Signs  Temp:  [98 °F (36.7 °C)-102.4 °F (39.1 °C)] 98.9 °F (37.2 °C)  Heart Rate:  [68-91] 68  Resp:  [16-18] 18  BP: (110-150)/(71-91) 147/78  Oxygen Therapy  SpO2: 96 %  Pulse Oximetry Type: Continuous  O2 Device: room air  EtCO2 (mm Hg) (Respiratory Monitoring): 38    Flowsheet Rows         First Filed Value    Admission Height  160.2 cm (63.07\") Documented at 01/13/2018 1254    Admission Weight  76.7 kg (169 lb) Documented at 01/13/2018 1254            Physical Exam:  Constitutional: Patient appears well-developed and well-nourished and in no acute distress.   HEENT:   Head: Normocephalic and atraumatic.   Eyes:  EOM are intact. Sclera are anicteric and non-injected.  Mouth and Throat: Patient has moist mucous membranes.     Neck: Neck supple. No lymphadenopathy present.  Cardiovascular: Regular rate, regular rhythm, S1 normal and S2 normal.  Exam reveals no gallop and no friction rub.  No murmur heard.  Pulmonary/Chest: Lungs are clear to auscultation bilaterally. No respiratory distress. No wheezes. No rhonchi. No rales.   Abdominal: Soft. Bowel sounds are normal. There is no tenderness.   Extremities: No edema noted. Pulses are palpable in all 4 extremities. Dressing to right LE is intact.  Neurological: Patient is alert and oriented to person, place, and time.  Skin: Skin is warm. Nails show no clubbing. No " cyanosis or erythema.    Results Review:     I reviewed the patient's new clinical results.    Lab Results (last 24 hours)     Procedure Component Value Units Date/Time    POC Glucose Once [[id number]]  (Abnormal) Collected:  01/16/18 1114    Specimen:  Blood Updated:  01/16/18 1123     Glucose 194 (H) mg/dL     Narrative:       Meter: [id number] : [id number] Bhupendra Oconnell NURSING ASSISTANT    POC Glucose Once [[id number]]  (Abnormal) Collected:  01/16/18 1637    Specimen:  Blood Updated:  01/16/18 1647     Glucose 168 (H) mg/dL     Narrative:       Meter: [id number] : [id number] Bhupendra Oconnell NURSING ASSISTANT    POC Glucose Once [[id number]]  (Abnormal) Collected:  01/16/18 2043    Specimen:  Blood Updated:  01/16/18 2051     Glucose 184 (H) mg/dL     Narrative:       Meter: [id number] : [id number] Devon CHERRY    Hemoglobin & Hematocrit, Blood [[id number]]  (Abnormal) Collected:  01/17/18 0353    Specimen:  Blood Updated:  01/17/18 0414     Hemoglobin 9.4 (L) g/dL      Hematocrit 29.6 (L) %     POC Glucose Once [[id number]]  (Normal) Collected:  01/17/18 0714    Specimen:  Blood Updated:  01/17/18 0739     Glucose 101 mg/dL     Narrative:       Meter: [id number] : [id number] Clarence Rojas NURSING ASSISTANT          Imaging Results (last 24 hours)     Procedure Component Value Units Date/Time    XR Knee 1 or 2 View Right [[id number]] Collected:  01/16/18 0943     Updated:  01/16/18 0946    Narrative:       POSTOP RIGHT KNEE, 01/15/2018:     HISTORY:   ORIF right distal femur fracture.     TECHNIQUE:  AP and crosstable lateral radiographs of the right knee were obtained  portably following surgery.     FINDINGS:  Lateral plate and screw fixation hardware appears well positioned  traversing well aligned distal femur fracture above the patient's  arthroplasty.       Impression:       Satisfactory postoperative appearance following right knee surgery.     This report was finalized on 1/16/2018  9:44 AM by Dr. Lang Corona MD.       XR Femur 2 View Right [[id number]] Collected:  01/16/18 0944     Updated:  01/16/18 0947    Narrative:       FLUOROSCOPY DURING RIGHT KNEE SURGERY, 01/15/2018:     HISTORY:  Fluoroscopy during ORIF right distal femur fracture.     REPORT:  C-arm fluoroscopy was provided by radiology personnel in the OR during  right knee surgery.. Fluoroscopy time was recorded as 57 seconds. 8 spot  film images were recorded for documentation purposes. Please see  operative note for details.     This report was finalized on 1/16/2018 9:45 AM by Dr. Lang Corona MD.           ECG/EMG Results (most recent)     Procedure Component Value Units Date/Time    ECG 12 Lead [[id number]] Collected:  01/13/18 1512     Updated:  01/13/18 2232    Narrative:       RR Interval= 606 ms  SD Interval= 128 ms  QRSD Interval= 72 ms  QT Interval= 344 ms  QTc Interval= 442 ms  Heart Rate= 99 ms  P Axis= 27 deg  QRS Axis= 41 deg  T Wave Axis= 184 deg  I: 40 Axis= 36 deg  T: 40 Axis= 54 deg  ST Axis= 209 deg  SINUS RHYTHM  BORDERLINE REPOLARIZATION ABNORMALITY  NO SIGNIFICANT CHANGE FROM PREVIOUS ECG  Electronically Signed by:  Tara Avitia ([facility] 13-Jan-2018 22:32:21  Date and Time of Study: 2018-01-13 15:12:47          Medication Review:   I have reviewed the patient's current medication list    Current Facility-Administered Medications:   •  acetaminophen (TYLENOL) tablet 650 mg, 650 mg, Oral, Q6H PRN, Nahun Zacarias, DO, 650 mg at 01/16/18 2030  •  albuterol (PROVENTIL) nebulizer solution 0.083% 2.5 mg/3mL, 2.5 mg, Nebulization, Q4H PRN, Nahun Zacarias, DO  •  apixaban (ELIQUIS) tablet 2.5 mg, 2.5 mg, Oral, Q12H, Moy Newberry MD, 2.5 mg at 01/17/18 0906  •  baclofen (LIORESAL) tablet 10 mg, 10 mg, Oral, Daily, Nahun Zacarias, DO, 10 mg at 01/17/18 0906  •  bisacodyl (DULCOLAX) EC tablet 5 mg, 5 mg, Oral, Daily PRN, Nahun Zacarias,   •  bisacodyl (DULCOLAX) suppository 10 mg, 10 mg, Rectal, Daily PRN, Nahun  DRE Zacarias, DO  •  budesonide-formoterol (SYMBICORT) 80-4.5 MCG/ACT inhaler 2 puff, 2 puff, Inhalation, BID - RT, Nahun Zacarias DO, 2 puff at 01/17/18 0822  •  cefuroxime (CEFTIN) tablet 500 mg, 500 mg, Oral, Q12H, Katherine Marinelli MD, 500 mg at 01/17/18 0906  •  dextrose (D50W) solution 25 g, 25 g, Intravenous, Q15 Min PRN, Nahun Zacarias, DO  •  dextrose (GLUTOSE) oral gel 15 g, 15 g, Oral, Q15 Min PRN, Nahun Zacarias, DO  •  glucagon (GLUCAGEN) injection 1 mg, 1 mg, Subcutaneous, Q15 Min PRN, Nahun Zacarias DO  •  HYDROcodone-acetaminophen (NORCO) 5-325 MG per tablet 1 tablet, 1 tablet, Oral, Q4H PRN, Katherine Marinelli MD, 1 tablet at 01/17/18 0654  •  insulin aspart (novoLOG) injection 0-7 Units, 0-7 Units, Subcutaneous, 4x Daily AC & at Bedtime, Nahun Zacarias DO, 2 Units at 01/16/18 2109  •  lactated ringers infusion 1,000 mL, 1,000 mL, Intravenous, Continuous PRN, Thuy Taylor CRNA, Last Rate: 25 mL/hr at 01/15/18 1456, 1,000 mL at 01/15/18 1456  •  melatonin tablet 5 mg, 5 mg, Oral, Nightly, Nahun Zacarias DO, 5 mg at 01/16/18 2032  •  meperidine (DEMEROL) injection 25 mg, 25 mg, Intravenous, BID PRN, Nahun Zacarias DO, 25 mg at 01/15/18 2157  •  metoprolol succinate XL (TOPROL-XL) 24 hr tablet 25 mg, 25 mg, Oral, Daily, Nahun Zacarias, DO, 25 mg at 01/17/18 0907  •  ondansetron (ZOFRAN) injection 4 mg, 4 mg, Intravenous, Q6H PRN, Nahun Zacarias, DO  •  pantoprazole (PROTONIX) EC tablet 40 mg, 40 mg, Oral, QAM, Nahun Zacarias DO, 40 mg at 01/17/18 0642  •  PARoxetine (PAXIL) tablet 20 mg, 20 mg, Oral, Daily, Nahun Zacarias DO, 20 mg at 01/17/18 0906  •  pregabalin (LYRICA) capsule 75 mg, 75 mg, Oral, Q12H, Katherine Marinelli MD, 75 mg at 01/17/18 0906  •  sodium chloride 0.9 % flush 1-10 mL, 1-10 mL, Intravenous, PRN, Nahun Zacarias DO  •  Insert peripheral IV, , , Once **AND** sodium chloride 0.9 % flush 10 mL, 10 mL, Intravenous, PRN, Manish Dwyer MD  •  sodium chloride 0.9 %  infusion 40 mL, 40 mL, Intravenous, PRN, Nahun Zacarias, DO      Assessment/Plan     1. Acute right distal femur fracture s/p fall: Patient slipped and fell prior to admission. Patient underwent ORIF. Dr. Newberry managing. Patient is doing much better today on po norco and is awake and alert. She was able to participate with PT/OT today. TTWB to RLE. Pain controlled.      2. Fever: Likely secondary to atelectasis as patient was very drowsy all yesterday secondary to medications. Encourage IS.  Is being treated for UTI.  Would not work up unless fever recurs.    3. E-coli UTI: On ceftin po. E-coli is resistant to FQ.     4. Normocytic Anemia: Likely multifactorial related to injury, IVFs, surgery and blood draws. Now stable and starting to rise. Monitor intermittently.      5. Asthma: On home Symbicort here, notes a little congestion this AM and has albuterol nebs PRN. No wheezing on exam.  No acute issues.       6. DM-2:  Patient has been off home po meds here.  Bedsides now elevated during the day.  Will resume home regimen once we confirm what patient was taking. Nursing is contacting patient's pharmacy and med rec does not look correct. Continue to monitor Accu checks and SSI.       7. Hypertension: BP mostly WNL. Patient is on home Toprol XL. Continue to monitor.      8. Dyslipidemia: Patient can resume fenofibrate at discharge.      9. MARGARET: Noncompliant with CPAP at home.      10. Mild dementia: No acute issues here pre-op. On no chronic medications for this. A&Ox3 here.      11. Chronic Pain: NSAIDs held, home Lyrica resumed (patient only on 75mg BID not 150mg BID at home). On Norco here post-op.      12. DVT prophy: On eliquis.    Plan for disposition: Likely STR,  has placed referral to [facility].    Katherine Marinelli MD  01/17/18  9:21 AM

## 2018-01-17 NOTE — PLAN OF CARE
Problem: Patient Care Overview (Adult)  Goal: Plan of Care Review   01/17/18 1033   Coping/Psychosocial Response Interventions   Plan Of Care Reviewed With patient   Patient Care Overview   Progress improving   Outcome Evaluation   Outcome Summary/Follow up Plan PT: patient more alert and able to actively participate in therapy today. Patient requires mod A x 2 to transfer supine to sit and mod A x 2 for sit to/from stand transfers. Patient unable to maintain TTWB status during sit to/from stand transfer however susana to correct shortly with verbal cues and assist while in static standing. Unable to maintain weight bearing status with attempt to perform pivot transfer to chair. Third person required to move bed and place recliner behind patient. Plan to attempt stand pivot transfer x 1-2 more times however may transition to attempts at sit pivot or slide board.

## 2018-01-17 NOTE — PLAN OF CARE
Problem: Patient Care Overview (Adult)  Goal: Plan of Care Review  Outcome: Ongoing (interventions implemented as appropriate)   01/17/18 1211   Coping/Psychosocial Response Interventions   Plan Of Care Reviewed With patient   Outcome Evaluation   Outcome Summary/Follow up Plan OT evaluation completed. Pt requires mod/max assist for lower body adl activity currenlty due to TTWB status and use of knee immobilizer. Pt required min assist for bed mobility and mod assist for sit to stand transfer. Pt has difficulty maintaining her TTWB status in a static standing position using a walker for support due to impaired balance and decreased strength.  Rec pt continue with therapy in a SNF.         Problem: Inpatient Occupational Therapy  Goal: Static Standing Balance Goal OT- STG  Outcome: Ongoing (interventions implemented as appropriate)   01/17/18 1211   Static Standing Balance OT STG   Static Standing Balance OT STG, Date Established 01/17/18   Static Standing Balance OT STG, Time to Achieve by discharge   Static Standing Balance OT STG, [place] Level minimum assist (75% patient effort)   Static Standing Balance OT STG, Assist Device assistive device   Static Standing Balance OT STG, Additional Goal Pt to maintain TTWB status in static standing position with min assist to allow for caregiver assistance with adl activity     Goal: Patient Education Goal STG- OT  Outcome: Ongoing (interventions implemented as appropriate)   01/17/18 1211   Patient Education OT STG   Patient Education OT STG, Date Established 01/17/18   Patient Education OT STG, Time to Achieve by discharge   Patient Education OT STG, Education Type adaptive equipment mgmt   Patient Education OT STG, Education Understanding verbalizes understanding   Patient Education OT STG, Additional Goal Pt to verbalize compensatory strategies and use of AE for lower body adl's (pt has AE from prior surgeries)     Goal: Patient Education Goal LTG- OT  Outcome:  Ongoing (interventions implemented as appropriate)   01/17/18 1211   Patient Education OT LTG   Patient Education OT LTG, Date Established 01/17/18   Patient Education OT LTG, Time to Achieve by discharge   Patient Education OT LTG, Education Type precautions per surgeon   Patient Education OT LTG, Education Understanding verbalizes understanding;demonstrates adequately   Patient Education OT LTG, Additional Goal pt to verbalize/demonstrate compliance with TTWB status of RLE during standing/transfers

## 2018-01-18 LAB
GLUCOSE BLDC GLUCOMTR-MCNC: 108 MG/DL (ref 70–130)
GLUCOSE BLDC GLUCOMTR-MCNC: 117 MG/DL (ref 70–130)
GLUCOSE BLDC GLUCOMTR-MCNC: 125 MG/DL (ref 70–130)
GLUCOSE BLDC GLUCOMTR-MCNC: 127 MG/DL (ref 70–130)

## 2018-01-18 PROCEDURE — 99232 SBSQ HOSP IP/OBS MODERATE 35: CPT | Performed by: HOSPITALIST

## 2018-01-18 PROCEDURE — 97110 THERAPEUTIC EXERCISES: CPT

## 2018-01-18 PROCEDURE — 82962 GLUCOSE BLOOD TEST: CPT

## 2018-01-18 PROCEDURE — 94799 UNLISTED PULMONARY SVC/PX: CPT

## 2018-01-18 RX ORDER — MONTELUKAST SODIUM 10 MG/1
10 TABLET ORAL NIGHTLY
Status: DISCONTINUED | OUTPATIENT
Start: 2018-01-18 | End: 2018-01-19 | Stop reason: HOSPADM

## 2018-01-18 RX ORDER — DOCUSATE SODIUM 100 MG/1
100 CAPSULE, LIQUID FILLED ORAL 2 TIMES DAILY
Status: DISCONTINUED | OUTPATIENT
Start: 2018-01-18 | End: 2018-01-18

## 2018-01-18 RX ORDER — SENNA AND DOCUSATE SODIUM 50; 8.6 MG/1; MG/1
1 TABLET, FILM COATED ORAL 2 TIMES DAILY
Status: DISCONTINUED | OUTPATIENT
Start: 2018-01-18 | End: 2018-01-19 | Stop reason: HOSPADM

## 2018-01-18 RX ORDER — PAROXETINE HYDROCHLORIDE 20 MG/1
40 TABLET, FILM COATED ORAL DAILY
Status: DISCONTINUED | OUTPATIENT
Start: 2018-01-19 | End: 2018-01-19 | Stop reason: HOSPADM

## 2018-01-18 RX ORDER — ENALAPRIL MALEATE 10 MG/1
10 TABLET ORAL
Status: DISCONTINUED | OUTPATIENT
Start: 2018-01-18 | End: 2018-01-19 | Stop reason: HOSPADM

## 2018-01-18 RX ORDER — HYDROCODONE BITARTRATE AND ACETAMINOPHEN 5; 325 MG/1; MG/1
1-2 TABLET ORAL EVERY 4 HOURS PRN
Qty: 60 TABLET | Refills: 0 | Status: SHIPPED | OUTPATIENT
Start: 2018-01-18 | End: 2018-02-01

## 2018-01-18 RX ORDER — PAROXETINE HYDROCHLORIDE 20 MG/1
40 TABLET, FILM COATED ORAL DAILY
Status: DISCONTINUED | OUTPATIENT
Start: 2018-01-18 | End: 2018-01-18

## 2018-01-18 RX ORDER — CHOLECALCIFEROL (VITAMIN D3) 1250 MCG
50000 CAPSULE ORAL
Status: DISCONTINUED | OUTPATIENT
Start: 2018-01-22 | End: 2018-01-19 | Stop reason: HOSPADM

## 2018-01-18 RX ORDER — SENNA AND DOCUSATE SODIUM 50; 8.6 MG/1; MG/1
1 TABLET, FILM COATED ORAL 2 TIMES DAILY
Status: DISCONTINUED | OUTPATIENT
Start: 2018-01-18 | End: 2018-01-18

## 2018-01-18 RX ORDER — OXYBUTYNIN CHLORIDE 5 MG/1
5 TABLET ORAL 2 TIMES DAILY
Status: DISCONTINUED | OUTPATIENT
Start: 2018-01-18 | End: 2018-01-19 | Stop reason: HOSPADM

## 2018-01-18 RX ADMIN — CEFUROXIME AXETIL 500 MG: 250 TABLET ORAL at 08:22

## 2018-01-18 RX ADMIN — Medication 5 MG: at 22:29

## 2018-01-18 RX ADMIN — CEFUROXIME AXETIL 500 MG: 250 TABLET ORAL at 22:29

## 2018-01-18 RX ADMIN — PAROXETINE HYDROCHLORIDE 20 MG: 20 TABLET, FILM COATED ORAL at 08:22

## 2018-01-18 RX ADMIN — METFORMIN HYDROCHLORIDE 1000 MG: 500 TABLET ORAL at 17:14

## 2018-01-18 RX ADMIN — APIXABAN 2.5 MG: 2.5 TABLET, FILM COATED ORAL at 22:30

## 2018-01-18 RX ADMIN — BACLOFEN 10 MG: 10 TABLET ORAL at 08:22

## 2018-01-18 RX ADMIN — DOCUSATE SODIUM AND SENNOSIDES 1 TABLET: 8.6; 5 TABLET, FILM COATED ORAL at 22:30

## 2018-01-18 RX ADMIN — HYDROCODONE BITARTRATE AND ACETAMINOPHEN 1 TABLET: 5; 325 TABLET ORAL at 14:26

## 2018-01-18 RX ADMIN — METOPROLOL SUCCINATE 25 MG: 25 TABLET, EXTENDED RELEASE ORAL at 08:22

## 2018-01-18 RX ADMIN — MONTELUKAST SODIUM 10 MG: 10 TABLET, FILM COATED ORAL at 22:30

## 2018-01-18 RX ADMIN — METFORMIN HYDROCHLORIDE 1000 MG: 500 TABLET ORAL at 08:22

## 2018-01-18 RX ADMIN — HYDROCODONE BITARTRATE AND ACETAMINOPHEN 1 TABLET: 5; 325 TABLET ORAL at 07:37

## 2018-01-18 RX ADMIN — APIXABAN 2.5 MG: 2.5 TABLET, FILM COATED ORAL at 08:22

## 2018-01-18 RX ADMIN — PREGABALIN 75 MG: 75 CAPSULE ORAL at 08:27

## 2018-01-18 RX ADMIN — BUDESONIDE AND FORMOTEROL FUMARATE DIHYDRATE 2 PUFF: 80; 4.5 AEROSOL RESPIRATORY (INHALATION) at 09:23

## 2018-01-18 RX ADMIN — BUDESONIDE AND FORMOTEROL FUMARATE DIHYDRATE 2 PUFF: 80; 4.5 AEROSOL RESPIRATORY (INHALATION) at 19:20

## 2018-01-18 RX ADMIN — OXYBUTYNIN CHLORIDE 5 MG: 5 TABLET ORAL at 22:30

## 2018-01-18 RX ADMIN — HYDROCODONE BITARTRATE AND ACETAMINOPHEN 1 TABLET: 5; 325 TABLET ORAL at 20:43

## 2018-01-18 RX ADMIN — PREGABALIN 75 MG: 75 CAPSULE ORAL at 22:30

## 2018-01-18 RX ADMIN — PANTOPRAZOLE SODIUM 40 MG: 40 TABLET, DELAYED RELEASE ORAL at 06:46

## 2018-01-18 RX ADMIN — ENALAPRIL MALEATE 10 MG: 10 TABLET ORAL at 17:14

## 2018-01-18 NOTE — PROGRESS NOTES
"Hospitalist Team      Patient Care Team:  Linden Zhang MD as PCP - General  Linden Zhang MD as PCP - Family Medicine        Chief Complaint:  F/U right distal femur fracture s/p fall    Subjective    Interval History and ROS:     Patient notes she is feeling ok today.  She does state she feels like she is getting constipated and requests something to help.  She also notes she is urinating very frequently here and does not think we have her on her \"bladder control\" medicine that she takes at home. The patient had a low grade fever yesterday afternoon but none since.  She denies any SOA or CP or N/V.  She is eating normally. She denies any abdominal pain.      Objective    Vital Signs  Temp:  [98 °F (36.7 °C)-100.7 °F (38.2 °C)] 98.2 °F (36.8 °C)  Heart Rate:  [76-84] 84  Resp:  [16-18] 16  BP: (117-142)/(68-78) 127/75  Oxygen Therapy  SpO2: 95 %  Pulse Oximetry Type: Intermittent  O2 Device: room air    Flowsheet Rows         First Filed Value    Admission Height  160.2 cm (63.07\") Documented at 01/13/2018 1254    Admission Weight  76.7 kg (169 lb) Documented at 01/13/2018 1254            Physical Exam:  Constitutional: Patient appears well-developed and well-nourished and in no acute distress.   HEENT:   Head: Normocephalic and atraumatic.   Eyes:  EOM are intact. Sclera are anicteric and non-injected.  Mouth and Throat: Patient has moist mucous membranes.     Neck: Neck supple. No lymphadenopathy present.  Cardiovascular: Regular rate, regular rhythm, S1 normal and S2 normal.  Exam reveals no gallop and no friction rub.  No murmur heard.  Pulmonary/Chest: Lungs are clear to auscultation bilaterally. No respiratory distress. No wheezes. No rhonchi. No rales.   Abdominal: Soft. Bowel sounds are normal. There is no tenderness.   Extremities: trace right ankle edema noted. Pulses are palpable in all 4 extremities. Dressing and ACE wrap to right knee are intact .  Neurological: Patient is alert and oriented to " person, place, and time.  Skin: Skin is warm. Nails show no clubbing. No cyanosis or erythema.    Results Review:     I reviewed the patient's new clinical results.    Lab Results (last 24 hours)     Procedure Component Value Units Date/Time    POC Glucose Once [[id number]]  (Abnormal) Collected:  01/17/18 1123    Specimen:  Blood Updated:  01/17/18 1131     Glucose 146 (H) mg/dL     Narrative:       Meter: [id number] : [id number] Clarence Rojas NURSING ASSISTANT    POC Glucose Once [[id number]]  (Normal) Collected:  01/17/18 1608    Specimen:  Blood Updated:  01/17/18 1615     Glucose 129 mg/dL     Narrative:       Meter: [id number] : [id number] Clarence Rojas NURSING ASSISTANT    Urinalysis With / Culture If Indicated - Urine, Catheter In/Out [[id number]]  (Abnormal) Collected:  01/17/18 1705    Specimen:  Urine from Urine, Catheter In/Out Updated:  01/17/18 1737     Color, UA Yellow     Appearance, UA Clear     pH, UA 6.0     Specific Gravity, UA 1.020     Glucose, UA Negative     Ketones, UA Negative     Bilirubin, UA Negative     Blood, UA Trace (A)     Protein, UA Negative     Leuk Esterase, UA Negative     Nitrite, UA Negative     Urobilinogen, UA 0.2 E.U./dL    Urinalysis, Microscopic Only - Urine, Clean Catch [[id number]]  (Abnormal) Collected:  01/17/18 1705    Specimen:  Urine from Urine, Catheter In/Out Updated:  01/17/18 1737     RBC, UA 0-2 (A) /HPF      WBC, UA None Seen /HPF      Bacteria, UA Trace (A) /HPF      Squamous Epithelial Cells, UA None Seen /HPF      Hyaline Casts, UA None Seen /LPF      Methodology Manual Light Microscopy    POC Glucose Once [[id number]]  (Abnormal) Collected:  01/17/18 2012    Specimen:  Blood Updated:  01/17/18 2018     Glucose 132 (H) mg/dL     Narrative:       Meter: [id number] : [id number] Edgar Hay    POC Glucose Once [[id number]]  (Normal) Collected:  01/18/18 0721    Specimen:  Blood Updated:  01/18/18 0729     Glucose 117 mg/dL     Narrative:        Meter: [id number] : [id number] Irwin Le NURSING ASSISTANT          Imaging Results (last 24 hours)     Procedure Component Value Units Date/Time    XR Chest PA & Lateral [[id number]] Collected:  01/17/18 1628     Updated:  01/17/18 1633    Narrative:       CHEST X-RAY, 01/17/2018:     HISTORY:   67-year-old female 2 days postop surgery for femur fracture. Newly noted  fever.     TECHNIQUE:  AP and lateral upright chest series.     FINDINGS:  Low lung volumes with mild bibasilar atelectasis. No visible airspace  consolidation or pleural effusion. Mild to moderate cardiomegaly.  Pulmonary vascularity is normal.     Old T11 and L1 vertebral compression fractures without appreciable  change since 11/21/2017.       Impression:       1. No active disease.  2. Shallow lung expansion with mild basilar atelectasis.  3. Cardiomegaly.     This report was finalized on 1/17/2018 4:31 PM by Dr. Lang Corona MD.           ECG/EMG Results (most recent)     Procedure Component Value Units Date/Time    ECG 12 Lead [[id number]] Collected:  01/13/18 1512     Updated:  01/13/18 2232    Narrative:       RR Interval= 606 ms  GA Interval= 128 ms  QRSD Interval= 72 ms  QT Interval= 344 ms  QTc Interval= 442 ms  Heart Rate= 99 ms  P Axis= 27 deg  QRS Axis= 41 deg  T Wave Axis= 184 deg  I: 40 Axis= 36 deg  T: 40 Axis= 54 deg  ST Axis= 209 deg  SINUS RHYTHM  BORDERLINE REPOLARIZATION ABNORMALITY  NO SIGNIFICANT CHANGE FROM PREVIOUS ECG  Electronically Signed by:  Tara Avitia ([facility]) 13-Jan-2018 22:32:21  Date and Time of Study: 2018-01-13 15:12:47          Medication Review:   I have reviewed the patient's current medication list    Current Facility-Administered Medications:   •  acetaminophen (TYLENOL) tablet 650 mg, 650 mg, Oral, Q6H PRN, Nahun Zacarias DO, 650 mg at 01/17/18 1533  •  albuterol (PROVENTIL) nebulizer solution 0.083% 2.5 mg/3mL, 2.5 mg, Nebulization, Q4H PRN, Nahun Zacarias DO  •  apixaban (ELIQUIS) tablet 2.5 mg,  2.5 mg, Oral, Q12H, Moy Newberry MD, 2.5 mg at 01/18/18 0822  •  baclofen (LIORESAL) tablet 10 mg, 10 mg, Oral, Daily, Nahun Zacarias, DO, 10 mg at 01/18/18 0822  •  bisacodyl (DULCOLAX) EC tablet 5 mg, 5 mg, Oral, Daily PRN, Nahun Zacarias, DO  •  bisacodyl (DULCOLAX) suppository 10 mg, 10 mg, Rectal, Daily PRN, Nahun Zacarias, DO  •  budesonide-formoterol (SYMBICORT) 80-4.5 MCG/ACT inhaler 2 puff, 2 puff, Inhalation, BID - RT, Nahun Zacarias DO, 2 puff at 01/18/18 0923  •  cefuroxime (CEFTIN) tablet 500 mg, 500 mg, Oral, Q12H, Katherine Marinelli MD, 500 mg at 01/18/18 0822  •  dextrose (D50W) solution 25 g, 25 g, Intravenous, Q15 Min PRN, Nahun Zacarias, DO  •  dextrose (GLUTOSE) oral gel 15 g, 15 g, Oral, Q15 Min PRN, Nahun Zacarias DO  •  glucagon (GLUCAGEN) injection 1 mg, 1 mg, Subcutaneous, Q15 Min PRN, Nahun Zacarias DO  •  HYDROcodone-acetaminophen (NORCO) 5-325 MG per tablet 1 tablet, 1 tablet, Oral, Q4H PRN, Katherine Marinelli MD, 1 tablet at 01/18/18 0737  •  insulin aspart (novoLOG) injection 0-7 Units, 0-7 Units, Subcutaneous, 4x Daily AC & at Bedtime, Nahun Zacarias DO, 2 Units at 01/16/18 2109  •  lactated ringers infusion 1,000 mL, 1,000 mL, Intravenous, Continuous PRN, Thuy Taylor CRNA, Last Rate: 25 mL/hr at 01/15/18 1456, 1,000 mL at 01/15/18 1456  •  melatonin tablet 5 mg, 5 mg, Oral, Nightly, Nahun Zacarias, DO, 5 mg at 01/17/18 2056  •  metFORMIN (GLUCOPHAGE) tablet 1,000 mg, 1,000 mg, Oral, BID With Meals, Katherine Marinelli MD, 1,000 mg at 01/18/18 0822  •  metoprolol succinate XL (TOPROL-XL) 24 hr tablet 25 mg, 25 mg, Oral, Daily, Nahun Zacarias DO, 25 mg at 01/18/18 0822  •  ondansetron (ZOFRAN) injection 4 mg, 4 mg, Intravenous, Q6H PRN, Nahun Zacarias DO  •  pantoprazole (PROTONIX) EC tablet 40 mg, 40 mg, Oral, QAM, Nahun Zacarias DO, 40 mg at 01/18/18 0646  •  PARoxetine (PAXIL) tablet 20 mg, 20 mg, Oral, Daily, Nahun Zacarias DO, 20 mg at 01/18/18 0822  •   pregabalin (LYRICA) capsule 75 mg, 75 mg, Oral, Q12H, Katherine Marinelli MD, 75 mg at 01/18/18 0874  •  sodium chloride 0.9 % flush 1-10 mL, 1-10 mL, Intravenous, PRN, Nahun Zacarias, DO  •  Insert peripheral IV, , , Once **AND** sodium chloride 0.9 % flush 10 mL, 10 mL, Intravenous, PRN, Manish Dwyer MD  •  sodium chloride 0.9 % infusion 40 mL, 40 mL, Intravenous, PRN, Nahun Zacarias, DO      Assessment/Plan     1. Acute right distal femur fracture s/p fall: Patient slipped and fell prior to admission. Patient underwent ORIF. Dr. Newberry managing. Patient is doing well with pain control on po norco. She is working with PT/OT. TTWB to E. Pain controlled. Patient is to be in knee immobilizer at all times. She will need to continue Eliquis for 4 weeks. She will need to F/U with Dr. Newberry in the office in 2 weeks. Plan to [facility] pending precert.      2. Fever secondary to atelectasis: CXR with low lung volumes and atelectasis.  UA looks good now as patient is on treatment (see below). Encouraged IS.      3. E-coli UTI: On ceftin po. E-coli is resistant to FQ. (UA yesterday looks good). Patient should complete 7 day course of Abx.      4. Normocytic Anemia: Likely multifactorial related to injury, IVFs, surgery and blood draws. Now stable and starting to rise. Monitor intermittently.      5. Asthma: On home Symbicort and will resume patient's home Singulair. No wheezing on exam.  No acute issues.         6. DM-2: BG here is good on only metformin. (she is on Janumet XR 50-1000mg, 2 tabs daily and Actos 30mg daily at home). Continue to hold other home meds. Continue to monitor Accu checks and SSI.       7. Hypertension: BP mostly WNL (intermittent elevations). Patient is on home Toprol XL. Will resume patient's home dose of enalapril. Continue to monitor.      8. Dyslipidemia: Per patient and pharmacy list she is not on any medication for this currently. Will check FLP in AM.      9. MARGARET:  Noncompliant with CPAP at home. No issues with O2 saturations here.       10. Mild dementia: No acute issues here. On no chronic medications for this. A&Ox3 on exam.      11. Chronic Pain: NSAIDs held post-op, On home Lyrica (patient only on 75mg BID at home). On Norco here post-op (was on PRN tramadol at home).    12. Urinary incontinence: Will resume patient's home dose of oxybutinin.    13. Anxiety and depression: Will resume patient's home dose of paroxetine. No acute issues here currently.    14. GERD: On PPI (protonix substituted for omeprazole here).    15. Vitamin D deficiency: Will resume home weekly supplementation. Patient takes on mondays.    16. Constipation: Will start Senokot-S BID and Miralax PRN.      17. DVT prophy: On eliquis.    NOTE: Patient's med rec in the computer is not correct, see list from pharmacy in paper chart.    Plan for disposition: [facility] once approved by insurance.    Katherine Marinelli MD  01/18/18  10:30 AM

## 2018-01-18 NOTE — PROGRESS NOTES
POD# 3 s/p ORIF right distal femur fracture    Subjective: Patient states pain well-controlled this point time.  Denies chills or sweats overnight, denies residual numbness or tingling right lower extremity. Making slow progress with PT    Objective:  Vitals:    01/17/18 1933 01/17/18 1955 01/18/18 0025 01/18/18 0645   BP:  125/78 117/70 127/75   BP Location:  Right arm Right arm Right arm   Patient Position:  Lying Lying Lying   Pulse:  76 83 77   Resp:  16 16 16   Temp:  99 °F (37.2 °C) 99.3 °F (37.4 °C) 98.2 °F (36.8 °C)   TempSrc:  Oral Oral Oral   SpO2: 98% 98% 95% 95%   Weight:       Height:             Results from last 7 days  Lab Units 01/17/18  0353 01/16/18  0431 01/15/18  0401 01/14/18  0354   WBC 10*3/mm3  --  8.33 7.97 9.99   HEMOGLOBIN g/dL 9.4* 9.1* 10.0* 11.0*   HEMATOCRIT % 29.6* 29.1* 31.2* 34.3*   PLATELETS 10*3/mm3  --  240 248 293         Results from last 7 days  Lab Units 01/16/18  0431 01/15/18  0401 01/14/18  0354   SODIUM mmol/L 140 142 139   POTASSIUM mmol/L 3.8 3.4* 3.8   CHLORIDE mmol/L 106 106 97*   CO2 mmol/L 24.4 24.7 26.2   BUN mg/dL 13 14 16   CREATININE mg/dL 0.61 0.57 0.71   GLUCOSE mg/dL 120* 117* 129*   CALCIUM mg/dL 7.6* 7.8* 8.6*       Exam:    Right leg-incision clean dry and intact   Flex and extend toes and ankle   Brisk cap refill all digits   Compartments soft and easily compressible   Positive sensation light touch all distibution's right foot   No calf pain, negative Homans sign    Impression: s/p ORIF right distal femur fracture     Plan:  1. PT/OT- ambulate, toe-touch weightbearing right lower extremity, knee immobilizer at all times  2. Pain control- Norco  3. DVT prophylaxis- Eliquis  4. Wound care- ACE wrap only  5. Disposition- planning for d/c to [facility] per hospitalist. F/u in 2 weeks for repeat xray and wound check.

## 2018-01-18 NOTE — PLAN OF CARE
Problem: Patient Care Overview (Adult)  Goal: Plan of Care Review  Outcome: Ongoing (interventions implemented as appropriate)   01/17/18 2106   Coping/Psychosocial Response Interventions   Plan Of Care Reviewed With patient   Patient Care Overview   Progress progress toward functional goals as expected       Problem: Fall Risk (Adult)  Goal: Absence of Falls  Outcome: Ongoing (interventions implemented as appropriate)   01/17/18 2106   Fall Risk (Adult)   Absence of Falls making progress toward outcome

## 2018-01-18 NOTE — PLAN OF CARE
Problem: Patient Care Overview (Adult)  Goal: Plan of Care Review   01/18/18 1142   Coping/Psychosocial Response Interventions   Plan Of Care Reviewed With patient   Patient Care Overview   Progress improving   Outcome Evaluation   Outcome Summary/Follow up Plan PT: Initiated slide board transfer training this morning. Patient able to perform bed to chair transafer with slide board with mod A x 2. Patient requires verbal cues for sequencing however able to maintain TTWB during transfer. Patient requires mod A for supine to sit transfer. Patient refuses further transfer training this AM. Will continue to follow.

## 2018-01-18 NOTE — SIGNIFICANT NOTE
01/18/18 1509   Rehab Treatment   Discipline occupational therapist   Treatment Not Performed patient/family declined treatment  (pt just got back into bed with CNA, requests check back tomorrow)

## 2018-01-18 NOTE — SIGNIFICANT NOTE
01/18/18 1424   Rehab Treatment   Discipline physical therapist   Treatment Not Performed (pt being assisted back into bed by nursing staff, declined therapy activities at this time)

## 2018-01-18 NOTE — THERAPY TREATMENT NOTE
Acute Care - Physical Therapy Treatment Note   Mayte Sandoval     Patient Name: Juan aPblo Hernandez  : 1950  MRN: [id number]  Today's Date: 2018  Onset of Illness/Injury or Date of Surgery Date: 18  Date of Referral to PT: 01/15/18  Referring Physician: Yves    Admit Date: 2018    Visit Dx:    ICD-10-CM ICD-9-CM   1. Closed fracture of distal end of right femur, unspecified fracture morphology, initial encounter S72.401A 821.20     Patient Active Problem List   Diagnosis   • Mental status change   • Altered mental status   • Primary osteoarthritis of right foot   • Sprain of anterior talofibular ligament of right ankle   • History of total right knee replacement   • Closed fracture of right distal femur               Adult Rehabilitation Note       18 0944 18 1140 18 0901    Rehab Assessment/Intervention    Discipline physical therapist  -BP physical therapist  -BP physical therapist  -BP    Document Type therapy note (daily note)  -BP therapy note (daily note)  -BP therapy note (daily note)  -BP    Subjective Information agree to therapy;complains of   patient complains of stomach discomfort  -BP agree to therapy;complains of;pain  -BP agree to therapy;complains of;pain  -BP    Patient Effort, Rehab Treatment adequate  -BP adequate  -BP adequate  -BP    Symptoms Noted During/After Treatment fatigue  -BP increased pain;fatigue  -BP none  -BP    Symptoms Noted Comment   Patient awake and alert throughout treatment session today.   -BP    Precautions/Limitations fall precautions;other (see comments);brace on when up   TTWB status  -BP fall precautions;other (see comments);brace on when up   TTWB R LE  -BP other (see comments);fall precautions   TTWB R LE  -BP    Specific Treatment Considerations  Dr. Newberry present prior to initiating treatment. Reinforced TTWB status.   -BP Patient reports she has a ramp into her home. She states it is w/c accessible once in the home and she  does own a w/c. Per Dr. Newberry, knee immobilizer to be on with all mobility. No ROM to R knee.   -BP    Recorded by [BP] Shayy Pack, PT [BP] Shayy Pack, PT [BP] Shayy Pack, PT    Pain Assessment    Pain Assessment --   c/o knee pain with mobility, does not rate  -BP 0-10  -BP 0-10  -BP    Pain Score  6  -BP 7  -BP    Pain Type  Acute pain;Surgical pain  -BP Acute pain;Surgical pain  -BP    Pain Location  Knee  -BP     Pain Orientation  Right  -BP     Pain Intervention(s) Repositioned  -BP Repositioned  -BP     Response to Interventions tolerated  -BP tolerated  -BP     Recorded by [BP] Shayy Pack, PT [BP] Shayy Pack, PT [BP] Shayy Pack, PT    Cognitive Assessment/Intervention    Current Cognitive/Communication Assessment  functional  -BP     Follows Commands/Answers Questions  able to follow single-step instructions  -BP able to follow single-step instructions;needs cueing  -BP    Personal Safety   WNL/WFL  -BP    Personal Safety Interventions nonskid shoes/slippers when out of bed;gait belt  -BP gait belt;nonskid shoes/slippers when out of bed  -BP gait belt;nonskid shoes/slippers when out of bed;fall prevention program maintained  -BP    Recorded by [BP] Shayy Pack, PT [BP] Shayy Pack, PT [BP] Shayy Pack, PT    Bed Mobility, Assessment/Treatment    Bed Mobility, Assistive Device bed rails;head of bed elevated  -BP  bed rails;head of bed elevated  -BP    Bed Mobility, Scoot/Bridge, [place]  minimum assist (75% patient effort);verbal cues required   scooting along EOB in sitting  -BP --  -BP    Bed Mob, Supine to Sit, [place] moderate assist (50% patient effort);verbal cues required  -BP  minimum assist (75% patient effort);moderate assist (50% patient effort);verbal cues required  -BP    Bed Mob, Sit to Supine, [place]  moderate assist (50% patient effort);2 person assist required;verbal cues required;minimum assist (75% patient effort)  -BP      Bed Mobility, Comment Verbal cues required for sequencing. Extended time to complte transfer  -BP Verbal cues required for sequencing required. Extended time to required. Patient able to scoot alone EOB while in sitting with min A. Able to maintain TTWB status while scooting.   -BP Verbal cues for sequencing. Patient requires increased time to complte transfer.   -BP    Recorded by [BP] Shayy Pack, PT [BP] Shayy Pack, PT [BP] Shayy Pack, PT    Transfer Assessment/Treatment    Transfers, Bed-Chair [place] moderate assist (50% patient effort);2 person assist required;verbal cues required  -BP      Transfers, Bed-Chair-Bed, Assist Device sliding board  -BP      Transfers, Sit-Stand [place]  moderate assist (50% patient effort);2 person assist required;verbal cues required  -BP moderate assist (50% patient effort);2 person assist required;verbal cues required  -BP    Transfers, Stand-Sit [place]  minimum assist (75% patient effort);2 person assist required;verbal cues required  -BP moderate assist (50% patient effort);2 person assist required;verbal cues required  -BP    Transfers, Sit-Stand-Sit, Assist Device  rolling walker  -BP rolling walker  -BP    Transfer, Maintain Weight Bearing Status able to maintain weight bearing status  -BP      Transfer, Comment Performed slide board transfer bed to chair with mod A x 2. Patient requires verbal cues for sequencing. She was able to maintain TTWB status during transfer. Refuses further trials this morning.   -BP Verbal cues required for hand placement. Patient requires assist and verbal cues to maintain TTWB status. Unable to perform pivot transfer. Second person required to move chair and place bed behind patient. Plan to try slide board transfer at next session.   -BP Patient unable to maintain TTWB during transfer from sit to stand. With verbal cues and assist patient able to maintain TTWB with static standing. With attempts to perform  pivot transfer patient places too much weight through R LE. Nursing present to move bed and place recliner behind patient.    -BP    Recorded by [BP] Shayy Pack, PT [BP] Shayy Pack, PT [BP] Shayy Pack, PT    Gait Assessment/Treatment    Gait, Comment   unable to assess. Unable to maintain TTWB in order to attempt forward gait training.   -BP    Recorded by   [BP] Shayy Pack, PT    Motor Skills/Interventions    Additional Documentation Balance Skills Training (Group)  -BP      Recorded by [BP] Shayy Pack, PT      Balance Skills Training    Sitting-Level of Assistance Close supervision  -BP      Sitting-Balance Support Feet supported  -BP      Recorded by [BP] Shayy Pack PT      Positioning and Restraints    Pre-Treatment Position in bed  -BP sitting in chair/recliner  -BP in bed  -BP    Post Treatment Position chair  -BP bed  -BP chair  -BP    In Bed  supine;call light within reach;encouraged to call for assist  -BP     In Chair reclined;call light within reach;encouraged to call for assist  -BP  reclined;call light within reach;encouraged to call for assist;with family/caregiver;with nsg  -BP    Recorded by [BP] Shayy Pack, PT [BP] Shayy Pack, PT [BP] Shayy Pack, PT      User Key  (r) = Recorded By, (t) = Taken By, (c) = Cosigned By    Initials Name Effective Dates    BP Shayy Pack, PT 12/01/15 -                 IP PT Goals       01/16/18 1128          Bed Mobility PT STG    Bed Mobility PT STG, Date Established 01/16/18  -BP      Bed Mobility PT STG, Time to Achieve 5 days  -BP      Bed Mobility PT STG, Activity Type supine to sit/sit to supine  -BP      Bed Mobility PT STG, [place] Level moderate assist (50% patient effort)  -BP      Transfer Training PT STG    Transfer Training PT STG, Date Established 01/16/18  -BP      Transfer Training PT STG, Time to Achieve 5 days  -BP      Transfer Training PT STG, Activity Type sit to stand/stand to sit  -BP       Transfer Training PT STG, [place] Level moderate assist (50% patient effort)  -BP      Transfer Training PT STG, Assist Device --   with appropriate assistive device  -BP      Transfer Training 2 PT STG    Transfer Training PT 2 STG, Date Established 01/16/18  -BP      Transfer Training PT 2 STG, Time to Achieve 5 days  -BP      Transfer Training PT 2 STG, Activity Type bed to chair /chair to bed  -BP      Transfer Training PT 2 STG, [place] Level maximum assist (25% patient effort)  -BP      Transfer Training PT 2 STG, Assist Device --   with appropriate assistive device  -BP        User Key  (r) = Recorded By, (t) = Taken By, (c) = Cosigned By    Initials Name Provider Type    BP Shayy Pack, PT Physical Therapist          Physical Therapy Education     Title: PT OT SLP Therapies (Active)     Topic: Physical Therapy (Active)     Point: Mobility training (Done)    Learning Progress Summary    Learner Readiness Method Response Comment Documented by Status   Patient Acceptance E VU  BP 01/18/18 1142 Done    Acceptance E VU  BP 01/17/18 1449 Done    Acceptance E VU  BP 01/17/18 1032 Done    Acceptance E NR  BP 01/16/18 1124 Active               Point: Precautions (Done)    Learning Progress Summary    Learner Readiness Method Response Comment Documented by Status   Patient Acceptance E VU  BP 01/18/18 1142 Done    Acceptance E VU  BP 01/17/18 1449 Done    Acceptance E VU  BP 01/17/18 1032 Done    Acceptance E NR  BP 01/16/18 1124 Active                      User Key     Initials Effective Dates Name Provider Type [facility]    BP 12/01/15 -  Shayy Pack, PT Physical Therapist PT                    PT Recommendation and Plan  Anticipated Equipment Needs At Discharge:  (will continue to assess)  Anticipated Discharge Disposition: other (see comments) (Will continue to assess)  Planned Therapy Interventions: bed mobility training, home exercise program, patient/family education, strengthening,  transfer training  PT Frequency: daily, 2 times/day  Plan of Care Review  Plan Of Care Reviewed With: patient  Progress: improving  Outcome Summary/Follow up Plan: PT: Initiated slide board transfer training this morning. Patient able to perform bed to chair transafer with slide board with mod A x 2. Patient requires verbal cues for sequencing however able to maintain TTWB during transfer. Patient requires mod A for supine to sit transfer. Patient refuses further transfer training this AM. Will continue to follow.           Outcome Measures       01/18/18 0944 01/17/18 1140 01/17/18 0920    How much help from another person do you currently need...    Turning from your back to your side while in flat bed without using bedrails? 2  -BP 2  -BP     Moving from lying on back to sitting on the side of a flat bed without bedrails? 2  -BP 2  -BP     Moving to and from a bed to a chair (including a wheelchair)? 2  -BP 1  -BP     Standing up from a chair using your arms (e.g., wheelchair, bedside chair)? 1  -BP 2  -BP     Climbing 3-5 steps with a railing? 1  -BP 1  -BP     To walk in hospital room? 1  -BP 1  -BP     AM-PAC 6 Clicks Score 9  -BP 9  -BP     How much help from another is currently needed...    Putting on and taking off regular lower body clothing?   2  -SD    Bathing (including washing, rinsing, and drying)   2  -SD    Toileting (which includes using toilet bed pan or urinal)   2  -SD    Putting on and taking off regular upper body clothing   4  -SD    Taking care of personal grooming (such as brushing teeth)   4  -SD    Eating meals   4  -SD    Score   18  -SD    Functional Assessment    Outcome Measure Options AM-PAC 6 Clicks Basic Mobility (PT)  -BP AM-PAC 6 Clicks Basic Mobility (PT)  -BP AM-PAC 6 Clicks Daily Activity (OT)  -SD      01/17/18 0901 01/16/18 0947       How much help from another person do you currently need...    Turning from your back to your side while in flat bed without using bedrails? 2   -BP 1  -BP     Moving from lying on back to sitting on the side of a flat bed without bedrails? 2  -BP 1  -BP     Moving to and from a bed to a chair (including a wheelchair)? 1  -BP 1  -BP     Standing up from a chair using your arms (e.g., wheelchair, bedside chair)? 2  -BP 1  -BP     Climbing 3-5 steps with a railing? 1  -BP 1  -BP     To walk in hospital room? 1  -BP 1  -BP     AM-PAC 6 Clicks Score 9  -BP 6  -BP     Functional Assessment    Outcome Measure Options AM-PAC 6 Clicks Basic Mobility (PT)  -BP AM-PAC 6 Clicks Basic Mobility (PT)  -BP       User Key  (r) = Recorded By, (t) = Taken By, (c) = Cosigned By    Initials Name Provider Type    SD Leobardo Mcdaniels, OTR Occupational Therapist    BP Shayy Pack PT Physical Therapist           Time Calculation:         PT Charges       01/18/18 1144          Time Calculation    Start Time 0944  -BP      Stop Time 0953  -BP      Time Calculation (min) 9 min  -BP      PT Received On 01/18/18  -BP      PT - Next Appointment 01/18/18  -BP        User Key  (r) = Recorded By, (t) = Taken By, (c) = Cosigned By    Initials Name Provider Type    BP Shayy Pack PT Physical Therapist          Therapy Charges for Today     Code Description Service Date Service Provider Modifiers Qty    96434867114 HC PT THER PROC EA 15 MIN 1/17/2018 Shayy Pack, PT GP 1    31082269254 HC PT THER PROC EA 15 MIN 1/17/2018 Shayy Pack, PT GP 1    41160205659 HC PT THER SUPP EA 15 MIN 1/17/2018 Shayy Pack, PT GP 1    37563624318 HC PT THER PROC EA 15 MIN 1/18/2018 Shayy Pack, PT GP 1    11198952593 HC PT THER SUPP EA 15 MIN 1/18/2018 Shayy Pack, PT GP 1          PT G-Codes  Outcome Measure Options: AM-PAC 6 Clicks Basic Mobility (PT)    Shayy Pack PT  1/18/2018

## 2018-01-18 NOTE — NURSING NOTE
Continued Stay Note  KASSIDY Ford     Patient Name: Juan Pablo Hernandez  MRN: [id number]  Today's Date: 1/18/2018    Admit Date: 1/13/2018          Discharge Plan       01/18/18 1235    Case Management/Social Work Plan    Additional Comments Spoke with Ara at [facility].  Awaiting precert from insurance.  Will continue to follow              Discharge Codes     None            Elaine Cisneros RN

## 2018-01-18 NOTE — PLAN OF CARE
Problem: Patient Care Overview (Adult)  Goal: Plan of Care Review  Outcome: Ongoing (interventions implemented as appropriate)   01/18/18 1550   Coping/Psychosocial Response Interventions   Plan Of Care Reviewed With patient   Patient Care Overview   Progress progress toward functional goals as expected   Outcome Evaluation   Outcome Summary/Follow up Plan VSS. Awaiting pre-cert for STR placement. Pain well controlled with [place]. PO intake adequate. Requires much encouragement for ambulation.        Problem: Fall Risk (Adult)  Goal: Absence of Falls  Outcome: Ongoing (interventions implemented as appropriate)      Problem: Orthopaedic Fracture (Adult)  Goal: Signs and Symptoms of Listed Potential Problems Will be Absent or Manageable (Orthopaedic Fracture)  Outcome: Ongoing (interventions implemented as appropriate)   01/18/18 1550   Orthopaedic Fracture   Problems Assessed (Orthopaedic Fracture) all   Problems Present (Orthopaedic Fracture) functional deficit/ self-care deficit;pain;situational response       Problem: Pressure Ulcer Risk (Jules Scale) (Adult,Obstetrics,Pediatric)  Goal: Skin Integrity  Outcome: Ongoing (interventions implemented as appropriate)   01/18/18 1550   Pressure Ulcer Risk (Jules Scale) (Adult,Obstetrics,Pediatric)   Skin Integrity making progress toward outcome       Problem: Pain, Chronic (Adult)  Goal: Acceptable Pain Control/Comfort Level  Outcome: Ongoing (interventions implemented as appropriate)   01/18/18 1550   Pain, Chronic (Adult)   Acceptable Pain Control/Comfort Level making progress toward outcome

## 2018-01-19 VITALS
RESPIRATION RATE: 16 BRPM | HEIGHT: 63 IN | WEIGHT: 177.8 LBS | BODY MASS INDEX: 31.5 KG/M2 | TEMPERATURE: 98 F | OXYGEN SATURATION: 95 % | HEART RATE: 84 BPM | SYSTOLIC BLOOD PRESSURE: 124 MMHG | DIASTOLIC BLOOD PRESSURE: 81 MMHG

## 2018-01-19 LAB
CHOLEST SERPL-MCNC: 106 MG/DL (ref 0–200)
GLUCOSE BLDC GLUCOMTR-MCNC: 102 MG/DL (ref 70–130)
HCT VFR BLD AUTO: 28.2 % (ref 37–47)
HDLC SERPL-MCNC: 31 MG/DL (ref 40–60)
HGB BLD-MCNC: 9 G/DL (ref 12–16)
LDLC SERPL CALC-MCNC: 49 MG/DL (ref 0–100)
LDLC/HDLC SERPL: 1.57 {RATIO}
TRIGL SERPL-MCNC: 132 MG/DL (ref 0–150)
VLDLC SERPL-MCNC: 26.4 MG/DL (ref 7–27)

## 2018-01-19 PROCEDURE — 85014 HEMATOCRIT: CPT | Performed by: HOSPITALIST

## 2018-01-19 PROCEDURE — 80061 LIPID PANEL: CPT | Performed by: HOSPITALIST

## 2018-01-19 PROCEDURE — 94799 UNLISTED PULMONARY SVC/PX: CPT

## 2018-01-19 PROCEDURE — 97110 THERAPEUTIC EXERCISES: CPT

## 2018-01-19 PROCEDURE — 85018 HEMOGLOBIN: CPT | Performed by: HOSPITALIST

## 2018-01-19 PROCEDURE — 97535 SELF CARE MNGMENT TRAINING: CPT

## 2018-01-19 PROCEDURE — 99239 HOSP IP/OBS DSCHRG MGMT >30: CPT | Performed by: NURSE PRACTITIONER

## 2018-01-19 PROCEDURE — 82962 GLUCOSE BLOOD TEST: CPT

## 2018-01-19 RX ORDER — MONTELUKAST SODIUM 10 MG/1
10 TABLET ORAL NIGHTLY
Start: 2018-01-19

## 2018-01-19 RX ORDER — OXYBUTYNIN CHLORIDE 5 MG/1
5 TABLET ORAL 2 TIMES DAILY
Start: 2018-01-19 | End: 2021-02-02 | Stop reason: ALTCHOICE

## 2018-01-19 RX ORDER — ALBUTEROL SULFATE 2.5 MG/3ML
2.5 SOLUTION RESPIRATORY (INHALATION) EVERY 4 HOURS PRN
Refills: 12
Start: 2018-01-19 | End: 2021-01-29

## 2018-01-19 RX ORDER — CEFUROXIME AXETIL 500 MG/1
500 TABLET ORAL EVERY 12 HOURS SCHEDULED
Qty: 7 TABLET | Refills: 0
Start: 2018-01-19 | End: 2018-01-23

## 2018-01-19 RX ORDER — BISACODYL 5 MG/1
5 TABLET, DELAYED RELEASE ORAL DAILY PRN
Start: 2018-01-19 | End: 2021-06-06

## 2018-01-19 RX ORDER — PAROXETINE HYDROCHLORIDE 40 MG/1
40 TABLET, FILM COATED ORAL DAILY
Start: 2018-01-20

## 2018-01-19 RX ORDER — CHOLECALCIFEROL (VITAMIN D3) 125 MCG
5 CAPSULE ORAL NIGHTLY
Start: 2018-01-19 | End: 2020-05-21 | Stop reason: HOSPADM

## 2018-01-19 RX ORDER — SENNA AND DOCUSATE SODIUM 50; 8.6 MG/1; MG/1
1 TABLET, FILM COATED ORAL 2 TIMES DAILY
Start: 2018-01-19 | End: 2020-05-21 | Stop reason: HOSPADM

## 2018-01-19 RX ORDER — PREGABALIN 75 MG/1
75 CAPSULE ORAL EVERY 12 HOURS SCHEDULED
Start: 2018-01-19 | End: 2020-05-21 | Stop reason: HOSPADM

## 2018-01-19 RX ORDER — BISACODYL 10 MG
10 SUPPOSITORY, RECTAL RECTAL DAILY PRN
Start: 2018-01-19 | End: 2020-08-04 | Stop reason: HOSPADM

## 2018-01-19 RX ORDER — ENALAPRIL MALEATE 10 MG/1
10 TABLET ORAL
Start: 2018-01-20 | End: 2022-03-31

## 2018-01-19 RX ADMIN — PANTOPRAZOLE SODIUM 40 MG: 40 TABLET, DELAYED RELEASE ORAL at 06:03

## 2018-01-19 RX ADMIN — BUDESONIDE AND FORMOTEROL FUMARATE DIHYDRATE 2 PUFF: 80; 4.5 AEROSOL RESPIRATORY (INHALATION) at 08:56

## 2018-01-19 RX ADMIN — ENALAPRIL MALEATE 10 MG: 10 TABLET ORAL at 08:05

## 2018-01-19 RX ADMIN — OXYBUTYNIN CHLORIDE 5 MG: 5 TABLET ORAL at 08:05

## 2018-01-19 RX ADMIN — PAROXETINE HYDROCHLORIDE 40 MG: 20 TABLET, FILM COATED ORAL at 08:05

## 2018-01-19 RX ADMIN — APIXABAN 2.5 MG: 2.5 TABLET, FILM COATED ORAL at 08:05

## 2018-01-19 RX ADMIN — METOPROLOL SUCCINATE 25 MG: 25 TABLET, EXTENDED RELEASE ORAL at 08:08

## 2018-01-19 RX ADMIN — BACLOFEN 10 MG: 10 TABLET ORAL at 08:05

## 2018-01-19 RX ADMIN — PREGABALIN 75 MG: 75 CAPSULE ORAL at 09:00

## 2018-01-19 RX ADMIN — METFORMIN HYDROCHLORIDE 1000 MG: 500 TABLET ORAL at 08:05

## 2018-01-19 RX ADMIN — DOCUSATE SODIUM AND SENNOSIDES 1 TABLET: 8.6; 5 TABLET, FILM COATED ORAL at 08:05

## 2018-01-19 RX ADMIN — HYDROCODONE BITARTRATE AND ACETAMINOPHEN 1 TABLET: 5; 325 TABLET ORAL at 09:12

## 2018-01-19 RX ADMIN — HYDROCODONE BITARTRATE AND ACETAMINOPHEN 1 TABLET: 5; 325 TABLET ORAL at 03:16

## 2018-01-19 NOTE — THERAPY DISCHARGE NOTE
Acute Care - Occupational Therapy Treatment Note/Discharge   Mayte Sandoval     Patient Name: Juan Pablo Hernandez  : 1950  MRN: [id number]  Today's Date: 2018  Onset of Illness/Injury or Date of Surgery Date: 18  Date of Referral to OT: 18  Referring Physician: Yves      Admit Date: 2018    Visit Dx:     ICD-10-CM ICD-9-CM   1. Closed fracture of distal end of right femur, unspecified fracture morphology, initial encounter S72.401A 821.20   2. Type 2 diabetes mellitus without complication, without long-term current use of insulin E11.9 250.00     Patient Active Problem List   Diagnosis   • Mental status change   • Altered mental status   • Primary osteoarthritis of right foot   • Sprain of anterior talofibular ligament of right ankle   • History of total right knee replacement   • Closed fracture of right distal femur             Adult Rehabilitation Note       18 0941 18 0940 18 0944    Rehab Assessment/Intervention    Discipline occupational therapist  -JJ physical therapist  -JW physical therapist  -BP    Document Type therapy note (daily note)  -JJ therapy note (daily note)  -JW therapy note (daily note)  -BP    Subjective Information agree to therapy;complains of;pain  -JJ agree to therapy;complains of;pain  -JW agree to therapy;complains of   patient complains of stomach discomfort  -BP    Patient Effort, Rehab Treatment adequate  -JJ adequate  -JW adequate  -BP    Symptoms Noted During/After Treatment fatigue  -JJ fatigue  -JW fatigue  -BP    Precautions/Limitations fall precautions;brace on when up   TTWB  -JJ fall precautions;brace on when up   TTWB   -JW fall precautions;other (see comments);brace on when up   TTWB status  -BP    Recorded by [JJ] Claire Ortega, OTR [JW] Michelle Solomon, PT [BP] Shayy Pack, PT    Pain Assessment    Pain Assessment --   co abdominal pain, does not rate  -JJ --   c/o abdominal pain, does not rate  -JW --   c/o knee  pain with mobility, does not rate  -BP    Pain Intervention(s) Repositioned   notified nurse practitioner  -JJ  Repositioned  -BP    Response to Interventions tolerated  -JJ  tolerated  -BP    Recorded by [JJ] Claire Ortega, OTR [JW] Michelle Solomon, PT [BP] Shayy Pack, PT    Cognitive Assessment/Intervention    Personal Safety Interventions   nonskid shoes/slippers when out of bed;gait belt  -BP    Recorded by   [BP] Shayy Pack, PT    Bed Mobility, Assessment/Treatment    Bed Mobility, Assistive Device bed rails;head of bed elevated  -JJ bed rails;head of bed elevated  -JW bed rails;head of bed elevated  -BP    Bed Mob, Supine to Sit, [place] minimum assist (75% patient effort);verbal cues required  -JJ minimum assist (75% patient effort);verbal cues required  -JW moderate assist (50% patient effort);verbal cues required  -BP    Bed Mobility, Comment pt requires verbal cues for hand placement and sequencing  -JJ requires verbal cues for proper sequencing  -JW Verbal cues required for sequencing. Extended time to complte transfer  -BP    Recorded by [JJ] Claire Ortega, OTR [JW] Michelle Solomon, PT [BP] Shayy Pack, PT    Transfer Assessment/Treatment    Transfers, Bed-Chair [place] minimum assist (75% patient effort);2 person assist required;verbal cues required  -JJ minimum assist (75% patient effort);2 person assist required;verbal cues required  -JW moderate assist (50% patient effort);2 person assist required;verbal cues required  -BP    Transfers, Bed-Chair-Bed, Assist Device sliding board  -JJ sliding board  -JW sliding board  -BP    Transfer, Maintain Weight Bearing Status   able to maintain weight bearing status  -BP    Transfer, Comment requires verbal cues for sequencing . pt required extended time and cues to scoot back into chair  -JJ requires verbal cues for sequencing and proper foot placement for transfer  -JW Performed slide board transfer bed to chair with  mod A x 2. Patient requires verbal cues for sequencing. She was able to maintain TTWB status during transfer. Refuses further trials this morning.   -BP    Recorded by [JJ] Claire Ortega, OTR [JW] Michelle Solomon, PT [BP] Shayy Pack, PT    Motor Skills/Interventions    Additional Documentation   Balance Skills Training (Group)  -BP    Recorded by   [BP] Shayy Pack, PT    Balance Skills Training    Sitting-Level of Assistance   Close supervision  -BP    Sitting-Balance Support   Feet supported  -BP    Recorded by   [BP] Shayy Pack PT    Positioning and Restraints    Pre-Treatment Position in bed  -JJ in bed  -JW in bed  -BP    Post Treatment Position chair  - chair  - chair  -BP    In Chair reclined;call light within reach;with other staff;encouraged to call for assist   with nurse practitioner  - reclined;call light within reach;encouraged to call for assist   with APRN  -JW reclined;call light within reach;encouraged to call for assist  -BP    Recorded by [JJ] Claire Ortega, OTR [JW] Michelle Solomon, PT [BP] Shayy Pack, PT      01/17/18 1140 01/17/18 0901       Rehab Assessment/Intervention    Discipline physical therapist  -BP physical therapist  -BP     Document Type therapy note (daily note)  -BP therapy note (daily note)  -BP     Subjective Information agree to therapy;complains of;pain  -BP agree to therapy;complains of;pain  -BP     Patient Effort, Rehab Treatment adequate  -BP adequate  -BP     Symptoms Noted During/After Treatment increased pain;fatigue  -BP none  -BP     Symptoms Noted Comment  Patient awake and alert throughout treatment session today.   -BP     Precautions/Limitations fall precautions;other (see comments);brace on when up   TTWB R LE  -BP other (see comments);fall precautions   TTWB R LE  -BP     Specific Treatment Considerations Dr. Newberry present prior to initiating treatment. Reinforced TTWB status.   -BP Patient reports she has a ramp into  her home. She states it is w/c accessible once in the home and she does own a w/c. Per Dr. Newberry, knee immobilizer to be on with all mobility. No ROM to R knee.   -BP     Recorded by [BP] Shayy Pack, PT [BP] Shayy Pack, PT     Pain Assessment    Pain Assessment 0-10  -BP 0-10  -BP     Pain Score 6  -BP 7  -BP     Pain Type Acute pain;Surgical pain  -BP Acute pain;Surgical pain  -BP     Pain Location Knee  -BP      Pain Orientation Right  -BP      Pain Intervention(s) Repositioned  -BP      Response to Interventions tolerated  -BP      Recorded by [BP] Shayy Pack, PT [BP] Shayy Pack, PT     Cognitive Assessment/Intervention    Current Cognitive/Communication Assessment functional  -BP      Follows Commands/Answers Questions able to follow single-step instructions  -BP able to follow single-step instructions;needs cueing  -BP     Personal Safety  WNL/WFL  -BP     Personal Safety Interventions gait belt;nonskid shoes/slippers when out of bed  -BP gait belt;nonskid shoes/slippers when out of bed;fall prevention program maintained  -BP     Recorded by [BP] Shayy Pack, PT [BP] Shayy Pack, PT     Bed Mobility, Assessment/Treatment    Bed Mobility, Assistive Device  bed rails;head of bed elevated  -BP     Bed Mobility, Scoot/Bridge, [place] minimum assist (75% patient effort);verbal cues required   scooting along EOB in sitting  -BP --  -BP     Bed Mob, Supine to Sit, [place]  minimum assist (75% patient effort);moderate assist (50% patient effort);verbal cues required  -BP     Bed Mob, Sit to Supine, [place] moderate assist (50% patient effort);2 person assist required;verbal cues required;minimum assist (75% patient effort)  -BP      Bed Mobility, Comment Verbal cues required for sequencing required. Extended time to required. Patient able to scoot alone EOB while in sitting with min A. Able to maintain TTWB status while scooting.   -BP Verbal cues for sequencing.  Patient requires increased time to complte transfer.   -BP     Recorded by [BP] Shayy Pack, PT [BP] Shayy Pack, PT     Transfer Assessment/Treatment    Transfers, Sit-Stand [place] moderate assist (50% patient effort);2 person assist required;verbal cues required  -BP moderate assist (50% patient effort);2 person assist required;verbal cues required  -BP     Transfers, Stand-Sit [place] minimum assist (75% patient effort);2 person assist required;verbal cues required  -BP moderate assist (50% patient effort);2 person assist required;verbal cues required  -BP     Transfers, Sit-Stand-Sit, Assist Device rolling walker  -BP rolling walker  -BP     Transfer, Comment Verbal cues required for hand placement. Patient requires assist and verbal cues to maintain TTWB status. Unable to perform pivot transfer. Second person required to move chair and place bed behind patient. Plan to try slide board transfer at next session.   -BP Patient unable to maintain TTWB during transfer from sit to stand. With verbal cues and assist patient able to maintain TTWB with static standing. With attempts to perform pivot transfer patient places too much weight through R LE. Nursing present to move bed and place recliner behind patient.    -BP     Recorded by [BP] Shayy Pack, PT [BP] Shayy Pack PT     Gait Assessment/Treatment    Gait, Comment  unable to assess. Unable to maintain TTWB in order to attempt forward gait training.   -BP     Recorded by  [BP] Shayy Pack PT     Positioning and Restraints    Pre-Treatment Position sitting in chair/recliner  -BP in bed  -BP     Post Treatment Position bed  -BP chair  -BP     In Bed supine;call light within reach;encouraged to call for assist  -BP      In Chair  reclined;call light within reach;encouraged to call for assist;with family/caregiver;with nsg  -BP     Recorded by [BP] Shayy Pack, PT [BP] Shayy Pack PT       User Key  (r) = Recorded By,  (t) = Taken By, (c) = Cosigned By    Initials Name Effective Dates     Claire Ortega, OTR 06/22/16 -     BP Shayy Pack, PT 12/01/15 -     JW Michelle Solomon, PT 12/01/15 -                 OT Goals       01/19/18 1135 01/17/18 1211       Static Standing Balance OT STG    Static Standing Balance OT STG, Date Established  01/17/18  -SD     Static Standing Balance OT STG, Time to Achieve  by discharge  -SD     Static Standing Balance OT STG, [place] Level  minimum assist (75% patient effort)  -SD     Static Standing Balance OT STG, Assist Device  assistive device  -SD     Static Standing Balance OT STG, Additional Goal  Pt to maintain TTWB status in static standing position with min assist to allow for caregiver assistance with adl activity  -SD     Static Standing Balance OT STG, Date Goal Reviewed 01/19/18  -      Static Standing Balance OT STG, Outcome goal not met  -      Static Standing Balance OT STG, Reason Goal Not Met discharged from facility  -      Patient Education OT STG    Patient Education OT STG, Date Established  01/17/18  -SD     Patient Education OT STG, Time to Achieve  by discharge  -SD     Patient Education OT STG, Education Type  adaptive equipment mgmt  -SD     Patient Education OT STG, Education Understanding  verbalizes understanding  -SD     Patient Education OT STG, Additional Goal  Pt to verbalize compensatory strategies and use of AE for lower body adl's (pt has AE from prior surgeries)  -SD     Patient Education OT STG, Date Goal Reviewed 01/19/18  -      Patient Education OT STG Outcome goal not met  -      Patient Education OT STG, Reason Goal Not Met discharged from facility  -      Patient Education OT LTG    Patient Education OT LTG, Date Established  01/17/18  -SD     Patient Education OT LTG, Time to Achieve  by discharge  -SD     Patient Education OT LTG, Education Type  precautions per surgeon  -SD     Patient Education OT LTG, Education  Understanding  verbalizes understanding;demonstrates adequately  -SD     Patient Education OT LTG, Additional Goal  pt to verbalize/demonstrate compliance with TTWB status of RLE during standing/transfers  -SD     Patient Education OT LTG, Date Goal Reviewed 01/19/18  -      Patient Education OT LTG Outcome goal not met  -      Patient Education OT LTG, Reason Goal Not Met discharged from facility  -        User Key  (r) = Recorded By, (t) = Taken By, (c) = Cosigned By    Initials Name Provider Type    SD Leobardo Mcdaniels OTR Occupational Therapist     Claire Ortega, OTR Occupational Therapist          Occupational Therapy Education     Title: PT OT SLP Therapies (Resolved)     Topic: Occupational Therapy (Resolved)     Point: ADL training (Resolved)    Description: Instruct learner(s) on proper safety adaptation and remediation techniques during self care or transfers.   Instruct in proper use of assistive devices.    Learning Progress Summary    Learner Readiness Method Response Comment Documented by Status   Patient Acceptance E VU pt educated on trasnfers, bed mobility  01/19/18 1134 Done    Acceptance E VU Education regarding OT services, TTWB status and transfer training. SD 01/17/18 1209 Done                      User Key     Initials Effective Dates Name Provider Type Discipline    SD 06/22/16 -  Leobardo Mcdaniels OTR Occupational Therapist OT     06/22/16 -  Claire Ortega OTHANNA Occupational Therapist OT                OT Recommendation and Plan  Anticipated Equipment Needs At Discharge: wheelchair (pt may need to use a sliding board for transfers)  Anticipated Discharge Disposition: skilled nursing facility  Planned Therapy Interventions: ADL retraining, adaptive equipment training, transfer training  Therapy Frequency: 3-5 times/wk             Outcome Measures       01/19/18 0941 01/19/18 0940 01/18/18 0944    How much help from another person do you currently need...    Turning  from your back to your side while in flat bed without using bedrails?  3  -JW 2  -BP    Moving from lying on back to sitting on the side of a flat bed without bedrails?  3  -JW 2  -BP    Moving to and from a bed to a chair (including a wheelchair)?  2  -JW 2  -BP    Standing up from a chair using your arms (e.g., wheelchair, bedside chair)?  1  -JW 1  -BP    Climbing 3-5 steps with a railing?  1  -JW 1  -BP    To walk in hospital room?  1  -JW 1  -BP    AM-PAC 6 Clicks Score  11  -JW 9  -BP    How much help from another is currently needed...    Putting on and taking off regular lower body clothing? 2  -JJ      Bathing (including washing, rinsing, and drying) 2  -JJ      Toileting (which includes using toilet bed pan or urinal) 2  -JJ      Putting on and taking off regular upper body clothing 4  -JJ      Taking care of personal grooming (such as brushing teeth) 4  -JJ      Eating meals 4  -JJ      Score 18  -JJ      Functional Assessment    Outcome Measure Options  AM-PAC 6 Clicks Basic Mobility (PT)  -JW AM-PAC 6 Clicks Basic Mobility (PT)  -BP      01/17/18 1140 01/17/18 0920 01/17/18 0901    How much help from another person do you currently need...    Turning from your back to your side while in flat bed without using bedrails? 2  -BP  2  -BP    Moving from lying on back to sitting on the side of a flat bed without bedrails? 2  -BP  2  -BP    Moving to and from a bed to a chair (including a wheelchair)? 1  -BP  1  -BP    Standing up from a chair using your arms (e.g., wheelchair, bedside chair)? 2  -BP  2  -BP    Climbing 3-5 steps with a railing? 1  -BP  1  -BP    To walk in hospital room? 1  -BP  1  -BP    AM-PAC 6 Clicks Score 9  -BP  9  -BP    How much help from another is currently needed...    Putting on and taking off regular lower body clothing?  2  -SD     Bathing (including washing, rinsing, and drying)  2  -SD     Toileting (which includes using toilet bed pan or urinal)  2  -SD     Putting on and  taking off regular upper body clothing  4  -SD     Taking care of personal grooming (such as brushing teeth)  4  -SD     Eating meals  4  -SD     Score  18  -SD     Functional Assessment    Outcome Measure Options AM-PAC 6 Clicks Basic Mobility (PT)  -BP AM-PAC 6 Clicks Daily Activity (OT)  -SD AM-PAC 6 Clicks Basic Mobility (PT)  -BP      User Key  (r) = Recorded By, (t) = Taken By, (c) = Cosigned By    Initials Name Provider Type    TUCKER Mcdaniels, OTR Occupational Therapist    BILLIE Ortega, OTR Occupational Therapist    BP Shayy Pack, PT Physical Therapist    BAN Solomon, PT Physical Therapist           Time Calculation:          Time Calculation- OT       01/19/18 1137          Time Calculation- OT    OT Start Time 0940  -BILLIE        User Key  (r) = Recorded By, (t) = Taken By, (c) = Cosigned By    Initials Name Provider Type    BILLIE Ortega, OTR Occupational Therapist          Therapy Charges for Today     Code Description Service Date Service Provider Modifiers Qty    [id number]  OT SELF CARE/MGMT/TRAIN EA 15 MIN 1/19/2018 ASHUTOSH Gilbert GO 1               OT Discharge Summary  Reason for Discharge: Discharge from facility  Discharge Destination: Extended care facility - LTC    ASHUTOSH Gilbert  1/19/2018

## 2018-01-19 NOTE — PLAN OF CARE
Problem: Patient Care Overview (Adult)  Goal: Plan of Care Review  Outcome: Outcome(s) achieved Date Met: 01/19/18 01/19/18 0957   Outcome Evaluation   Outcome Summary/Follow up Plan Patient pain well controlled with PRN pain medication. Patient is wearing immobilizer. Patient aware not to bear weight on leg. Patient is comfortable at this time on 1/19/18 at 1000.     Goal: Adult Individualization and Mutuality  Outcome: Outcome(s) achieved Date Met: 01/19/18    Goal: Discharge Needs Assessment  Outcome: Outcome(s) achieved Date Met: 01/19/18      Problem: Fall Risk (Adult)  Goal: Absence of Falls  Outcome: Outcome(s) achieved Date Met: 01/19/18      Problem: Orthopaedic Fracture (Adult)  Goal: Signs and Symptoms of Listed Potential Problems Will be Absent or Manageable (Orthopaedic Fracture)  Outcome: Outcome(s) achieved Date Met: 01/19/18      Problem: Pain, Chronic (Adult)  Goal: Acceptable Pain Control/Comfort Level  Outcome: Outcome(s) achieved Date Met: 01/19/18

## 2018-01-19 NOTE — NURSING NOTE
Case Management Discharge Note    Final Note: discharged to [facility] skilled level of care.    Discharge Placement     Facility/Agency Request Status Selected? Address Phone Number Fax Number    [facility] Accepted    Yes [address] [facility] [address] [phone]    [facility] Declined     [address] [place] [address] [phone]        Other:  (private vehicle)    Discharge Codes: 03  Discharged/transferred to skilled nursing facility (SNF) with Medicare certification in anticipation of skilled care

## 2018-01-19 NOTE — PLAN OF CARE
Problem: Inpatient Physical Therapy  Goal: Bed Mobility Goal STG- PT  Outcome: Outcome(s) achieved Date Met: 01/19/18 01/16/18 1128 01/19/18 1001   Bed Mobility PT STG   Bed Mobility PT STG, Date Established 01/16/18 --    Bed Mobility PT STG, Time to Achieve 5 days --    Bed Mobility PT STG, Activity Type supine to sit/sit to supine --    Bed Mobility PT STG, [place] Level moderate assist (50% patient effort) --    Bed Mobility PT STG, Date Goal Reviewed --  01/19/18   Bed Mobility PT STG, Outcome --  goal met     Goal: Transfer Training Goal 1 STG- PT  Outcome: Unable to achieve outcome(s) by discharge Date Met: 01/19/18 01/16/18 1128 01/19/18 1001   Transfer Training PT STG   Transfer Training PT STG, Date Established 01/16/18 --    Transfer Training PT STG, Time to Achieve 5 days --    Transfer Training PT STG, Activity Type sit to stand/stand to sit --    Transfer Training PT STG, [place] Level moderate assist (50% patient effort) --    Transfer Training PT STG, Assist Device (with appropriate assistive device) --    Transfer Training PT STG, Date Goal Reviewed --  01/19/18   Transfer Training PT STG, Outcome --  goal not met   Transfer Training PT STG, Reason Goal Not Met --  discharged from facility     Goal: Transfer Training Goal 2 STG- PT  Outcome: Outcome(s) achieved Date Met: 01/19/18 01/16/18 1128 01/19/18 1001   Transfer Training 2 PT STG   Transfer Training PT 2 STG, Date Established 01/16/18 --    Transfer Training PT 2 STG, Time to Achieve 5 days --    Transfer Training PT 2 STG, Activity Type bed to chair /chair to bed --    Transfer Training PT 2 STG, [place] Level maximum assist (25% patient effort) --    Transfer Training PT 2 STG, Assist Device (with appropriate assistive device) --    Transfer Training PT 2 STG, Date Goal Reviewed --  01/19/18   Transfer Training PT 2 STG, Outcome --  goal met

## 2018-01-19 NOTE — DISCHARGE SUMMARY
ESAU SHIPLEY  1950  [id number]    Hospitalists Discharge Summary    Date of Admission: 1/13/2018  Date of Discharge:  1/19/2018    Primary Discharge Diagnoses:   1. S/P ORIF right femur secondary to acute right distal femur fracture, secondary to fall    2. Fever secondary to atelectasis    3. E-coli UTI  Secondary Discharge Diagnoses:    4. Chronic normo/normo anemia  5. DM2  6. Asthma  7. Hypertension    8. Dyslipidemia    9. MARGARET    10. Mild dementia    11. Chronic Pain   12. Urinary incontinence   13. Anxiety and depression   14. GERD  15. Vitamin D deficiency   16. Constipation  17. Obesity   PCP  Linden Zhang MD    Consults:   Consults     Date and Time Order Name Status Description    1/13/2018 1377 Inpatient Consult to Orthopedic Surgery Completed           Family History   Problem Relation Age of Onset   • Lung disease Mother    • Cancer Mother    • Heart disease Father    • Diabetes Paternal Aunt    • Diabetes Paternal Uncle    • Diabetes Paternal Grandmother    • Diabetes Paternal Grandfather      Past Medical History:   Diagnosis Date   • Anxiety    • Asthma    • CHF (congestive heart failure)    • Chronic pain disorder    • Chronic UTI    • Depression    • Diabetes    • GERD (gastroesophageal reflux disease)    • H/O CHF    • HTN (hypertension)    • Hyperlipidemia    • Incontinence    • Injury of back     spinal stenosis, chronic back pain   • Low back pain    • Lumbosacral disc disease    • MRSA infection (methicillin-resistant Staphylococcus aureus)     to left foot states approx 12-13 years ago    • Osteoarthritis    • Sleep apnea     uses cpap   • Spinal stenosis    • Stroke     pt states PMD has said she may be having mini strokes   • Vertigo      Past Surgical History:   Procedure Laterality Date   • CHOLECYSTECTOMY OPEN     • COLONOSCOPY     • ENDOSCOPY     • HYSTERECTOMY     • TOTAL KNEE ARTHROPLASTY Bilateral      Social History     Social History   • Marital status:       Spouse name: N/A   • Number of children: N/A   • Years of education: N/A     Occupational History   • Not on file.     Social History Main Topics   • Smoking status: Never Smoker   • Smokeless tobacco: Never Used   • Alcohol use No   • Drug use: No   • Sexual activity: Defer     Other Topics Concern   • Not on file     Social History Narrative     Allergies   Allergen Reactions   • Augmentin [Amoxicillin-Pot Clavulanate]    • Avelox [Moxifloxacin]    • Codeine    • Morphine And Related    • Penicillins    • Sulfa Antibiotics       Juan Pablo Hernandez   Home Medication Instructions AUDREY:[id number]    Printed on:01/19/18 1024   Medication Information                      acetaminophen (TYLENOL) 325 MG tablet  Take 2 tablets by mouth Every 6 (Six) Hours As Needed for mild pain (1-3) or moderate pain (4-6).             albuterol (PROVENTIL HFA;VENTOLIN HFA) 108 (90 BASE) MCG/ACT inhaler  Inhale 2 puffs Every 4 (Four) Hours As Needed for wheezing.             albuterol (PROVENTIL) (2.5 MG/3ML) 0.083% nebulizer solution  Take 2.5 mg by nebulization Every 4 (Four) Hours As Needed for Wheezing or Shortness of Air.             apixaban (ELIQUIS) 2.5 MG tablet tablet  Take 1 tablet by mouth Every 12 (Twelve) Hours.             baclofen (LIORESAL) 10 MG tablet  Take 10 mg by mouth Daily.             bisacodyl (DULCOLAX) 10 MG suppository  Insert 1 suppository into the rectum Daily As Needed for Constipation.             bisacodyl (DULCOLAX) 5 MG EC tablet  Take 1 tablet by mouth Daily As Needed for Constipation.             cefuroxime (CEFTIN) 500 MG tablet  Take 1 tablet by mouth Every 12 (Twelve) Hours for 7 doses. Indications: Urinary Tract Infection             Cholecalciferol (VITAMIN D3) 24966 UNITS capsule  Take 1 capsule by mouth Every 7 (Seven) Days.             enalapril (VASOTEC) 10 MG tablet  Take 1 tablet by mouth Daily.             estradiol (ESTRACE) 0.1 MG/GM vaginal cream  Insert 1 g into the vagina Daily.        "      fenofibrate 160 MG tablet  Take 160 mg by mouth Daily.             fluticasone-salmeterol (ADVAIR) 250-50 MCG/DOSE DISKUS  Inhale 2 puffs Every 12 (Twelve) Hours.             HYDROcodone-acetaminophen (NORCO) 5-325 MG per tablet  Take 1-2 tablets by mouth Every 4 (Four) Hours As Needed for Moderate Pain  or Severe Pain  for up to 14 days.             hyoscyamine sulfate (ANASPAZ) 0.125 MG tablet dispersible disintegrating tablet  Take 125 mcg by mouth Every 4 (Four) Hours As Needed.             insulin aspart (novoLOG) 100 UNIT/ML injection  Inject 0-7 Units under the skin 4 (Four) Times a Day Before Meals & at Bedtime.             melatonin 5 MG tablet tablet  Take 1 tablet by mouth Every Night.             metFORMIN (GLUCOPHAGE) 1000 MG tablet  Take 1 tablet by mouth 2 (Two) Times a Day With Meals.             metoprolol succinate XL (TOPROL XL) 25 MG 24 hr tablet  Take 1 tablet by mouth Daily.             montelukast (SINGULAIR) 10 MG tablet  Take 1 tablet by mouth Every Night.             omeprazole (priLOSEC) 40 MG capsule  Take 40 mg by mouth Daily.             oxybutynin (DITROPAN) 5 MG tablet  Take 1 tablet by mouth 2 (Two) Times a Day.             PARoxetine (PAXIL) 40 MG tablet  Take 1 tablet by mouth Daily.             polyethylene glycol (MIRALAX) pack packet  Take 17 g by mouth Daily As Needed (constipation).             pregabalin (LYRICA) 75 MG capsule  Take 1 capsule by mouth Every 12 (Twelve) Hours.             sennosides-docusate sodium (SENOKOT-S) 8.6-50 MG tablet  Take 1 tablet by mouth 2 (Two) Times a Day.             SYMBICORT 160-4.5 MCG/ACT inhaler                 Omega  Tramadol/Lyrica 1/2018 per report of 1/13/18    CC: right leg pain    History of Present Illness: Taken from [facility] on admit:  \"Patient Is a 67-year-old pleasant female presented to ER for right knee pain.  She had been having issues with right knee pain over several weeks and have been seeing her orthopedist plans for " "possible MRI to evaluate prior knee replacement.  However on Thursday she twisted her knee stepping out of the car with subsequent pain.  Pain persisted so she came to the ER today where it was noted that she had a distal femur fracture near the replacement and she has been admitted for further care.  Currently, she states that the pain is about a 5 out of 10. No other issues. Daughter at bedside reports intermittent confusion since the summer. Denies CP, SOB, n/v/d, fever or chills.\"     Hospital Course  1. S/P ORIF right femur secondary to acute right distal femur fracture, secondary to fall: Dr. Newberry managed  She will go to [facility] for rehab when precert completed  TTWB to RLE with knee immobilizer at all times, Eliquis x 4 weeks and ace only to wound.  F/U Dr. Newberry 2 weeks for wound check and repeat X-ray  F/U Linden Zhang MD 1 week      2. Fever secondary to atelectasis: resolving, no fever since 1/17/18  CXR with low lung volumes and atelectasis.   Continue ambulation/IS      3. E-coli UTI:   Ceftin x 7 days total (3 more days)  F/U Linden Zhang MD 1 week      4. Chronic normo/normo anemia:   Hemoglobin stable at 9.0 today, c/w past labs  No active blood loss, suspect multifactorial with lab/surgery/IVFs/injury and chronic  F/U Linden Zhang MD 1 week    5. DM2: Last A1C 4.69% 12/2016   Continued on metformin 1000 mg twice daily only here with glucose at goal   Previously on janumet/actos discontinued this admit  F/U Linden Zhang MD for further changes  Monitor accuchecks ac/hs, CCC diet    6. Asthma: no acute issues on home symbicort/singulair    7. Hypertension:  BP at goal on enalapril 10 mg daily/metoprolol succinate XL 25 mg daily  Monitor      8. Dyslipidemia: no medications noted  Lipid panel normal except HDL slightly low  Continue CCC diet for now, monitor by Linden Zhang MD      9. MARGARET: Noncompliant with CPAP at home. No issues with O2 saturations here. "       10. Mild dementia: no acute issues here      11. Chronic Pain:   Remains on home lyrica 75 mg twice daily  NSAIDS held post operatively  Controlled with norco currently  Monitor      12. Urinary incontinence: no acute issues on oxybutynin 5 mg twice daily     13. Anxiety and depression:  No acute issues on home paxil 40 mg daily     14. GERD: No acute issues on PPI     15. Vitamin D deficiency: no acute issues on weekly (Monday) supplement     16. Constipation:   monitor on senokot-S twice daily with miralax daily as needed/dulcolax oral and suppository as needed     17. Obesity: Body mass index is 31.5 kg/(m^2).  Monitoring with DM    Lab Results (last 72 hours)     Procedure Component Value Units Date/Time    POC Glucose Once [[id number]]  (Abnormal) Collected:  01/16/18 1114    Specimen:  Blood Updated:  01/16/18 1123     Glucose 194 (H) mg/dL     Narrative:       Meter: [id number] : [id number] Bhupendra Oconnell NURSING ASSISTANT    POC Glucose Once [[id number]]  (Abnormal) Collected:  01/16/18 1637    Specimen:  Blood Updated:  01/16/18 1647     Glucose 168 (H) mg/dL     Narrative:       Meter: [id number] : [id number] Bhupendra Oconnell NURSING ASSISTANT    POC Glucose Once [[id number]]  (Abnormal) Collected:  01/16/18 2043    Specimen:  Blood Updated:  01/16/18 2051     Glucose 184 (H) mg/dL     Narrative:       Meter: [id number] : [id number] Devon CHERRY    Hemoglobin & Hematocrit, Blood [[id number]]  (Abnormal) Collected:  01/17/18 0353    Specimen:  Blood Updated:  01/17/18 0414     Hemoglobin 9.4 (L) g/dL      Hematocrit 29.6 (L) %     POC Glucose Once [[id number]]  (Normal) Collected:  01/17/18 0714    Specimen:  Blood Updated:  01/17/18 0739     Glucose 101 mg/dL     Narrative:       Meter: [id number] : [id number] Clarence Rojas NURSING ASSISTANT    POC Glucose Once [[id number]]  (Abnormal) Collected:  01/17/18 1123    Specimen:  Blood Updated:  01/17/18 1131     Glucose 146 (H) mg/dL      Narrative:       Meter: [id number] : [id number] Clarence Rojas NURSING ASSISTANT    POC Glucose Once [[id number]]  (Normal) Collected:  01/17/18 1608    Specimen:  Blood Updated:  01/17/18 1615     Glucose 129 mg/dL     Narrative:       Meter: [id number] : [id number] Clarence Rojas NURSING ASSISTANT    Urinalysis With / Culture If Indicated - Urine, Catheter In/Out [[id number]]  (Abnormal) Collected:  01/17/18 1705    Specimen:  Urine from Urine, Catheter In/Out Updated:  01/17/18 1737     Color, UA Yellow     Appearance, UA Clear     pH, UA 6.0     Specific Gravity, UA 1.020     Glucose, UA Negative     Ketones, UA Negative     Bilirubin, UA Negative     Blood, UA Trace (A)     Protein, UA Negative     Leuk Esterase, UA Negative     Nitrite, UA Negative     Urobilinogen, UA 0.2 E.U./dL    Urinalysis, Microscopic Only - Urine, Clean Catch [[id number]]  (Abnormal) Collected:  01/17/18 1705    Specimen:  Urine from Urine, Catheter In/Out Updated:  01/17/18 1737     RBC, UA 0-2 (A) /HPF      WBC, UA None Seen /HPF      Bacteria, UA Trace (A) /HPF      Squamous Epithelial Cells, UA None Seen /HPF      Hyaline Casts, UA None Seen /LPF      Methodology Manual Light Microscopy    POC Glucose Once [[id number]]  (Abnormal) Collected:  01/17/18 2012    Specimen:  Blood Updated:  01/17/18 2018     Glucose 132 (H) mg/dL     Narrative:       Meter: [id number] : [id number] Edgar Hay    POC Glucose Once [[id number]]  (Normal) Collected:  01/18/18 0721    Specimen:  Blood Updated:  01/18/18 0729     Glucose 117 mg/dL     Narrative:       Meter: [id number] : [id number] Minviviana Mimi NURSING ASSISTANT    POC Glucose Once [[id number]]  (Normal) Collected:  01/18/18 1128    Specimen:  Blood Updated:  01/18/18 1153     Glucose 125 mg/dL     Narrative:       Meter: [id number] : [id number] Mings Mimi NURSING ASSISTANT    POC Glucose Once [[id number]]  (Normal) Collected:  01/18/18 1636    Specimen:  Blood Updated:   01/18/18 1646     Glucose 127 mg/dL     Narrative:       Meter: [id number] : [id number] Irwin Le NURSING ASSISTANT    POC Glucose Once [[id number]]  (Normal) Collected:  01/18/18 2006    Specimen:  Blood Updated:  01/18/18 2012     Glucose 108 mg/dL     Narrative:       Meter: [id number] : [id number] Edgar Hay    Hemoglobin & Hematocrit, Blood [[id number]]  (Abnormal) Collected:  01/19/18 0416    Specimen:  Blood Updated:  01/19/18 0429     Hemoglobin 9.0 (L) g/dL      Hematocrit 28.2 (L) %     Lipid Panel [[id number]]  (Abnormal) Collected:  01/19/18 0416    Specimen:  Blood Updated:  01/19/18 0518     Total Cholesterol 106 mg/dL      Triglycerides 132 mg/dL      HDL Cholesterol 31 (L) mg/dL      LDL Cholesterol  49 mg/dL      VLDL Cholesterol 26.4 mg/dL      LDL/HDL Ratio 1.57    POC Glucose Once [[id number]]  (Normal) Collected:  01/19/18 0755    Specimen:  Blood Updated:  01/19/18 0802     Glucose 102 mg/dL     Narrative:       Meter: [id number] : [id number] Clarence Rojas NURSING ASSISTANT        Imaging Results (most recent)     Procedure Component Value Units Date/Time    XR Knee 1 or 2 View Right [[id number]] Collected:  01/14/18 0803     Updated:  01/14/18 0806    Narrative:       INDICATION: Knee pain after slipping and falling on the ice last night.  Previous knee replacement.     TECHNIQUE: 2 views the right knee were obtained     FINDINGS: A knee prosthesis is seen with satisfactory alignment and  position. There is an acute fracture of the distal femur just at the  upper and of the femoral prosthesis. The fracture is mildly displaced  with minimal apex anterior angulation. The tibial component is  unremarkable. No radiodense foreign bodies are seen.       Impression:       Acute fracture of the distal femur just above the femoral  component of the knee prosthesis as described above.     This report was finalized on 1/14/2018 8:04 AM by Dr. Jose Neri MD.       XR Chest 1 View  [[id number]] Collected:  01/14/18 0804     Updated:  01/14/18 0807    Narrative:       INDICATION: Distal femoral fracture. Presurgical evaluation.     TECHNIQUE: A single AP view the chest was obtained     FINDINGS: Mild cardiomegaly is noted. Both lungs are fully expanded and  clear with normal vascular markings. No pleural fluid is seen.       Impression:       Mild cardiomegaly. No active disease     This report was finalized on 1/14/2018 8:05 AM by Dr. Jose Neri MD.       XR Femur 2 View Right [[id number]] Collected:  01/14/18 0805     Updated:  01/14/18 0808    Narrative:       INDICATION: Leg pain after slipping and falling on the ice last night     TECHNIQUE: 4 views of the right femur were obtained     FINDINGS: There is a fracture of the distal femur just above the femoral  component of the knee prosthesis. The fracture is obliquely oriented.  There is mild displacement and minimal apex anterior angulation. No  additional femoral fractures are seen. Diffuse small vessel  atherosclerotic calcification is seen in the S after a. Mild  degenerative changes are seen at the right hip.       Impression:       Minimally angulated mildly displaced distal femoral fracture     This report was finalized on 1/14/2018 8:06 AM by Dr. Jose Neri MD.       CT Lower Extremity Right Without Contrast [[id number]] Collected:  01/15/18 0844     Updated:  01/15/18 0855    Narrative:       CT RIGHT KNEE, 01/13/2018:     HISTORY:  67-year-old female admitted to the hospital through the ED today with a  right distal femur fracture above the right knee arthroplasty. Slipped  and fell on ice last evening.     TECHNIQUE:  Axial CT images of the right knee beginning at the distal third femoral  shaft level extending to the proximal third of the tibia and fibula with  multiplanar image reconstruction. Radiation dose reduction techniques  were utilized, which may include automated exposure control and/or  exposure modulation based on  body size. Previous CT and radionuclide  cardiac imaging studies here in the last year: 0.     FINDINGS:  The images confirm the presence of a nondisplaced acute oblique fracture  across the distal metaphysis of the femur. The fracture extends into the  upper and medial margin of the medial femoral condyle above the level of  the arthroplasty, but no additional significant condylar or  intracondylar distal fracture extension is demonstrated.     Right total knee arthroplasty components appear well-positioned and well  seated. There is a probable small knee joint effusion. No definite acute  fracture of the visualized proximal tibia or fibula is seen.       Impression:       1. Acute nondisplaced oblique fracture across the distal metaphysis of  the femur as detailed above. Fracture extends into the upper and medial  margin of the medial femoral condyle, but no other condylar or  intercondylar extension is seen.  2. Well-positioned right total knee arthroplasty.     This report was finalized on 1/15/2018 8:53 AM by Dr. Lang Corona MD.       XR Knee 1 or 2 View Right [[id number]] Collected:  01/16/18 0943     Updated:  01/16/18 0946    Narrative:       POSTOP RIGHT KNEE, 01/15/2018:     HISTORY:   ORIF right distal femur fracture.     TECHNIQUE:  AP and crosstable lateral radiographs of the right knee were obtained  portably following surgery.     FINDINGS:  Lateral plate and screw fixation hardware appears well positioned  traversing well aligned distal femur fracture above the patient's  arthroplasty.       Impression:       Satisfactory postoperative appearance following right knee surgery.     This report was finalized on 1/16/2018 9:44 AM by Dr. Lang Corona MD.       XR Femur 2 View Right [[id number]] Collected:  01/16/18 0944     Updated:  01/16/18 0947    Narrative:       FLUOROSCOPY DURING RIGHT KNEE SURGERY, 01/15/2018:     HISTORY:  Fluoroscopy during ORIF right distal femur fracture.      REPORT:  C-arm fluoroscopy was provided by radiology personnel in the OR during  right knee surgery.. Fluoroscopy time was recorded as 57 seconds. 8 spot  film images were recorded for documentation purposes. Please see  operative note for details.     This report was finalized on 1/16/2018 9:45 AM by Dr. Lang Corona MD.       XR Chest PA & Lateral [[id number]] Collected:  01/17/18 1628     Updated:  01/17/18 1633    Narrative:       CHEST X-RAY, 01/17/2018:     HISTORY:   67-year-old female 2 days postop surgery for femur fracture. Newly noted  fever.     TECHNIQUE:  AP and lateral upright chest series.     FINDINGS:  Low lung volumes with mild bibasilar atelectasis. No visible airspace  consolidation or pleural effusion. Mild to moderate cardiomegaly.  Pulmonary vascularity is normal.     Old T11 and L1 vertebral compression fractures without appreciable  change since 11/21/2017.       Impression:       1. No active disease.  2. Shallow lung expansion with mild basilar atelectasis.  3. Cardiomegaly.     This report was finalized on 1/17/2018 4:31 PM by Dr. Lang Corona MD.           PROCEDURES  Procedure(s):  FEMUR DISTAL OPEN REDUCTION INTERNAL FIXATION    Condition on Discharge:  Stable    Physical Exam at Discharge  Temp:  [97.5 °F (36.4 °C)-98.9 °F (37.2 °C)] 98 °F (36.7 °C)  Heart Rate:  [81-87] 84  Resp:  [16] 16  BP: (105-142)/(66-81) 124/81    Physical Exam:  Physical Exam   Constitutional: Patient appears well-developed and well-nourished and in no acute distress, obese   HEENT:   Head: Normocephalic and atraumatic.   Eyes:  Pupils are equal, round, and reactive to light. EOM are intact. Sclera are anicteric and non-injected.  Mouth and Throat: Patient has moist mucous membranes. Oropharynx is clear of any erythema or exudate.     Neck: Neck supple. No JVD present. No thyromegaly present. No lymphadenopathy present.  Cardiovascular: Regular rate, regular rhythm, S1 normal and S2 normal.   Exam reveals no gallop and no friction rub.  No murmur heard.  Pulmonary/Chest: Lungs are clear to auscultation bilaterally. No respiratory distress. No wheezes. No rhonchi. No rales.   Abdominal: Soft. Bowel sounds are normal. No distension and no mass. There is no hepatosplenomegaly. There is no tenderness.   Musculoskeletal: decreased muscle bulk  Extremities: RLE in ace/immobilizer, skin not visualized. No edema. Pulses are palpable in all 4 extremities.  Neurological: Patient is alert and oriented to person, place, and time. Cranial nerves II-XII are grossly intact with no focal deficits.  Skin: Skin is warm. No rash noted. Nails show no clubbing.  No cyanosis or erythema.    Discharge Disposition  [facility]    Visiting Nurse:    As per facility    Home PT/OT:  As per facility    Home Safety Evaluation:  As per facility    DME  Needs sliding board for all transfers. Immobilizer at all times  And TBD by therapy    Discharge Diet:         Dietary Orders            Start     Ordered    01/15/18 1705  Diet Regular; Consistent Carbohydrate, Cardiac  Diet Effective Now     Question Answer Comment   Diet Texture / Consistency Regular    Common Modifiers Consistent Carbohydrate    Common Modifiers Cardiac        01/15/18 1704        Activity at Discharge:  TTWB    Pre-discharge education  Diabetic, Wound Care, medications, follow up    Follow-up Appointments  Additional Instructions for the Follow-ups that You Need to Schedule     Discharge Follow-up with PCP    As directed    Follow Up Details:  1 week           Discharge Follow-up with Specified Provider: Dr. Newberry; 2 Weeks    As directed    To:  Dr. Newberry    Follow Up:  2 Weeks    Follow Up Details:  will need wound check and repeat X-ray prior                     Test Results Pending at Discharge: NONE   ERNESTINA Rodriguez  01/19/18  10:24 AM    Time: Discharge over 30 min (if over 30 minutes give explanation as to why it took greater than 30  minutes)  Secondary to:  Review of notes/labs/diagnostics  Coordination of care/follow up  Medication reconciliation  D/W patient and family

## 2018-01-19 NOTE — THERAPY DISCHARGE NOTE
Acute Care - Physical Therapy Treatment Note/Discharge   [place]     Patient Name: Juan Pablo Hernandez  : 1950  MRN: [id number]  Today's Date: 2018  Onset of Illness/Injury or Date of Surgery Date: 18  Date of Referral to PT: 01/15/18  Referring Physician: Yves    Admit Date: 2018    Visit Dx:    ICD-10-CM ICD-9-CM   1. Closed fracture of distal end of right femur, unspecified fracture morphology, initial encounter S72.401A 821.20     Patient Active Problem List   Diagnosis   • Mental status change   • Altered mental status   • Primary osteoarthritis of right foot   • Sprain of anterior talofibular ligament of right ankle   • History of total right knee replacement   • Closed fracture of right distal femur       Physical Therapy Education     Title: PT OT SLP Therapies (Done)     Topic: Physical Therapy (Resolved)     Point: Mobility training (Resolved)    Learning Progress Summary    Learner Readiness Method Response Comment Documented by Status   Patient Acceptance E VU  JW 18 1001 Done    Acceptance E VU  BP 18 1142 Done    Acceptance E VU  BP 18 1449 Done    Acceptance E VU  BP 18 1032 Done    Acceptance E NR  BP 18 1124 Active               Point: Precautions (Resolved)    Learning Progress Summary    Learner Readiness Method Response Comment Documented by Status   Patient Acceptance E VU  JW 18 1001 Done    Acceptance E VU  BP 18 1142 Done    Acceptance E VU  BP 18 1449 Done    Acceptance E VU  BP 18 1032 Done    Acceptance E NR  BP 18 1124 Active                      User Key     Initials Effective Dates Name Provider Type Discipline    BP 12/01/15 -  Shayy Pack, PT Physical Therapist PT    JW 12/01/15 -  Michelle Solomon, PT Physical Therapist PT                    IP PT Goals       18 1001 18 1128       Bed Mobility PT STG    Bed Mobility PT STG, Date Established  18  -BP     Bed Mobility PT STG,  Time to Achieve  5 days  -BP     Bed Mobility PT STG, Activity Type  supine to sit/sit to supine  -BP     Bed Mobility PT STG, [place] Level  moderate assist (50% patient effort)  -BP     Bed Mobility PT STG, Date Goal Reviewed 01/19/18  -JW      Bed Mobility PT STG, Outcome goal met  -JW      Transfer Training PT STG    Transfer Training PT STG, Date Established  01/16/18  -BP     Transfer Training PT STG, Time to Achieve  5 days  -BP     Transfer Training PT STG, Activity Type  sit to stand/stand to sit  -BP     Transfer Training PT STG, [place] Level  moderate assist (50% patient effort)  -BP     Transfer Training PT STG, Assist Device  --   with appropriate assistive device  -BP     Transfer Training PT STG, Date Goal Reviewed 01/19/18  -JW      Transfer Training PT STG, Outcome goal not met  -JW      Transfer Training PT STG, Reason Goal Not Met discharged from facility  -JW      Transfer Training 2 PT STG    Transfer Training PT 2 STG, Date Established  01/16/18  -BP     Transfer Training PT 2 STG, Time to Achieve  5 days  -BP     Transfer Training PT 2 STG, Activity Type  bed to chair /chair to bed  -BP     Transfer Training PT 2 STG, [place] Level  maximum assist (25% patient effort)  -BP     Transfer Training PT 2 STG, Assist Device  --   with appropriate assistive device  -BP     Transfer Training PT 2 STG, Date Goal Reviewed 01/19/18  -JW      Transfer Training PT 2 STG, Outcome goal met  -        User Key  (r) = Recorded By, (t) = Taken By, (c) = Cosigned By    Initials Name Provider Type    BP Shayy Pack, PT Physical Therapist    BAN Solomon, PT Physical Therapist              Adult Rehabilitation Note       01/19/18 0940 01/18/18 0944 01/17/18 1140    Rehab Assessment/Intervention    Discipline physical therapist  -JW physical therapist  -BP physical therapist  -BP    Document Type therapy note (daily note)  -JW therapy note (daily note)  -BP therapy note (daily note)  -BP     Subjective Information agree to therapy;complains of;pain  -JW agree to therapy;complains of   patient complains of stomach discomfort  -BP agree to therapy;complains of;pain  -BP    Patient Effort, Rehab Treatment adequate  -JW adequate  -BP adequate  -BP    Symptoms Noted During/After Treatment fatigue  -JW fatigue  -BP increased pain;fatigue  -BP    Precautions/Limitations fall precautions;brace on when up   TTWB   -JW fall precautions;other (see comments);brace on when up   TTWB status  -BP fall precautions;other (see comments);brace on when up   TTWB R LE  -BP    Specific Treatment Considerations   Dr. Newbrery present prior to initiating treatment. Reinforced TTWB status.   -BP    Recorded by [JW] Michelle Solomon PT [BP] Shayy Pack, PT [BP] Shayy Pack, PT    Pain Assessment    Pain Assessment --   c/o abdominal pain, does not rate  -JW --   c/o knee pain with mobility, does not rate  -BP 0-10  -BP    Pain Score   6  -BP    Pain Type   Acute pain;Surgical pain  -BP    Pain Location   Knee  -BP    Pain Orientation   Right  -BP    Pain Intervention(s)  Repositioned  -BP Repositioned  -BP    Response to Interventions  tolerated  -BP tolerated  -BP    Recorded by [JW] Michelle Solomon, PT [BP] Shayy Pack, PT [BP] Shayy Pack, PT    Cognitive Assessment/Intervention    Current Cognitive/Communication Assessment   functional  -BP    Follows Commands/Answers Questions   able to follow single-step instructions  -BP    Personal Safety Interventions  nonskid shoes/slippers when out of bed;gait belt  -BP gait belt;nonskid shoes/slippers when out of bed  -BP    Recorded by  [BP] Shayy Pack, PT [BP] Shayy Pack, PT    Bed Mobility, Assessment/Treatment    Bed Mobility, Assistive Device bed rails;head of bed elevated  -JW bed rails;head of bed elevated  -BP     Bed Mobility, Scoot/Bridge, [place]   minimum assist (75% patient effort);verbal cues required   scooting along EOB in sitting   -BP    Bed Mob, Supine to Sit, [place] minimum assist (75% patient effort);verbal cues required  -JW moderate assist (50% patient effort);verbal cues required  -BP     Bed Mob, Sit to Supine, [place]   moderate assist (50% patient effort);2 person assist required;verbal cues required;minimum assist (75% patient effort)  -BP    Bed Mobility, Comment requires verbal cues for proper sequencing  - Verbal cues required for sequencing. Extended time to complte transfer  -BP Verbal cues required for sequencing required. Extended time to required. Patient able to scoot alone EOB while in sitting with min A. Able to maintain TTWB status while scooting.   -BP    Recorded by [JW] Michelle Solomon, PT [BP] Shayy Pack, PT [BP] Shayy Pack, PT    Transfer Assessment/Treatment    Transfers, Bed-Chair [place] minimum assist (75% patient effort);2 person assist required;verbal cues required  -JW moderate assist (50% patient effort);2 person assist required;verbal cues required  -BP     Transfers, Bed-Chair-Bed, Assist Device sliding board  - sliding board  -BP     Transfers, Sit-Stand [place]   moderate assist (50% patient effort);2 person assist required;verbal cues required  -BP    Transfers, Stand-Sit [place]   minimum assist (75% patient effort);2 person assist required;verbal cues required  -BP    Transfers, Sit-Stand-Sit, Assist Device   rolling walker  -BP    Transfer, Maintain Weight Bearing Status  able to maintain weight bearing status  -BP     Transfer, Comment requires verbal cues for sequencing and proper foot placement for transfer  - Performed slide board transfer bed to chair with mod A x 2. Patient requires verbal cues for sequencing. She was able to maintain TTWB status during transfer. Refuses further trials this morning.   -BP Verbal cues required for hand placement. Patient requires assist and verbal cues to maintain TTWB status. Unable to perform pivot transfer. Second  person required to move chair and place bed behind patient. Plan to try slide board transfer at next session.   -BP    Recorded by [JW] Michelle Solomon, PT [BP] Shayy Pack, PT [BP] Shayy Pack, PT    Motor Skills/Interventions    Additional Documentation  Balance Skills Training (Group)  -BP     Recorded by  [BP] Shayy Pack, PT     Balance Skills Training    Sitting-Level of Assistance  Close supervision  -BP     Sitting-Balance Support  Feet supported  -BP     Recorded by  [BP] Shayy Pack, PT     Positioning and Restraints    Pre-Treatment Position in bed  -JW in bed  -BP sitting in chair/recliner  -BP    Post Treatment Position chair  -JW chair  -BP bed  -BP    In Bed   supine;call light within reach;encouraged to call for assist  -BP    In Chair reclined;call light within reach;encouraged to call for assist   with APRN  -JW reclined;call light within reach;encouraged to call for assist  -BP     Recorded by [JW] Michelle Solomon, PT [BP] Shayy Pack, PT [BP] Shayy Pack, PT      01/17/18 0901          Rehab Assessment/Intervention    Discipline physical therapist  -BP      Document Type therapy note (daily note)  -BP      Subjective Information agree to therapy;complains of;pain  -BP      Patient Effort, Rehab Treatment adequate  -BP      Symptoms Noted During/After Treatment none  -BP      Symptoms Noted Comment Patient awake and alert throughout treatment session today.   -BP      Precautions/Limitations other (see comments);fall precautions   TTWB R LE  -BP      Specific Treatment Considerations Patient reports she has a ramp into her home. She states it is w/c accessible once in the home and she does own a w/c. Per Dr. Newberry, knee immobilizer to be on with all mobility. No ROM to R knee.   -BP      Recorded by [BP] Shayy Pack, PT      Pain Assessment    Pain Assessment 0-10  -BP      Pain Score 7  -BP      Pain Type Acute pain;Surgical pain  -BP      Recorded by [BP]  Shayy Pack, PT      Cognitive Assessment/Intervention    Follows Commands/Answers Questions able to follow single-step instructions;needs cueing  -BP      Personal Safety WNL/WFL  -BP      Personal Safety Interventions gait belt;nonskid shoes/slippers when out of bed;fall prevention program maintained  -BP      Recorded by [BP] Shayy Pack, PT      Bed Mobility, Assessment/Treatment    Bed Mobility, Assistive Device bed rails;head of bed elevated  -BP      Bed Mobility, Scoot/Bridge, [place] --  -BP      Bed Mob, Supine to Sit, [place] minimum assist (75% patient effort);moderate assist (50% patient effort);verbal cues required  -BP      Bed Mobility, Comment Verbal cues for sequencing. Patient requires increased time to complte transfer.   -BP      Recorded by [BP] Shayy Pack, PT      Transfer Assessment/Treatment    Transfers, Sit-Stand [place] moderate assist (50% patient effort);2 person assist required;verbal cues required  -BP      Transfers, Stand-Sit [place] moderate assist (50% patient effort);2 person assist required;verbal cues required  -BP      Transfers, Sit-Stand-Sit, Assist Device rolling walker  -BP      Transfer, Comment Patient unable to maintain TTWB during transfer from sit to stand. With verbal cues and assist patient able to maintain TTWB with static standing. With attempts to perform pivot transfer patient places too much weight through R LE. Nursing present to move bed and place recliner behind patient.    -BP      Recorded by [BP] Shayy Pack, PT      Gait Assessment/Treatment    Gait, Comment unable to assess. Unable to maintain TTWB in order to attempt forward gait training.   -BP      Recorded by [BP] Shayy Pack, PT      Positioning and Restraints    Pre-Treatment Position in bed  -BP      Post Treatment Position chair  -BP      In Chair reclined;call light within reach;encouraged to call for assist;with family/caregiver;with nsg  -BP       Recorded by [BP] Shayy Pack, PT        User Key  (r) = Recorded By, (t) = Taken By, (c) = Cosigned By    Initials Name Effective Dates    BP Shayy Pack, PT 12/01/15 -     JW Michelle Turnercarlos alberto, PT 12/01/15 -           PT Recommendation and Plan  Anticipated Discharge Disposition: skilled nursing facility  Planned Therapy Interventions: bed mobility training, home exercise program, patient/family education, strengthening, transfer training  PT Frequency: daily, 2 times/day             Outcome Measures       01/19/18 0940 01/18/18 0944 01/17/18 1140    How much help from another person do you currently need...    Turning from your back to your side while in flat bed without using bedrails? 3  -JW 2  -BP 2  -BP    Moving from lying on back to sitting on the side of a flat bed without bedrails? 3  -JW 2  -BP 2  -BP    Moving to and from a bed to a chair (including a wheelchair)? 2  -JW 2  -BP 1  -BP    Standing up from a chair using your arms (e.g., wheelchair, bedside chair)? 1  -JW 1  -BP 2  -BP    Climbing 3-5 steps with a railing? 1  -JW 1  -BP 1  -BP    To walk in hospital room? 1  - 1  -BP 1  -BP    AM-PAC 6 Clicks Score 11  - 9  -BP 9  -BP    Functional Assessment    Outcome Measure Options AM-PAC 6 Clicks Basic Mobility (PT)  -JW AM-PAC 6 Clicks Basic Mobility (PT)  -BP AM-PAC 6 Clicks Basic Mobility (PT)  -BP      01/17/18 0920 01/17/18 0901       How much help from another person do you currently need...    Turning from your back to your side while in flat bed without using bedrails?  2  -BP     Moving from lying on back to sitting on the side of a flat bed without bedrails?  2  -BP     Moving to and from a bed to a chair (including a wheelchair)?  1  -BP     Standing up from a chair using your arms (e.g., wheelchair, bedside chair)?  2  -BP     Climbing 3-5 steps with a railing?  1  -BP     To walk in hospital room?  1  -BP     AM-PAC 6 Clicks Score  9  -BP     How much help from another is  currently needed...    Putting on and taking off regular lower body clothing? 2  -SD      Bathing (including washing, rinsing, and drying) 2  -SD      Toileting (which includes using toilet bed pan or urinal) 2  -SD      Putting on and taking off regular upper body clothing 4  -SD      Taking care of personal grooming (such as brushing teeth) 4  -SD      Eating meals 4  -SD      Score 18  -SD      Functional Assessment    Outcome Measure Options AM-PAC 6 Clicks Daily Activity (OT)  -SD AM-PAC 6 Clicks Basic Mobility (PT)  -BP       User Key  (r) = Recorded By, (t) = Taken By, (c) = Cosigned By    Initials Name Provider Type    TUCKER Mcdaniels, OTR Occupational Therapist    BP Shayy Pack, PT Physical Therapist    BAN Solomon PT Physical Therapist           Time Calculation:         PT Charges       01/19/18 1002          Time Calculation    Start Time 0940  -      Stop Time 0950  -      Time Calculation (min) 10 min  -      PT Received On 01/19/18  -BAN        User Key  (r) = Recorded By, (t) = Taken By, (c) = Cosigned By    Initials Name Provider Type    BAN Solomon PT Physical Therapist          Therapy Charges for Today     Code Description Service Date Service Provider Modifiers Qty    33239792244 HC PT THER PROC EA 15 MIN 1/19/2018 Michelle Solomon PT GP 1          PT G-Codes  Outcome Measure Options: AM-PAC 6 Clicks Basic Mobility (PT)    PT Discharge Summary  Anticipated Discharge Disposition: skilled nursing facility  Reason for Discharge: Discharge from facility  Outcomes Achieved: Patient able to partially acheive established goals  Patient able to perform sliding board transfers with min assist x2 with verbal cues for safety and sequencing.  Patient has progressed with physical therapy during admission to hospital and demonstrates ability to maintain TTWB status during sliding board transfers.  Patient has attempted sit to stand transfers, however unable to maintain TTWB and  displays decreased coordination/strength to maintain upright posture.  Per ERNESTINA, pt to discharge to SNF today.  Michelle Solomon, PT  1/19/2018

## 2018-01-19 NOTE — PLAN OF CARE
Problem: Inpatient Occupational Therapy  Goal: Static Standing Balance Goal OT- STG  Outcome: Unable to achieve outcome(s) by discharge Date Met: 01/19/18 01/17/18 1211 01/19/18 1135   Static Standing Balance OT STG   Static Standing Balance OT STG, Date Established 01/17/18 --    Static Standing Balance OT STG, Time to Achieve by discharge --    Static Standing Balance OT STG, [place] Level minimum assist (75% patient effort) --    Static Standing Balance OT STG, Assist Device assistive device --    Static Standing Balance OT STG, Additional Goal Pt to maintain TTWB status in static standing position with min assist to allow for caregiver assistance with adl activity --    Static Standing Balance OT STG, Date Goal Reviewed --  01/19/18   Static Standing Balance OT STG, Outcome --  goal not met   Static Standing Balance OT STG, Reason Goal Not Met --  discharged from facility     Goal: Patient Education Goal STG- OT  Outcome: Unable to achieve outcome(s) by discharge Date Met: 01/19/18 01/17/18 1211 01/19/18 1135   Patient Education OT STG   Patient Education OT STG, Date Established 01/17/18 --    Patient Education OT STG, Time to Achieve by discharge --    Patient Education OT STG, Education Type adaptive equipment mgmt --    Patient Education OT STG, Education Understanding verbalizes understanding --    Patient Education OT STG, Additional Goal Pt to verbalize compensatory strategies and use of AE for lower body adl's (pt has AE from prior surgeries) --    Patient Education OT STG, Date Goal Reviewed --  01/19/18   Patient Education OT STG Outcome --  goal not met   Patient Education OT STG, Reason Goal Not Met --  discharged from facility     Goal: Patient Education Goal LTG- OT  Outcome: Unable to achieve outcome(s) by discharge Date Met: 01/19/18 01/17/18 1211 01/19/18 1135   Patient Education OT LTG   Patient Education OT LTG, Date Established 01/17/18 --    Patient Education OT LTG, Time to  Achieve by discharge --    Patient Education OT LTG, Education Type precautions per surgeon --    Patient Education OT LTG, Education Understanding verbalizes understanding;demonstrates adequately --    Patient Education OT LTG, Additional Goal pt to verbalize/demonstrate compliance with TTWB status of RLE during standing/transfers --    Patient Education OT LTG, Date Goal Reviewed --  01/19/18   Patient Education OT LTG Outcome --  goal not met   Patient Education OT LTG, Reason Goal Not Met --  discharged from facility

## 2018-01-19 NOTE — NURSING NOTE
Continued Stay Note  [facility]     Patient Name: Juan Pablo Hernandez  MRN: [id number]  Today's Date: 1/19/2018    Admit Date: 1/13/2018          Discharge Plan       01/19/18 0943    Case Management/Social Work Plan    Additional Comments received call from [facility] and they have received precert. informed charge nurse Lida. spoke with patient at bedside. updated IMM. she stated her  is on his way to hospital. patient is appropriate for ambulance transport. nurse to call report to [phone]. will continue to follow.              Discharge Codes     None            Shazia Read RN

## 2018-01-19 NOTE — NURSING NOTE
EMS arrived to unit to transport patient to [facility]. Report called to ARMANDO Van. Patient safely transferred at this time.

## 2018-01-19 NOTE — PAYOR COMM NOTE
"Esau Hernandez (67 y.o. Female)     ATTN: EL JANG  AUTH#[id number]  FAXING DISCHARGE DATE. THANK YOU.       Date of Birth Social Security Number Address Home Phone MRN    1950  [id number] [address] [phone] [id number]    [employer] Marital Status          Episcopalian        Admission Date Admission Type Admitting Provider Attending Provider Department, Room/Bed    1/13/18 Emergency [place], Nahun ERICKSON, [facility] MED SURG, 1413/1    Discharge Date Discharge Disposition Discharge Destination        1/19/2018 Skilled Nursing Facility (DC - External)             Attending Provider: (none)    Allergies:  Augmentin [Amoxicillin-pot Clavulanate], Avelox [Moxifloxacin], Codeine, Morphine And Related, Penicillins, Sulfa Antibiotics    Isolation:  None   Infection:  None   Code Status:  Prior    Ht:  160 cm (63\")   Wt:  80.6 kg (177 lb 12.8 oz)    Admission Cmt:  None   Principal Problem:  None                Active Insurance as of 1/13/2018     Primary Coverage     Payor Plan Insurance Group Employer/Plan Group    [facility]      Payor Plan Address Payor Plan Phone Number Effective From Effective To    [address] [address] [phone] 1/2/2018     [place] [address]       Subscriber Name Subscriber Birth Date Member ID       ESAU HERNANDZE 1950 [id number]           Secondary Coverage     Payor Plan Insurance Group Employer/Plan Group    [employer] [place] [place] AETNA BETTER [facility]      Payor Plan Address Payor Plan Phone Number Effective From Effective To    [address] [address]  11/1/2017     [place] [address]       Subscriber Name Subscriber Birth Date Member ID       ESAU HERNANDEZ 1950 [id number]                 Emergency Contacts      (Rel.) Home Phone Work Phone Mobile Phone    Jazzy Walters (Daughter) -- [phone]    Daniel Hernandez (Spouse) [phone] -- --               Discharge Summary "      ERNESTINA Kirk at 1/19/2018  9:58 AM     Attestation signed by Nicolle Mccarthy DO at 1/19/2018 10:31 AM        Agree with ARNP.  Concur with assessment and plan.                               ESAU S QUINTEN  1950  [id number]    Hospitalists Discharge Summary    Date of Admission: 1/13/2018  Date of Discharge:  1/19/2018    Primary Discharge Diagnoses:   1. S/P ORIF right femur secondary to acute right distal femur fracture, secondary to fall    2. Fever secondary to atelectasis    3. E-coli UTI  Secondary Discharge Diagnoses:    4. Chronic normo/normo anemia  5. DM2  6. Asthma  7. Hypertension    8. Dyslipidemia    9. MARGARET    10. Mild dementia    11. Chronic Pain   12. Urinary incontinence   13. Anxiety and depression   14. GERD  15. Vitamin D deficiency   16. Constipation  17. Obesity   PCP  Linden Zhang MD    Consults:   Consults     Date and Time Order Name Status Description    1/13/2018 1657 Inpatient Consult to Orthopedic Surgery Completed           Family History   Problem Relation Age of Onset   • Lung disease Mother    • Cancer Mother    • Heart disease Father    • Diabetes Paternal Aunt    • Diabetes Paternal Uncle    • Diabetes Paternal Grandmother    • Diabetes Paternal Grandfather      Past Medical History:   Diagnosis Date   • Anxiety    • Asthma    • CHF (congestive heart failure)    • Chronic pain disorder    • Chronic UTI    • Depression    • Diabetes    • GERD (gastroesophageal reflux disease)    • H/O CHF    • HTN (hypertension)    • Hyperlipidemia    • Incontinence    • Injury of back     spinal stenosis, chronic back pain   • Low back pain    • Lumbosacral disc disease    • MRSA infection (methicillin-resistant Staphylococcus aureus)     to left foot states approx 12-13 years ago    • Osteoarthritis    • Sleep apnea     uses cpap   • Spinal stenosis    • Stroke     pt states PMD has said she may be having mini strokes   • Vertigo      Past Surgical History:    Procedure Laterality Date   • CHOLECYSTECTOMY OPEN     • COLONOSCOPY     • ENDOSCOPY     • HYSTERECTOMY     • TOTAL KNEE ARTHROPLASTY Bilateral      Social History     Social History   • Marital status:      Spouse name: N/A   • Number of children: N/A   • Years of education: N/A     Occupational History   • Not on file.     Social History Main Topics   • Smoking status: Never Smoker   • Smokeless tobacco: Never Used   • Alcohol use No   • Drug use: No   • Sexual activity: Defer     Other Topics Concern   • Not on file     Social History Narrative     Allergies   Allergen Reactions   • Augmentin [Amoxicillin-Pot Clavulanate]    • Avelox [Moxifloxacin]    • Codeine    • Morphine And Related    • Penicillins    • Sulfa Antibiotics       Juan Pablo Hernandez   Home Medication Instructions AUDREY:[id number]    Printed on:01/19/18 1024   Medication Information                      acetaminophen (TYLENOL) 325 MG tablet  Take 2 tablets by mouth Every 6 (Six) Hours As Needed for mild pain (1-3) or moderate pain (4-6).             albuterol (PROVENTIL HFA;VENTOLIN HFA) 108 (90 BASE) MCG/ACT inhaler  Inhale 2 puffs Every 4 (Four) Hours As Needed for wheezing.             albuterol (PROVENTIL) (2.5 MG/3ML) 0.083% nebulizer solution  Take 2.5 mg by nebulization Every 4 (Four) Hours As Needed for Wheezing or Shortness of Air.             apixaban (ELIQUIS) 2.5 MG tablet tablet  Take 1 tablet by mouth Every 12 (Twelve) Hours.             baclofen (LIORESAL) 10 MG tablet  Take 10 mg by mouth Daily.             bisacodyl (DULCOLAX) 10 MG suppository  Insert 1 suppository into the rectum Daily As Needed for Constipation.             bisacodyl (DULCOLAX) 5 MG EC tablet  Take 1 tablet by mouth Daily As Needed for Constipation.             cefuroxime (CEFTIN) 500 MG tablet  Take 1 tablet by mouth Every 12 (Twelve) Hours for 7 doses. Indications: Urinary Tract Infection             Cholecalciferol (VITAMIN D3) 18306 UNITS  capsule  Take 1 capsule by mouth Every 7 (Seven) Days.             enalapril (VASOTEC) 10 MG tablet  Take 1 tablet by mouth Daily.             estradiol (ESTRACE) 0.1 MG/GM vaginal cream  Insert 1 g into the vagina Daily.             fenofibrate 160 MG tablet  Take 160 mg by mouth Daily.             fluticasone-salmeterol (ADVAIR) 250-50 MCG/DOSE DISKUS  Inhale 2 puffs Every 12 (Twelve) Hours.             HYDROcodone-acetaminophen (NORCO) 5-325 MG per tablet  Take 1-2 tablets by mouth Every 4 (Four) Hours As Needed for Moderate Pain  or Severe Pain  for up to 14 days.             hyoscyamine sulfate (ANASPAZ) 0.125 MG tablet dispersible disintegrating tablet  Take 125 mcg by mouth Every 4 (Four) Hours As Needed.             insulin aspart (novoLOG) 100 UNIT/ML injection  Inject 0-7 Units under the skin 4 (Four) Times a Day Before Meals & at Bedtime.             melatonin 5 MG tablet tablet  Take 1 tablet by mouth Every Night.             metFORMIN (GLUCOPHAGE) 1000 MG tablet  Take 1 tablet by mouth 2 (Two) Times a Day With Meals.             metoprolol succinate XL (TOPROL XL) 25 MG 24 hr tablet  Take 1 tablet by mouth Daily.             montelukast (SINGULAIR) 10 MG tablet  Take 1 tablet by mouth Every Night.             omeprazole (priLOSEC) 40 MG capsule  Take 40 mg by mouth Daily.             oxybutynin (DITROPAN) 5 MG tablet  Take 1 tablet by mouth 2 (Two) Times a Day.             PARoxetine (PAXIL) 40 MG tablet  Take 1 tablet by mouth Daily.             polyethylene glycol (MIRALAX) pack packet  Take 17 g by mouth Daily As Needed (constipation).             pregabalin (LYRICA) 75 MG capsule  Take 1 capsule by mouth Every 12 (Twelve) Hours.             sennosides-docusate sodium (SENOKOT-S) 8.6-50 MG tablet  Take 1 tablet by mouth 2 (Two) Times a Day.             SYMBICORT 160-4.5 MCG/ACT inhaler                 Omega  Tramadol/Lyrica 1/2018 per report of 1/13/18    CC: right leg pain    History of Present  "Illness: Taken from [facility] on admit:  \"Patient Is a 67-year-old pleasant female presented to ER for right knee pain.  She had been having issues with right knee pain over several weeks and have been seeing her orthopedist plans for possible MRI to evaluate prior knee replacement.  However on Thursday she twisted her knee stepping out of the car with subsequent pain.  Pain persisted so she came to the ER today where it was noted that she had a distal femur fracture near the replacement and she has been admitted for further care.  Currently, she states that the pain is about a 5 out of 10. No other issues. Daughter at bedside reports intermittent confusion since the summer. Denies CP, SOB, n/v/d, fever or chills.\"     Hospital Course  1. S/P ORIF right femur secondary to acute right distal femur fracture, secondary to fall: Dr. Newberry managed  She will go to [facility] for rehab when precert completed  TTWB to RLE with knee immobilizer at all times, Eliquis x 4 weeks and ace only to wound.  F/U Dr. Newberry 2 weeks for wound check and repeat X-ray  F/U Linden Zhang MD 1 week      2. Fever secondary to atelectasis: resolving, no fever since 1/17/18  CXR with low lung volumes and atelectasis.   Continue ambulation/IS      3. E-coli UTI:   Ceftin x 7 days total (3 more days)  F/U Linden Zhang MD 1 week      4. Chronic normo/normo anemia:   Hemoglobin stable at 9.0 today, c/w past labs  No active blood loss, suspect multifactorial with lab/surgery/IVFs/injury and chronic  F/U Linden Zhang MD 1 week    5. DM2: Last A1C 4.69% 12/2016   Continued on metformin 1000 mg twice daily only here with glucose at goal   Previously on janumet/actos discontinued this admit  F/U Linden Zhang MD for further changes  Monitor accuchecks ac/hs, CCC diet    6. Asthma: no acute issues on home symbicort/singulair    7. Hypertension:  BP at goal on enalapril 10 mg daily/metoprolol succinate XL 25 mg " daily  Monitor      8. Dyslipidemia: no medications noted  Lipid panel normal except HDL slightly low  Continue CCC diet for now, monitor by Linden Zhang MD      9. MARGARET: Noncompliant with CPAP at home. No issues with O2 saturations here.       10. Mild dementia: no acute issues here      11. Chronic Pain:   Remains on home lyrica 75 mg twice daily  NSAIDS held post operatively  Controlled with norco currently  Monitor      12. Urinary incontinence: no acute issues on oxybutynin 5 mg twice daily     13. Anxiety and depression:  No acute issues on home paxil 40 mg daily     14. GERD: No acute issues on PPI     15. Vitamin D deficiency: no acute issues on weekly (Monday) supplement     16. Constipation:   monitor on senokot-S twice daily with miralax daily as needed/dulcolax oral and suppository as needed     17. Obesity: Body mass index is 31.5 kg/(m^2).  Monitoring with DM    Lab Results (last 72 hours)     Procedure Component Value Units Date/Time    POC Glucose Once [[id number]]  (Abnormal) Collected:  01/16/18 1114    Specimen:  Blood Updated:  01/16/18 1123     Glucose 194 (H) mg/dL     Narrative:       Meter: [id number] : [id number] Bhupendra Oconnell NURSING ASSISTANT    POC Glucose Once [[id number]]  (Abnormal) Collected:  01/16/18 1637    Specimen:  Blood Updated:  01/16/18 1647     Glucose 168 (H) mg/dL     Narrative:       Meter: [id number] : [id number] Bhupendra Oconnell NURSING ASSISTANT    POC Glucose Once [[id number]]  (Abnormal) Collected:  01/16/18 2043    Specimen:  Blood Updated:  01/16/18 2051     Glucose 184 (H) mg/dL     Narrative:       Meter: [id number] : [id number] Devon CHERRY    Hemoglobin & Hematocrit, Blood [[id number]]  (Abnormal) Collected:  01/17/18 0353    Specimen:  Blood Updated:  01/17/18 0414     Hemoglobin 9.4 (L) g/dL      Hematocrit 29.6 (L) %     POC Glucose Once [[id number]]  (Normal) Collected:  01/17/18 0714    Specimen:  Blood Updated:  01/17/18 0710      Glucose 101 mg/dL     Narrative:       Meter: [id number] : [id number] Clarence Rojas NURSING ASSISTANT    POC Glucose Once [[id number]]  (Abnormal) Collected:  01/17/18 1123    Specimen:  Blood Updated:  01/17/18 1131     Glucose 146 (H) mg/dL     Narrative:       Meter: [id number] : [id number] Clarence Rojas NURSING ASSISTANT    POC Glucose Once [[id number]]  (Normal) Collected:  01/17/18 1608    Specimen:  Blood Updated:  01/17/18 1615     Glucose 129 mg/dL     Narrative:       Meter: [id number] : [id number] Clarence Rojas NURSING ASSISTANT    Urinalysis With / Culture If Indicated - Urine, Catheter In/Out [[id number]]  (Abnormal) Collected:  01/17/18 1705    Specimen:  Urine from Urine, Catheter In/Out Updated:  01/17/18 1737     Color, UA Yellow     Appearance, UA Clear     pH, UA 6.0     Specific Gravity, UA 1.020     Glucose, UA Negative     Ketones, UA Negative     Bilirubin, UA Negative     Blood, UA Trace (A)     Protein, UA Negative     Leuk Esterase, UA Negative     Nitrite, UA Negative     Urobilinogen, UA 0.2 E.U./dL    Urinalysis, Microscopic Only - Urine, Clean Catch [[id number]]  (Abnormal) Collected:  01/17/18 1705    Specimen:  Urine from Urine, Catheter In/Out Updated:  01/17/18 1737     RBC, UA 0-2 (A) /HPF      WBC, UA None Seen /HPF      Bacteria, UA Trace (A) /HPF      Squamous Epithelial Cells, UA None Seen /HPF      Hyaline Casts, UA None Seen /LPF      Methodology Manual Light Microscopy    POC Glucose Once [[id number]]  (Abnormal) Collected:  01/17/18 2012    Specimen:  Blood Updated:  01/17/18 2018     Glucose 132 (H) mg/dL     Narrative:       Meter: [id number] : [id number] Edgar Hay    POC Glucose Once [[id number]]  (Normal) Collected:  01/18/18 0721    Specimen:  Blood Updated:  01/18/18 0729     Glucose 117 mg/dL     Narrative:       Meter: [id number] : [id number] Irwin Le NURSING ASSISTANT    POC Glucose Once [[id number]]  (Normal) Collected:  01/18/18 1128     Specimen:  Blood Updated:  01/18/18 1153     Glucose 125 mg/dL     Narrative:       Meter: [id number] : [id number] Irwin Le NURSING ASSISTANT    POC Glucose Once [[id number]]  (Normal) Collected:  01/18/18 1636    Specimen:  Blood Updated:  01/18/18 1646     Glucose 127 mg/dL     Narrative:       Meter: [id number] : [id number] Irwin Burtonny NURSING ASSISTANT    POC Glucose Once [[id number]]  (Normal) Collected:  01/18/18 2006    Specimen:  Blood Updated:  01/18/18 2012     Glucose 108 mg/dL     Narrative:       Meter: [id number] : [id number] Edgar Hay    Hemoglobin & Hematocrit, Blood [[id number]]  (Abnormal) Collected:  01/19/18 0416    Specimen:  Blood Updated:  01/19/18 0429     Hemoglobin 9.0 (L) g/dL      Hematocrit 28.2 (L) %     Lipid Panel [[id number]]  (Abnormal) Collected:  01/19/18 0416    Specimen:  Blood Updated:  01/19/18 0518     Total Cholesterol 106 mg/dL      Triglycerides 132 mg/dL      HDL Cholesterol 31 (L) mg/dL      LDL Cholesterol  49 mg/dL      VLDL Cholesterol 26.4 mg/dL      LDL/HDL Ratio 1.57    POC Glucose Once [[id number]]  (Normal) Collected:  01/19/18 0755    Specimen:  Blood Updated:  01/19/18 0802     Glucose 102 mg/dL     Narrative:       Meter: [id number] : [id number] Clarence Rojas NURSING ASSISTANT        Imaging Results (most recent)     Procedure Component Value Units Date/Time    XR Knee 1 or 2 View Right [[id number]] Collected:  01/14/18 0803     Updated:  01/14/18 0806    Narrative:       INDICATION: Knee pain after slipping and falling on the ice last night.  Previous knee replacement.     TECHNIQUE: 2 views the right knee were obtained     FINDINGS: A knee prosthesis is seen with satisfactory alignment and  position. There is an acute fracture of the distal femur just at the  upper and of the femoral prosthesis. The fracture is mildly displaced  with minimal apex anterior angulation. The tibial component is  unremarkable. No radiodense foreign bodies are  seen.       Impression:       Acute fracture of the distal femur just above the femoral  component of the knee prosthesis as described above.     This report was finalized on 1/14/2018 8:04 AM by Dr. Jose Neri MD.       XR Chest 1 View [[id number]] Collected:  01/14/18 0804     Updated:  01/14/18 0807    Narrative:       INDICATION: Distal femoral fracture. Presurgical evaluation.     TECHNIQUE: A single AP view the chest was obtained     FINDINGS: Mild cardiomegaly is noted. Both lungs are fully expanded and  clear with normal vascular markings. No pleural fluid is seen.       Impression:       Mild cardiomegaly. No active disease     This report was finalized on 1/14/2018 8:05 AM by Dr. Jose Neri MD.       XR Femur 2 View Right [[id number]] Collected:  01/14/18 0805     Updated:  01/14/18 0808    Narrative:       INDICATION: Leg pain after slipping and falling on the ice last night     TECHNIQUE: 4 views of the right femur were obtained     FINDINGS: There is a fracture of the distal femur just above the femoral  component of the knee prosthesis. The fracture is obliquely oriented.  There is mild displacement and minimal apex anterior angulation. No  additional femoral fractures are seen. Diffuse small vessel  atherosclerotic calcification is seen in the S after a. Mild  degenerative changes are seen at the right hip.       Impression:       Minimally angulated mildly displaced distal femoral fracture     This report was finalized on 1/14/2018 8:06 AM by Dr. Jose Neri MD.       CT Lower Extremity Right Without Contrast [[id number]] Collected:  01/15/18 0844     Updated:  01/15/18 0855    Narrative:       CT RIGHT KNEE, 01/13/2018:     HISTORY:  67-year-old female admitted to the hospital through the ED today with a  right distal femur fracture above the right knee arthroplasty. Slipped  and fell on ice last evening.     TECHNIQUE:  Axial CT images of the right knee beginning at the distal third  femoral  shaft level extending to the proximal third of the tibia and fibula with  multiplanar image reconstruction. Radiation dose reduction techniques  were utilized, which may include automated exposure control and/or  exposure modulation based on body size. Previous CT and radionuclide  cardiac imaging studies here in the last year: 0.     FINDINGS:  The images confirm the presence of a nondisplaced acute oblique fracture  across the distal metaphysis of the femur. The fracture extends into the  upper and medial margin of the medial femoral condyle above the level of  the arthroplasty, but no additional significant condylar or  intracondylar distal fracture extension is demonstrated.     Right total knee arthroplasty components appear well-positioned and well  seated. There is a probable small knee joint effusion. No definite acute  fracture of the visualized proximal tibia or fibula is seen.       Impression:       1. Acute nondisplaced oblique fracture across the distal metaphysis of  the femur as detailed above. Fracture extends into the upper and medial  margin of the medial femoral condyle, but no other condylar or  intercondylar extension is seen.  2. Well-positioned right total knee arthroplasty.     This report was finalized on 1/15/2018 8:53 AM by Dr. Lang Corona MD.       XR Knee 1 or 2 View Right [[id number]] Collected:  01/16/18 0943     Updated:  01/16/18 0946    Narrative:       POSTOP RIGHT KNEE, 01/15/2018:     HISTORY:   ORIF right distal femur fracture.     TECHNIQUE:  AP and crosstable lateral radiographs of the right knee were obtained  portably following surgery.     FINDINGS:  Lateral plate and screw fixation hardware appears well positioned  traversing well aligned distal femur fracture above the patient's  arthroplasty.       Impression:       Satisfactory postoperative appearance following right knee surgery.     This report was finalized on 1/16/2018 9:44 AM by Dr. Croft  MD Chloe.       XR Femur 2 View Right [[id number]] Collected:  01/16/18 0944     Updated:  01/16/18 0947    Narrative:       FLUOROSCOPY DURING RIGHT KNEE SURGERY, 01/15/2018:     HISTORY:  Fluoroscopy during ORIF right distal femur fracture.     REPORT:  C-arm fluoroscopy was provided by radiology personnel in the OR during  right knee surgery.. Fluoroscopy time was recorded as 57 seconds. 8 spot  film images were recorded for documentation purposes. Please see  operative note for details.     This report was finalized on 1/16/2018 9:45 AM by Dr. Lang Corona MD.       XR Chest PA & Lateral [[id number]] Collected:  01/17/18 1628     Updated:  01/17/18 1633    Narrative:       CHEST X-RAY, 01/17/2018:     HISTORY:   67-year-old female 2 days postop surgery for femur fracture. Newly noted  fever.     TECHNIQUE:  AP and lateral upright chest series.     FINDINGS:  Low lung volumes with mild bibasilar atelectasis. No visible airspace  consolidation or pleural effusion. Mild to moderate cardiomegaly.  Pulmonary vascularity is normal.     Old T11 and L1 vertebral compression fractures without appreciable  change since 11/21/2017.       Impression:       1. No active disease.  2. Shallow lung expansion with mild basilar atelectasis.  3. Cardiomegaly.     This report was finalized on 1/17/2018 4:31 PM by Dr. Lang Corona MD.           PROCEDURES  Procedure(s):  FEMUR DISTAL OPEN REDUCTION INTERNAL FIXATION    Condition on Discharge:  Stable    Physical Exam at Discharge  Temp:  [97.5 °F (36.4 °C)-98.9 °F (37.2 °C)] 98 °F (36.7 °C)  Heart Rate:  [81-87] 84  Resp:  [16] 16  BP: (105-142)/(66-81) 124/81    Physical Exam:  Physical Exam   Constitutional: Patient appears well-developed and well-nourished and in no acute distress, obese   HEENT:   Head: Normocephalic and atraumatic.   Eyes:  Pupils are equal, round, and reactive to light. EOM are intact. Sclera are anicteric and non-injected.  Mouth and Throat:  Patient has moist mucous membranes. Oropharynx is clear of any erythema or exudate.     Neck: Neck supple. No JVD present. No thyromegaly present. No lymphadenopathy present.  Cardiovascular: Regular rate, regular rhythm, S1 normal and S2 normal.  Exam reveals no gallop and no friction rub.  No murmur heard.  Pulmonary/Chest: Lungs are clear to auscultation bilaterally. No respiratory distress. No wheezes. No rhonchi. No rales.   Abdominal: Soft. Bowel sounds are normal. No distension and no mass. There is no hepatosplenomegaly. There is no tenderness.   Musculoskeletal: decreased muscle bulk  Extremities: RLE in ace/immobilizer, skin not visualized. No edema. Pulses are palpable in all 4 extremities.  Neurological: Patient is alert and oriented to person, place, and time. Cranial nerves II-XII are grossly intact with no focal deficits.  Skin: Skin is warm. No rash noted. Nails show no clubbing.  No cyanosis or erythema.    Discharge Disposition  [place]    Visiting Nurse:    As per facility    Home PT/OT:  As per facility    Home Safety Evaluation:  As per facility    DME  Needs sliding board for all transfers. Immobilizer at all times  And TBD by therapy    Discharge Diet:         Dietary Orders            Start     Ordered    01/15/18 1705  Diet Regular; Consistent Carbohydrate, Cardiac  Diet Effective Now     Question Answer Comment   Diet Texture / Consistency Regular    Common Modifiers Consistent Carbohydrate    Common Modifiers Cardiac        01/15/18 1704        Activity at Discharge:  TTWB    Pre-discharge education  Diabetic, Wound Care, medications, follow up    Follow-up Appointments  Additional Instructions for the Follow-ups that You Need to Schedule     Discharge Follow-up with PCP    As directed    Follow Up Details:  1 week           Discharge Follow-up with Specified Provider: Dr. Newberry; 2 Weeks    As directed    To:  Dr. Newberry    Follow Up:  2 Weeks    Follow Up Details:  will need  wound check and repeat X-ray prior                     Test Results Pending at Discharge: NONE   Liza Llamas, ERNESTINA  01/19/18  10:24 AM    Time: Discharge over 30 min (if over 30 minutes give explanation as to why it took greater than 30 minutes)  Secondary to:  Review of notes/labs/diagnostics  Coordination of care/follow up  Medication reconciliation  D/W patient and family                     Electronically signed by Nicolle Mccarthy DO at 1/19/2018 10:31 AM        Discharge Order     Start     Ordered    01/19/18 1015  Discharge patient  Once     Expected Discharge Date:  01/19/18    Expected Discharge Time:  Morning    Discharge Disposition:  Skilled Nursing Facility (DC - External)        01/19/18 1023

## 2018-01-19 NOTE — PLAN OF CARE
Problem: Patient Care Overview (Adult)  Goal: Plan of Care Review  Outcome: Ongoing (interventions implemented as appropriate)   01/19/18 0357   Coping/Psychosocial Response Interventions   Plan Of Care Reviewed With patient   Patient Care Overview   Progress improving   Outcome Evaluation   Outcome Summary/Follow up Plan VSS, patient pain well controlled with prn pain meds. Patient is wearing immobilizer every time she gets up, and is aware not to bear weght on leg. Patient tolerating diet well, and has no issues or concerns at this time. Will continue to monitor.        Problem: Orthopaedic Fracture (Adult)  Goal: Signs and Symptoms of Listed Potential Problems Will be Absent or Manageable (Orthopaedic Fracture)  Outcome: Ongoing (interventions implemented as appropriate)   01/19/18 0357   Orthopaedic Fracture   Problems Assessed (Orthopaedic Fracture) all   Problems Present (Orthopaedic Fracture) functional deficit/ self-care deficit;pain;situational response;skin integrity impairment       Problem: Pressure Ulcer Risk (Jules Scale) (Adult,Obstetrics,Pediatric)  Goal: Skin Integrity  Outcome: Ongoing (interventions implemented as appropriate)   01/19/18 0357   Pressure Ulcer Risk (Jules Scale) (Adult,Obstetrics,Pediatric)   Skin Integrity making progress toward outcome       Problem: Pain, Chronic (Adult)  Goal: Acceptable Pain Control/Comfort Level  Outcome: Ongoing (interventions implemented as appropriate)   01/19/18 0357   Pain, Chronic (Adult)   Acceptable Pain Control/Comfort Level making progress toward outcome

## 2018-01-20 ENCOUNTER — OUTSIDE FACILITY SERVICE (OUTPATIENT)
Dept: HOSPITALIST | Facility: HOSPITAL | Age: 68
End: 2018-01-20

## 2018-01-20 PROCEDURE — 99304 1ST NF CARE SF/LOW MDM 25: CPT | Performed by: HOSPITALIST

## 2018-01-23 ENCOUNTER — OUTSIDE FACILITY SERVICE (OUTPATIENT)
Dept: HOSPITALIST | Facility: HOSPITAL | Age: 68
End: 2018-01-23

## 2018-01-23 PROCEDURE — 99307 SBSQ NF CARE SF MDM 10: CPT | Performed by: HOSPITALIST

## 2018-01-27 NOTE — OP NOTE
Date of Operation: 1/15/2018     PREOPERATIVE DIAGNOSIS: Right distal femur periprosthetic fracture     POSTOPERATIVE DIAGNOSIS:  Right distal femur periprosthetic fracture       PROCEDURE PERFORMED: Open reduction internal fixation Right distal femur periprosthetic fracture with plate and screw construct    SURGEON: Moy Newberry MD     ASSISTANT: Leonel       ANESTHESIA: Spinal anesthesia with regional block.       ESTIMATED BLOOD LOSS: 150 mL     URINE OUTPUT: Not recorded.       FLUIDS: Per anesthesia.       COMPLICATIONS: None.       SPECIMENS: None.       DRAINS: None.      IMPLANTS: Stanley 6-hole distal femur locking plate with 4.5 mm locking screw ×4, 4.5 mm cortical screw ×1, 5.5 mm locking screw ×2     INDICATIONS FOR PROCEDURE: The patient is a pleasant 67 y.o. female with significant history of right thigh pain after a fall.  She does have remote history of total knee arthroplasty.  She is brought to the emergency department and noted to have a minimally displaced distal femur fracture, CT confirmed no evidence of luz loosening of the total knee arthroplasty components. I discussed treatment options available to the patient and her family and patient wished to proceed with surgical treatment. I explained details of the procedure, as well as the risks, benefits, and alternatives as documented on history and physical, and the patient had all questions answered prior to signing the operative consent form. No guarantees were given in regard to results of the surgery.       DESCRIPTION OF PROCEDURE: The patient was seen, evaluated, and cleared for surgery by anesthesia. Admitted in the preoperative holding area. The operative site was marked, consent was reviewed, history and physical was updated, and preoperative labs were reviewed. A regional block was then placed per anesthesia. The patient was then taken to the operating room and placed in a supine position on a regular OR table. After successful  spinal placed per anesthesia, all bony prominences were well-padded and patient was secured to the table with a waist strap. The right lower extremity was then sterilely prepped and draped in a standard fashion.       A formal timeout was completed, including confirmation of History and Physical, operative consent, surgical site, patient identification number, and preoperative antibiotic administration.  Attention was then turned to confirmation of the fracture site under C-arm fluoroscopy this point in time.  A 12 cm lateral incision was made centered over the lateral condyle distally and in line with the femoral shaft or proximally.  Incision was carried through skin and subcutaneous tissue with 10 blade.  Bovie cautery was used to obtain careful hemostasis.  IT band was then split longitudinally in line with its fibers and the lateral aspect of the distal femur was identified this time.  A Hernandez was then used to complete a slight elevation of the periosteal layer of the more proximal femur on its lateral aspect.  A 6 hole locking plate was then chosen and applied to lateral aspect of the distal femur at this point in time.  Once position was confirmed to be appropriate on AP and lateral x-rays under fluoroscopy, pins were placed both proximal and distal followed by proximal and distal cortical screws.  Position of plate and stabilization and reduction of fracture were noted to be appropriate this time.  5.5 mm locking screws were then placed proximally followed by 4.5 mm locking screws distally.  Length of screws was measured with a depth gauge and confirmed under C-arm fluoroscopy prior to placing the screw.  Pins were removed this point in time.  Final fluoroscopic images were taken confirming appropriate reduction of fracture, placement of implant, and length of hardware.    Wound was then thoroughly irrigated with normal saline followed by Betadine lavage.  Attention was then turned to closure the wound was 0  Vicryl in interrupted fashion for closure of the IT band, 2-0 Vicryl for subcutaneous closure, 3-0 Stratafix in running subcuticular fashion for skin closure followed by Prineo and glue. Wound was then dressed with 4 x 4 gauze, web roll, Ace wrap, and patient was placed in a knee immobilizer.     At the end of the procedure, all lap, needle, and sponge counts were correct x2. The patient had brisk capillary refill to all digits of the right lower extremity. Compartments were soft and easily compressible at the end of the procedure.       DISPOSITION: The patient was extubated per anesthesia and taken to the recovery room in stable condition.  She will be readmitted to the hospitalist service, touchdown weightbearing right lower extremity with no knee range of motion for 2 weeks.  At time of follow-up in office we will allow her to start working on range of motion from 0-30°, increasing by 30° every 2 weeks with goal of 0-90° by the 6 week bill.  She'll be placed on chemoprophylaxis for DVT. Results discussed immediately after procedure with family and all questions were answered at that time.

## 2018-01-30 ENCOUNTER — OFFICE VISIT (OUTPATIENT)
Dept: ORTHOPEDIC SURGERY | Facility: CLINIC | Age: 68
End: 2018-01-30

## 2018-01-30 DIAGNOSIS — S72.401D CLOSED FRACTURE OF DISTAL END OF RIGHT FEMUR WITH ROUTINE HEALING, UNSPECIFIED FRACTURE MORPHOLOGY, SUBSEQUENT ENCOUNTER: Primary | ICD-10-CM

## 2018-01-30 PROCEDURE — 99024 POSTOP FOLLOW-UP VISIT: CPT | Performed by: ORTHOPAEDIC SURGERY

## 2018-02-01 ENCOUNTER — OUTSIDE FACILITY SERVICE (OUTPATIENT)
Dept: HOSPITALIST | Facility: HOSPITAL | Age: 68
End: 2018-02-01

## 2018-02-01 ENCOUNTER — TELEPHONE (OUTPATIENT)
Dept: ORTHOPEDIC SURGERY | Facility: CLINIC | Age: 68
End: 2018-02-01

## 2018-02-01 DIAGNOSIS — S72.401D CLOSED FRACTURE OF DISTAL END OF RIGHT FEMUR WITH ROUTINE HEALING, UNSPECIFIED FRACTURE MORPHOLOGY, SUBSEQUENT ENCOUNTER: Primary | ICD-10-CM

## 2018-02-01 PROCEDURE — 99308 SBSQ NF CARE LOW MDM 20: CPT | Performed by: HOSPITALIST

## 2018-02-01 NOTE — TELEPHONE ENCOUNTER
Needs an order for a wheelchair and a hospital bed, please.    Also, is it ok for her to remove her brace when sleeping

## 2018-02-06 ENCOUNTER — TELEPHONE (OUTPATIENT)
Dept: ORTHOPEDIC SURGERY | Facility: CLINIC | Age: 68
End: 2018-02-06

## 2018-02-06 NOTE — TELEPHONE ENCOUNTER
[employer] called stating they have tried to tell the patient that her insurance does not cover a hospital bed.  I told them I would get in touch with family and let them know.

## 2018-02-07 RX ORDER — TRAMADOL HYDROCHLORIDE 50 MG/1
TABLET ORAL
Qty: 30 TABLET | Refills: 0 | OUTPATIENT
Start: 2018-02-07

## 2018-02-12 NOTE — PROGRESS NOTES
Follow-up status post ORIF right distal femur periprosthetic fracture, 1/15/2018    Interval history: Patient returns to clinic today, states she is doing very well in regards to pain in the right distal femur.  No issues with her incision, no fevers chills or sweats.    Exam:  Right thigh-lateral incision well-healed, no signs of erythema or fluctuance, no significant fluid collection.  Patient tolerates passive motion knee out of the immobilizer from 0-95°, 4 minus out of 5 strength on flexion and extension actively.  No effusion noted.  Positive sensation light touch all distibution's right foot symmetric to the left.  4+ out of 5 strength on dorsiflexion plantar flexion right ankle.    Imaging:  Review of outside x-rays from nursing home facility of right femur as well as radiology report indicates stable alignment of periprosthetic femur fracture, no evidence of loosening or failure of implants around the well-seated total knee arthroplasty.  No comparison x-rays.    Impression: Status post ORIF right distal femur periprosthetic fracture    Plan:  1.  May progress to work on range of motion of knee from 0-90° and hinge knee brace at this time, brace given to patient at this time.  She may progress to touchdown weightbearing status on right lower extremity help with her balance.  Follow-up in 4 weeks with repeat x-rays, fracture remains stable at that point time we will advance her to partial weightbearing and then to weightbearing as tolerated.  Patient is still on DVT prophylaxis this time, I recommended continuing this for 4 weeks total postop.  Patient is happy with result from surgery had all questions answered today.

## 2018-03-02 ENCOUNTER — OFFICE VISIT (OUTPATIENT)
Dept: ORTHOPEDIC SURGERY | Facility: CLINIC | Age: 68
End: 2018-03-02

## 2018-03-02 DIAGNOSIS — S72.401D CLOSED FRACTURE OF DISTAL END OF RIGHT FEMUR WITH ROUTINE HEALING, UNSPECIFIED FRACTURE MORPHOLOGY, SUBSEQUENT ENCOUNTER: Primary | ICD-10-CM

## 2018-03-02 PROCEDURE — 73560 X-RAY EXAM OF KNEE 1 OR 2: CPT | Performed by: ORTHOPAEDIC SURGERY

## 2018-03-02 PROCEDURE — 99024 POSTOP FOLLOW-UP VISIT: CPT | Performed by: ORTHOPAEDIC SURGERY

## 2018-03-02 NOTE — PROGRESS NOTES
Follow-up status post ORIF right distal femur periprosthetic fracture, 1/15/2018    Interval history: Patient returns to clinic today, states she is doing very well in regards to her recovery, ambulating with full weightbearing at this point time, using ACL hinge brace.  She is continue to work with physical therapy this time.  She has noted a small area of prominence and feels like a stitch underneath the level the skin over the proximal incision, no redness or warmth or swelling from this area and no drainage noted, no fevers chills or sweats.    Exam:  Right thigh-lateral incision well-healed, mild prominence at top edge of incision and very small region that feels like a stitch, no signs of erythema or fluctuance, no significant fluid collection.  Active range of motion 0-120°, 4 out of 5 strength on flexion and extension actively.  No effusion noted.  Positive sensation light touch all distibution's right foot symmetric to the left.  4+ out of 5 strength on dorsiflexion plantar flexion right ankle.    Imaging:  Two-view x-rays right knee from today's visit, ordered and reviewed by me, indication status post ORIF right distal femur fracture, compared to prior x-rays.  Stable alignment of distal femur periprosthetic fracture, implant stable position with no evidence of loosening or ostial lysis.  Fracture appears to be healing well with good interval callus formation noted.  No evidence of displacement or loosening of total knee prosthesis.    Impression: Status post ORIF right distal femur periprosthetic fracture    Plan:  1.  Advance range of motion as tolerated with therapy, full weightbearing as tolerated this point in time.  Continue work on strengthening program.  Drytex hinge knee brace given today with a lockout at 90°, May remove brace for working on supine motion or in physical therapy.  Removed small stitch from proximal prominence of incision, covered with Steri-Strips, recommended cleaning daily with  peroxide and water.  Follow-up in 6 weeks with repeat x-ray.  Patient very happy with result from surgery had all questions answered today.

## 2018-03-16 ENCOUNTER — DOCUMENTATION (OUTPATIENT)
Dept: ORTHOPEDIC SURGERY | Facility: CLINIC | Age: 68
End: 2018-03-16

## 2018-03-19 ENCOUNTER — TELEPHONE (OUTPATIENT)
Dept: ORTHOPEDIC SURGERY | Facility: CLINIC | Age: 68
End: 2018-03-19

## 2018-04-17 ENCOUNTER — OFFICE VISIT (OUTPATIENT)
Dept: ORTHOPEDIC SURGERY | Facility: CLINIC | Age: 68
End: 2018-04-17

## 2018-04-17 DIAGNOSIS — Z96.651 HISTORY OF TOTAL RIGHT KNEE REPLACEMENT: Primary | ICD-10-CM

## 2018-04-17 DIAGNOSIS — S72.401D CLOSED FRACTURE OF DISTAL END OF RIGHT FEMUR WITH ROUTINE HEALING, UNSPECIFIED FRACTURE MORPHOLOGY, SUBSEQUENT ENCOUNTER: ICD-10-CM

## 2018-04-17 PROCEDURE — 99024 POSTOP FOLLOW-UP VISIT: CPT | Performed by: ORTHOPAEDIC SURGERY

## 2018-04-17 PROCEDURE — 73560 X-RAY EXAM OF KNEE 1 OR 2: CPT | Performed by: ORTHOPAEDIC SURGERY

## 2018-04-17 RX ORDER — LEVOFLOXACIN 750 MG/1
TABLET ORAL
COMMUNITY
Start: 2018-03-16 | End: 2018-09-11

## 2018-04-17 RX ORDER — CLOTRIMAZOLE AND BETAMETHASONE DIPROPIONATE 10; .64 MG/G; MG/G
CREAM TOPICAL
COMMUNITY
Start: 2018-04-06 | End: 2021-06-06

## 2018-04-17 RX ORDER — MECLIZINE HYDROCHLORIDE 25 MG/1
TABLET ORAL
COMMUNITY
Start: 2018-03-28 | End: 2018-09-11

## 2018-04-17 RX ORDER — MELOXICAM 15 MG/1
TABLET ORAL
COMMUNITY
Start: 2018-03-13 | End: 2020-05-21 | Stop reason: HOSPADM

## 2018-04-17 RX ORDER — RANITIDINE 150 MG/1
TABLET ORAL
COMMUNITY
Start: 2018-03-13 | End: 2020-05-21 | Stop reason: HOSPADM

## 2018-04-17 RX ORDER — SITAGLIPTIN AND METFORMIN HYDROCHLORIDE 1000; 50 MG/1; MG/1
TABLET, FILM COATED, EXTENDED RELEASE ORAL
COMMUNITY
Start: 2018-04-16 | End: 2020-10-05 | Stop reason: SDUPTHER

## 2018-04-17 RX ORDER — TRAMADOL HYDROCHLORIDE 50 MG/1
TABLET ORAL NIGHTLY
COMMUNITY
Start: 2018-03-27 | End: 2021-06-06

## 2018-04-17 NOTE — PROGRESS NOTES
Subjective:     Patient ID: Juan Pablo Hernandez is a 68 y.o. female.    Chief Complaint:    History of Present Illness  Juan Pablo Hernandez {presents/returns:93205} to clinic today for evaluation of ***     Social History     Occupational History   • Not on file.     Social History Main Topics   • Smoking status: Never Smoker   • Smokeless tobacco: Never Used   • Alcohol use No   • Drug use: No   • Sexual activity: Defer      Past Medical History:   Diagnosis Date   • Anxiety    • Asthma    • CHF (congestive heart failure)    • Chronic pain disorder    • Chronic UTI    • Depression    • Diabetes    • GERD (gastroesophageal reflux disease)    • H/O CHF    • HTN (hypertension)    • Hyperlipidemia    • Incontinence    • Injury of back     spinal stenosis, chronic back pain   • Low back pain    • Lumbosacral disc disease    • MRSA infection (methicillin-resistant Staphylococcus aureus)     to left foot states approx 12-13 years ago    • Osteoarthritis    • Sleep apnea     uses cpap   • Spinal stenosis    • Stroke     pt states PMD has said she may be having mini strokes   • Vertigo      Past Surgical History:   Procedure Laterality Date   • CHOLECYSTECTOMY OPEN     • COLONOSCOPY     • ENDOSCOPY     • FEMUR OPEN REDUCTION INTERNAL FIXATION Right 1/15/2018    Procedure: FEMUR DISTAL OPEN REDUCTION INTERNAL FIXATION;  Surgeon: Moy Newberry MD;  Location: [facility];  Service:    • HYSTERECTOMY     • TOTAL KNEE ARTHROPLASTY Bilateral        Family History   Problem Relation Age of Onset   • Lung disease Mother    • Cancer Mother    • Heart disease Father    • Diabetes Paternal Aunt    • Diabetes Paternal Uncle    • Diabetes Paternal Grandmother    • Diabetes Paternal Grandfather          Review of Systems   Constitutional: Negative for chills, diaphoresis, fever and unexpected weight change.   HENT: Negative for hearing loss, nosebleeds, sore throat and tinnitus.    Eyes: Negative for pain and visual disturbance.    Respiratory: Negative for cough, shortness of breath and wheezing.    Cardiovascular: Negative for chest pain and palpitations.   Gastrointestinal: Negative for abdominal pain, diarrhea, nausea and vomiting.   Endocrine: Negative for cold intolerance, heat intolerance and polydipsia.   Genitourinary: Negative for difficulty urinating, dysuria and hematuria.   Musculoskeletal: Positive for joint swelling. Negative for arthralgias and myalgias.   Skin: Negative for rash and wound.   Allergic/Immunologic: Negative for environmental allergies.   Neurological: Negative for dizziness, syncope and numbness.   Hematological: Does not bruise/bleed easily.   Psychiatric/Behavioral: Negative for dysphoric mood and sleep disturbance. The patient is not nervous/anxious.    All other systems reviewed and are negative.          Objective:  There were no vitals filed for this visit.  There were no vitals filed for this visit.  There is no height or weight on file to calculate BMI.  ***     Ortho Exam    ***  Imaging:  ***  Assessment:     No diagnosis found.      Plan:          Discussed treatment options at length with patient at today's visit. ***    Juan Pablo Hernandez *** was in agreement with plan and had all questions answered.     Orders:  No orders of the defined types were placed in this encounter.      Medications:  No orders of the defined types were placed in this encounter.      Followup:  No Follow-up on file.    There are no diagnoses linked to this encounter.      Dictated utilizing Dragon dictation

## 2018-04-30 NOTE — PROGRESS NOTES
Cc: Follow-up status post ORIF right distal femur periprosthetic fracture, 1/15/2018    Interval history: Patient returns to clinic today, states she is doing very well in regards to her recovery, she has made good progress in regards to range of motion and her brace.  She has been walking with her brace unlocked and tolerated this very well at this time.  Denies any fevers chills or sweats, no redness or warmth over her incisions.  Denies any significant knee pain at this time.      Exam:  Right thigh-lateral incision well-healed, active range of motion 0-125°, 4+ out of 5 strength on flexion and extension symmetric to the left.  Midline incision from prior total knee arthroplasty well-healed.  No effusion noted.  Positive sensation to light touch all distibution's right foot symmetric to the left.  4+ out of 5 strength on dorsiflexion plantar flexion right ankle.    Imaging:  Two-view x-rays right knee from today's visit, ordered and reviewed by me, indication status post ORIF right distal femur fracture, compared to prior x-rays.  Stable alignment of distal femur periprosthetic fracture, implant stable position with no evidence of loosening or ostial lysis.  Fracture appears to be well-healed with good callus.  No evidence of displacement or loosening of total knee prosthesis.    Impression: Status post ORIF right distal femur periprosthetic fracture    Plan:  1.  May discontinue brace, resume activities as tolerated this point in time.  Continue work on knee range of motion and hip and core strength at this time.  Recommended use of knee sleeve for support.  Follow-up as needed should she have any recurrence of pain.

## 2018-05-03 ENCOUNTER — TRANSCRIBE ORDERS (OUTPATIENT)
Dept: PAIN MEDICINE | Facility: HOSPITAL | Age: 68
End: 2018-05-03

## 2018-05-03 DIAGNOSIS — M54.16 LUMBAR RADICULOPATHY: Primary | ICD-10-CM

## 2018-05-25 ENCOUNTER — TRANSCRIBE ORDERS (OUTPATIENT)
Dept: ADMINISTRATIVE | Facility: HOSPITAL | Age: 68
End: 2018-05-25

## 2018-05-25 DIAGNOSIS — Z12.39 SCREENING BREAST EXAMINATION: Primary | ICD-10-CM

## 2018-05-25 DIAGNOSIS — Z78.0 POSTMENOPAUSAL STATE: Primary | ICD-10-CM

## 2018-05-31 ENCOUNTER — ANESTHESIA EVENT (OUTPATIENT)
Dept: PAIN MEDICINE | Facility: HOSPITAL | Age: 68
End: 2018-05-31

## 2018-05-31 ENCOUNTER — HOSPITAL ENCOUNTER (OUTPATIENT)
Dept: GENERAL RADIOLOGY | Facility: HOSPITAL | Age: 68
Discharge: HOME | End: 2018-05-31

## 2018-05-31 ENCOUNTER — HOSPITAL ENCOUNTER (OUTPATIENT)
Dept: PAIN MEDICINE | Facility: HOSPITAL | Age: 68
Discharge: HOME | End: 2018-05-31
Admitting: NURSE PRACTITIONER

## 2018-05-31 ENCOUNTER — ANESTHESIA (OUTPATIENT)
Dept: PAIN MEDICINE | Facility: HOSPITAL | Age: 68
End: 2018-05-31

## 2018-05-31 VITALS
SYSTOLIC BLOOD PRESSURE: 130 MMHG | TEMPERATURE: 97.6 F | OXYGEN SATURATION: 92 % | DIASTOLIC BLOOD PRESSURE: 80 MMHG | RESPIRATION RATE: 16 BRPM | HEART RATE: 77 BPM

## 2018-05-31 DIAGNOSIS — M54.16 LUMBAR RADICULOPATHY: ICD-10-CM

## 2018-05-31 DIAGNOSIS — M54.50 LOW BACK PAIN OF OVER 3 MONTHS DURATION: ICD-10-CM

## 2018-05-31 LAB — GLUCOSE BLDC GLUCOMTR-MCNC: 82 MG/DL (ref 70–130)

## 2018-05-31 PROCEDURE — 25010000002 METHYLPREDNISOLONE PER 80 MG: Performed by: ANESTHESIOLOGY

## 2018-05-31 PROCEDURE — 77003 FLUOROGUIDE FOR SPINE INJECT: CPT

## 2018-05-31 PROCEDURE — 82962 GLUCOSE BLOOD TEST: CPT

## 2018-05-31 PROCEDURE — 0 IOPAMIDOL 41 % SOLUTION: Performed by: ANESTHESIOLOGY

## 2018-05-31 PROCEDURE — C1755 CATHETER, INTRASPINAL: HCPCS

## 2018-05-31 RX ORDER — LIDOCAINE HYDROCHLORIDE 10 MG/ML
10 INJECTION, SOLUTION INFILTRATION; PERINEURAL ONCE
Status: COMPLETED | OUTPATIENT
Start: 2018-05-31 | End: 2018-05-31

## 2018-05-31 RX ORDER — METHYLPREDNISOLONE ACETATE 80 MG/ML
80 INJECTION, SUSPENSION INTRA-ARTICULAR; INTRALESIONAL; INTRAMUSCULAR; SOFT TISSUE ONCE
Status: COMPLETED | OUTPATIENT
Start: 2018-05-31 | End: 2018-05-31

## 2018-05-31 RX ADMIN — METHYLPREDNISOLONE ACETATE 80 MG: 80 INJECTION, SUSPENSION INTRA-ARTICULAR; INTRALESIONAL; INTRAMUSCULAR; SOFT TISSUE at 12:31

## 2018-05-31 RX ADMIN — IOPAMIDOL 3 ML: 408 INJECTION, SOLUTION INTRATHECAL at 12:30

## 2018-05-31 RX ADMIN — LIDOCAINE HYDROCHLORIDE 3 ML: 10 INJECTION, SOLUTION EPIDURAL; INFILTRATION; INTRACAUDAL; PERINEURAL at 12:10

## 2018-05-31 NOTE — ANESTHESIA PROCEDURE NOTES
PAIN Epidural block    Patient location during procedure: pain clinic  Start Time: 5/31/2018 12:09 PM  Stop Time: 5/31/2018 12:32 PM  Indication:procedure for pain  Performed By  Anesthesiologist: GENNA SANCHEZ  Preanesthetic Checklist  Completed: patient identified, site marked, surgical consent, pre-op evaluation, timeout performed, risks and benefits discussed and monitors and equipment checked  Additional Notes  Dicscussed with mortensen and  possibility of wet tap and possible PDPH.  Will call them tomorrow to follow up.  Phone numbers given should there be problems or questions.  Prep:  Pt Position:prone  Sterile Tech:cap, gloves, sterile barrier and mask  Prep:chlorhexidine gluconate and isopropyl alcohol  Monitoring:EKG, continuous pulse oximetry and blood pressure monitoring  Procedure:  Sedation: no   Approach:midline  Guidance: fluoroscopy  Location:lumbar  Level:4-5  Needle Type:Tuohy  Needle Gauge:22 G  Aspiration:negative  Medications:  Depomedrol:80 mg  Preservative Free Saline:6mL  Isovue:1mL  Comments:LESI attempted L3-4 with difficulty-possible wet tap. To L4-5, good geni, epidurogram okay, no further problems.   Post Assessment:  Post-procedure: band aid applied.  Pt Tolerance:patient tolerated the procedure well with no apparent complications  Complications:no

## 2018-06-05 ENCOUNTER — TRANSCRIBE ORDERS (OUTPATIENT)
Dept: PAIN MEDICINE | Facility: HOSPITAL | Age: 68
End: 2018-06-05

## 2018-06-05 DIAGNOSIS — M54.5 LOW BACK PAIN, UNSPECIFIED BACK PAIN LATERALITY, UNSPECIFIED CHRONICITY, WITH SCIATICA PRESENCE UNSPECIFIED: Primary | ICD-10-CM

## 2018-06-06 ENCOUNTER — HOSPITAL ENCOUNTER (OUTPATIENT)
Dept: MAMMOGRAPHY | Facility: HOSPITAL | Age: 68
Discharge: HOME | End: 2018-06-06
Admitting: NURSE PRACTITIONER

## 2018-06-06 ENCOUNTER — APPOINTMENT (OUTPATIENT)
Dept: BONE DENSITY | Facility: HOSPITAL | Age: 68
End: 2018-06-06

## 2018-06-06 DIAGNOSIS — Z78.0 POSTMENOPAUSAL STATE: ICD-10-CM

## 2018-06-06 DIAGNOSIS — Z12.39 SCREENING BREAST EXAMINATION: ICD-10-CM

## 2018-06-06 PROCEDURE — 77063 BREAST TOMOSYNTHESIS BI: CPT

## 2018-06-06 PROCEDURE — 77067 SCR MAMMO BI INCL CAD: CPT

## 2018-06-06 PROCEDURE — 77080 DXA BONE DENSITY AXIAL: CPT

## 2018-07-09 ENCOUNTER — TELEPHONE (OUTPATIENT)
Dept: SURGERY | Facility: CLINIC | Age: 68
End: 2018-07-09

## 2018-08-16 ENCOUNTER — APPOINTMENT (OUTPATIENT)
Dept: CT IMAGING | Facility: HOSPITAL | Age: 68
End: 2018-08-16

## 2018-08-16 ENCOUNTER — HOSPITAL ENCOUNTER (EMERGENCY)
Facility: HOSPITAL | Age: 68
Discharge: HOME | End: 2018-08-16
Attending: EMERGENCY MEDICINE | Admitting: EMERGENCY MEDICINE

## 2018-08-16 ENCOUNTER — APPOINTMENT (OUTPATIENT)
Dept: GENERAL RADIOLOGY | Facility: HOSPITAL | Age: 68
End: 2018-08-16

## 2018-08-16 VITALS
BODY MASS INDEX: 29.06 KG/M2 | OXYGEN SATURATION: 97 % | HEIGHT: 63 IN | TEMPERATURE: 98.3 F | WEIGHT: 164 LBS | SYSTOLIC BLOOD PRESSURE: 123 MMHG | HEART RATE: 72 BPM | RESPIRATION RATE: 16 BRPM | DIASTOLIC BLOOD PRESSURE: 68 MMHG

## 2018-08-16 DIAGNOSIS — R29.6 FREQUENT FALLS: ICD-10-CM

## 2018-08-16 DIAGNOSIS — F03.90 DEMENTIA WITHOUT BEHAVIORAL DISTURBANCE, UNSPECIFIED DEMENTIA TYPE: ICD-10-CM

## 2018-08-16 DIAGNOSIS — T14.8XXA HEMATOMA: Primary | ICD-10-CM

## 2018-08-16 DIAGNOSIS — Z79.01 CURRENT USE OF LONG TERM ANTICOAGULATION: ICD-10-CM

## 2018-08-16 LAB
ALBUMIN SERPL-MCNC: 3.7 G/DL (ref 3.5–5.2)
ALBUMIN/GLOB SERPL: 1.2 G/DL
ALP SERPL-CCNC: 35 U/L (ref 40–129)
ALT SERPL W P-5'-P-CCNC: 9 U/L (ref 5–33)
ANION GAP SERPL CALCULATED.3IONS-SCNC: 12.9 MMOL/L
APTT PPP: 31.9 SECONDS (ref 24.3–38.1)
AST SERPL-CCNC: 20 U/L (ref 5–32)
BASOPHILS # BLD AUTO: 0.04 10*3/MM3 (ref 0–0.2)
BASOPHILS NFR BLD AUTO: 0.4 % (ref 0–2)
BILIRUB SERPL-MCNC: 0.4 MG/DL (ref 0.2–1.2)
BILIRUB UR QL STRIP: NEGATIVE
BUN BLD-MCNC: 17 MG/DL (ref 8–23)
BUN/CREAT SERPL: 21 (ref 7–25)
CALCIUM SPEC-SCNC: 9.3 MG/DL (ref 8.8–10.5)
CHLORIDE SERPL-SCNC: 103 MMOL/L (ref 98–107)
CLARITY UR: CLEAR
CO2 SERPL-SCNC: 25.1 MMOL/L (ref 22–29)
COLOR UR: YELLOW
CREAT BLD-MCNC: 0.81 MG/DL (ref 0.57–1)
DEPRECATED RDW RBC AUTO: 53.8 FL (ref 37–54)
EOSINOPHIL # BLD AUTO: 0.08 10*3/MM3 (ref 0.1–0.3)
EOSINOPHIL NFR BLD AUTO: 0.8 % (ref 0–4)
ERYTHROCYTE [DISTWIDTH] IN BLOOD BY AUTOMATED COUNT: 16.2 % (ref 11.5–14.5)
GFR SERPL CREATININE-BSD FRML MDRD: 70 ML/MIN/1.73
GLOBULIN UR ELPH-MCNC: 3 GM/DL
GLUCOSE BLD-MCNC: 104 MG/DL (ref 65–99)
GLUCOSE BLDC GLUCOMTR-MCNC: 96 MG/DL (ref 70–130)
GLUCOSE BLDC GLUCOMTR-MCNC: 96 MG/DL (ref 70–130)
GLUCOSE UR STRIP-MCNC: NEGATIVE MG/DL
HCT VFR BLD AUTO: 29.3 % (ref 37–47)
HGB BLD-MCNC: 9 G/DL (ref 12–16)
HGB UR QL STRIP.AUTO: NEGATIVE
IMM GRANULOCYTES # BLD: 0.09 10*3/MM3 (ref 0–0.03)
IMM GRANULOCYTES NFR BLD: 0.9 % (ref 0–0.5)
INR PPP: 1.15 (ref 0.9–1.1)
KETONES UR QL STRIP: NEGATIVE
LEUKOCYTE ESTERASE UR QL STRIP.AUTO: NEGATIVE
LYMPHOCYTES # BLD AUTO: 1.12 10*3/MM3 (ref 0.6–4.8)
LYMPHOCYTES NFR BLD AUTO: 10.9 % (ref 20–45)
MCH RBC QN AUTO: 28.5 PG (ref 27–31)
MCHC RBC AUTO-ENTMCNC: 30.7 G/DL (ref 31–37)
MCV RBC AUTO: 92.7 FL (ref 81–99)
MONOCYTES # BLD AUTO: 0.49 10*3/MM3 (ref 0–1)
MONOCYTES NFR BLD AUTO: 4.8 % (ref 3–8)
NEUTROPHILS # BLD AUTO: 8.44 10*3/MM3 (ref 1.5–8.3)
NEUTROPHILS NFR BLD AUTO: 82.2 % (ref 45–70)
NITRITE UR QL STRIP: NEGATIVE
NRBC BLD MANUAL-RTO: 0 /100 WBC (ref 0–0)
PH UR STRIP.AUTO: 7 [PH] (ref 4.5–8)
PLATELET # BLD AUTO: 350 10*3/MM3 (ref 140–500)
PMV BLD AUTO: 10.8 FL (ref 7.4–10.4)
POTASSIUM BLD-SCNC: 4.1 MMOL/L (ref 3.5–5.2)
PROT SERPL-MCNC: 6.7 G/DL (ref 6–8.5)
PROT UR QL STRIP: NEGATIVE
PROTHROMBIN TIME: 14.8 SECONDS (ref 12.1–15)
RBC # BLD AUTO: 3.16 10*6/MM3 (ref 4.2–5.4)
SODIUM BLD-SCNC: 141 MMOL/L (ref 136–145)
SP GR UR STRIP: 1.02 (ref 1–1.03)
TROPONIN T SERPL-MCNC: 0.01 NG/ML (ref 0–0.03)
UROBILINOGEN UR QL STRIP: NORMAL
WBC NRBC COR # BLD: 10.26 10*3/MM3 (ref 4.8–10.8)

## 2018-08-16 PROCEDURE — 81003 URINALYSIS AUTO W/O SCOPE: CPT | Performed by: PHYSICIAN ASSISTANT

## 2018-08-16 PROCEDURE — 85610 PROTHROMBIN TIME: CPT | Performed by: PHYSICIAN ASSISTANT

## 2018-08-16 PROCEDURE — 84484 ASSAY OF TROPONIN QUANT: CPT | Performed by: PHYSICIAN ASSISTANT

## 2018-08-16 PROCEDURE — 93010 ELECTROCARDIOGRAM REPORT: CPT | Performed by: INTERNAL MEDICINE

## 2018-08-16 PROCEDURE — 73502 X-RAY EXAM HIP UNI 2-3 VIEWS: CPT

## 2018-08-16 PROCEDURE — 82962 GLUCOSE BLOOD TEST: CPT

## 2018-08-16 PROCEDURE — 74176 CT ABD & PELVIS W/O CONTRAST: CPT

## 2018-08-16 PROCEDURE — 93005 ELECTROCARDIOGRAM TRACING: CPT | Performed by: PHYSICIAN ASSISTANT

## 2018-08-16 PROCEDURE — 99284 EMERGENCY DEPT VISIT MOD MDM: CPT | Performed by: PHYSICIAN ASSISTANT

## 2018-08-16 PROCEDURE — 99285 EMERGENCY DEPT VISIT HI MDM: CPT

## 2018-08-16 PROCEDURE — 85025 COMPLETE CBC W/AUTO DIFF WBC: CPT | Performed by: PHYSICIAN ASSISTANT

## 2018-08-16 PROCEDURE — 85730 THROMBOPLASTIN TIME PARTIAL: CPT | Performed by: PHYSICIAN ASSISTANT

## 2018-08-16 PROCEDURE — 36415 COLL VENOUS BLD VENIPUNCTURE: CPT

## 2018-08-16 PROCEDURE — 70450 CT HEAD/BRAIN W/O DYE: CPT

## 2018-08-16 PROCEDURE — 80053 COMPREHEN METABOLIC PANEL: CPT | Performed by: PHYSICIAN ASSISTANT

## 2018-08-16 PROCEDURE — P9612 CATHETERIZE FOR URINE SPEC: HCPCS

## 2018-08-16 RX ORDER — SODIUM CHLORIDE 0.9 % (FLUSH) 0.9 %
10 SYRINGE (ML) INJECTION AS NEEDED
Status: DISCONTINUED | OUTPATIENT
Start: 2018-08-16 | End: 2018-08-16 | Stop reason: HOSPADM

## 2018-08-16 RX ORDER — ONDANSETRON 4 MG/1
4 TABLET, ORALLY DISINTEGRATING ORAL ONCE
Status: COMPLETED | OUTPATIENT
Start: 2018-08-16 | End: 2018-08-16

## 2018-08-16 RX ORDER — ONDANSETRON 2 MG/ML
4 INJECTION INTRAMUSCULAR; INTRAVENOUS ONCE
Status: DISCONTINUED | OUTPATIENT
Start: 2018-08-16 | End: 2018-08-16

## 2018-08-16 RX ADMIN — ONDANSETRON 4 MG: 4 TABLET, ORALLY DISINTEGRATING ORAL at 14:17

## 2018-08-16 NOTE — DISCHARGE INSTRUCTIONS
Return to the emergency department with worsening symptoms, uncontrolled pain, inability to tolerate oral liquids, fever greater than 101°F not controlled by Tylenol or as needed with emergent concerns.    Discontinue Eliquis until you see Aliza Peralta on Monday.

## 2018-08-16 NOTE — ED PROVIDER NOTES
"Subjective   History of Present Illness  History of Present Illness    Chief complaint: \"I think I have a UTI\"    Location: urine    Quality/Severity:  \"smells foul\"    Timing/Duration: 2 days    Modifying Factors: none    Associated Symptoms: +AMS. + diffuse abd pain, burning. Denies HA or dizziness. Denies change in vision.  Denies neck/back pain.  +R hip pain. Denies fevers/chills. Denies cp or soa.  Denies frequency, urgency, dysuria and hematuria. +n/v yesterday.    Narrative: 68-year-old female presents with her daughter for thinking that she has urinary tract infection.  Daughter reports that she gets frequent urinary tract infections and confusion associated with it.  She has been more confused the past 2 days, but is slightly improved today.  She does have frequent falls and fell again yesterday per patient's daughter.  Patient states that she did hit her head on the door, but denies LOC.  She states that she fell onto her left side.  She has been having right hip pain as well from the previous fall.    Review of Systems  General: Denies fevers or chills.  Denies any weakness or fatigue.  Denies any weight loss or weight gain.  SKIN: Denies any rashes lesions or ulcers.  Denies color change.  ENT: Denies sore throat or rhinorrhea.  Denies ear pain.    EYES: Denies any blurred vision.  Denies any change in vision.  Denies any photophobia.  Denies any vision loss.  LUNGS: Denies any shortness of breath or wheezing.  Denies any cough.  Denies any hemoptysis.  CARDIAC: Denies any chest pain.  Denies palpitations.  Denies syncope.  Denies any edema  ABD: + abdominal pain.  +n/v. Denies diarrhea.  Denies any rectal bleeding.  Denies constipation  : Denies any dysuria, urgency, frequency or hematuria.  Denies discharge.  Denies flank pain.  NEURO: Denies any focal weakness.  Denies headache.  Denies seizures.  Denies changes in speech or difficulty walking.  ENDOCRINE: Denies polydipsia and polyuria  M/S: Denies " arthralgias, back pain, myalgias or neck pain  HEME/LYMPH: Negative for adenopathy. Does not bruise/bleed easily.   PSYCH: Negative for suicidal ideas. Denies anxiety or depression  review was performed in addition to those in the above all other reviews are negative.      Past Medical History:   Diagnosis Date   • Anxiety    • Asthma    • CHF (congestive heart failure) (CMS/[facility])    • Chronic pain disorder    • Chronic UTI    • Depression    • Diabetes (CMS/[facility])    • GERD (gastroesophageal reflux disease)    • H/O CHF    • HTN (hypertension)    • Hyperlipidemia    • Incontinence    • Injury of back     spinal stenosis, chronic back pain   • Low back pain    • Lumbosacral disc disease    • MRSA infection (methicillin-resistant Staphylococcus aureus)     to left foot states approx 12-13 years ago    • Osteoarthritis    • Sleep apnea     uses cpap   • Spinal stenosis    • Stroke (CMS/[facility])     pt states PMD has said she may be having mini strokes   • Vertigo        Allergies   Allergen Reactions   • Augmentin [Amoxicillin-Pot Clavulanate]    • Avelox [Moxifloxacin]    • Codeine    • Morphine And Related    • Penicillins    • Sulfa Antibiotics        Past Surgical History:   Procedure Laterality Date   • CHOLECYSTECTOMY OPEN     • COLONOSCOPY     • ENDOSCOPY     • FEMUR OPEN REDUCTION INTERNAL FIXATION Right 1/15/2018    Procedure: FEMUR DISTAL OPEN REDUCTION INTERNAL FIXATION;  Surgeon: Myo Newberry MD;  Location: [facility];  Service:    • FRACTURE SURGERY     • HYSTERECTOMY     • TOTAL KNEE ARTHROPLASTY Bilateral        Family History   Problem Relation Age of Onset   • Lung disease Mother    • Cancer Mother    • Breast cancer Mother    • Heart disease Father    • Diabetes Paternal Aunt    • Diabetes Paternal Uncle    • Diabetes Paternal Grandmother    • Diabetes Paternal Grandfather        Social History     Social History   • Marital status:      Social History Main Topics   • Smoking status: Never  Smoker   • Smokeless tobacco: Never Used   • Alcohol use No   • Drug use: No   • Sexual activity: Defer     Other Topics Concern   • Not on file     Current Facility-Administered Medications:   •  Insert peripheral IV, , , Once **AND** sodium chloride 0.9 % flush 10 mL, 10 mL, Intravenous, PRN, Criselda Munoz PA-C    Current Outpatient Prescriptions:   •  acetaminophen (TYLENOL) 325 MG tablet, Take 2 tablets by mouth Every 6 (Six) Hours As Needed for mild pain (1-3) or moderate pain (4-6)., Disp: 60 tablet, Rfl: 0  •  albuterol (PROVENTIL HFA;VENTOLIN HFA) 108 (90 BASE) MCG/ACT inhaler, Inhale 2 puffs Every 4 (Four) Hours As Needed for wheezing., Disp: , Rfl:   •  albuterol (PROVENTIL) (2.5 MG/3ML) 0.083% nebulizer solution, Take 2.5 mg by nebulization Every 4 (Four) Hours As Needed for Wheezing or Shortness of Air., Disp: , Rfl: 12  •  apixaban (ELIQUIS) 2.5 MG tablet tablet, Take 1 tablet by mouth Every 12 (Twelve) Hours., Disp: 56 tablet, Rfl: 0  •  baclofen (LIORESAL) 10 MG tablet, Take 10 mg by mouth Daily., Disp: , Rfl:   •  bisacodyl (DULCOLAX) 10 MG suppository, Insert 1 suppository into the rectum Daily As Needed for Constipation., Disp: , Rfl:   •  bisacodyl (DULCOLAX) 5 MG EC tablet, Take 1 tablet by mouth Daily As Needed for Constipation., Disp: , Rfl:   •  Cholecalciferol (VITAMIN D3) 33008 UNITS capsule, Take 1 capsule by mouth Every 7 (Seven) Days. (Patient taking differently: Take 50,000 Units by mouth Every 7 (Seven) Days. Takes on mondays), Disp: 4 capsule, Rfl: 0  •  clotrimazole-betamethasone (LOTRISONE) 1-0.05 % cream, , Disp: , Rfl:   •  enalapril (VASOTEC) 10 MG tablet, Take 1 tablet by mouth Daily., Disp: , Rfl:   •  estradiol (ESTRACE) 0.1 MG/GM vaginal cream, Insert 1 g into the vagina Daily., Disp: , Rfl:   •  fenofibrate 160 MG tablet, Take 160 mg by mouth Daily., Disp: , Rfl:   •  fluticasone-salmeterol (ADVAIR) 250-50 MCG/DOSE DISKUS, Inhale 2 puffs Every 12 (Twelve) Hours., Disp: ,  Rfl:   •  hyoscyamine sulfate (ANASPAZ) 0.125 MG tablet dispersible disintegrating tablet, Take 125 mcg by mouth Every 4 (Four) Hours As Needed., Disp: , Rfl:   •  insulin aspart (novoLOG) 100 UNIT/ML injection, Inject 0-7 Units under the skin 4 (Four) Times a Day Before Meals & at Bedtime., Disp: , Rfl: 12  •  JANUMET XR  MG tablet, , Disp: , Rfl:   •  levoFLOXacin (LEVAQUIN) 750 MG tablet, , Disp: , Rfl:   •  meclizine (ANTIVERT) 25 MG tablet, , Disp: , Rfl:   •  melatonin 5 MG tablet tablet, Take 1 tablet by mouth Every Night., Disp: , Rfl:   •  meloxicam (MOBIC) 15 MG tablet, , Disp: , Rfl:   •  metFORMIN (GLUCOPHAGE) 1000 MG tablet, Take 1 tablet by mouth 2 (Two) Times a Day With Meals., Disp: , Rfl:   •  metoprolol succinate XL (TOPROL XL) 25 MG 24 hr tablet, Take 1 tablet by mouth Daily., Disp: 30 tablet, Rfl: 0  •  montelukast (SINGULAIR) 10 MG tablet, Take 1 tablet by mouth Every Night., Disp: , Rfl:   •  omeprazole (priLOSEC) 40 MG capsule, Take 40 mg by mouth Daily., Disp: , Rfl:   •  oxybutynin (DITROPAN) 5 MG tablet, Take 1 tablet by mouth 2 (Two) Times a Day., Disp: , Rfl:   •  PARoxetine (PAXIL) 40 MG tablet, Take 1 tablet by mouth Daily., Disp: , Rfl:   •  polyethylene glycol (MIRALAX) pack packet, Take 17 g by mouth Daily As Needed (constipation)., Disp: , Rfl:   •  pregabalin (LYRICA) 75 MG capsule, Take 1 capsule by mouth Every 12 (Twelve) Hours., Disp: , Rfl:   •  raNITIdine (ZANTAC) 150 MG tablet, , Disp: , Rfl:   •  sennosides-docusate sodium (SENOKOT-S) 8.6-50 MG tablet, Take 1 tablet by mouth 2 (Two) Times a Day., Disp: , Rfl:   •  SYMBICORT 160-4.5 MCG/ACT inhaler, , Disp: , Rfl:   •  traMADol (ULTRAM) 50 MG tablet, , Disp: , Rfl:           Objective   Physical Exam  Vitals:    08/16/18 1204   BP: 138/67   Pulse: 77   Resp: 16   Temp: 98.3 °F (36.8 °C)   SpO2: 96%     GENERAL: a/o x 4, NAD  SKIN: Warm pink and dry, large ecchymosis L upper abd/flank  HEENT:  PERRLA, EOM intact,  conjunctiva normal, sclera clear  NECK: supple, no JVD  LUNGS: Clear to auscultation bilaterally without wheezes, rales or rhonchi.  No accessory muscle use and no nasal flaring.  CARDIAC:  Regular rate and rhythm, S1-S2.  No murmurs, rubs or gallops.  No peripheral edema.  Equal pulses bilaterally.  ABDOMEN: Soft, minimal diffuse tenderness., nondistended.  No guarding or rebound tenderness.  Normal bowel sounds.  MUSCULOSKELETAL: Moves all extremities well.  No deformity. Minimal R hip tenderness to palp.   NEURO: Cranial nerves II through XII grossly intact.  No gross focal deficits.  Alert.  Normal speech and motor.  PSYCH: Normal mood and affect      Procedures           ED Course  ED Course as of Aug 16 1404   Thu Aug 16, 2018   1338 Stable over past year. Hemoglobin: (!) 9.0 [[place]]      ED Course User Index  [KY] Criselda Munoz, AMRIT    EKG         EKG time / Interpretation time: 1259 / 1306  Rhythm/Rate: NSR 75   MT: 144  QRS, axis: 57   QTc 447  ST and T waves: flattened III, aVL, V6   Interpreted Contemporaneously by me, independently viewed by me and MD.  Not sig changed compared to prior 1/13/18    NOT orthostatic.    1331- RN cannot start IV, CT without contrast. zofran to odt    Results for orders placed or performed during the hospital encounter of 08/16/18   Urinalysis With Culture If Indicated - Urine, Catheter   Result Value Ref Range    Color, UA Yellow Yellow, Straw    Appearance, UA Clear Clear    pH, UA 7.0 4.5 - 8.0    Specific Gravity, UA 1.020 1.003 - 1.030    Glucose, UA Negative Negative    Ketones, UA Negative Negative, 80 mg/dL (3+), >=160 mg/dL (4+)    Bilirubin, UA Negative Negative    Blood, UA Negative Negative    Protein, UA Negative Negative    Leuk Esterase, UA Negative Negative    Nitrite, UA Negative Negative    Urobilinogen, UA 0.2 E.U./dL 0.2 - 1.0 E.U./dL   Comprehensive Metabolic Panel   Result Value Ref Range    Glucose 104 (H) 65 - 99 mg/dL    BUN 17 8 - 23 mg/dL     Creatinine 0.81 0.57 - 1.00 mg/dL    Sodium 141 136 - 145 mmol/L    Potassium 4.1 3.5 - 5.2 mmol/L    Chloride 103 98 - 107 mmol/L    CO2 25.1 22.0 - 29.0 mmol/L    Calcium 9.3 8.8 - 10.5 mg/dL    Total Protein 6.7 6.0 - 8.5 g/dL    Albumin 3.70 3.50 - 5.20 g/dL    ALT (SGPT) 9 5 - 33 U/L    AST (SGOT) 20 5 - 32 U/L    Alkaline Phosphatase 35 (L) 40 - 129 U/L    Total Bilirubin 0.4 0.2 - 1.2 mg/dL    eGFR Non African Amer 70 >60 mL/min/1.73    Globulin 3.0 gm/dL    A/G Ratio 1.2 g/dL    BUN/Creatinine Ratio 21.0 7.0 - 25.0    Anion Gap 12.9 mmol/L   Protime-INR   Result Value Ref Range    Protime 14.8 12.1 - 15.0 Seconds    INR 1.15 (H) 0.90 - 1.10   aPTT   Result Value Ref Range    PTT 31.9 24.3 - 38.1 seconds   Troponin   Result Value Ref Range    Troponin T 0.013 0.000 - 0.030 ng/mL   CBC Auto Differential   Result Value Ref Range    WBC 10.26 4.80 - 10.80 10*3/mm3    RBC 3.16 (L) 4.20 - 5.40 10*6/mm3    Hemoglobin 9.0 (L) 12.0 - 16.0 g/dL    Hematocrit 29.3 (L) 37.0 - 47.0 %    MCV 92.7 81.0 - 99.0 fL    MCH 28.5 27.0 - 31.0 pg    MCHC 30.7 (L) 31.0 - 37.0 g/dL    RDW 16.2 (H) 11.5 - 14.5 %    RDW-SD 53.8 37.0 - 54.0 fl    MPV 10.8 (H) 7.4 - 10.4 fL    Platelets 350 140 - 500 10*3/mm3    Neutrophil % 82.2 (H) 45.0 - 70.0 %    Lymphocyte % 10.9 (L) 20.0 - 45.0 %    Monocyte % 4.8 3.0 - 8.0 %    Eosinophil % 0.8 0.0 - 4.0 %    Basophil % 0.4 0.0 - 2.0 %    Immature Grans % 0.9 (H) 0.0 - 0.5 %    Neutrophils, Absolute 8.44 (H) 1.50 - 8.30 10*3/mm3    Lymphocytes, Absolute 1.12 0.60 - 4.80 10*3/mm3    Monocytes, Absolute 0.49 0.00 - 1.00 10*3/mm3    Eosinophils, Absolute 0.08 (L) 0.10 - 0.30 10*3/mm3    Basophils, Absolute 0.04 0.00 - 0.20 10*3/mm3    Immature Grans, Absolute 0.09 (H) 0.00 - 0.03 10*3/mm3    nRBC 0.0 0.0 - 0.0 /100 WBC   POC Glucose Once   Result Value Ref Range    Glucose 96 70 - 130 mg/dL   POC Glucose Once   Result Value Ref Range    Glucose 96 70 - 130 mg/dL     Reviewed CT's below.  Independently viewed by me. Interpreted by radiologist. Discussed with pt and daughter.  Ct Abdomen Pelvis Without Contrast    Result Date: 8/16/2018  Narrative: CT ABDOMEN AND PELVIS, NONCONTRAST, 8/16/2018  HISTORY: 68-year-old female with history of two falls over the last two weeks presenting to the ED complaining of left side abdomen pain and bruising. Some nausea.  TECHNIQUE: CT examination of the abdomen and pelvis without oral or IV contrast, as requested. Radiation dose reduction techniques included automated exposure control or exposure modulation based on body size. Radiation audit for CT and nuclear cardiology exams in the last 12 months: 1.  COMPARISON: *  CT lumbar spine, 9/1/2016. *  CT abdomen/pelvis, 8/13/2015.  ABDOMEN FINDINGS: There is a moderately large subcutaneous hematoma overlying the left lower rib margin and left flank measuring 11.5 x 6.5 x 6.6 cm. Overlying skin thickening.  No hematoma or other fluid collection is seen elsewhere within the abdomen, pelvis or body wall. There is no visible acute fracture of left lower ribs or lumbar spine. Old fracture deformity is of the left transverse processes of L1 and L2 are noted, and these fractures were also present on the 2016 study.  Chronic appearing focal scarring is noted within the spleen, not present on the prior exam. Mild hepatomegaly and lobulated hepatic contour may indicate chronic liver disease. Liver is otherwise unremarkable. No evidence of acute renal injury or urinary obstruction. Cholecystectomy. Normal caliber abdominal aorta.  Mild to moderate sigmoid colonic diverticulosis. Small bowel and colon are otherwise within normal limits, as imaged. Normal appendix. Postop changes previous ventral hernia repair surgery.  PELVIS FINDINGS: Hysterectomy. Urinary bladder and rectum are negative.  Mild chronic vertebral compression fracture deformity involving the superior endplates of L1 and T11, present on the 2016 exam. Severe  multilevel degenerative changes throughout the lumbar spine.      Impression: 1. Subcutaneous hematoma in the left flank abdominal wall measuring up to 11.5 cm. 2. No additional acute traumatic abnormality seen within the abdomen, pelvis or body wall. 3. Chronic fractures of the T11 and L1 vertebrae in the left L1 and L2 transverse processes. 4. Chronic appearing splenic scarring. 5. Morphologic changes suggesting potential chronic liver disease. Cholecystectomy. 6. Colonic diverticulosis. 7. Hysterectomy.  This report was finalized on 8/16/2018 2:47 PM by Dr. Lang Corona MD.      Ct Head Without Contrast    Result Date: 8/16/2018  Narrative: CT HEAD, NONCONTRAST, 8/16/2018  HISTORY: 68-year-old female in the ED after two falls over the last two weeks. Left flank pain. Right frontal scalp tenderness.  TECHNIQUE:  CT examination of the head without IV contrast. Radiation dose reduction techniques included automated exposure control or exposure modulation based on body size. Radiation audit for CT and nuclear cardiology exams in the last 12 months: 1.  COMPARISON: *  CT head, 12/21/2016.  FINDINGS:  No acute intracranial abnormality is demonstrated. No visible skull fracture.  Mild generalized cerebral cortical atrophy. Mild diffuse low-attenuation white matter changes are nonspecific but likely related to chronic small vessel disease. This is stable.  No evidence of intracranial hemorrhage, mass, mass effect, cerebral edema, hydrocephalus or additional abnormality.       Impression: 1. No acute intracranial abnormality. 2. Stable mild diffuse chronic changes as noted above. 3. No change since 12/21/2016.  This report was finalized on 8/16/2018 2:48 PM by Dr. Lang Corona MD.      Xr Hip With Or Without Pelvis 2 - 3 View Right    Result Date: 8/16/2018  Narrative: PELVIS RIGHT HIP, 8/16/2018     HISTORY: 68-year-old female in the ED complaining of right hip pain. She reports two falls over the last two  weeks.  TECHNIQUE: AP pelvis with 2-view right hip series.  FINDINGS: No fracture, dislocation or other acute osseous abnormality is demonstrated. Mild degenerative arthropathy involving both hips.      Impression: 1. No acute osseous abnormality. 2. Mild bilateral degenerative hip arthropathy.  This report was finalized on 8/16/2018 1:56 PM by Dr. Lang Corona MD.      CONSULT  Time 1512  Discussed case with Dr Peralta  Reviewed history, exam, results and treatments.  Discussed concerns and plan of care.  She states pt has been falling for years. Hold Eliquis and they will see her on Monday.  Patient to call tomorrow for appointment.    Discussed pertinent labs and imaging findings with the patient/family.  Patient/Family voiced understanding of need to follow-up for recheck, further testing as needed.  Return to the emergency Department warnings were given.                MDM  Number of Diagnoses or Management Options  Current use of long term anticoagulation: established and worsening  Dementia without behavioral disturbance, unspecified dementia type: established and worsening  Frequent falls: established and worsening  Hematoma: new and requires workup     Amount and/or Complexity of Data Reviewed  Clinical lab tests: reviewed and ordered  Tests in the radiology section of CPT®: reviewed and ordered  Tests in the medicine section of CPT®: reviewed and ordered  Decide to obtain previous medical records or to obtain history from someone other than the patient: yes  Obtain history from someone other than the patient: yes  Discuss the patient with other providers: yes  Independent visualization of images, tracings, or specimens: yes    Risk of Complications, Morbidity, and/or Mortality  Presenting problems: moderate  Diagnostic procedures: moderate  Management options: moderate    Patient Progress  Patient progress: improved        Final diagnoses:   Hematoma   Frequent falls   Dementia without behavioral  disturbance, unspecified dementia type   Current use of long term anticoagulation       Dictated utilizing Dragon dictation       Criselda Munoz PA-C  08/16/18 [id number]

## 2018-09-11 ENCOUNTER — OFFICE VISIT (OUTPATIENT)
Dept: ORTHOPEDIC SURGERY | Facility: CLINIC | Age: 68
End: 2018-09-11

## 2018-09-11 DIAGNOSIS — S72.401D CLOSED FRACTURE OF DISTAL END OF RIGHT FEMUR WITH ROUTINE HEALING, UNSPECIFIED FRACTURE MORPHOLOGY, SUBSEQUENT ENCOUNTER: Primary | ICD-10-CM

## 2018-09-11 PROCEDURE — 99213 OFFICE O/P EST LOW 20 MIN: CPT | Performed by: ORTHOPAEDIC SURGERY

## 2018-09-11 PROCEDURE — 73552 X-RAY EXAM OF FEMUR 2/>: CPT | Performed by: ORTHOPAEDIC SURGERY

## 2018-09-11 RX ORDER — METHYLPREDNISOLONE 4 MG/1
TABLET ORAL
Qty: 1 TABLET | Refills: 0 | Status: SHIPPED | OUTPATIENT
Start: 2018-09-11 | End: 2018-10-18

## 2018-09-11 RX ORDER — PIOGLITAZONEHYDROCHLORIDE 30 MG/1
TABLET ORAL
COMMUNITY
End: 2020-05-21 | Stop reason: HOSPADM

## 2018-09-11 NOTE — PROGRESS NOTES
Subjective:     Patient ID: Juan Pablo Hernandez is a 68 y.o. female.    Chief Complaint:  Follow-up status post ORIF right distal femur periprosthetic fracture, 1/15/2018  History of Present Illness  Juan Pablo Hernandez returns to clinic today for evaluation of right leg, states she had done very well following her surgery but over the last 6-8 weeks is noticing increasing pain intermittent and occasionally evenat rest, denies any numbness or tingling, denies any fevers chills or sweats.  Localizes majority of her pain to the anterior aspect of her right thigh exacerbated with prolonged weightbearing, getting up from a seated position, and attempts at stairclimbing.  Using walker for mobility purposes.  Rates current level of pain as a 4-5 out of 10 in aching in nature.  Denies radiation of pain.     Social History     Occupational History   • Not on file.     Social History Main Topics   • Smoking status: Never Smoker   • Smokeless tobacco: Never Used   • Alcohol use No   • Drug use: No   • Sexual activity: Defer      Past Medical History:   Diagnosis Date   • Anxiety    • Asthma    • CHF (congestive heart failure) (CMS/[facility])    • Chronic pain disorder    • Chronic UTI    • Depression    • Diabetes (CMS/[facility])    • GERD (gastroesophageal reflux disease)    • H/O CHF    • HTN (hypertension)    • Hyperlipidemia    • Incontinence    • Injury of back     spinal stenosis, chronic back pain   • Low back pain    • Lumbosacral disc disease    • MRSA infection (methicillin-resistant Staphylococcus aureus)     to left foot states approx 12-13 years ago    • Osteoarthritis    • Sleep apnea     uses cpap   • Spinal stenosis    • Stroke (CMS/[facility])     pt states PMD has said she may be having mini strokes   • Vertigo      Past Surgical History:   Procedure Laterality Date   • CHOLECYSTECTOMY OPEN     • COLONOSCOPY     • ENDOSCOPY     • FEMUR OPEN REDUCTION INTERNAL FIXATION Right 1/15/2018    Procedure: FEMUR DISTAL OPEN REDUCTION INTERNAL  FIXATION;  Surgeon: Moy Newberry MD;  Location: [facility];  Service:    • FRACTURE SURGERY     • HYSTERECTOMY     • TOTAL KNEE ARTHROPLASTY Bilateral        Family History   Problem Relation Age of Onset   • Lung disease Mother    • Cancer Mother    • Breast cancer Mother    • Heart disease Father    • Diabetes Paternal Aunt    • Diabetes Paternal Uncle    • Diabetes Paternal Grandmother    • Diabetes Paternal Grandfather          Review of Systems   Constitutional: Negative for chills, diaphoresis, fever and unexpected weight change.   HENT: Negative for hearing loss, nosebleeds, sore throat and tinnitus.    Eyes: Negative for pain and visual disturbance.   Respiratory: Negative for cough, shortness of breath and wheezing.    Cardiovascular: Negative for chest pain and palpitations.   Gastrointestinal: Negative for abdominal pain, diarrhea, nausea and vomiting.   Endocrine: Negative for cold intolerance, heat intolerance and polydipsia.   Genitourinary: Negative for difficulty urinating, dysuria and hematuria.   Musculoskeletal: Positive for arthralgias. Negative for joint swelling and myalgias.   Skin: Negative for rash and wound.   Allergic/Immunologic: Negative for environmental allergies.   Neurological: Negative for dizziness, syncope and numbness.   Hematological: Does not bruise/bleed easily.   Psychiatric/Behavioral: Negative for dysphoric mood and sleep disturbance. The patient is not nervous/anxious.            Objective:  There were no vitals filed for this visit.  There were no vitals filed for this visit.  There is no height or weight on file to calculate BMI.  General: No acute distress.  Resp: normal respiratory effort  Skin: no rashes or wounds; normal turgor  Psych: mood and affect appropriate; recent and remote memory intact         Ortho Exam    Right knee-active range of motion 0-125°, 4 out of 5 strength on flexion and extension, maximal tenderness palpation over the midportion of the  quad, no tenderness of her quadriceps tendon, patient is able to complete a straight leg raise without extensor lag, no effusion over the knee noted.  Lateral incision well-healed.  No tenderness or prominence of hardware appreciated.    Imaging:  Two-view x-rays of right femur from today's visit, ordered and reviewed by me, indication status post ORIF right distal femur fracture, compared to prior postoperative x-rays from office.  Fracture appears to be well-healed this point, no evidence of loosening or failure of hardware, total knee arthroplasty components appear to be stable as well with no evidence of ostial lysis.  No evidence of reactive bone formation or heterotopic ossification.    Assessment:       1. Closed fracture of distal end of right femur with routine healing, unspecified fracture morphology, subsequent encounter          Plan:          Discussed treatment options at length with patient at today's visit.  Patient appears to have some increasing quad atrophy and weakness as well as muscular irritation.  Discussed options, we will refer her to therapy for modalities and strengthening, Medrol Dosepak to try to help with her pain currently.    Juan Pablo Hernandez was in agreement with plan and had all questions answered.     Orders:  Orders Placed This Encounter   Procedures   • XR Femur 2 View Right   • Ambulatory Referral to Physical Therapy Evaluate and treat, Ortho; (AS INDICATED); Soft Tissue Mobilizaton       Medications:  New Medications Ordered This Visit   Medications   • MethylPREDNISolone (MEDROL, AMY,) 4 MG tablet     Sig: Take as directed on package instructions.     Dispense:  1 tablet     Refill:  0       Followup:  Return in about 6 weeks (around 10/23/2018).    Juan Pablo was seen today for pain.    Diagnoses and all orders for this visit:    Closed fracture of distal end of right femur with routine healing, unspecified fracture morphology, subsequent encounter  -     XR Femur 2 View  Right  -     Ambulatory Referral to Physical Therapy Evaluate and treat, Ortho; (AS INDICATED); Soft Tissue Mobilizaton    Other orders  -     MethylPREDNISolone (MEDROL, AMY,) 4 MG tablet; Take as directed on package instructions.          Dictated utilizing Dragon dictation

## 2018-10-11 ENCOUNTER — TRANSCRIBE ORDERS (OUTPATIENT)
Dept: PAIN MEDICINE | Facility: HOSPITAL | Age: 68
End: 2018-10-11

## 2018-10-11 DIAGNOSIS — M54.5 LOW BACK PAIN, UNSPECIFIED BACK PAIN LATERALITY, UNSPECIFIED CHRONICITY, WITH SCIATICA PRESENCE UNSPECIFIED: Primary | ICD-10-CM

## 2018-10-12 ENCOUNTER — APPOINTMENT (OUTPATIENT)
Dept: CT IMAGING | Facility: HOSPITAL | Age: 68
End: 2018-10-12

## 2018-10-12 ENCOUNTER — HOSPITAL ENCOUNTER (EMERGENCY)
Facility: HOSPITAL | Age: 68
Discharge: HOME | End: 2018-10-12
Attending: EMERGENCY MEDICINE | Admitting: EMERGENCY MEDICINE

## 2018-10-12 VITALS
OXYGEN SATURATION: 92 % | HEIGHT: 63 IN | DIASTOLIC BLOOD PRESSURE: 82 MMHG | RESPIRATION RATE: 18 BRPM | BODY MASS INDEX: 28.88 KG/M2 | SYSTOLIC BLOOD PRESSURE: 162 MMHG | TEMPERATURE: 98 F | WEIGHT: 163 LBS | HEART RATE: 67 BPM

## 2018-10-12 DIAGNOSIS — H11.32 SUBCONJUNCTIVAL HEMATOMA, LEFT: ICD-10-CM

## 2018-10-12 DIAGNOSIS — S00.83XA CONTUSION OF FACE, INITIAL ENCOUNTER: ICD-10-CM

## 2018-10-12 DIAGNOSIS — H05.232 PERIORBITAL HEMATOMA OF LEFT EYE: Primary | ICD-10-CM

## 2018-10-12 PROCEDURE — 70450 CT HEAD/BRAIN W/O DYE: CPT

## 2018-10-12 PROCEDURE — 25010000002 FENTANYL CITRATE (PF) 100 MCG/2ML SOLUTION: Performed by: EMERGENCY MEDICINE

## 2018-10-12 PROCEDURE — 72125 CT NECK SPINE W/O DYE: CPT

## 2018-10-12 PROCEDURE — 96372 THER/PROPH/DIAG INJ SC/IM: CPT

## 2018-10-12 PROCEDURE — 99284 EMERGENCY DEPT VISIT MOD MDM: CPT

## 2018-10-12 PROCEDURE — 70486 CT MAXILLOFACIAL W/O DYE: CPT

## 2018-10-12 PROCEDURE — 99282 EMERGENCY DEPT VISIT SF MDM: CPT | Performed by: EMERGENCY MEDICINE

## 2018-10-12 RX ORDER — FENTANYL CITRATE 50 UG/ML
50 INJECTION, SOLUTION INTRAMUSCULAR; INTRAVENOUS ONCE
Status: COMPLETED | OUTPATIENT
Start: 2018-10-12 | End: 2018-10-12

## 2018-10-12 RX ADMIN — FENTANYL CITRATE 50 MCG: 50 INJECTION, SOLUTION INTRAMUSCULAR; INTRAVENOUS at 21:16

## 2018-10-13 NOTE — ED PROVIDER NOTES
Subjective     History provided by:  Patient (Daughter)    History of Present Illness    · Chief complaint: Fall    · Location: Injury to the left eye and face.  Neck pain.    · Quality/Severity: Patient reports moderately severe pain around her left eye and mild pain in her neck.    · Timing/Onset: The patient fell at 3 PM today.    · Modifying Factors: None    · Associated symptoms: Her left eye is swollen shut.    · Narrative: The patient is a 68-year-old white female fell at home when she bent over to  a piece of paper that fell while she was paying bills.  She doesn't know what she struck, but has swelling of her left eye.  She also complains of some neck pain and has a wound on her left knee.  The patient has a history of spinal stenosis and chronic back pain for which she takes tramadol.    ED Triage Vitals [10/12/18 2013]   Temp Heart Rate Resp BP SpO2   98 °F (36.7 °C) 67 18 162/82 95 %      Temp src Heart Rate Source Patient Position BP Location FiO2 (%)   Oral Monitor Sitting Right arm --       Review of Systems   Constitutional: Negative for activity change, appetite change, chills and fever.   HENT: Positive for facial swelling. Negative for congestion, ear pain, sinus pain, trouble swallowing and voice change.    Eyes: Positive for pain (left). Negative for photophobia and visual disturbance.        Left eye swollen shut.   Respiratory: Negative for cough and shortness of breath.    Cardiovascular: Negative for chest pain.   Gastrointestinal: Negative for abdominal pain, nausea and vomiting.   Genitourinary: Negative for difficulty urinating, dysuria, flank pain, frequency and urgency.   Musculoskeletal: Positive for back pain (chronic) and neck pain. Negative for neck stiffness.   Skin: Positive for wound (left knee).   Neurological: Negative for dizziness, seizures, weakness, light-headedness, numbness and headaches.   Hematological: Negative for adenopathy.   Psychiatric/Behavioral: Negative  for confusion, hallucinations and self-injury.       Past Medical History:   Diagnosis Date   • Anxiety    • Asthma    • CHF (congestive heart failure) (CMS/[facility])    • Chronic pain disorder    • Chronic UTI    • Depression    • Diabetes (CMS/[facility])    • GERD (gastroesophageal reflux disease)    • H/O CHF    • HTN (hypertension)    • Hyperlipidemia    • Incontinence    • Injury of back     spinal stenosis, chronic back pain   • Low back pain    • Lumbosacral disc disease    • MRSA infection (methicillin-resistant Staphylococcus aureus)     to left foot states approx 12-13 years ago    • Osteoarthritis    • Sleep apnea     uses cpap   • Spinal stenosis    • Stroke (CMS/[facility])     pt states PMD has said she may be having mini strokes   • Vertigo        Allergies   Allergen Reactions   • Augmentin [Amoxicillin-Pot Clavulanate]    • Avelox [Moxifloxacin]    • Codeine    • Morphine And Related    • Penicillins    • Sulfa Antibiotics        Past Surgical History:   Procedure Laterality Date   • CHOLECYSTECTOMY OPEN     • COLONOSCOPY     • ENDOSCOPY     • FEMUR OPEN REDUCTION INTERNAL FIXATION Right 1/15/2018    Procedure: FEMUR DISTAL OPEN REDUCTION INTERNAL FIXATION;  Surgeon: Moy Newberry MD;  Location: [facility];  Service:    • FRACTURE SURGERY     • HYSTERECTOMY     • TOTAL KNEE ARTHROPLASTY Bilateral        Family History   Problem Relation Age of Onset   • Lung disease Mother    • Cancer Mother    • Breast cancer Mother    • Heart disease Father    • Diabetes Paternal Aunt    • Diabetes Paternal Uncle    • Diabetes Paternal Grandmother    • Diabetes Paternal Grandfather        Social History     Social History   • Marital status:      Social History Main Topics   • Smoking status: Never Smoker   • Smokeless tobacco: Never Used   • Alcohol use No   • Drug use: No   • Sexual activity: Defer     Other Topics Concern   • Not on file           Objective   Physical Exam   Constitutional: She is oriented to person,  place, and time. She appears well-developed and well-nourished. No distress.   The patient appears in discomfort.  She does not appear acutely ill.  Reviewed her vital signs: She is afebrile, blood pressure slightly elevated 162/82, remainder of vital signs within normal limits.   HENT:   Head: Normocephalic.   Right Ear: External ear normal.   Left Ear: External ear normal.   Nose: Nose normal.   Mouth/Throat: Oropharynx is clear and moist.   The patient is left periorbital ecchymosis and swelling with swelling of the left shunt.  There is no fracture crepitance.   Eyes: Pupils are equal, round, and reactive to light.   The patient's left eye has complete subconjunctivae arrival hematoma involving both the temporal and nasal aspect of the conjunctiva with swelling.  The iris is intact.  There is no blood in the anterior chamber.  She has normal vision in her left eye.   Neck:   The patient has mild tenderness to palpation of the posterior cervical spine without bony deformity.   Cardiovascular: Normal rate and regular rhythm.    No murmur heard.  Pulmonary/Chest: Effort normal and breath sounds normal.   Abdominal: Soft. Bowel sounds are normal. There is no tenderness.   Musculoskeletal:   The patient is a very superficial skin avulsion of the most outer layer of the epidermis on the left patella.  No underlying bony deformity or tenderness.  There is no ligamental laxity.    Lymphadenopathy:     She has no cervical adenopathy.   Neurological: She is alert and oriented to person, place, and time. No cranial nerve deficit.   No focal motor sensory deficit.   Skin: Skin is warm and dry. Capillary refill takes less than 2 seconds. No rash noted. She is not diaphoretic. No erythema. No pallor.   Psychiatric: She has a normal mood and affect. Her behavior is normal. Judgment and thought content normal.   Nursing note and vitals reviewed.      Procedures           ED Course  ED Course as of Oct 12 2312   Fri Oct 12, 2018    0124 The patient's CT of her head, face, and cervical spine were negative for fracture.  [TP]   2043 The patient has prescription already for Ultram for pain.  She is instructed to apply ice to her left eye to help reduce swelling.  Instructed to make an appointment to follow with Dr. Zhang next week.  [TP]   2720 [facility] Report [id number]  [TP]      ED Course User Index  [TP] Darrel Ferrell MD                  MDM  Number of Diagnoses or Management Options  Contusion of face, initial encounter: new and requires workup  Periorbital hematoma of left eye: new and requires workup  Subconjunctival hematoma, left: new and requires workup     Amount and/or Complexity of Data Reviewed  Tests in the radiology section of CPT®: ordered and reviewed  Independent visualization of images, tracings, or specimens: yes    Patient Progress  Patient progress: stable        Final diagnoses:   Periorbital hematoma of left eye   Subconjunctival hematoma, left   Contusion of face, initial encounter           Labs Reviewed - No data to display  CT Facial Bones Without Contrast   ED Interpretation   Negative for fracture.      CT Head Without Contrast   ED Interpretation   Negative, except for forehead hematoma.      CT Cervical Spine Without Contrast   ED Interpretation   Negative for fracture per Dr. Pedro             Medication List      Changed    Vitamin D3 71560 units capsule  Take 1 capsule by mouth Every 7 (Seven) Days.  What changed:  additional instructions               Darrel Ferrell MD  10/12/18 2418

## 2018-10-18 ENCOUNTER — TRANSCRIBE ORDERS (OUTPATIENT)
Dept: PAIN MEDICINE | Facility: HOSPITAL | Age: 68
End: 2018-10-18

## 2018-10-18 ENCOUNTER — HOSPITAL ENCOUNTER (OUTPATIENT)
Dept: PAIN MEDICINE | Facility: HOSPITAL | Age: 68
Discharge: HOME | End: 2018-10-18
Admitting: ANESTHESIOLOGY

## 2018-10-18 ENCOUNTER — ANESTHESIA EVENT (OUTPATIENT)
Dept: PAIN MEDICINE | Facility: HOSPITAL | Age: 68
End: 2018-10-18

## 2018-10-18 ENCOUNTER — ANESTHESIA (OUTPATIENT)
Dept: PAIN MEDICINE | Facility: HOSPITAL | Age: 68
End: 2018-10-18

## 2018-10-18 ENCOUNTER — HOSPITAL ENCOUNTER (OUTPATIENT)
Dept: GENERAL RADIOLOGY | Facility: HOSPITAL | Age: 68
Discharge: HOME | End: 2018-10-18

## 2018-10-18 VITALS
OXYGEN SATURATION: 98 % | SYSTOLIC BLOOD PRESSURE: 138 MMHG | DIASTOLIC BLOOD PRESSURE: 75 MMHG | HEART RATE: 65 BPM | RESPIRATION RATE: 16 BRPM | TEMPERATURE: 97.4 F

## 2018-10-18 DIAGNOSIS — M54.5 LOW BACK PAIN, UNSPECIFIED BACK PAIN LATERALITY, UNSPECIFIED CHRONICITY, WITH SCIATICA PRESENCE UNSPECIFIED: ICD-10-CM

## 2018-10-18 DIAGNOSIS — M54.5 LOW BACK PAIN, UNSPECIFIED BACK PAIN LATERALITY, UNSPECIFIED CHRONICITY, WITH SCIATICA PRESENCE UNSPECIFIED: Primary | ICD-10-CM

## 2018-10-18 DIAGNOSIS — R52 PAIN: ICD-10-CM

## 2018-10-18 LAB — GLUCOSE BLDC GLUCOMTR-MCNC: 85 MG/DL (ref 70–130)

## 2018-10-18 PROCEDURE — 77003 FLUOROGUIDE FOR SPINE INJECT: CPT

## 2018-10-18 PROCEDURE — 0 IOPAMIDOL 41 % SOLUTION: Performed by: ANESTHESIOLOGY

## 2018-10-18 PROCEDURE — C1755 CATHETER, INTRASPINAL: HCPCS

## 2018-10-18 PROCEDURE — 25010000002 METHYLPREDNISOLONE PER 80 MG: Performed by: ANESTHESIOLOGY

## 2018-10-18 PROCEDURE — 82962 GLUCOSE BLOOD TEST: CPT

## 2018-10-18 RX ORDER — LIDOCAINE HYDROCHLORIDE 10 MG/ML
5 INJECTION, SOLUTION INFILTRATION; PERINEURAL ONCE
Status: COMPLETED | OUTPATIENT
Start: 2018-10-18 | End: 2018-10-18

## 2018-10-18 RX ORDER — METHYLPREDNISOLONE ACETATE 80 MG/ML
80 INJECTION, SUSPENSION INTRA-ARTICULAR; INTRALESIONAL; INTRAMUSCULAR; SOFT TISSUE ONCE
Status: COMPLETED | OUTPATIENT
Start: 2018-10-18 | End: 2018-10-18

## 2018-10-18 RX ADMIN — LIDOCAINE HYDROCHLORIDE 3 ML: 10 INJECTION, SOLUTION EPIDURAL; INFILTRATION; INTRACAUDAL; PERINEURAL at 14:35

## 2018-10-18 RX ADMIN — METHYLPREDNISOLONE ACETATE 80 MG: 80 INJECTION, SUSPENSION INTRA-ARTICULAR; INTRALESIONAL; INTRAMUSCULAR; SOFT TISSUE at 14:46

## 2018-10-18 RX ADMIN — IOPAMIDOL 3 ML: 408 INJECTION, SOLUTION INTRATHECAL at 14:45

## 2018-10-18 NOTE — ANESTHESIA PROCEDURE NOTES
PAIN Epidural block    Pre-sedation assessment completed: 10/18/2018 1:42 PM    Patient reassessed immediately prior to procedure    Patient location during procedure: pain clinic  Start Time: 10/18/2018 1:46 PM  Stop Time: 10/18/2018 1:50 PM  Indication:procedure for pain  Performed By  Anesthesiologist: GENNA SANCHEZ  Preanesthetic Checklist  Completed: patient identified, site marked, surgical consent, pre-op evaluation, timeout performed, risks and benefits discussed and monitors and equipment checked  Additional Notes      Pre-op Diagnosis:  Degenerative disc disease lumbar spine, lumbar radiculopathy    Post-op Diagnosis:  Degenerative disc disease lumbar spine, lumbar radiculopathy       Physician/Assistants: Anurag    Estimated Blood Loss: None    Specimens: None    Findings: Lumbar epidural steroid injection today without difficulty    Complications: None          Prep:  Pt Position:prone  Sterile Tech:cap, gloves, sterile barrier and mask  Prep:chlorhexidine gluconate and isopropyl alcohol  Monitoring:continuous pulse oximetry and blood pressure monitoring  Procedure:  Sedation: no   Approach:midline  Guidance: fluoroscopy  Location:lumbar  Interspace: L5-S1.  Needle Type:Tuohy  Needle Gauge:22 G  Aspiration:negative  Medications:  Depomedrol:80 mg  Preservative Free Saline:5mL  Isovue:1mL  Comments:Skin infiltrated with 1% lidocaine, 2 ML.  Needle placement fluoroscopically guided, epidurogram performed.  Some discomfort left leg within production of steroid medication, spontaneously and quickly resolved.    Post Assessment:  Post-procedure: Band-Aid applied.  Pt Tolerance:patient tolerated the procedure well with no apparent complications  Complications:no

## 2018-10-18 NOTE — H&P
KASSIDY Ford    History and Physical    Patient Name: Juan Pablo Hernandez  :  1950  MRN:  [id number]  Date of Admission: 10/18/2018    Subjective     Patient is a 68 y.o. female presents with chief complaint of chronic, constant, severe low back and leg: right pain.  Onset of symptoms was gradual starting several years ago.  Symptoms are associated/aggravated by activity, standing or walking for more than a few minutes. Symptoms improve with relaxation and lying down.  Mrs. Hernandez underwent lumbar epidural steroid injection on 2018 with 70% improvement of her pain for at least 2 months.  Her pain is returning, but she is still overall better than prior to her lumbar epidural steroid in May.  She reports her pain is constant and severe in her back, hips and down her anterior aspect of her right leg.  Her pain is worse with any activity and standing or walking for more than 5 minutes.  She reports her pain is constant and is a 10 out of 10 at the worst and a 2-3 out of 10 at the best.  Her pain does improve with lying down and she reports that her sleep is not usually interrupted by her pain.  Of note is that she did have a fall 1 week ago and shows severe severe bruising over her forehead and periorbital bilaterally.  She denies any loss of consciousness at that time.  She did have an MRI that revealed:  Study Result     MRI LUMBAR SPINE WITHOUT CONTRAST  - 2017     INDICATIONS:  Chronic low back pain.  Recent falls over the past 4 weeks.  Right lower extremity radiculopathy.       PROCEDURE:  Sagittal and axial T1 and T2 weighted imaging of the lumbar spine without contrast.     COMPARISON:  2016     FINDINGS:  There is gradual levocurvature throughout the lumbar spine.  Lumbar vertebral bodies otherwise maintain alignment.  Chronic mild anterior compression at L1 and T11.  These are unchanged from the previous study.  Conus terminates at L1.  No paravertebral mass.       L1-L2:  Mild  broad-based posterior disk bulge.  Mild facet arthrosis.  Mild central canal narrowing.  No significant neural foraminal narrowing.     L2-L3:  Circumferential disk osteophyte with moderate facet arthrosis.  Moderate central canal narrowing.  Moderate right neural foraminal narrowing.     L3-L4:  Circumferential disk osteophyte.  Bilateral facet arthrosis.  A 7 mm synovial cyst along the medial aspect of the right facet.  Moderate central canal narrowing.  Moderately severe right and mild left neural foraminal narrowing.     L4-L5:  Circumferential disk osteophyte with moderate facet arthrosis.  Moderately severe central canal narrowing.  Moderate right and left neural foraminal narrowing.       L5-S1:  Mild circumferential disk osteophyte.  Moderate facet arthrosis.  Mild central canal narrowing.  Moderate left and mild right neural foraminal narrowing.       IMPRESSION:  1.  Multilevel degenerative disk disease and levocurvature throughout the lumbar spine.    2.  Central canal narrowing, most significant at L2-L3, L3-L4, and L4-L5.  3.  Neural foraminal narrowing is most significant on the right at L3-L4.    4.  Stable mild chronic compression deformities of T11 and L1.  No evidence for acute compression fracture.       I discussed performing lumbar epidural steroid injection with the risks including, not limited to, bleeding and infection, postdural puncture headache, and the fact that I could not guarantee her pain would improve, in fact may worsen, or undergo no change, and she does wish to proceed at this time.  Ms. Hernandez is awake and alert, does show some problems with memory, and appropriate.  Debilities:  Back and leg pain, uses walker.    The following portions of the patients history were reviewed and updated as appropriate: current medications, allergies, past medical history, past surgical history, past family history, past social history and problem list                Objective     Past Medical  History:   Past Medical History:   Diagnosis Date   • Anxiety    • Asthma    • CHF (congestive heart failure) (CMS/HCC)    • Chronic pain disorder    • Chronic UTI    • Depression    • Diabetes (CMS/HCC)    • GERD (gastroesophageal reflux disease)    • H/O CHF    • HTN (hypertension)    • Hyperlipidemia    • Incontinence    • Injury of back     spinal stenosis, chronic back pain   • Low back pain    • Lumbosacral disc disease    • MRSA infection (methicillin-resistant Staphylococcus aureus)     to left foot states approx 12-13 years ago    • Osteoarthritis    • Sleep apnea     uses cpap   • Spinal stenosis    • Stroke (CMS/HCC)     pt states PMD has said she may be having mini strokes   • Vertigo      Past Surgical History:   Past Surgical History:   Procedure Laterality Date   • CHOLECYSTECTOMY OPEN     • COLONOSCOPY     • ENDOSCOPY     • EPIDURAL BLOCK     • FEMUR OPEN REDUCTION INTERNAL FIXATION Right 1/15/2018    Procedure: FEMUR DISTAL OPEN REDUCTION INTERNAL FIXATION;  Surgeon: Moy Newberry MD;  [facility];  Service:    • FRACTURE SURGERY     • HYSTERECTOMY     • TOTAL KNEE ARTHROPLASTY Bilateral      Family History:   Family History   Problem Relation Age of Onset   • Lung disease Mother    • Cancer Mother    • Breast cancer Mother    • Heart disease Father    • Diabetes Paternal Aunt    • Diabetes Paternal Uncle    • Diabetes Paternal Grandmother    • Diabetes Paternal Grandfather      Social History:   Social History   Substance Use Topics   • Smoking status: Never Smoker   • Smokeless tobacco: Never Used   • Alcohol use No       Vital Signs Range for the last 24 hours  Temperature: Temp:  [97.4 °F (36.3 °C)] 97.4 °F (36.3 °C)   Temp Source: Temp src: Oral   BP: BP: (154)/(81) 154/81   Pulse: Heart Rate:  [66] 66   Respirations: Resp:  [18] 18   SPO2: SpO2:  [98 %] 98 %   O2 Amount (l/min):     O2 Devices Device (Oxygen Therapy): room air   Weight:            --------------------------------------------------------------------------------    Current Outpatient Prescriptions   Medication Sig Dispense Refill   • acetaminophen (TYLENOL) 325 MG tablet Take 2 tablets by mouth Every 6 (Six) Hours As Needed for mild pain (1-3) or moderate pain (4-6). 60 tablet 0   • albuterol (PROVENTIL HFA;VENTOLIN HFA) 108 (90 BASE) MCG/ACT inhaler Inhale 2 puffs Every 4 (Four) Hours As Needed for wheezing.     • albuterol (PROVENTIL) (2.5 MG/3ML) 0.083% nebulizer solution Take 2.5 mg by nebulization Every 4 (Four) Hours As Needed for Wheezing or Shortness of Air.  12   • baclofen (LIORESAL) 10 MG tablet Take 10 mg by mouth Daily.     • bisacodyl (DULCOLAX) 10 MG suppository Insert 1 suppository into the rectum Daily As Needed for Constipation.     • bisacodyl (DULCOLAX) 5 MG EC tablet Take 1 tablet by mouth Daily As Needed for Constipation.     • Cholecalciferol (VITAMIN D3) 04221 UNITS capsule Take 1 capsule by mouth Every 7 (Seven) Days. (Patient taking differently: Take 50,000 Units by mouth Every 7 (Seven) Days. Takes on mondays) 4 capsule 0   • enalapril (VASOTEC) 10 MG tablet Take 1 tablet by mouth Daily.     • estradiol (ESTRACE) 0.1 MG/GM vaginal cream Insert 1 g into the vagina Daily.     • fenofibrate 160 MG tablet Take 160 mg by mouth Daily.     • fluticasone-salmeterol (ADVAIR) 250-50 MCG/DOSE DISKUS Inhale 2 puffs Every 12 (Twelve) Hours.     • hyoscyamine sulfate (ANASPAZ) 0.125 MG tablet dispersible disintegrating tablet Take 125 mcg by mouth Every 4 (Four) Hours As Needed.     • meloxicam (MOBIC) 15 MG tablet      • metFORMIN (GLUCOPHAGE) 1000 MG tablet Take 1 tablet by mouth 2 (Two) Times a Day With Meals.     • metoprolol succinate XL (TOPROL XL) 25 MG 24 hr tablet Take 1 tablet by mouth Daily. 30 tablet 0   • montelukast (SINGULAIR) 10 MG tablet Take 1 tablet by mouth Every Night.     • omeprazole (priLOSEC) 40 MG capsule Take 40 mg by mouth Daily.     • oxybutynin  (DITROPAN) 5 MG tablet Take 1 tablet by mouth 2 (Two) Times a Day.     • PARoxetine (PAXIL) 40 MG tablet Take 1 tablet by mouth Daily.     • pioglitazone (ACTOS) 30 MG tablet pioglitazone 30 mg tablet   Take 1 tablet every day by oral route.     • polyethylene glycol (MIRALAX) pack packet Take 17 g by mouth Daily As Needed (constipation).     • pregabalin (LYRICA) 75 MG capsule Take 1 capsule by mouth Every 12 (Twelve) Hours.     • raNITIdine (ZANTAC) 150 MG tablet      • sennosides-docusate sodium (SENOKOT-S) 8.6-50 MG tablet Take 1 tablet by mouth 2 (Two) Times a Day.     • SYMBICORT 160-4.5 MCG/ACT inhaler      • traMADol (ULTRAM) 50 MG tablet      • clotrimazole-betamethasone (LOTRISONE) 1-0.05 % cream      • JANUMET XR  MG tablet      • magnesium oxide (MAGOX) 400 (241.3 Mg) MG tablet tablet Every 12 (Twelve) Hours.     • melatonin 5 MG tablet tablet Take 1 tablet by mouth Every Night.       Current Facility-Administered Medications   Medication Dose Route Frequency Provider Last Rate Last Dose   • iopamidol (ISOVUE-M 200) injection 41%  12 mL Epidural Once PRN Hallie Osborn MD       • lidocaine (XYLOCAINE) 1 % injection 5 mL  5 mL Infiltration Once Hallie Osborn MD       • methylPREDNISolone acetate (DEPO-medrol) injection 80 mg  80 mg Intra-articular Once Hallie Osborn MD           --------------------------------------------------------------------------------  Assessment/Plan      Anesthesia Evaluation     Patient summary reviewed and Nursing notes reviewed   no history of anesthetic complications:  NPO Solid Status: N/A  NPO Liquid Status: N/A    Pain impairs ability to perform ADLs: Meal prep/Eating, Housekeeping, Ambulation, Driving, Working and Exercise/Activity  Modalities previously tried to control pain with limited effectiveness within the last 4-6 weeks: OTC medications, Prescription medications and Other     Airway   Mallampati: II  TM distance: >3 FB  Neck ROM: full  No difficulty expected   Dental - normal exam     Pulmonary - normal exam    breath sounds clear to auscultation  (+) asthma, sleep apnea,   Cardiovascular - normal exam    Rhythm: regular  Rate: normal    (+) hypertension, CHF (history of), hyperlipidemia,       Neuro/Psych  (+) CVA, dizziness/light headedness (vertigo), psychiatric history Anxiety and Depression,     GI/Hepatic/Renal/Endo    (+)  GERD,  diabetes mellitus (BS 85) type 2 well controlled,     Musculoskeletal     (+) back pain, chronic pain, radiculopathy Right lower extremity  Abdominal    Substance History - negative use     OB/GYN negative ob/gyn ROS         Other   (+) arthritis                Diagnosis and Plan    Treatment Plan  ASA 3   Patient has had previous injection/procedure with 50-75% improvement.   Procedures: Lumbar Epidural Steroid Injection(LESI), With fluoroscopy,       Anesthetic plan and risks discussed with patient.          Diagnosis     * DDD (degenerative disc disease), lumbar [M51.36]     * Lumbar spinal stenosis [M48.061]     * Lumbar radiculopathy [M54.16]

## 2018-10-18 NOTE — ANESTHESIA PROCEDURE NOTES
PAIN Epidural block    Pre-sedation assessment completed: 10/18/2018 2:32 PM    Patient reassessed immediately prior to procedure    Patient location during procedure: pain clinic  Start Time: 10/18/2018 2:34 PM  Stop Time: 10/18/2018 2:47 PM  Indication:procedure for pain  Performed By  Anesthesiologist: GENNA SANCHEZ  Preanesthetic Checklist  Completed: patient identified, site marked, surgical consent, pre-op evaluation, timeout performed, risks and benefits discussed and monitors and equipment checked  Additional Notes      Pre-op Diagnosis:  Degenerative disc disease, spinal stenosis, radiculopathy lumbar spine    Post-op Diagnosis:  *Degenerative disc disease, spinal stenosis, radiculopathy lumbar spine       Physician/Assistants: Anurag    Estimated Blood Loss: None    Specimens: None    Findings: Lumbar epidural steroid injection without difficulty    Complications: None          Prep:  Pt Position:prone  Sterile Tech:cap, gloves, sterile barrier and mask  Prep:chlorhexidine gluconate and isopropyl alcohol  Monitoring:continuous pulse oximetry and blood pressure monitoring  Procedure:  Sedation: yes   Approach:midline  Guidance: fluoroscopy  Location:lumbar  Level:3-4  Needle Type:Tuohy  Needle Gauge:22 G  Aspiration:negative  Medications:  Depomedrol:80 mg  Preservative Free Saline:5mL  Isovue:1mL  Comments:Skin infiltrated with 1% lidocaine, 3 ML's.  Needle placement using fluoroscopy, epidurogram performed.  Post Assessment:  Post-procedure: Band-Aid applied.  Pt Tolerance:patient tolerated the procedure well with no apparent complications  Complications:no

## 2018-10-23 ENCOUNTER — OFFICE VISIT (OUTPATIENT)
Dept: ORTHOPEDIC SURGERY | Facility: CLINIC | Age: 68
End: 2018-10-23

## 2018-10-23 VITALS — WEIGHT: 163 LBS | BODY MASS INDEX: 28.88 KG/M2 | HEIGHT: 63 IN

## 2018-10-23 DIAGNOSIS — S72.401D CLOSED FRACTURE OF DISTAL END OF RIGHT FEMUR WITH ROUTINE HEALING, UNSPECIFIED FRACTURE MORPHOLOGY, SUBSEQUENT ENCOUNTER: Primary | ICD-10-CM

## 2018-10-23 PROCEDURE — 99213 OFFICE O/P EST LOW 20 MIN: CPT | Performed by: ORTHOPAEDIC SURGERY

## 2018-11-06 ENCOUNTER — TELEPHONE (OUTPATIENT)
Dept: SURGERY | Facility: CLINIC | Age: 68
End: 2018-11-06

## 2018-11-18 ENCOUNTER — PREP FOR SURGERY (OUTPATIENT)
Dept: OTHER | Facility: HOSPITAL | Age: 68
End: 2018-11-18

## 2018-11-18 DIAGNOSIS — D12.6 ADENOMATOUS POLYP OF COLON, UNSPECIFIED PART OF COLON: Primary | ICD-10-CM

## 2018-11-26 ENCOUNTER — HOSPITAL ENCOUNTER (OUTPATIENT)
Facility: HOSPITAL | Age: 68
Setting detail: HOSPITAL OUTPATIENT SURGERY
End: 2018-11-26
Attending: INTERNAL MEDICINE | Admitting: INTERNAL MEDICINE

## 2018-11-26 PROBLEM — D12.6 ADENOMATOUS POLYP OF COLON: Status: ACTIVE | Noted: 2018-11-26

## 2019-01-03 ENCOUNTER — HOSPITAL ENCOUNTER (OUTPATIENT)
Dept: GENERAL RADIOLOGY | Facility: HOSPITAL | Age: 69
Discharge: HOME | End: 2019-01-03

## 2019-01-03 ENCOUNTER — ANESTHESIA EVENT (OUTPATIENT)
Dept: PAIN MEDICINE | Facility: HOSPITAL | Age: 69
End: 2019-01-03

## 2019-01-03 ENCOUNTER — HOSPITAL ENCOUNTER (OUTPATIENT)
Dept: PAIN MEDICINE | Facility: HOSPITAL | Age: 69
Discharge: HOME | End: 2019-01-03
Admitting: ANESTHESIOLOGY

## 2019-01-03 ENCOUNTER — ANESTHESIA (OUTPATIENT)
Dept: PAIN MEDICINE | Facility: HOSPITAL | Age: 69
End: 2019-01-03

## 2019-01-03 ENCOUNTER — TRANSCRIBE ORDERS (OUTPATIENT)
Dept: PAIN MEDICINE | Facility: HOSPITAL | Age: 69
End: 2019-01-03

## 2019-01-03 VITALS
HEART RATE: 61 BPM | HEIGHT: 63 IN | SYSTOLIC BLOOD PRESSURE: 118 MMHG | TEMPERATURE: 97.9 F | BODY MASS INDEX: 28.88 KG/M2 | RESPIRATION RATE: 22 BRPM | OXYGEN SATURATION: 92 % | DIASTOLIC BLOOD PRESSURE: 68 MMHG

## 2019-01-03 DIAGNOSIS — R52 PAIN: ICD-10-CM

## 2019-01-03 DIAGNOSIS — M54.5 LOW BACK PAIN, UNSPECIFIED BACK PAIN LATERALITY, UNSPECIFIED CHRONICITY, WITH SCIATICA PRESENCE UNSPECIFIED: Primary | ICD-10-CM

## 2019-01-03 DIAGNOSIS — M54.5 LOW BACK PAIN, UNSPECIFIED BACK PAIN LATERALITY, UNSPECIFIED CHRONICITY, WITH SCIATICA PRESENCE UNSPECIFIED: ICD-10-CM

## 2019-01-03 PROCEDURE — 0 IOPAMIDOL 41 % SOLUTION: Performed by: ANESTHESIOLOGY

## 2019-01-03 PROCEDURE — 77003 FLUOROGUIDE FOR SPINE INJECT: CPT

## 2019-01-03 PROCEDURE — C1755 CATHETER, INTRASPINAL: HCPCS

## 2019-01-03 PROCEDURE — 25010000002 METHYLPREDNISOLONE PER 80 MG: Performed by: ANESTHESIOLOGY

## 2019-01-03 RX ORDER — LIDOCAINE HYDROCHLORIDE 10 MG/ML
5 INJECTION, SOLUTION INFILTRATION; PERINEURAL ONCE
Status: COMPLETED | OUTPATIENT
Start: 2019-01-03 | End: 2019-01-03

## 2019-01-03 RX ORDER — METHYLPREDNISOLONE ACETATE 80 MG/ML
80 INJECTION, SUSPENSION INTRA-ARTICULAR; INTRALESIONAL; INTRAMUSCULAR; SOFT TISSUE ONCE
Status: COMPLETED | OUTPATIENT
Start: 2019-01-03 | End: 2019-01-03

## 2019-01-03 RX ADMIN — METHYLPREDNISOLONE ACETATE 80 MG: 80 INJECTION, SUSPENSION INTRA-ARTICULAR; INTRALESIONAL; INTRAMUSCULAR; SOFT TISSUE at 12:32

## 2019-01-03 RX ADMIN — LIDOCAINE HYDROCHLORIDE 30 ML: 10 INJECTION, SOLUTION EPIDURAL; INFILTRATION; INTRACAUDAL; PERINEURAL at 12:21

## 2019-01-03 RX ADMIN — IOPAMIDOL 10 ML: 408 INJECTION, SOLUTION INTRATHECAL at 12:32

## 2019-01-03 NOTE — ANESTHESIA PROCEDURE NOTES
PAIN Epidural block    Pre-sedation assessment completed: 1/3/2019 12:19 PM    Patient reassessed immediately prior to procedure    Patient location during procedure: pain clinic  Start Time: 1/3/2019 12:21 PM  Stop Time: 1/3/2019 12:32 PM  Indication:procedure for pain  Performed By  Anesthesiologist: Hallie Osborn MD  Preanesthetic Checklist  Completed: patient identified, site marked, surgical consent, pre-op evaluation, timeout performed, risks and benefits discussed and monitors and equipment checked  Additional Notes    Pre-op Diagnosis:  Lumbar spinal stenosis, degenerative disc disease, radiculopathy    Post-op Diagnosis:  Lumbar spinal stenosis, degenerative disc disease, radiculopathy       Physician/Assistants: Anurag    Estimated Blood Loss: None    Specimens: None    Findings: Lumbar epidural steroid injection    Complications: None            Prep:  Pt Position:prone  Sterile Tech:cap, gloves, sterile barrier and mask  Prep:chlorhexidine gluconate and isopropyl alcohol  Monitoring:continuous pulse oximetry and blood pressure monitoring  Procedure:  Sedation: no   Approach:midline  Guidance: fluoroscopy  Location:lumbar  Level:3-4  Needle Type:Tuohy  Needle Gauge:22 G  Aspiration:negative  Medications:  Depomedrol:80 mg  Preservative Free Saline:5mL  Isovue:1mL  Comments:Skin infiltrated with 1% lidocaine 4 ml using 27 gauge needle.  Needle fluoroscopically guided, epidurogram performed.  Post Assessment:  Post-procedure: Band-Aid applied.  Pt Tolerance:patient tolerated the procedure well with no apparent complications  Complications:no

## 2019-01-03 NOTE — H&P
KASSIDY Ford    History and Physical    Patient Name: Juan Pablo Hernandez  :  1950  MRN:  [id number]  Date of Admission: 1/3/2019    Subjective     Patient is a 68 y.o. female presents with chief complaint of chronic, moderate, severe low back and leg: left pain.  Onset of symptoms was gradual starting unknown years ago.  Symptoms are associated/aggravated by activity, exercise, standing for more than a few minutes or walking for more than a few minutes. Symptoms improve with relaxation, pain medication, ice and lying down.  Mrs. Hernandez is well known to this anesthesia pain clinic.  She has undergone multiple lumbar epidural steroid injections, the last in 2018 which she received 50% improvement of her pain from the last at least 2 months.  She returns today with a complaint of pain in her low back with pain radiating down the anterior aspect of her left leg to her knee.  Her pain is worse with walking or standing for any length of time and with any activity.  Her pain is better with relaxation and lying down, she does report her pain is better with Ultram but this causes constipation so she has stopped this.  She does take Tylenol that seems to help her pain.  Ice helps her pain and sometimes heat will help.  She reports her pain is an 8 out of 10 at the worst, and a 0 out of 10 at the very least.  She reports her sleep is interrupted at least 3 times per week due to her pain.  I discussed performing lumbar epidural steroid injection with the risks including, not limited to, bleeding and infection, postdural puncture headache, and the fact that I could not guarantee her pain would improve, in fact may worsen, or undergo no change, and she does wish to proceed at this time.  She denies allergy to contrast agents, denies renal disease, and denies use of blood thinners.  MRI LUMBAR SPINE WITHOUT CONTRAST  - 2017     INDICATIONS:  Chronic low back pain.  Recent falls over the past 4 weeks.  Right  lower extremity radiculopathy.       PROCEDURE:  Sagittal and axial T1 and T2 weighted imaging of the lumbar spine without contrast.     COMPARISON:  12/22/2016     FINDINGS:  There is gradual levocurvature throughout the lumbar spine.  Lumbar vertebral bodies otherwise maintain alignment.  Chronic mild anterior compression at L1 and T11.  These are unchanged from the previous study.  Conus terminates at L1.  No paravertebral mass.       L1-L2:  Mild broad-based posterior disk bulge.  Mild facet arthrosis.  Mild central canal narrowing.  No significant neural foraminal narrowing.     L2-L3:  Circumferential disk osteophyte with moderate facet arthrosis.  Moderate central canal narrowing.  Moderate right neural foraminal narrowing.     L3-L4:  Circumferential disk osteophyte.  Bilateral facet arthrosis.  A 7 mm synovial cyst along the medial aspect of the right facet.  Moderate central canal narrowing.  Moderately severe right and mild left neural foraminal narrowing.     L4-L5:  Circumferential disk osteophyte with moderate facet arthrosis.  Moderately severe central canal narrowing.  Moderate right and left neural foraminal narrowing.       L5-S1:  Mild circumferential disk osteophyte.  Moderate facet arthrosis.  Mild central canal narrowing.  Moderate left and mild right neural foraminal narrowing.       IMPRESSION:  1.  Multilevel degenerative disk disease and levocurvature throughout the lumbar spine.    2.  Central canal narrowing, most significant at L2-L3, L3-L4, and L4-L5.  3.  Neural foraminal narrowing is most significant on the right at L3-L4.    4.  Stable    Awake and alert at time of exam.  Debilities\disabilities:  Anxiety, but doing well this visit.  Limited ambulation due to pain and weakness uses walker and wheelchair.    The following portions of the patients history were reviewed and updated as appropriate: current medications, allergies, past medical history, past surgical history, past family  history, past social history and problem list                Objective     Past Medical History:   Past Medical History:   Diagnosis Date   • Anxiety    • Asthma    • CHF (congestive heart failure) (CMS/HCC)    • Chronic pain disorder    • Chronic UTI    • Depression    • Diabetes (CMS/HCC)    • GERD (gastroesophageal reflux disease)    • H/O CHF    • HTN (hypertension)    • Hyperlipidemia    • Incontinence    • Injury of back     spinal stenosis, chronic back pain   • Low back pain    • Lumbosacral disc disease    • MRSA infection (methicillin-resistant Staphylococcus aureus)     to left foot states approx 12-13 years ago    • Osteoarthritis    • Sleep apnea     uses cpap   • Spinal stenosis    • Stroke (CMS/HCC)     pt states PMD has said she may be having mini strokes   • Vertigo      Past Surgical History:   Past Surgical History:   Procedure Laterality Date   • CHOLECYSTECTOMY OPEN     • COLONOSCOPY     • ENDOSCOPY     • EPIDURAL BLOCK     • FEMUR OPEN REDUCTION INTERNAL FIXATION Right 1/15/2018    Procedure: FEMUR DISTAL OPEN REDUCTION INTERNAL FIXATION;  Surgeon: Moy Newberry MD;  Location: [facility];  Service:    • FRACTURE SURGERY     • HYSTERECTOMY     • TOTAL KNEE ARTHROPLASTY Bilateral      Family History:   Family History   Problem Relation Age of Onset   • Lung disease Mother    • Cancer Mother    • Breast cancer Mother    • Heart disease Father    • Diabetes Paternal Aunt    • Diabetes Paternal Uncle    • Diabetes Paternal Grandmother    • Diabetes Paternal Grandfather      Social History:   Social History     Tobacco Use   • Smoking status: Never Smoker   • Smokeless tobacco: Never Used   Substance Use Topics   • Alcohol use: No   • Drug use: No       Vital Signs Range for the last 24 hours  Temperature: Temp:  [97.9 °F (36.6 °C)] 97.9 °F (36.6 °C)   Temp Source: Temp src: Oral   BP: BP: (127)/(70) 127/70   Pulse: Heart Rate:  [77] 77   Respirations: Resp:  [18] 18   SPO2: SpO2:  [97 %] 97 %  "  O2 Amount (l/min):     O2 Devices     Weight:       Flowsheet Rows      First Filed Value   Admission Height  160 cm (62.99\") Documented at 01/03/2019 1156   Admission Weight  No data          --------------------------------------------------------------------------------    Current Outpatient Medications   Medication Sig Dispense Refill   • acetaminophen (TYLENOL) 325 MG tablet Take 2 tablets by mouth Every 6 (Six) Hours As Needed for mild pain (1-3) or moderate pain (4-6). 60 tablet 0   • albuterol (PROVENTIL HFA;VENTOLIN HFA) 108 (90 BASE) MCG/ACT inhaler Inhale 2 puffs Every 4 (Four) Hours As Needed for wheezing.     • albuterol (PROVENTIL) (2.5 MG/3ML) 0.083% nebulizer solution Take 2.5 mg by nebulization Every 4 (Four) Hours As Needed for Wheezing or Shortness of Air.  12   • baclofen (LIORESAL) 10 MG tablet Take 10 mg by mouth Daily.     • bisacodyl (DULCOLAX) 10 MG suppository Insert 1 suppository into the rectum Daily As Needed for Constipation.     • bisacodyl (DULCOLAX) 5 MG EC tablet Take 1 tablet by mouth Daily As Needed for Constipation.     • Cholecalciferol (VITAMIN D3) 36708 UNITS capsule Take 1 capsule by mouth Every 7 (Seven) Days. (Patient taking differently: Take 50,000 Units by mouth Every 7 (Seven) Days. Takes on mondays) 4 capsule 0   • clotrimazole-betamethasone (LOTRISONE) 1-0.05 % cream      • enalapril (VASOTEC) 10 MG tablet Take 1 tablet by mouth Daily.     • estradiol (ESTRACE) 0.1 MG/GM vaginal cream Insert 1 g into the vagina Daily.     • fenofibrate 160 MG tablet Take 160 mg by mouth Daily.     • fluticasone-salmeterol (ADVAIR) 250-50 MCG/DOSE DISKUS Inhale 2 puffs Every 12 (Twelve) Hours.     • hyoscyamine sulfate (ANASPAZ) 0.125 MG tablet dispersible disintegrating tablet Take 125 mcg by mouth Every 4 (Four) Hours As Needed.     • JANUMET XR  MG tablet      • magnesium oxide (MAGOX) 400 (241.3 Mg) MG tablet tablet Every 12 (Twelve) Hours.     • melatonin 5 MG tablet " tablet Take 1 tablet by mouth Every Night.     • meloxicam (MOBIC) 15 MG tablet      • metFORMIN (GLUCOPHAGE) 1000 MG tablet Take 1 tablet by mouth 2 (Two) Times a Day With Meals.     • metoprolol succinate XL (TOPROL XL) 25 MG 24 hr tablet Take 1 tablet by mouth Daily. 30 tablet 0   • montelukast (SINGULAIR) 10 MG tablet Take 1 tablet by mouth Every Night.     • omeprazole (priLOSEC) 40 MG capsule Take 40 mg by mouth Daily.     • oxybutynin (DITROPAN) 5 MG tablet Take 1 tablet by mouth 2 (Two) Times a Day.     • PARoxetine (PAXIL) 40 MG tablet Take 1 tablet by mouth Daily.     • pioglitazone (ACTOS) 30 MG tablet pioglitazone 30 mg tablet   Take 1 tablet every day by oral route.     • polyethylene glycol (MIRALAX) pack packet Take 17 g by mouth Daily As Needed (constipation).     • pregabalin (LYRICA) 75 MG capsule Take 1 capsule by mouth Every 12 (Twelve) Hours.     • raNITIdine (ZANTAC) 150 MG tablet      • sennosides-docusate sodium (SENOKOT-S) 8.6-50 MG tablet Take 1 tablet by mouth 2 (Two) Times a Day.     • SYMBICORT 160-4.5 MCG/ACT inhaler      • traMADol (ULTRAM) 50 MG tablet        Current Facility-Administered Medications   Medication Dose Route Frequency Provider Last Rate Last Dose   • iopamidol (ISOVUE-M 200) injection 41%  12 mL Epidural Once PRN Hallie Osborn MD       • lidocaine (XYLOCAINE) 1 % injection 5 mL  5 mL Infiltration Once Hallie Osborn MD       • methylPREDNISolone acetate (DEPO-medrol) injection 80 mg  80 mg Intra-articular Once Hallie Osborn MD           --------------------------------------------------------------------------------  Assessment/Plan      Anesthesia Evaluation     Patient summary reviewed and Nursing notes reviewed   no history of anesthetic complications:  NPO Solid Status: N/A  NPO Liquid Status: N/A    Pain impairs ability to perform ADLs: Ambulation, Exercise/Activity, Sleeping and Housekeeping  Modalities previously tried to control pain with limited effectiveness  within the last 4-6 weeks: Ice, Rest, Heat, OTC medications, Prescription medications, Physical therapy and Other     Airway   Mallampati: II  TM distance: >3 FB  Neck ROM: full  No difficulty expected  Dental - normal exam     Pulmonary - normal exam    breath sounds clear to auscultation  (+) COPD mild, asthma, sleep apnea on CPAP,   Cardiovascular - normal exam  Exercise tolerance: poor (<4 METS)    Rhythm: regular  Rate: normal    (+) hypertension well controlled less than 2 medications, CHF, hyperlipidemia,       Neuro/Psych  (+) CVA, dizziness/light headedness (vertigo, frequent falls the last being last week.), weakness (right leg due to previous fracture and total knee replacement.), psychiatric history Anxiety and Depression,     GI/Hepatic/Renal/Endo    (+)  GERD,  diabetes mellitus type 2,     Musculoskeletal     (+) back pain, radiculopathy Left lower extremity  Abdominal     Abdomen: soft.   Substance History - negative use     OB/GYN negative ob/gyn ROS         Other   (+) arthritis                Diagnosis and Plan    Treatment Plan  ASA 3   Patient has had previous injection/procedure with 50-75% improvement.   Procedures: Lumbar Epidural Steroid Injection(LESI), With fluoroscopy,       Anesthetic plan and risks discussed with patient.          Diagnosis     * DDD (degenerative disc disease), lumbar [M51.36]     * Lumbar radiculopathy [M54.16]     * Lumbar spinal stenosis [M48.061]

## 2019-01-04 ENCOUNTER — TRANSCRIBE ORDERS (OUTPATIENT)
Dept: ADMINISTRATIVE | Facility: HOSPITAL | Age: 69
End: 2019-01-04

## 2019-01-04 DIAGNOSIS — R07.9 CHEST PAIN, UNSPECIFIED TYPE: Primary | ICD-10-CM

## 2019-01-17 ENCOUNTER — OFFICE VISIT (OUTPATIENT)
Dept: ORTHOPEDIC SURGERY | Facility: CLINIC | Age: 69
End: 2019-01-17

## 2019-01-17 DIAGNOSIS — S42.034A CLOSED NONDISPLACED FRACTURE OF ACROMIAL END OF RIGHT CLAVICLE, INITIAL ENCOUNTER: Primary | ICD-10-CM

## 2019-01-17 PROCEDURE — 99214 OFFICE O/P EST MOD 30 MIN: CPT | Performed by: ORTHOPAEDIC SURGERY

## 2019-01-17 RX ORDER — POTASSIUM CHLORIDE 1.5 G/1.77G
POWDER, FOR SOLUTION ORAL
COMMUNITY
End: 2020-05-21 | Stop reason: HOSPADM

## 2019-01-17 NOTE — PROGRESS NOTES
Subjective:right shoulder pain     Patient ID: Juan Pablo Hernandez is a 68 y.o. female.    Chief Complaint:    History of Present Illness68-year-old female known to me is seen for a new problem as result of fall when she injured her right shoulder.  Was seen in urgent care where x-rays demonstrate a minimally displaced lateral right clavicular fracture.  Patient placed in a cradle sling and now presents here.       Social History     Occupational History   • Not on file   Tobacco Use   • Smoking status: Never Smoker   • Smokeless tobacco: Never Used   Substance and Sexual Activity   • Alcohol use: No   • Drug use: No   • Sexual activity: Defer      Review of Systems   Constitutional: Negative for chills, diaphoresis, fever and unexpected weight change.   HENT: Negative for hearing loss, nosebleeds, sore throat and tinnitus.    Eyes: Negative for pain and visual disturbance.   Respiratory: Negative for cough, shortness of breath and wheezing.    Cardiovascular: Negative for chest pain and palpitations.   Gastrointestinal: Negative for abdominal pain, diarrhea, nausea and vomiting.   Endocrine: Negative for cold intolerance, heat intolerance and polydipsia.   Genitourinary: Negative for difficulty urinating, dysuria and hematuria.   Musculoskeletal: Positive for arthralgias. Negative for joint swelling and myalgias.   Skin: Negative for rash and wound.   Allergic/Immunologic: Negative for environmental allergies.   Neurological: Negative for dizziness, syncope and numbness.   Hematological: Does not bruise/bleed easily.   Psychiatric/Behavioral: Negative for dysphoric mood and sleep disturbance. The patient is not nervous/anxious.          Past Medical History:   Diagnosis Date   • Anxiety    • Asthma    • CHF (congestive heart failure) (CMS/[facility])    • Chronic pain disorder    • Chronic UTI    • Depression    • Diabetes (CMS/[facility])    • GERD (gastroesophageal reflux disease)    • H/O CHF    • HTN (hypertension)    •  Hyperlipidemia    • Incontinence    • Injury of back     spinal stenosis, chronic back pain   • Low back pain    • Lumbosacral disc disease    • MRSA infection (methicillin-resistant Staphylococcus aureus)     to left foot states approx 12-13 years ago    • Osteoarthritis    • Sleep apnea     uses cpap   • Spinal stenosis    • Stroke (CMS/HCC)     pt states PMD has said she may be having mini strokes   • Vertigo      Past Surgical History:   Procedure Laterality Date   • CHOLECYSTECTOMY OPEN     • COLONOSCOPY     • ENDOSCOPY     • EPIDURAL BLOCK     • FEMUR OPEN REDUCTION INTERNAL FIXATION Right 1/15/2018    Procedure: FEMUR DISTAL OPEN REDUCTION INTERNAL FIXATION;  Surgeon: Moy Newberry MD;  Location: [facility];  Service:    • FRACTURE SURGERY     • HYSTERECTOMY     • TOTAL KNEE ARTHROPLASTY Bilateral      Family History   Problem Relation Age of Onset   • Lung disease Mother    • Cancer Mother    • Breast cancer Mother    • Heart disease Father    • Diabetes Paternal Aunt    • Diabetes Paternal Uncle    • Diabetes Paternal Grandmother    • Diabetes Paternal Grandfather          Objective:  There were no vitals filed for this visit.  There were no vitals filed for this visit.  There is no height or weight on file to calculate BMI.        Ortho Exam   review the x-rays done at the outside facility shows a minimally displaced lateral clavicular fracture.  No proximal is available for comparison.  She is alert and oriented ×3.  There is no motor deficit in the right upper extremity as far as radial ulnar median nerve function. No sensory loss.  There is moderate ecchymosis and bruising about the shoulder pain to palpation over the clavicular site.  Mild swelling is also noted.  Skin is intact.  She has full range of motion of the elbow but there is pain with range of motion.  Limited range of motion of the shoulder secondary to pain.    Assessment:        1. Closed nondisplaced fracture of acromial end of right  clavicle, initial encounter           Plan:over 15 minutes was spent with the patient and her daughter reviewing her x-rays and physical finding of findings and treatment plan going forward.  Again it is minimal displacement and do believe this can be treated nonoperatively as I discussed with them.  She may begin circumduction exercises in the shower.  She is to  Wearing a cradle sling out of the house.  She has Ultram for pain that she's getting from her primary care physician.  Return to see me in 3 weeks with repeat x-ray of the clavicle            Work Status:    KEN query complete.    Orders:  No orders of the defined types were placed in this encounter.      Medications:  No orders of the defined types were placed in this encounter.      Followup:  Return in about 3 weeks (around 2/7/2019).          Dictated utilizing Dragon dictation

## 2019-02-15 ENCOUNTER — TELEPHONE (OUTPATIENT)
Dept: GASTROENTEROLOGY | Facility: CLINIC | Age: 69
End: 2019-02-15

## 2019-02-15 NOTE — TELEPHONE ENCOUNTER
SPOKE WITH GRAND DAUGHTER.  PATIENT IN THE [facility] BURN UNIT.  HAS BEEN THERE FOR 2 WEEKS.  BURN HERSELF WITH [facility]YLER.  SCHEDULED FOR KIN GRAFT ON Saturday.  ASK GRAND DAUGHTER TO KEEP US POSTED.

## 2019-03-02 ENCOUNTER — LAB REQUISITION (OUTPATIENT)
Dept: LAB | Facility: HOSPITAL | Age: 69
End: 2019-03-02

## 2019-03-02 DIAGNOSIS — Z00.00 ROUTINE GENERAL MEDICAL EXAMINATION AT A HEALTH CARE FACILITY: ICD-10-CM

## 2019-03-02 LAB
ALBUMIN SERPL-MCNC: 3.3 G/DL (ref 3.5–5.2)
ALBUMIN/GLOB SERPL: 1.1 G/DL
ALP SERPL-CCNC: 72 U/L (ref 39–117)
ALT SERPL W P-5'-P-CCNC: 9 U/L (ref 1–33)
ANION GAP SERPL CALCULATED.3IONS-SCNC: 14.5 MMOL/L
AST SERPL-CCNC: 13 U/L (ref 1–32)
BASOPHILS # BLD AUTO: 0.04 10*3/MM3 (ref 0–0.2)
BASOPHILS NFR BLD AUTO: 0.4 % (ref 0–1.5)
BILIRUB SERPL-MCNC: <0.2 MG/DL (ref 0.1–1.2)
BUN BLD-MCNC: 11 MG/DL (ref 8–23)
BUN/CREAT SERPL: 16.4 (ref 7–25)
CALCIUM SPEC-SCNC: 9.1 MG/DL (ref 8.6–10.5)
CHLORIDE SERPL-SCNC: 98 MMOL/L (ref 98–107)
CO2 SERPL-SCNC: 26.5 MMOL/L (ref 22–29)
CREAT BLD-MCNC: 0.67 MG/DL (ref 0.57–1)
DEPRECATED RDW RBC AUTO: 51.2 FL (ref 37–54)
EOSINOPHIL # BLD AUTO: 0.41 10*3/MM3 (ref 0–0.4)
EOSINOPHIL NFR BLD AUTO: 4.3 % (ref 0.3–6.2)
ERYTHROCYTE [DISTWIDTH] IN BLOOD BY AUTOMATED COUNT: 15.8 % (ref 12.3–15.4)
GFR SERPL CREATININE-BSD FRML MDRD: 87 ML/MIN/1.73
GLOBULIN UR ELPH-MCNC: 3 GM/DL
GLUCOSE BLD-MCNC: 123 MG/DL (ref 65–99)
HCT VFR BLD AUTO: 27.3 % (ref 34–46.6)
HGB BLD-MCNC: 8.1 G/DL (ref 12–15.9)
IMM GRANULOCYTES # BLD AUTO: 0.04 10*3/MM3 (ref 0–0.05)
IMM GRANULOCYTES NFR BLD AUTO: 0.4 % (ref 0–0.5)
LYMPHOCYTES # BLD AUTO: 2.02 10*3/MM3 (ref 0.7–3.1)
LYMPHOCYTES NFR BLD AUTO: 21.3 % (ref 19.6–45.3)
MCH RBC QN AUTO: 26.5 PG (ref 26.6–33)
MCHC RBC AUTO-ENTMCNC: 29.7 G/DL (ref 31.5–35.7)
MCV RBC AUTO: 89.2 FL (ref 79–97)
MONOCYTES # BLD AUTO: 0.72 10*3/MM3 (ref 0.1–0.9)
MONOCYTES NFR BLD AUTO: 7.6 % (ref 5–12)
NEUTROPHILS # BLD AUTO: 6.26 10*3/MM3 (ref 1.4–7)
NEUTROPHILS NFR BLD AUTO: 66 % (ref 42.7–76)
NRBC BLD AUTO-RTO: 0 /100 WBC (ref 0–0)
PLATELET # BLD AUTO: 449 10*3/MM3 (ref 140–450)
PMV BLD AUTO: 10.8 FL (ref 6–12)
POTASSIUM BLD-SCNC: 4.8 MMOL/L (ref 3.5–5.2)
PROT SERPL-MCNC: 6.3 G/DL (ref 6–8.5)
RBC # BLD AUTO: 3.06 10*6/MM3 (ref 3.77–5.28)
SODIUM BLD-SCNC: 139 MMOL/L (ref 136–145)
WBC NRBC COR # BLD: 9.49 10*3/MM3 (ref 3.4–10.8)

## 2019-03-02 PROCEDURE — 85025 COMPLETE CBC W/AUTO DIFF WBC: CPT

## 2019-03-02 PROCEDURE — 80053 COMPREHEN METABOLIC PANEL: CPT

## 2019-04-10 ENCOUNTER — HOSPITAL ENCOUNTER (OUTPATIENT)
Dept: PHYSICAL THERAPY | Facility: HOSPITAL | Age: 69
Setting detail: THERAPIES SERIES
Discharge: HOME | End: 2019-04-10

## 2019-04-10 DIAGNOSIS — M43.6: Primary | ICD-10-CM

## 2019-04-10 PROCEDURE — 97161 PT EVAL LOW COMPLEX 20 MIN: CPT | Performed by: PHYSICAL THERAPIST
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
## 2019-04-15 ENCOUNTER — APPOINTMENT (OUTPATIENT)
Dept: PHYSICAL THERAPY | Facility: HOSPITAL | Age: 69
End: 2019-04-15

## 2019-04-18 ENCOUNTER — HOSPITAL ENCOUNTER (OUTPATIENT)
Dept: PHYSICAL THERAPY | Facility: HOSPITAL | Age: 69
Setting detail: THERAPIES SERIES
Discharge: HOME | End: 2019-04-18

## 2019-04-18 DIAGNOSIS — M43.6: Primary | ICD-10-CM

## 2019-04-18 PROCEDURE — 97140 MANUAL THERAPY 1/> REGIONS: CPT | Performed by: PHYSICAL THERAPIST

## 2019-04-18 NOTE — THERAPY TREATMENT NOTE
Outpatient Physical Therapy Ortho Treatment Note   Mayte Sandoval     Patient Name: Juan Pablo Hernandez  : 1950  MRN: [id number]  Today's Date: 2019      Visit Date: 2019    Visit Dx:    ICD-10-CM ICD-9-CM   1. Contracture, neck M43.6 723.5       Patient Active Problem List   Diagnosis   • Mental status change   • Altered mental status   • Primary osteoarthritis of right foot   • Sprain of anterior talofibular ligament of right ankle   • History of total right knee replacement   • Closed fracture of right distal femur (CMS/[facility])   • Adenomatous polyp of colon        Past Medical History:   Diagnosis Date   • Anxiety    • Asthma    • CHF (congestive heart failure) (CMS/[facility])    • Chronic pain disorder    • Chronic UTI    • Depression    • Diabetes (CMS/[facility])    • GERD (gastroesophageal reflux disease)    • H/O CHF    • HTN (hypertension)    • Hyperlipidemia    • Incontinence    • Injury of back     spinal stenosis, chronic back pain   • Low back pain    • Lumbosacral disc disease    • MRSA infection (methicillin-resistant Staphylococcus aureus)     to left foot states approx 12-13 years ago    • Osteoarthritis    • Sleep apnea     uses cpap   • Spinal stenosis    • Stroke (CMS/[facility])     pt states PMD has said she may be having mini strokes   • Vertigo         Past Surgical History:   Procedure Laterality Date   • CHOLECYSTECTOMY OPEN     • COLONOSCOPY     • ENDOSCOPY     • EPIDURAL BLOCK     • FEMUR OPEN REDUCTION INTERNAL FIXATION Right 1/15/2018    Procedure: FEMUR DISTAL OPEN REDUCTION INTERNAL FIXATION;  Surgeon: Moy Newberry MD;  Location: [facility];  Service:    • FRACTURE SURGERY     • HYSTERECTOMY     • TOTAL KNEE ARTHROPLASTY Bilateral        PT Ortho     Row Name 19 1200       Subjective Comments    Subjective Comments  Pt states she was very sore after her first visit.  -      User Key  (r) = Recorded By, (t) = Taken By, (c) = Cosigned By    Initials Name Provider Type    GC  Mor Cabrera, PT Physical Therapist                      PT Assessment/Plan     Row Name 04/18/19 1200          PT Assessment    Assessment Comments  Feel soreness is expected with the newness of the stretching. She did seem to have greater range with her stretches today compared to previous visit.  -        PT Plan    PT Plan Comments  Pt is to continue her HEP 2x daily.  -       User Key  (r) = Recorded By, (t) = Taken By, (c) = Cosigned By    Initials Name Provider Type     Mor Cabrera, PT Physical Therapist            Exercises     Row Name 04/18/19 1200             Subjective Comments    Subjective Comments  Pt states she was very sore after her first visit.  -        User Key  (r) = Recorded By, (t) = Taken By, (c) = Cosigned By    Initials Name Provider Type     Mor Cabrera, PT Physical Therapist                      Manual Rx (last 36 hours)      Manual Treatments     Row Name 04/18/19 1200             Manual Rx 1    Manual Rx 1 Location  cervical spine  -      Manual Rx 1 Type  PROM/stretch in FLEX, EXT, ROT left nd right, SB right and left  -      Manual Rx 1 Duration  15 min  -        User Key  (r) = Recorded By, (t) = Taken By, (c) = Cosigned By    Initials Name Provider Type     Mor Cabrera, PT Physical Therapist                             Time Calculation:   Start Time: 1200  Stop Time: 1228  Time Calculation (min): 28 min  Therapy Charges for Today     Code Description Service Date Service Provider Modifiers Qty    08069153522  PT MANUAL THERAPY EA 15 MIN 4/18/2019 Mor Cabrera, PT GP 1                    Mor Cabrera PT  4/18/2019

## 2019-04-23 ENCOUNTER — HOSPITAL ENCOUNTER (OUTPATIENT)
Dept: PHYSICAL THERAPY | Facility: HOSPITAL | Age: 69
Setting detail: THERAPIES SERIES
Discharge: HOME | End: 2019-04-23

## 2019-04-23 DIAGNOSIS — M43.6: Primary | ICD-10-CM

## 2019-04-23 PROCEDURE — 97140 MANUAL THERAPY 1/> REGIONS: CPT | Performed by: PHYSICAL THERAPIST

## 2019-04-23 NOTE — THERAPY TREATMENT NOTE
Outpatient Physical Therapy Ortho Treatment Note   Mayte Sandoval     Patient Name: Juan Pablo Hernandez  : 1950  MRN: [id number]  Today's Date: 2019      Visit Date: 2019    Visit Dx:    ICD-10-CM ICD-9-CM   1. Contracture, neck M43.6 723.5       Patient Active Problem List   Diagnosis   • Mental status change   • Altered mental status   • Primary osteoarthritis of right foot   • Sprain of anterior talofibular ligament of right ankle   • History of total right knee replacement   • Closed fracture of right distal femur (CMS/HCC)   • Adenomatous polyp of colon        Past Medical History:   Diagnosis Date   • Anxiety    • Asthma    • CHF (congestive heart failure) (CMS/[facility])    • Chronic pain disorder    • Chronic UTI    • Depression    • Diabetes (CMS/[facility])    • GERD (gastroesophageal reflux disease)    • H/O CHF    • HTN (hypertension)    • Hyperlipidemia    • Incontinence    • Injury of back     spinal stenosis, chronic back pain   • Low back pain    • Lumbosacral disc disease    • MRSA infection (methicillin-resistant Staphylococcus aureus)     to left foot states approx 12-13 years ago    • Osteoarthritis    • Sleep apnea     uses cpap   • Spinal stenosis    • Stroke (CMS/[facility])     pt states PMD has said she may be having mini strokes   • Vertigo         Past Surgical History:   Procedure Laterality Date   • CHOLECYSTECTOMY OPEN     • COLONOSCOPY     • ENDOSCOPY     • EPIDURAL BLOCK     • FEMUR OPEN REDUCTION INTERNAL FIXATION Right 1/15/2018    Procedure: FEMUR DISTAL OPEN REDUCTION INTERNAL FIXATION;  Surgeon: Moy Newberry MD;  Location: [facility];  Service:    • FRACTURE SURGERY     • HYSTERECTOMY     • TOTAL KNEE ARTHROPLASTY Bilateral        PT Ortho     Row Name 19 3835       Subjective Comments    Subjective Comments  Pt states she was real sore after her last treatment session.  -      User Key  (r) = Recorded By, (t) = Taken By, (c) = Cosigned By    Initials Name Provider Type      Mor Cabrera, PT Physical Therapist                      PT Assessment/Plan     Row Name 04/23/19 1315          PT Assessment    Assessment Comments  Pt is noted to have increased PROM of her cervical spine with treatment.   -        PT Plan    PT Plan Comments  Pt is to continue her ROM exercises 2-3x daily.  -       User Key  (r) = Recorded By, (t) = Taken By, (c) = Cosigned By    Initials Name Provider Type     Mor Cabrera, PT Physical Therapist            Exercises     Row Name 04/23/19 1316             Subjective Comments    Subjective Comments  Pt states she was real sore after her last treatment session.  -        User Key  (r) = Recorded By, (t) = Taken By, (c) = Cosigned By    Initials Name Provider Type     Mor Cabrera, PT Physical Therapist                      Manual Rx (last 36 hours)      Manual Treatments     Row Name 04/23/19 6921             Manual Rx 1    Manual Rx 1 Location  cervical spine  -      Manual Rx 1 Type  PROM/stretch in FLEX, EXT, ROT left nd right, SB right and left  -      Manual Rx 1 Duration  15 min  -        User Key  (r) = Recorded By, (t) = Taken By, (c) = Cosigned By    Initials Name Provider Type     Mor Cabrera, PT Physical Therapist                             Time Calculation:   Start Time: 1315  Stop Time: 1341  Time Calculation (min): 26 min  Therapy Charges for Today     Code Description Service Date Service Provider Modifiers Qty    34981729983  PT MANUAL THERAPY EA 15 MIN 4/23/2019 Mor Cabrera, PT GP 1                    Mor Cabrera, PT  4/23/2019

## 2019-05-02 ENCOUNTER — TELEPHONE (OUTPATIENT)
Dept: GASTROENTEROLOGY | Facility: CLINIC | Age: 69
End: 2019-05-02

## 2019-05-02 NOTE — TELEPHONE ENCOUNTER
SPOKE WITH PT.  SHE STILL UNDER DOCTORS CARE FOR HER BURNS.  ONCE SHE CLEARED, WILL CALL TO RESCHEDULED COLONOSCOPY.

## 2019-05-07 ENCOUNTER — HOSPITAL ENCOUNTER (OUTPATIENT)
Dept: PHYSICAL THERAPY | Facility: HOSPITAL | Age: 69
Setting detail: THERAPIES SERIES
Discharge: HOME | End: 2019-05-07

## 2019-05-07 DIAGNOSIS — M43.6: Primary | ICD-10-CM

## 2019-05-07 PROCEDURE — 97140 MANUAL THERAPY 1/> REGIONS: CPT | Performed by: PHYSICAL THERAPIST

## 2019-05-07 NOTE — THERAPY TREATMENT NOTE
Outpatient Physical Therapy Ortho Treatment Note   Mayte Sandoval     Patient Name: Juan Pablo Hernandez  : 1950  MRN: [id number]  Today's Date: 2019      Visit Date: 2019    Visit Dx:    ICD-10-CM ICD-9-CM   1. Contracture, neck M43.6 723.5       Patient Active Problem List   Diagnosis   • Mental status change   • Altered mental status   • Primary osteoarthritis of right foot   • Sprain of anterior talofibular ligament of right ankle   • History of total right knee replacement   • Closed fracture of right distal femur (CMS/HCC)   • Adenomatous polyp of colon        Past Medical History:   Diagnosis Date   • Anxiety    • Asthma    • CHF (congestive heart failure) (CMS/[facility])    • Chronic pain disorder    • Chronic UTI    • Depression    • Diabetes (CMS/[facility])    • GERD (gastroesophageal reflux disease)    • H/O CHF    • HTN (hypertension)    • Hyperlipidemia    • Incontinence    • Injury of back     spinal stenosis, chronic back pain   • Low back pain    • Lumbosacral disc disease    • MRSA infection (methicillin-resistant Staphylococcus aureus)     to left foot states approx 12-13 years ago    • Osteoarthritis    • Sleep apnea     uses cpap   • Spinal stenosis    • Stroke (CMS/[facility])     pt states PMD has said she may be having mini strokes   • Vertigo         Past Surgical History:   Procedure Laterality Date   • CHOLECYSTECTOMY OPEN     • COLONOSCOPY     • ENDOSCOPY     • EPIDURAL BLOCK     • FEMUR OPEN REDUCTION INTERNAL FIXATION Right 1/15/2018    Procedure: FEMUR DISTAL OPEN REDUCTION INTERNAL FIXATION;  Surgeon: Moy Newberry MD;  Location: [facility];  Service:    • FRACTURE SURGERY     • HYSTERECTOMY     • TOTAL KNEE ARTHROPLASTY Bilateral        PT Ortho     Row Name 19 1400       Subjective Comments    Subjective Comments  Pt states shehas noticed increased pain over the last several days and she c/o considerable itching in her shoulders and neck.  -      User Key  (r) = Recorded By,  (t) = Taken By, (c) = Cosigned By    Initials Name Provider Type     Mor Cabrera, PT Physical Therapist                      PT Assessment/Plan     Row Name 05/07/19 1400          PT Assessment    Assessment Comments  Pt still has considerable tightness and decreased ROM noted, but she did seem to have slightly better PROM noted with her stretches. She toelrated additional myofascial work well.  -        PT Plan    PT Plan Comments  Pt is to continue her HEP daily.  -       User Key  (r) = Recorded By, (t) = Taken By, (c) = Cosigned By    Initials Name Provider Type     Mor Cabrera, PT Physical Therapist            Exercises     Row Name 05/07/19 1400             Subjective Comments    Subjective Comments  Pt states shehas noticed increased pain over the last several days and she c/o considerable itching in her shoulders and neck.  -        User Key  (r) = Recorded By, (t) = Taken By, (c) = Cosigned By    Initials Name Provider Type     Mor Cabrera, PT Physical Therapist                      Manual Rx (last 36 hours)      Manual Treatments     Row Name 05/07/19 1400             Manual Rx 1    Manual Rx 1 Location  cervical spine  -      Manual Rx 1 Type  PROM/stretch in FLEX, EXT, ROT left nd right, SB right and left  -      Manual Rx 1 Duration  15 min  -         Manual Rx 2    Manual Rx 2 Location  Cervical spine  -      Manual Rx 2 Type  myofascial type stretching pectoralis, SCM  -      Manual Rx 2 Duration  15 min  -        User Key  (r) = Recorded By, (t) = Taken By, (c) = Cosigned By    Initials Name Provider Type     Mor Cabrera, PT Physical Therapist                             Time Calculation:   Start Time: 1400  Stop Time: 1433  Time Calculation (min): 33 min  Therapy Charges for Today     Code Description Service Date Service Provider Modifiers Qty    38516187192 HC PT MANUAL THERAPY EA 15 MIN 5/7/2019 Mor Cabrera, PT GP 2                    Mor Cabrera  PT  5/7/2019

## 2019-05-13 ENCOUNTER — HOSPITAL ENCOUNTER (OUTPATIENT)
Dept: PHYSICAL THERAPY | Facility: HOSPITAL | Age: 69
Setting detail: THERAPIES SERIES
Discharge: HOME | End: 2019-05-13

## 2019-05-13 DIAGNOSIS — M43.6: Primary | ICD-10-CM

## 2019-05-13 PROCEDURE — 97140 MANUAL THERAPY 1/> REGIONS: CPT | Performed by: PHYSICAL THERAPIST

## 2019-05-13 NOTE — THERAPY TREATMENT NOTE
Outpatient Physical Therapy Ortho Treatment Note   Mayte Sandoval     Patient Name: Juan Pablo Hernandez  : 1950  MRN: [id number]  Today's Date: 2019      Visit Date: 2019    Visit Dx:    ICD-10-CM ICD-9-CM   1. Contracture, neck M43.6 723.5       Patient Active Problem List   Diagnosis   • Mental status change   • Altered mental status   • Primary osteoarthritis of right foot   • Sprain of anterior talofibular ligament of right ankle   • History of total right knee replacement   • Closed fracture of right distal femur (CMS/HCC)   • Adenomatous polyp of colon        Past Medical History:   Diagnosis Date   • Anxiety    • Asthma    • CHF (congestive heart failure) (CMS/HCC)    • Chronic pain disorder    • Chronic UTI    • Depression    • Diabetes (CMS/[facility])    • GERD (gastroesophageal reflux disease)    • H/O CHF    • HTN (hypertension)    • Hyperlipidemia    • Incontinence    • Injury of back     spinal stenosis, chronic back pain   • Low back pain    • Lumbosacral disc disease    • MRSA infection (methicillin-resistant Staphylococcus aureus)     to left foot states approx 12-13 years ago    • Osteoarthritis    • Sleep apnea     uses cpap   • Spinal stenosis    • Stroke (CMS/HCC)     pt states PMD has said she may be having mini strokes   • Vertigo         Past Surgical History:   Procedure Laterality Date   • CHOLECYSTECTOMY OPEN     • COLONOSCOPY     • ENDOSCOPY     • EPIDURAL BLOCK     • FEMUR OPEN REDUCTION INTERNAL FIXATION Right 1/15/2018    Procedure: FEMUR DISTAL OPEN REDUCTION INTERNAL FIXATION;  Surgeon: Moy Newberry MD;  Location: [facility];  Service:    • FRACTURE SURGERY     • HYSTERECTOMY     • TOTAL KNEE ARTHROPLASTY Bilateral        PT Ortho     Row Name 19 1300       Subjective Comments    Subjective Comments  Pt states her neck is pretty sore and she has had severe headaches the past several days.  -      User Key  (r) = Recorded By, (t) = Taken By, (c) = Cosigned By     Initials Name Provider Type     Mor Cabrera, PT Physical Therapist                      PT Assessment/Plan     Row Name 05/13/19 1300          PT Assessment    Assessment Comments  Pt is showing increased cervical ROM with her stretches.  -        PT Plan    PT Plan Comments  Pt is to continue her HEP daily.  -       User Key  (r) = Recorded By, (t) = Taken By, (c) = Cosigned By    Initials Name Provider Type     Mor Cabrera, PT Physical Therapist            Exercises     Row Name 05/13/19 1300             Subjective Comments    Subjective Comments  Pt states her neck is pretty sore and she has had severe headaches the past several days.  -        User Key  (r) = Recorded By, (t) = Taken By, (c) = Cosigned By    Initials Name Provider Type     Mor Cabrera, PT Physical Therapist                      Manual Rx (last 36 hours)      Manual Treatments     Row Name 05/13/19 1300             Manual Rx 1    Manual Rx 1 Location  cervical spine  -      Manual Rx 1 Type  PROM/stretch in FLEX, EXT, ROT left nd right, SB right and left  -      Manual Rx 1 Duration  15 min  -         Manual Rx 2    Manual Rx 2 Location  Cervical spine  -      Manual Rx 2 Type  myofascial type stretching pectoralis, SCM  -      Manual Rx 2 Duration  15 min  -        User Key  (r) = Recorded By, (t) = Taken By, (c) = Cosigned By    Initials Name Provider Type     Mor Cabrera, PT Physical Therapist                             Time Calculation:   Start Time: 1300  Stop Time: 1333  Time Calculation (min): 33 min  Therapy Charges for Today     Code Description Service Date Service Provider Modifiers Qty    42210864668 HC PT MANUAL THERAPY EA 15 MIN 5/13/2019 Mor Cabrera, PT GP 2                    Mor Cabrera PT  5/13/2019

## 2019-05-21 ENCOUNTER — HOSPITAL ENCOUNTER (OUTPATIENT)
Dept: PHYSICAL THERAPY | Facility: HOSPITAL | Age: 69
Setting detail: THERAPIES SERIES
Discharge: HOME | End: 2019-05-21

## 2019-05-21 DIAGNOSIS — M43.6: Primary | ICD-10-CM

## 2019-05-21 PROCEDURE — 97140 MANUAL THERAPY 1/> REGIONS: CPT | Performed by: PHYSICAL THERAPIST

## 2019-05-21 NOTE — THERAPY TREATMENT NOTE
Outpatient Physical Therapy Ortho Treatment Note   Mayte Sandoval     Patient Name: Juan Pablo Hernandez  : 1950  MRN: [id number]  Today's Date: 2019      Visit Date: 2019    Visit Dx:    ICD-10-CM ICD-9-CM   1. Contracture, neck M43.6 723.5       Patient Active Problem List   Diagnosis   • Mental status change   • Altered mental status   • Primary osteoarthritis of right foot   • Sprain of anterior talofibular ligament of right ankle   • History of total right knee replacement   • Closed fracture of right distal femur (CMS/HCC)   • Adenomatous polyp of colon        Past Medical History:   Diagnosis Date   • Anxiety    • Asthma    • CHF (congestive heart failure) (CMS/[facility])    • Chronic pain disorder    • Chronic UTI    • Depression    • Diabetes (CMS/[facility])    • GERD (gastroesophageal reflux disease)    • H/O CHF    • HTN (hypertension)    • Hyperlipidemia    • Incontinence    • Injury of back     spinal stenosis, chronic back pain   • Low back pain    • Lumbosacral disc disease    • MRSA infection (methicillin-resistant Staphylococcus aureus)     to left foot states approx 12-13 years ago    • Osteoarthritis    • Sleep apnea     uses cpap   • Spinal stenosis    • Stroke (CMS/[facility])     pt states PMD has said she may be having mini strokes   • Vertigo         Past Surgical History:   Procedure Laterality Date   • CHOLECYSTECTOMY OPEN     • COLONOSCOPY     • ENDOSCOPY     • EPIDURAL BLOCK     • FEMUR OPEN REDUCTION INTERNAL FIXATION Right 1/15/2018    Procedure: FEMUR DISTAL OPEN REDUCTION INTERNAL FIXATION;  Surgeon: Moy Newberry MD;  Location: [facility];  Service:    • FRACTURE SURGERY     • HYSTERECTOMY     • TOTAL KNEE ARTHROPLASTY Bilateral        PT Ortho     Row Name 19 1300       Subjective Comments    Subjective Comments  Pt states her neck seems to be moving better.  -GC       Head/Neck/Trunk    Neck Extension AROM  22 degrees  -GC    Neck Flexion AROM  44 degrees  -GC    Neck Lt  Lateral Flexion AROM  18 degrees  -GC    Neck Rt Lateral Flexion AROM  22 egrees  -GC    Neck Lt Rotation AROM  21 degrees  -GC    Neck Rt Rotation AROM  47 degrees  -GC       Right Upper Ext    Rt Shoulder Abduction AROM  118 degrees  -GC    Rt Shoulder Flexion AROM  126 degrees  -GC       Left Upper Ext    Lt Shoulder Abduction AROM  121 degrees  -GC    Lt Shoulder Flexion AROM  130 degrees  -GC      User Key  (r) = Recorded By, (t) = Taken By, (c) = Cosigned By    Initials Name Provider Type     Mor Cabrera, PT Physical Therapist                      PT Assessment/Plan     Row Name 05/21/19 1300          PT Assessment    Assessment Comments  Pt is showing some increase in her cervical and shoulder ROM, but it is still very limited.  -        PT Plan    PT Plan Comments  Pt is to continue her HEP daily. Will continue therapy if more insurance approval is obtained.  -       User Key  (r) = Recorded By, (t) = Taken By, (c) = Cosigned By    Initials Name Provider Type     Mor Cabrera, PT Physical Therapist            Exercises     Row Name 05/21/19 1300             Subjective Comments    Subjective Comments  Pt states her neck seems to be moving better.  -        User Key  (r) = Recorded By, (t) = Taken By, (c) = Cosigned By    Initials Name Provider Type     Mor Cabrera, PT Physical Therapist                      Manual Rx (last 36 hours)      Manual Treatments     Row Name 05/21/19 1300             Manual Rx 1    Manual Rx 1 Location  cervical spine  -      Manual Rx 1 Type  PROM/stretch in FLEX, EXT, ROT left nd right, SB right and left  -      Manual Rx 1 Duration  15 min  -         Manual Rx 2    Manual Rx 2 Location  Cervical spine  -      Manual Rx 2 Type  myofascial type stretching pectoralis, SCM  -      Manual Rx 2 Duration  15 min  -        User Key  (r) = Recorded By, (t) = Taken By, (c) = Cosigned By    Initials Name Provider Type    GC Mor Cabrera, PT Physical  Therapist          PT OP Goals     Row Name 05/21/19 1300          PT Short Term Goals    STG Date to Achieve  05/08/19  -GC     STG 1  Decrease cervical pain to 3-4/10 with activity.  -GC     STG 1 Progress  Ongoing;Progressing  -GC     STG 2  Increase cervical AROM by 10-15 degrees in all planes with testing.  -GC     STG 2 Progress  Partially Met;Ongoing;Progressing  -GC     STG 3  Increase shoulder AROM to 145 degrees FLEX and EXT with testing.  -GC     STG 3 Progress  Ongoing;Progressing  -GC     STG 4  Pt will be independent with her HEP issued by this therapist.  -GC     STG 4 Progress  Met  -GC        Long Term Goals    LTG Date to Achieve  06/05/19  -GC     LTG 1  Decrease cervical pain to 1-2/10 with activity.  -GC     LTG 1 Progress  Ongoing;Progressing  -GC     LTG 2  Increase cervical AROM by 20-30 degrees all planes with testing.  -GC     LTG 2 Progress  Ongoing;Progressing  -GC     LTG 3  Increase shoulder AROM to 160 degrees FLEX and ABD with testing.  -GC     LTG 3 Progress  Ongoing;Progressing  -GC     LTG 4  Pt will be independent with all ADLs and have a Neck Disability score < 10.  -GC     LTG 4 Progress  Ongoing;Progressing  -GC       User Key  (r) = Recorded By, (t) = Taken By, (c) = Cosigned By    Initials Name Provider Type    Mor Conn, PT Physical Therapist                         Time Calculation:   Start Time: 1300  Stop Time: 1339  Time Calculation (min): 39 min  Therapy Charges for Today     Code Description Service Date Service Provider Modifiers Qty    18061051469 HC PT MANUAL THERAPY EA 15 MIN 5/21/2019 Mor Cabrera, PT GP 2                    Mor Cabrera, PT  5/21/2019

## 2019-06-04 ENCOUNTER — HOSPITAL ENCOUNTER (OUTPATIENT)
Dept: PHYSICAL THERAPY | Facility: HOSPITAL | Age: 69
Setting detail: THERAPIES SERIES
Discharge: HOME | End: 2019-06-04

## 2019-06-04 DIAGNOSIS — M43.6: Primary | ICD-10-CM

## 2019-06-04 PROCEDURE — 97140 MANUAL THERAPY 1/> REGIONS: CPT | Performed by: PHYSICAL THERAPIST

## 2019-06-11 ENCOUNTER — HOSPITAL ENCOUNTER (OUTPATIENT)
Dept: PHYSICAL THERAPY | Facility: HOSPITAL | Age: 69
Setting detail: THERAPIES SERIES
Discharge: HOME | End: 2019-06-11

## 2019-06-11 DIAGNOSIS — M43.6: Primary | ICD-10-CM

## 2019-06-11 PROCEDURE — 97140 MANUAL THERAPY 1/> REGIONS: CPT | Performed by: PHYSICAL THERAPIST

## 2019-06-11 NOTE — THERAPY TREATMENT NOTE
Outpatient Physical Therapy Ortho Treatment Note   Mayte Sandoval     Patient Name: Juan Pablo Hernandez  : 1950  MRN: [id number]  Today's Date: 2019      Visit Date: 2019    Visit Dx:    ICD-10-CM ICD-9-CM   1. Contracture, neck M43.6 723.5       Patient Active Problem List   Diagnosis   • Mental status change   • Altered mental status   • Primary osteoarthritis of right foot   • Sprain of anterior talofibular ligament of right ankle   • History of total right knee replacement   • Closed fracture of right distal femur (CMS/HCC)   • Adenomatous polyp of colon        Past Medical History:   Diagnosis Date   • Anxiety    • Asthma    • CHF (congestive heart failure) (CMS/[facility])    • Chronic pain disorder    • Chronic UTI    • Depression    • Diabetes (CMS/[facility])    • GERD (gastroesophageal reflux disease)    • H/O CHF    • HTN (hypertension)    • Hyperlipidemia    • Incontinence    • Injury of back     spinal stenosis, chronic back pain   • Low back pain    • Lumbosacral disc disease    • MRSA infection (methicillin-resistant Staphylococcus aureus)     to left foot states approx 12-13 years ago    • Osteoarthritis    • Sleep apnea     uses cpap   • Spinal stenosis    • Stroke (CMS/HCC)     pt states PMD has said she may be having mini strokes   • Vertigo         Past Surgical History:   Procedure Laterality Date   • CHOLECYSTECTOMY OPEN     • COLONOSCOPY     • ENDOSCOPY     • EPIDURAL BLOCK     • FEMUR OPEN REDUCTION INTERNAL FIXATION Right 1/15/2018    Procedure: FEMUR DISTAL OPEN REDUCTION INTERNAL FIXATION;  Surgeon: Moy Newberry MD;  Location: [facility];  Service:    • FRACTURE SURGERY     • HYSTERECTOMY     • TOTAL KNEE ARTHROPLASTY Bilateral        PT Ortho     Row Name 19 1200       Subjective Comments    Subjective Comments  Pt states her chin does not seem to be healing.  -       Posture/Observations    Posture/Observations Comments  Pt has open wound area under her chin, but there is  no drainage noted  -GC      User Key  (r) = Recorded By, (t) = Taken By, (c) = Cosigned By    Initials Name Provider Type     Mor Cabrera, PT Physical Therapist                      PT Assessment/Plan     Row Name 06/11/19 1200          PT Assessment    Assessment Comments  Held myofascial stretching due to open wound area.  -GC        PT Plan    PT Plan Comments  Pt is to continue her HEP daily.  -GC       User Key  (r) = Recorded By, (t) = Taken By, (c) = Cosigned By    Initials Name Provider Type     Mor Cabrera, PT Physical Therapist            Exercises     Row Name 06/11/19 1200             Subjective Comments    Subjective Comments  Pt states her chin does not seem to be healing.  -GC        User Key  (r) = Recorded By, (t) = Taken By, (c) = Cosigned By    Initials Name Provider Type     Mor Cabrera, PT Physical Therapist                      Manual Rx (last 36 hours)      Manual Treatments     Row Name 06/11/19 1200             Manual Rx 1    Manual Rx 1 Location  cervical spine  -      Manual Rx 1 Type  PROM/stretch in FLEX, EXT, ROT left nd right, SB right and left  -      Manual Rx 1 Duration  15 min  -        User Key  (r) = Recorded By, (t) = Taken By, (c) = Cosigned By    Initials Name Provider Type     Mor Cabrera, PT Physical Therapist                             Time Calculation:   Start Time: 1200  Stop Time: 1227  Time Calculation (min): 27 min  Therapy Charges for Today     Code Description Service Date Service Provider Modifiers Qty    38120760260 HC PT MANUAL THERAPY EA 15 MIN 6/11/2019 Mor Cabrera, PT GP 1                    Mor Cabrera PT  6/11/2019

## 2019-06-18 ENCOUNTER — HOSPITAL ENCOUNTER (OUTPATIENT)
Dept: PHYSICAL THERAPY | Facility: HOSPITAL | Age: 69
Setting detail: THERAPIES SERIES
Discharge: HOME | End: 2019-06-18

## 2019-06-18 DIAGNOSIS — M43.6: Primary | ICD-10-CM

## 2019-06-18 PROCEDURE — 97140 MANUAL THERAPY 1/> REGIONS: CPT | Performed by: PHYSICAL THERAPIST

## 2019-06-18 NOTE — THERAPY TREATMENT NOTE
Outpatient Physical Therapy Ortho Treatment Note   Mayte Sandoval     Patient Name: Juan Pablo Hernandez  : 1950  MRN: [id number]  Today's Date: 2019      Visit Date: 2019    Visit Dx:    ICD-10-CM ICD-9-CM   1. Contracture, neck M43.6 723.5       Patient Active Problem List   Diagnosis   • Mental status change   • Altered mental status   • Primary osteoarthritis of right foot   • Sprain of anterior talofibular ligament of right ankle   • History of total right knee replacement   • Closed fracture of right distal femur (CMS/[facility])   • Adenomatous polyp of colon        Past Medical History:   Diagnosis Date   • Anxiety    • Asthma    • CHF (congestive heart failure) (CMS/[facility])    • Chronic pain disorder    • Chronic UTI    • Depression    • Diabetes (CMS/[facility])    • GERD (gastroesophageal reflux disease)    • H/O CHF    • HTN (hypertension)    • Hyperlipidemia    • Incontinence    • Injury of back     spinal stenosis, chronic back pain   • Low back pain    • Lumbosacral disc disease    • MRSA infection (methicillin-resistant Staphylococcus aureus)     to left foot states approx 12-13 years ago    • Osteoarthritis    • Sleep apnea     uses cpap   • Spinal stenosis    • Stroke (CMS/[facility])     pt states PMD has said she may be having mini strokes   • Vertigo         Past Surgical History:   Procedure Laterality Date   • CHOLECYSTECTOMY OPEN     • COLONOSCOPY     • ENDOSCOPY     • EPIDURAL BLOCK     • FEMUR OPEN REDUCTION INTERNAL FIXATION Right 1/15/2018    Procedure: FEMUR DISTAL OPEN REDUCTION INTERNAL FIXATION;  Surgeon: Moy Newberry MD;  Location: [facility] [place];  Service:    • FRACTURE SURGERY     • HYSTERECTOMY     • TOTAL KNEE ARTHROPLASTY Bilateral        PT Ortho     Row Name 19 1200       Subjective Comments    Subjective Comments  Pt states she is feeling a little looser today.  -      User Key  (r) = Recorded By, (t) = Taken By, (c) = Cosigned By    Initials Name Provider Type    GC  Mor Cabrera, PT Physical Therapist                      PT Assessment/Plan     Row Name 06/18/19 1200          PT Assessment    Assessment Comments  Pt showed a significant improvement in her PROM today and her graft sites were healed well enogh to allow myofascial techniques again.  -        PT Plan    PT Plan Comments  Pt is to continue her HEP daily.  -       User Key  (r) = Recorded By, (t) = Taken By, (c) = Cosigned By    Initials Name Provider Type     Mor Cabrera, PT Physical Therapist            Exercises     Row Name 06/18/19 1200             Subjective Comments    Subjective Comments  Pt states she is feeling a little looser today.  -        User Key  (r) = Recorded By, (t) = Taken By, (c) = Cosigned By    Initials Name Provider Type     Mor Cabrera, PT Physical Therapist                      Manual Rx (last 36 hours)      Manual Treatments     Row Name 06/18/19 1200             Manual Rx 1    Manual Rx 1 Location  cervical spine  -      Manual Rx 1 Type  PROM/stretch in FLEX, EXT, ROT left nd right, SB right and left  -      Manual Rx 1 Duration  15 min  -         Manual Rx 2    Manual Rx 2 Location  Cervical spine  -      Manual Rx 2 Type  myofascial type stretching pectoralis, SCM  -      Manual Rx 2 Duration  15 min  -        User Key  (r) = Recorded By, (t) = Taken By, (c) = Cosigned By    Initials Name Provider Type     Mor Cabrera, PT Physical Therapist                             Time Calculation:   Start Time: 1200  Stop Time: 1243  Time Calculation (min): 43 min  Therapy Charges for Today     Code Description Service Date Service Provider Modifiers Qty    [id number]  PT MANUAL THERAPY EA 15 MIN 6/18/2019 Mor Cabrera, PT GP 2                    Mor Cabrera PT  6/18/2019

## 2019-06-26 ENCOUNTER — APPOINTMENT (OUTPATIENT)
Dept: PHYSICAL THERAPY | Facility: HOSPITAL | Age: 69
End: 2019-06-26

## 2019-07-01 ENCOUNTER — HOSPITAL ENCOUNTER (OUTPATIENT)
Dept: PHYSICAL THERAPY | Facility: HOSPITAL | Age: 69
Setting detail: THERAPIES SERIES
Discharge: HOME | End: 2019-07-01

## 2019-07-01 DIAGNOSIS — M43.6: Primary | ICD-10-CM

## 2019-07-01 NOTE — THERAPY TREATMENT NOTE
Outpatient Physical Therapy Ortho Treatment Note   Mayte Sandoval     Patient Name: Juan Pablo Hernandez  : 1950  MRN: [id number]  Today's Date: 2019      Visit Date: 2019    Visit Dx:    ICD-10-CM ICD-9-CM   1. Contracture, neck M43.6 723.5       Patient Active Problem List   Diagnosis   • Mental status change   • Altered mental status   • Primary osteoarthritis of right foot   • Sprain of anterior talofibular ligament of right ankle   • History of total right knee replacement   • Closed fracture of right distal femur (CMS/HCC)   • Adenomatous polyp of colon        Past Medical History:   Diagnosis Date   • Anxiety    • Asthma    • CHF (congestive heart failure) (CMS/[facility])    • Chronic pain disorder    • Chronic UTI    • Depression    • Diabetes (CMS/[facility])    • GERD (gastroesophageal reflux disease)    • H/O CHF    • HTN (hypertension)    • Hyperlipidemia    • Incontinence    • Injury of back     spinal stenosis, chronic back pain   • Low back pain    • Lumbosacral disc disease    • MRSA infection (methicillin-resistant Staphylococcus aureus)     to left foot states approx 12-13 years ago    • Osteoarthritis    • Sleep apnea     uses cpap   • Spinal stenosis    • Stroke (CMS/[facility])     pt states PMD has said she may be having mini strokes   • Vertigo         Past Surgical History:   Procedure Laterality Date   • CHOLECYSTECTOMY OPEN     • COLONOSCOPY     • ENDOSCOPY     • EPIDURAL BLOCK     • FEMUR OPEN REDUCTION INTERNAL FIXATION Right 1/15/2018    Procedure: FEMUR DISTAL OPEN REDUCTION INTERNAL FIXATION;  Surgeon: Moy Newberry MD;  Location: [facility], [place];  Service:    • FRACTURE SURGERY     • HYSTERECTOMY     • TOTAL KNEE ARTHROPLASTY Bilateral        PT Ortho     Row Name 19 [id number]       Subjective Comments    Subjective Comments  Pt states she is having more intense pain for no apparent reason.  -GC       Head/Neck/Trunk    Neck Extension AROM  38 degrees  -GC    Neck Flexion AROM  52 degrees   Subjective   Jozeflor Gray is a 21 y.o. male.     Problem List  1. Obesity   2. Tobacco user     PSHx  1. Tonsillectomy 8 years  2  Tubes in both ears a child     FamilyHx  -mother - unknown  -father - unknown  -siblings - none     SocialHx  -current smoker smokes 1 ppd every 2 days for 7 years  -no alcohol use  -no illicit drugs  -currently in a relationship.   -currently no children.  Has 1 child on the way  -Works on houses     Pt is 22 yo male who I am seeing for the first time. He is here to establish. Pt was recently seen at walk-in clinic for upper respiratory infection. Was given Azithromycin, Medrol dose pack and albuterol inhaler. Pt states he is feeling much better and that he has stopped using the medications Pt also reported some numbness and tingling in his 4th and 5th fingers on left hand for the past few weeks. 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  He also banged his elbow several times on the medial surface. Denies any pain. He has not taken anything for pain   Pt also smokes 1 ppd every 2 days and wants to quit. He is expected his bob child soon.  His BMI >30. He does not know his family's medical history.       Upper Extremity Issue   This is a new problem. The current episode started 1 to 4 weeks ago. The problem occurs daily. The problem has been waxing and waning. Associated symptoms include arthralgias, congestion and numbness. Pertinent negatives include no anorexia, change in bowel habit, chest pain, chills, coughing, diaphoresis, fatigue, fever, headaches, joint swelling, myalgias, nausea, neck pain, rash, sore throat, swollen glands, urinary symptoms, vertigo, visual change, vomiting or weakness. The symptoms are aggravated by bending and twisting (reptitive motion of hands and elbows ). He has tried nothing for the symptoms. The treatment provided no relief.        The following portions of the patient's history were reviewed  "and updated as appropriate: allergies, current medications, past family history, past medical history, past social history, past surgical history and problem list.    Review of Systems   Constitutional: Negative.  Negative for chills, diaphoresis, fatigue and fever.   HENT: Positive for congestion. Negative for sore throat.    Eyes: Negative.    Respiratory: Negative.  Negative for cough.    Cardiovascular: Negative.  Negative for chest pain.   Gastrointestinal: Negative.  Negative for anorexia, change in bowel habit, nausea and vomiting.   Endocrine: Negative.    Genitourinary: Negative.    Musculoskeletal: Positive for arthralgias. Negative for joint swelling, myalgias and neck pain.   Skin: Negative.  Negative for rash.   Allergic/Immunologic: Negative.    Neurological: Positive for numbness. Negative for vertigo, weakness and headaches.   Hematological: Negative.    Psychiatric/Behavioral: Negative.        Objective    /80  Pulse 70  Temp 99.3 °F (37.4 °C)  Resp 16  Ht 75\" (190.5 cm)  Wt 247 lb 6.4 oz (112 kg)  BMI 30.92 kg/m2    No results found for any previous visit.    Physical Exam   Constitutional: He is oriented to person, place, and time. He appears well-developed and well-nourished. No distress.   HENT:   Head: Normocephalic and atraumatic.   Right Ear: External ear normal.   Left Ear: External ear normal.   Eyes: Conjunctivae and EOM are normal. Pupils are equal, round, and reactive to light. Right eye exhibits no discharge. Left eye exhibits no discharge. No scleral icterus.   Neck: Normal range of motion. Neck supple. No JVD present. No tracheal deviation present. No thyromegaly present.   Cardiovascular: Normal rate, regular rhythm and normal heart sounds.    Pulmonary/Chest: Effort normal. No stridor. No respiratory distress. He has no wheezes.   Abdominal: Soft. He exhibits no distension and no mass. There is no tenderness. There is no rebound and no guarding. No hernia. " -GC    Neck Lt Lateral Flexion AROM  32 degrees  -GC    Neck Rt Lateral Flexion AROM  34 degrees  -GC    Neck Lt Rotation AROM  52 degrees  -GC    Neck Rt Rotation AROM  79 degrees  -GC       Right Upper Ext    Rt Shoulder Abduction AROM  131 degrees  -GC    Rt Shoulder Flexion AROM  135 degrees  -GC       Left Upper Ext    Lt Shoulder Abduction AROM  124 degrees  -GC    Lt Shoulder Flexion AROM  129 degrees  -GC       MMT Right Upper Ext    Rt Shoulder Flexion MMT, Gross Movement  (4+/5) good plus  -GC    Rt Shoulder Extension MMT, Gross Movement  (5/5) normal  -GC    Rt Shoulder ABduction MMT, Gross Movement  (5/5) normal  -GC    Rt Shoulder ADduction MMT, Gross Movement  (5/5) normal  -GC    Rt Shoulder Internal Rotation MMT, Gross Movement  (5/5) normal  -GC    Rt Shoulder External Rotation MMT, Gross Movement  (4+/5) good plus  -GC    Rt Scapular Elevation MMT, Gross Movement  (5/5) normal  -GC       MMT Left Upper Ext    Lt Shoulder Flexion MMT, Gross Movement  (5/5) normal  -GC    Lt Shoulder Extension MMT, Gross Movement  (5/5) normal  -GC    Lt Shoulder ABduction MMT, Gross Movement  (5/5) normal  -GC    Lt Shoulder ADduction MMT, Gross Movement  (5/5) normal  -GC    Lt Shoulder Internal Rotation MMT, Gross Movement  (5/5) normal  -GC    Lt Shoulder External Rotation MMT, Gross Movement  (4+/5) good plus  -GC    Lt Scapular Elevation MMT, Gross Movement  (5/5) normal  -GC       Upper Extremity Flexibility    Scalenes  Bilateral:;Moderately limited  -GC    SCM  Right:;Severely limited;Left:;Moderately limited  -GC    Upper Trapezius  Right:;Severely limited;Left:;Moderately limited  -GC    Levator Scapula  Right:;Severely limited;Left:;Moderately limited  -GC    Pect Minor  Bilateral:;Moderately limited  -GC    Pect Major  Bilateral:;Moderately limited  -GC      User Key  (r) = Recorded By, (t) = Taken By, (c) = Cosigned By    Initials Name Provider Type    Mor Conn, PT Physical Therapist                       PT Assessment/Plan     Row Name 07/01/19 1215          PT Assessment    Assessment Comments  Pt is showing some increased cervical and shoulder ROM, but her pain continues to be a big limiting factor.  -        PT Plan    PT Plan Comments  Pt is to continue her ROM exercises daily. she has MD follow up on 7/3/2019.  -GC       User Key  (r) = Recorded By, (t) = Taken By, (c) = Cosigned By    Initials Name Provider Type    GC Mor Cabrera, PT Physical Therapist            Exercises     Row Name 07/01/19 1215             Subjective Comments    Subjective Comments  Pt states she is having more intense pain for no apparent reason.  -        User Key  (r) = Recorded By, (t) = Taken By, (c) = Cosigned By    Initials Name Provider Type    GC Mor Cabrera, PT Physical Therapist                      Manual Rx (last 36 hours)      Manual Treatments     Row Name 07/01/19 1215             Manual Rx 1    Manual Rx 1 Location  cervical spine  -      Manual Rx 1 Type  PROM/stretch in FLEX, EXT, ROT left nd right, SB right and left  -      Manual Rx 1 Duration  15 min  -         Manual Rx 2    Manual Rx 2 Location  Cervical spine  -      Manual Rx 2 Type  myofascial type stretching pectoralis, SCM  -      Manual Rx 2 Duration  15 min  -        User Key  (r) = Recorded By, (t) = Taken By, (c) = Cosigned By    Initials Name Provider Type    Mor Conn, PT Physical Therapist          PT OP Goals     Row Name 07/01/19 1215          PT Short Term Goals    STG Date to Achieve  05/08/19  -     STG 1  Decrease cervical pain to 3-4/10 with activity.  -     STG 1 Progress  Ongoing;Progressing  -     STG 2  Increase cervical AROM by 10-15 degrees in all planes with testing.  -     STG 2 Progress  Met  -     STG 3  Increase shoulder AROM to 145 degrees FLEX and EXT with testing.  -     STG 3 Progress  Ongoing;Progressing  -     STG 4  Pt will be independent with her HEP issued by this    Musculoskeletal: Normal range of motion. He exhibits no edema, tenderness or deformity.        Hands:  Lymphadenopathy:     He has no cervical adenopathy.   Neurological: He is alert and oriented to person, place, and time. He has normal reflexes. No cranial nerve deficit. Coordination normal.   Skin: Skin is warm and dry. No rash noted. He is not diaphoretic. No erythema. No pallor.   Psychiatric: He has a normal mood and affect. His behavior is normal.   Nursing note and vitals reviewed.      Assessment/Plan   Problems Addressed this Visit        Digestive    Obesity       Nervous and Auditory    Elbow pain    Relevant Orders    Ambulatory Referral to Neurology (Completed)    XR elbow 2 vw left    Numbness and tingling in left hand - Primary    Relevant Orders    Ambulatory Referral to Neurology (Completed)    XR elbow 2 vw left       Other    Encounter for screening for diabetes mellitus    Relevant Orders    CBC Auto Differential    Comprehensive Metabolic Panel    Hemoglobin A1c    Lipid Panel    TSH    T3, Free    T4, Free    Vitamin D 25 Hydroxy    Insulin, Random    Tobacco abuse counseling    Tobacco user    Encounter for screening for endocrine disorder    Encounter for screening for cardiovascular disorders        - for obesity - recommended DASH diet  - elbow pain/numbness of finger of left hand.  - Based on clinical symptoms and history it may be ulnar nerve entrapment or an underlying pathology of elbow with decreased conduction of ulnar nerve at elbow due to trauma,repetitive movements of left hand.  Recommend rest, ice, heat, massage and naproxen 500 mg PO BID.  Gave drug information.  Advised pt to drink with fluids and eat with meals.    - will refer to Dr. Hummel (Neurologist) for EMG studies. Consultation appreciated. Will also get x-ray of elbow  - recommended PT/OT for pt but he declined today. Discuss again on next visit  - will get thyroid studies, hga1c and lipid panel for endocrine,  diabetes and cardiovascular disorder screening  - tobacco user - counseled >5 minutes. Recommended 1 800 QUIT NOW. Gave prescription for nicotine patches  - recheck in 2 weeks or earlier if any problems arise.            therapist.  -     STG 4 Progress  Met  -        Long Term Goals    LTG Date to Achieve  06/05/19  -     LTG 1  Decrease cervical pain to 1-2/10 with activity.  -     LTG 1 Progress  Ongoing;Progressing  -     LTG 2  Increase cervical AROM by 20-30 degrees all planes with testing.  -     LTG 2 Progress  Ongoing;Progressing  -     LTG 3  Increase shoulder AROM to 160 degrees FLEX and ABD with testing.  -     LTG 3 Progress  Ongoing;Progressing  -     LTG 4  Pt will be independent with all ADLs and have a Neck Disability score < 10.  -     LTG 4 Progress  Ongoing;Progressing  -       User Key  (r) = Recorded By, (t) = Taken By, (c) = Cosigned By    Initials Name Provider Type    Mor Conn, PT Physical Therapist                         Time Calculation:   Start Time: 1215  Stop Time: 1251  Time Calculation (min): 36 min                Mor Cabrera, PT  7/1/2019

## 2019-08-05 ENCOUNTER — TRANSCRIBE ORDERS (OUTPATIENT)
Dept: ADMINISTRATIVE | Facility: HOSPITAL | Age: 69
End: 2019-08-05

## 2019-08-05 DIAGNOSIS — Z12.39 SCREENING BREAST EXAMINATION: Primary | ICD-10-CM

## 2019-10-29 RX ORDER — MONTELUKAST SODIUM 10 MG/1
TABLET ORAL
Qty: 30 TABLET | OUTPATIENT
Start: 2019-10-29

## 2020-03-19 ENCOUNTER — TRANSCRIBE ORDERS (OUTPATIENT)
Dept: ADMINISTRATIVE | Facility: HOSPITAL | Age: 70
End: 2020-03-19

## 2020-03-19 DIAGNOSIS — M54.40 ACUTE MIDLINE LOW BACK PAIN WITH SCIATICA, SCIATICA LATERALITY UNSPECIFIED: Primary | ICD-10-CM

## 2020-05-06 ENCOUNTER — APPOINTMENT (OUTPATIENT)
Dept: CT IMAGING | Facility: HOSPITAL | Age: 70
End: 2020-05-06

## 2020-05-06 ENCOUNTER — APPOINTMENT (OUTPATIENT)
Dept: GENERAL RADIOLOGY | Facility: HOSPITAL | Age: 70
End: 2020-05-06

## 2020-05-06 ENCOUNTER — HOSPITAL ENCOUNTER (EMERGENCY)
Facility: HOSPITAL | Age: 70
Discharge: HOME | End: 2020-05-06
Attending: EMERGENCY MEDICINE | Admitting: EMERGENCY MEDICINE

## 2020-05-06 VITALS
SYSTOLIC BLOOD PRESSURE: 159 MMHG | TEMPERATURE: 98.8 F | WEIGHT: 149 LBS | BODY MASS INDEX: 26.4 KG/M2 | HEIGHT: 63 IN | RESPIRATION RATE: 18 BRPM | OXYGEN SATURATION: 99 % | HEART RATE: 76 BPM | DIASTOLIC BLOOD PRESSURE: 70 MMHG

## 2020-05-06 DIAGNOSIS — N39.0 ACUTE UTI: ICD-10-CM

## 2020-05-06 DIAGNOSIS — R09.89 PULMONARY CONGESTION: Primary | ICD-10-CM

## 2020-05-06 LAB
ALBUMIN SERPL-MCNC: 4.4 G/DL (ref 3.5–5.2)
ALBUMIN/GLOB SERPL: 1.5 G/DL
ALP SERPL-CCNC: 47 U/L (ref 39–117)
ALT SERPL W P-5'-P-CCNC: 9 U/L (ref 1–33)
ANION GAP SERPL CALCULATED.3IONS-SCNC: 12 MMOL/L (ref 5–15)
AST SERPL-CCNC: 23 U/L (ref 1–32)
B PARAPERT DNA SPEC QL NAA+PROBE: NOT DETECTED
B PERT DNA SPEC QL NAA+PROBE: NOT DETECTED
BACTERIA UR QL AUTO: ABNORMAL /HPF
BASOPHILS # BLD AUTO: 0.05 10*3/MM3 (ref 0–0.2)
BASOPHILS NFR BLD AUTO: 0.6 % (ref 0–1.5)
BILIRUB SERPL-MCNC: 0.3 MG/DL (ref 0.2–1.2)
BILIRUB UR QL STRIP: NEGATIVE
BUN BLD-MCNC: 17 MG/DL (ref 8–23)
BUN/CREAT SERPL: 21.8 (ref 7–25)
C PNEUM DNA NPH QL NAA+NON-PROBE: NOT DETECTED
CALCIUM SPEC-SCNC: 9.4 MG/DL (ref 8.6–10.5)
CHLORIDE SERPL-SCNC: 96 MMOL/L (ref 98–107)
CLARITY UR: CLEAR
CO2 SERPL-SCNC: 27 MMOL/L (ref 22–29)
COLOR UR: YELLOW
CREAT BLD-MCNC: 0.78 MG/DL (ref 0.57–1)
CRP SERPL-MCNC: 0.8 MG/DL (ref 0–0.5)
D DIMER PPP FEU-MCNC: 0.53 MCGFEU/ML (ref 0–0.46)
D-LACTATE SERPL-SCNC: 0.8 MMOL/L (ref 0.5–2)
DEPRECATED RDW RBC AUTO: 53.6 FL (ref 37–54)
EOSINOPHIL # BLD AUTO: 0.44 10*3/MM3 (ref 0–0.4)
EOSINOPHIL NFR BLD AUTO: 5.2 % (ref 0.3–6.2)
ERYTHROCYTE [DISTWIDTH] IN BLOOD BY AUTOMATED COUNT: 16.2 % (ref 12.3–15.4)
FLUAV H1 2009 PAND RNA NPH QL NAA+PROBE: NOT DETECTED
FLUAV H1 HA GENE NPH QL NAA+PROBE: NOT DETECTED
FLUAV H3 RNA NPH QL NAA+PROBE: NOT DETECTED
FLUAV SUBTYP SPEC NAA+PROBE: NOT DETECTED
FLUBV RNA ISLT QL NAA+PROBE: NOT DETECTED
GFR SERPL CREATININE-BSD FRML MDRD: 73 ML/MIN/1.73
GLOBULIN UR ELPH-MCNC: 2.9 GM/DL
GLUCOSE BLD-MCNC: 106 MG/DL (ref 65–99)
GLUCOSE UR STRIP-MCNC: NEGATIVE MG/DL
HADV DNA SPEC NAA+PROBE: NOT DETECTED
HCOV 229E RNA SPEC QL NAA+PROBE: NOT DETECTED
HCOV HKU1 RNA SPEC QL NAA+PROBE: NOT DETECTED
HCOV NL63 RNA SPEC QL NAA+PROBE: NOT DETECTED
HCOV OC43 RNA SPEC QL NAA+PROBE: NOT DETECTED
HCT VFR BLD AUTO: 33.9 % (ref 34–46.6)
HGB BLD-MCNC: 10.2 G/DL (ref 12–15.9)
HGB UR QL STRIP.AUTO: ABNORMAL
HMPV RNA NPH QL NAA+NON-PROBE: NOT DETECTED
HPIV1 RNA SPEC QL NAA+PROBE: NOT DETECTED
HPIV2 RNA SPEC QL NAA+PROBE: NOT DETECTED
HPIV3 RNA NPH QL NAA+PROBE: NOT DETECTED
HPIV4 P GENE NPH QL NAA+PROBE: NOT DETECTED
HYALINE CASTS UR QL AUTO: ABNORMAL /LPF
IMM GRANULOCYTES # BLD AUTO: 0.02 10*3/MM3 (ref 0–0.05)
IMM GRANULOCYTES NFR BLD AUTO: 0.2 % (ref 0–0.5)
KETONES UR QL STRIP: NEGATIVE
LDH SERPL-CCNC: 189 U/L (ref 135–214)
LEUKOCYTE ESTERASE UR QL STRIP.AUTO: ABNORMAL
LYMPHOCYTES # BLD AUTO: 1.55 10*3/MM3 (ref 0.7–3.1)
LYMPHOCYTES NFR BLD AUTO: 18.3 % (ref 19.6–45.3)
M PNEUMO IGG SER IA-ACNC: NOT DETECTED
MCH RBC QN AUTO: 26.8 PG (ref 26.6–33)
MCHC RBC AUTO-ENTMCNC: 30.1 G/DL (ref 31.5–35.7)
MCV RBC AUTO: 89.2 FL (ref 79–97)
MONOCYTES # BLD AUTO: 0.58 10*3/MM3 (ref 0.1–0.9)
MONOCYTES NFR BLD AUTO: 6.8 % (ref 5–12)
NEUTROPHILS # BLD AUTO: 5.84 10*3/MM3 (ref 1.7–7)
NEUTROPHILS NFR BLD AUTO: 68.9 % (ref 42.7–76)
NITRITE UR QL STRIP: NEGATIVE
NT-PROBNP SERPL-MCNC: 552.9 PG/ML (ref 5–900)
PH UR STRIP.AUTO: 7 [PH] (ref 4.5–8)
PLATELET # BLD AUTO: 379 10*3/MM3 (ref 140–450)
PMV BLD AUTO: 10.5 FL (ref 6–12)
POTASSIUM BLD-SCNC: 3.7 MMOL/L (ref 3.5–5.2)
PROCALCITONIN SERPL-MCNC: 0.1 NG/ML (ref 0.1–0.25)
PROT SERPL-MCNC: 7.3 G/DL (ref 6–8.5)
PROT UR QL STRIP: NEGATIVE
RBC # BLD AUTO: 3.8 10*6/MM3 (ref 3.77–5.28)
RBC # UR: ABNORMAL /HPF
REF LAB TEST METHOD: ABNORMAL
RHINOVIRUS RNA SPEC NAA+PROBE: NOT DETECTED
RSV RNA NPH QL NAA+NON-PROBE: NOT DETECTED
S PYO AG THROAT QL: NEGATIVE
SARS-COV-2 RNA RESP QL NAA+PROBE: NOT DETECTED
SODIUM BLD-SCNC: 135 MMOL/L (ref 136–145)
SP GR UR STRIP: 1.01 (ref 1–1.03)
SQUAMOUS #/AREA URNS HPF: ABNORMAL /HPF
TROPONIN T SERPL-MCNC: <0.01 NG/ML (ref 0–0.03)
UROBILINOGEN UR QL STRIP: ABNORMAL
WBC NRBC COR # BLD: 8.48 10*3/MM3 (ref 3.4–10.8)
WBC UR QL AUTO: ABNORMAL /HPF

## 2020-05-06 PROCEDURE — 71275 CT ANGIOGRAPHY CHEST: CPT

## 2020-05-06 PROCEDURE — 83615 LACTATE (LD) (LDH) ENZYME: CPT | Performed by: EMERGENCY MEDICINE

## 2020-05-06 PROCEDURE — 80053 COMPREHEN METABOLIC PANEL: CPT | Performed by: EMERGENCY MEDICINE

## 2020-05-06 PROCEDURE — 99284 EMERGENCY DEPT VISIT MOD MDM: CPT | Performed by: EMERGENCY MEDICINE

## 2020-05-06 PROCEDURE — 83880 ASSAY OF NATRIURETIC PEPTIDE: CPT | Performed by: EMERGENCY MEDICINE

## 2020-05-06 PROCEDURE — 84145 PROCALCITONIN (PCT): CPT | Performed by: EMERGENCY MEDICINE

## 2020-05-06 PROCEDURE — 85379 FIBRIN DEGRADATION QUANT: CPT | Performed by: EMERGENCY MEDICINE

## 2020-05-06 PROCEDURE — 87086 URINE CULTURE/COLONY COUNT: CPT | Performed by: EMERGENCY MEDICINE

## 2020-05-06 PROCEDURE — 86140 C-REACTIVE PROTEIN: CPT | Performed by: EMERGENCY MEDICINE

## 2020-05-06 PROCEDURE — 87635 SARS-COV-2 COVID-19 AMP PRB: CPT | Performed by: EMERGENCY MEDICINE

## 2020-05-06 PROCEDURE — 87880 STREP A ASSAY W/OPTIC: CPT | Performed by: EMERGENCY MEDICINE

## 2020-05-06 PROCEDURE — 93010 ELECTROCARDIOGRAM REPORT: CPT | Performed by: INTERNAL MEDICINE

## 2020-05-06 PROCEDURE — 87186 SC STD MICRODIL/AGAR DIL: CPT | Performed by: EMERGENCY MEDICINE

## 2020-05-06 PROCEDURE — 84484 ASSAY OF TROPONIN QUANT: CPT | Performed by: EMERGENCY MEDICINE

## 2020-05-06 PROCEDURE — 85025 COMPLETE CBC W/AUTO DIFF WBC: CPT | Performed by: EMERGENCY MEDICINE

## 2020-05-06 PROCEDURE — 83605 ASSAY OF LACTIC ACID: CPT | Performed by: EMERGENCY MEDICINE

## 2020-05-06 PROCEDURE — 87088 URINE BACTERIA CULTURE: CPT | Performed by: EMERGENCY MEDICINE

## 2020-05-06 PROCEDURE — 93005 ELECTROCARDIOGRAM TRACING: CPT | Performed by: EMERGENCY MEDICINE

## 2020-05-06 PROCEDURE — 99284 EMERGENCY DEPT VISIT MOD MDM: CPT

## 2020-05-06 PROCEDURE — 0100U HC BIOFIRE FILMARRAY RESP PANEL 2: CPT | Performed by: EMERGENCY MEDICINE

## 2020-05-06 PROCEDURE — 71045 X-RAY EXAM CHEST 1 VIEW: CPT

## 2020-05-06 PROCEDURE — 81001 URINALYSIS AUTO W/SCOPE: CPT | Performed by: EMERGENCY MEDICINE

## 2020-05-06 PROCEDURE — 0 IOPAMIDOL PER 1 ML: Performed by: EMERGENCY MEDICINE

## 2020-05-06 PROCEDURE — 87081 CULTURE SCREEN ONLY: CPT | Performed by: EMERGENCY MEDICINE

## 2020-05-06 RX ORDER — FUROSEMIDE 40 MG/1
40 TABLET ORAL ONCE
Status: COMPLETED | OUTPATIENT
Start: 2020-05-06 | End: 2020-05-06

## 2020-05-06 RX ORDER — CEFUROXIME AXETIL 250 MG/1
500 TABLET ORAL ONCE
Status: COMPLETED | OUTPATIENT
Start: 2020-05-06 | End: 2020-05-06

## 2020-05-06 RX ORDER — FUROSEMIDE 10 MG/ML
40 INJECTION INTRAMUSCULAR; INTRAVENOUS ONCE
Status: DISCONTINUED | OUTPATIENT
Start: 2020-05-06 | End: 2020-05-06

## 2020-05-06 RX ORDER — CEFUROXIME AXETIL 500 MG/1
500 TABLET ORAL 2 TIMES DAILY
Qty: 10 TABLET | Refills: 0 | Status: SHIPPED | OUTPATIENT
Start: 2020-05-06 | End: 2020-05-21 | Stop reason: HOSPADM

## 2020-05-06 RX ADMIN — CEFUROXIME AXETIL 500 MG: 250 TABLET, FILM COATED ORAL at 20:23

## 2020-05-06 RX ADMIN — FUROSEMIDE 40 MG: 40 TABLET ORAL at 20:23

## 2020-05-06 RX ADMIN — IOPAMIDOL 100 ML: 755 INJECTION, SOLUTION INTRAVENOUS at 17:52

## 2020-05-06 NOTE — ED PROVIDER NOTES
Subjective   History of Present Illness  History of Present Illness    Chief complaint: Short of air    Location: Home    Quality/Severity: Low saturations and doctors office, in the 80s    Timing/Onset/Duration: Over the last couple of days    Modifying Factors: Exertion makes it worse, oxygen makes it better    Associated Symptoms: No headache.  The patient's had a fever, she is unsure how high, she has had chills.  She has had cough.  She had sore throat.  She has no earache or nasal congestion.  Has had intermittent chest pain.  No abdominal pain.  No diarrhea or burning when she urinates.  No nausea or vomiting.  She has had lost of taste and smell for the last week    Narrative: This 70-year-old white female presents with shortness of breath.  She was seen at Dr. Zhang's offers and referred here for further work-up and evaluation.    PCP: Linden Zhang      Review of Systems   Constitutional: Positive for chills and fever.   HENT: Positive for sore throat. Negative for ear pain.    Eyes: Negative for discharge and redness.   Respiratory: Positive for cough and shortness of breath. Negative for chest tightness.    Cardiovascular: Negative for chest pain.   Gastrointestinal: Negative for abdominal pain, diarrhea, nausea and vomiting.   Genitourinary: Negative for decreased urine volume, difficulty urinating and dysuria.   Musculoskeletal: Negative for back pain, neck pain and neck stiffness.   Skin: Negative for color change and rash.   Neurological: Negative for headaches.   Psychiatric/Behavioral: Negative.  Negative for confusion.        Medication List      ASK your doctor about these medications    acetaminophen 325 MG tablet  Commonly known as:  TYLENOL  Take 2 tablets by mouth Every 6 (Six) Hours As Needed for mild pain (1-3)   or moderate pain (4-6).     * albuterol (2.5 MG/3ML) 0.083% nebulizer solution  Commonly known as:  PROVENTIL  Take 2.5 mg by nebulization Every 4 (Four) Hours As Needed for  Wheezing or   Shortness of Air.     * albuterol sulfate  (90 Base) MCG/ACT inhaler  Commonly known as:  PROVENTIL HFA;VENTOLIN HFA;PROAIR HFA     baclofen 10 MG tablet  Commonly known as:  LIORESAL     * bisacodyl 5 MG EC tablet  Commonly known as:  DULCOLAX  Take 1 tablet by mouth Daily As Needed for Constipation.     * bisacodyl 10 MG suppository  Commonly known as:  DULCOLAX  Insert 1 suppository into the rectum Daily As Needed for Constipation.     clotrimazole-betamethasone 1-0.05 % cream  Commonly known as:  LOTRISONE     enalapril 10 MG tablet  Commonly known as:  VASOTEC  Take 1 tablet by mouth Daily.     estradiol 0.1 MG/GM vaginal cream  Commonly known as:  ESTRACE     fenofibrate 160 MG tablet     fluticasone-salmeterol 250-50 MCG/DOSE DISKUS  Commonly known as:  ADVAIR     hyoscyamine sulfate 0.125 MG tablet dispersible disintegrating tablet  Commonly known as:  ANASPAZ     Janumet XR  MG tablet  Generic drug:  SITagliptin-metFORMIN HCl ER     magnesium oxide 400 (241.3 Mg) MG tablet tablet  Commonly known as:  MAGOX     melatonin 5 MG tablet tablet  Take 1 tablet by mouth Every Night.     meloxicam 15 MG tablet  Commonly known as:  MOBIC     metFORMIN 1000 MG tablet  Commonly known as:  GLUCOPHAGE  Take 1 tablet by mouth 2 (Two) Times a Day With Meals.     metoprolol succinate XL 25 MG 24 hr tablet  Commonly known as:  Toprol XL  Take 1 tablet by mouth Daily.     montelukast 10 MG tablet  Commonly known as:  SINGULAIR  Take 1 tablet by mouth Every Night.     omeprazole 40 MG capsule  Commonly known as:  priLOSEC     oxybutynin 5 MG tablet  Commonly known as:  DITROPAN  Take 1 tablet by mouth 2 (Two) Times a Day.     PARoxetine 40 MG tablet  Commonly known as:  PAXIL  Take 1 tablet by mouth Daily.     pioglitazone 30 MG tablet  Commonly known as:  ACTOS     polyethylene glycol pack packet  Commonly known as:  MIRALAX  Take 17 g by mouth Daily As Needed (constipation).     potassium chloride  20 MEQ packet  Commonly known as:  KLOR-CON     pregabalin 75 MG capsule  Commonly known as:  LYRICA  Take 1 capsule by mouth Every 12 (Twelve) Hours.     raNITIdine 150 MG tablet  Commonly known as:  ZANTAC     sennosides-docusate 8.6-50 MG per tablet  Commonly known as:  PERICOLACE  Take 1 tablet by mouth 2 (Two) Times a Day.     Symbicort 160-4.5 MCG/ACT inhaler  Generic drug:  budesonide-formoterol     traMADol 50 MG tablet  Commonly known as:  ULTRAM     Vitamin D3 1.25 MG (71177 UT) capsule  Take 1 capsule by mouth Every 7 (Seven) Days.         * This list has 4 medication(s) that are the same as other medications   prescribed for you. Read the directions carefully, and ask your doctor or   other care provider to review them with you.              Past Medical History:   Diagnosis Date   • Anxiety    • Asthma    • CHF (congestive heart failure) (CMS/[facility])    • Chronic pain disorder    • Chronic UTI    • Depression    • Diabetes (CMS/[facility])    • GERD (gastroesophageal reflux disease)    • H/O CHF    • HTN (hypertension)    • Hyperlipidemia    • Incontinence    • Injury of back     spinal stenosis, chronic back pain   • Low back pain    • Lumbosacral disc disease    • MRSA infection (methicillin-resistant Staphylococcus aureus)     to left foot states approx 12-13 years ago    • Osteoarthritis    • Sleep apnea     uses cpap   • Spinal stenosis    • Stroke (CMS/[facility])     pt states PMD has said she may be having mini strokes   • Vertigo        Allergies   Allergen Reactions   • Augmentin [Amoxicillin-Pot Clavulanate]    • Avelox [Moxifloxacin]    • Codeine    • Morphine And Related    • Penicillins    • Sulfa Antibiotics        Past Surgical History:   Procedure Laterality Date   • CHOLECYSTECTOMY OPEN     • COLONOSCOPY     • ENDOSCOPY     • EPIDURAL BLOCK     • FEMUR OPEN REDUCTION INTERNAL FIXATION Right 1/15/2018    Procedure: FEMUR DISTAL OPEN REDUCTION INTERNAL FIXATION;  Surgeon: Moy Newberry MD;   Location: [facility] OR;  Service:    • FRACTURE SURGERY     • HYSTERECTOMY     • TOTAL KNEE ARTHROPLASTY Bilateral        Family History   Problem Relation Age of Onset   • Lung disease Mother    • Cancer Mother    • Breast cancer Mother    • Heart disease Father    • Diabetes Paternal Aunt    • Diabetes Paternal Uncle    • Diabetes Paternal Grandmother    • Diabetes Paternal Grandfather        Social History     Socioeconomic History   • Marital status:      Spouse name: Not on file   • Number of children: Not on file   • Years of education: Not on file   • Highest education level: Not on file   Tobacco Use   • Smoking status: Never Smoker   • Smokeless tobacco: Never Used   Substance and Sexual Activity   • Alcohol use: No   • Drug use: No   • Sexual activity: Defer           Objective   Physical Exam   Constitutional: She is oriented to person, place, and time. She appears well-developed and well-nourished. No distress.   ED Triage Vitals  Temp: 98.8 °F (37.1 °C) (05/06/20 1603)  Heart Rate: 74 (05/06/20 1603)  Resp: 18 (05/06/20 1603)  BP: 154/100 (05/06/20 1603)  SpO2: 97 % (05/06/20 1611)  Temp src: Oral (05/06/20 1603)  Heart Rate Source: n/a  Patient Position: n/a  BP Location: n/a  FiO2 (%): n/a    The patient's vitals were reviewed by me.  Unless otherwise noted they are within normal limits.     HENT:   Head: Normocephalic and atraumatic.   Right Ear: External ear normal.   Left Ear: External ear normal.   Nose: Nose normal.   Mouth/Throat: Oropharynx is clear and moist. No oropharyngeal exudate or posterior oropharyngeal edema.   Eyes: Pupils are equal, round, and reactive to light. Conjunctivae and EOM are normal. Right eye exhibits no discharge. Left eye exhibits no discharge.   Neck: Normal range of motion. Neck supple. No JVD present. No tracheal deviation present. No thyromegaly present.   Cardiovascular: Normal rate, regular rhythm, normal heart sounds and intact distal pulses. Exam reveals no  gallop and no friction rub.   No murmur heard.  Pulmonary/Chest: Effort normal and breath sounds normal. No stridor. No respiratory distress. She has no wheezes. She has no rales. She exhibits no tenderness.   Abdominal: Soft. Bowel sounds are normal. She exhibits no distension and no mass. There is no tenderness. There is no rebound and no guarding. No hernia.   Musculoskeletal: Normal range of motion. She exhibits no edema or deformity.        Right lower leg: Normal. She exhibits no edema.        Left lower leg: Normal. She exhibits no edema.   Lymphadenopathy:     She has no cervical adenopathy.   Neurological: She is alert and oriented to person, place, and time.   Skin: Skin is warm and dry. No rash noted. She is not diaphoretic. No erythema. No pallor.   Psychiatric: Her behavior is normal.   Nursing note and vitals reviewed.      Procedures           ED Course  ED Course as of May 06 2011   Wed May 06, 2020   1858 The laboratory values were reviewed by me.  The urine microscopic shows 6-12 red blood cells, 21-30 white blood cells, 2+ bacteria.  The hemoglobin is 10.  The hematocrit is 33.  The leukocytes are moderate.  The glucose is 106.  The sodium is 135.  The chloride 96.  The d-dimer is 0.53.  The CRP is pending.  The COVID-19 is pending.    [RC]   2008 Results for QUINTEN ESAU S (MRN [id number]) as of 5/6/2020 20:07    3/2/2019 14:00  Hemoglobin: 8.1 (L)      [RC]      ED Course User Index  [RC] Donovan Koroma MD      16:45, 05/06/20:  The EKG was obtained at 1641 and read by me at 1641.  EKG shows normal sinus rhythm with rate of 74.  There is a normal axis with no hypertrophy.  The PA, QRS, and QT intervals are unremarkable.  There is no ectopy.  There is no acute ST elevation or depression.      19:58, 05/06/20:  The patient was reassessed.  She feels better.  Her vital signs reviewed and are stable.  Her saturations are 100% on room air.    19:58, 05/06/20:  The patient's diagnosis of  shortness of air with pulmonary vascular congestion and evaluation for COVID-19 was discussed with her.  The patient should home quarantine until the cold would results are negative.  The patient should follow-up with Dr. Zhang after the COVID repeat results have returned negative.  She should return to emergency department if there is increased shortness of breath, chest pain, worse in any way at all.  All the patient's questions were answered she will be discharged in good condition.                                           MDM  No orders to display     Labs Reviewed - No data to display  No results found.    Final diagnoses:   Pulmonary congestion   Acute UTI         ED Medications:  Medications - No data to display    New Medications:     Medication List      ASK your doctor about these medications    acetaminophen 325 MG tablet  Commonly known as:  TYLENOL  Take 2 tablets by mouth Every 6 (Six) Hours As Needed for mild pain (1-3)   or moderate pain (4-6).     * albuterol (2.5 MG/3ML) 0.083% nebulizer solution  Commonly known as:  PROVENTIL  Take 2.5 mg by nebulization Every 4 (Four) Hours As Needed for Wheezing or   Shortness of Air.     * albuterol sulfate  (90 Base) MCG/ACT inhaler  Commonly known as:  PROVENTIL HFA;VENTOLIN HFA;PROAIR HFA     baclofen 10 MG tablet  Commonly known as:  LIORESAL     * bisacodyl 5 MG EC tablet  Commonly known as:  DULCOLAX  Take 1 tablet by mouth Daily As Needed for Constipation.     * bisacodyl 10 MG suppository  Commonly known as:  DULCOLAX  Insert 1 suppository into the rectum Daily As Needed for Constipation.     clotrimazole-betamethasone 1-0.05 % cream  Commonly known as:  LOTRISONE     enalapril 10 MG tablet  Commonly known as:  VASOTEC  Take 1 tablet by mouth Daily.     estradiol 0.1 MG/GM vaginal cream  Commonly known as:  ESTRACE     fenofibrate 160 MG tablet     fluticasone-salmeterol 250-50 MCG/DOSE DISKUS  Commonly known as:  ADVAIR     hyoscyamine  sulfate 0.125 MG tablet dispersible disintegrating tablet  Commonly known as:  ANASPAZ     Janumet XR  MG tablet  Generic drug:  SITagliptin-metFORMIN HCl ER     magnesium oxide 400 (241.3 Mg) MG tablet tablet  Commonly known as:  MAGOX     melatonin 5 MG tablet tablet  Take 1 tablet by mouth Every Night.     meloxicam 15 MG tablet  Commonly known as:  MOBIC     metFORMIN 1000 MG tablet  Commonly known as:  GLUCOPHAGE  Take 1 tablet by mouth 2 (Two) Times a Day With Meals.     metoprolol succinate XL 25 MG 24 hr tablet  Commonly known as:  Toprol XL  Take 1 tablet by mouth Daily.     montelukast 10 MG tablet  Commonly known as:  SINGULAIR  Take 1 tablet by mouth Every Night.     omeprazole 40 MG capsule  Commonly known as:  priLOSEC     oxybutynin 5 MG tablet  Commonly known as:  DITROPAN  Take 1 tablet by mouth 2 (Two) Times a Day.     PARoxetine 40 MG tablet  Commonly known as:  PAXIL  Take 1 tablet by mouth Daily.     pioglitazone 30 MG tablet  Commonly known as:  ACTOS     polyethylene glycol pack packet  Commonly known as:  MIRALAX  Take 17 g by mouth Daily As Needed (constipation).     potassium chloride 20 MEQ packet  Commonly known as:  KLOR-CON     pregabalin 75 MG capsule  Commonly known as:  LYRICA  Take 1 capsule by mouth Every 12 (Twelve) Hours.     raNITIdine 150 MG tablet  Commonly known as:  ZANTAC     sennosides-docusate 8.6-50 MG per tablet  Commonly known as:  PERICOLACE  Take 1 tablet by mouth 2 (Two) Times a Day.     Symbicort 160-4.5 MCG/ACT inhaler  Generic drug:  budesonide-formoterol     traMADol 50 MG tablet  Commonly known as:  ULTRAM     Vitamin D3 1.25 MG (37864 UT) capsule  Take 1 capsule by mouth Every 7 (Seven) Days.         * This list has 4 medication(s) that are the same as other medications   prescribed for you. Read the directions carefully, and ask your doctor or   other care provider to review them with you.              Stopped Medications:     Medication List      ASK  your doctor about these medications    acetaminophen 325 MG tablet  Commonly known as:  TYLENOL  Take 2 tablets by mouth Every 6 (Six) Hours As Needed for mild pain (1-3)   or moderate pain (4-6).     * albuterol (2.5 MG/3ML) 0.083% nebulizer solution  Commonly known as:  PROVENTIL  Take 2.5 mg by nebulization Every 4 (Four) Hours As Needed for Wheezing or   Shortness of Air.     * albuterol sulfate  (90 Base) MCG/ACT inhaler  Commonly known as:  PROVENTIL HFA;VENTOLIN HFA;PROAIR HFA     baclofen 10 MG tablet  Commonly known as:  LIORESAL     * bisacodyl 5 MG EC tablet  Commonly known as:  DULCOLAX  Take 1 tablet by mouth Daily As Needed for Constipation.     * bisacodyl 10 MG suppository  Commonly known as:  DULCOLAX  Insert 1 suppository into the rectum Daily As Needed for Constipation.     clotrimazole-betamethasone 1-0.05 % cream  Commonly known as:  LOTRISONE     enalapril 10 MG tablet  Commonly known as:  VASOTEC  Take 1 tablet by mouth Daily.     estradiol 0.1 MG/GM vaginal cream  Commonly known as:  ESTRACE     fenofibrate 160 MG tablet     fluticasone-salmeterol 250-50 MCG/DOSE DISKUS  Commonly known as:  ADVAIR     hyoscyamine sulfate 0.125 MG tablet dispersible disintegrating tablet  Commonly known as:  ANASPAZ     Janumet XR  MG tablet  Generic drug:  SITagliptin-metFORMIN HCl ER     magnesium oxide 400 (241.3 Mg) MG tablet tablet  Commonly known as:  MAGOX     melatonin 5 MG tablet tablet  Take 1 tablet by mouth Every Night.     meloxicam 15 MG tablet  Commonly known as:  MOBIC     metFORMIN 1000 MG tablet  Commonly known as:  GLUCOPHAGE  Take 1 tablet by mouth 2 (Two) Times a Day With Meals.     metoprolol succinate XL 25 MG 24 hr tablet  Commonly known as:  Toprol XL  Take 1 tablet by mouth Daily.     montelukast 10 MG tablet  Commonly known as:  SINGULAIR  Take 1 tablet by mouth Every Night.     omeprazole 40 MG capsule  Commonly known as:  priLOSEC     oxybutynin 5 MG tablet  Commonly  known as:  DITROPAN  Take 1 tablet by mouth 2 (Two) Times a Day.     PARoxetine 40 MG tablet  Commonly known as:  PAXIL  Take 1 tablet by mouth Daily.     pioglitazone 30 MG tablet  Commonly known as:  ACTOS     polyethylene glycol pack packet  Commonly known as:  MIRALAX  Take 17 g by mouth Daily As Needed (constipation).     potassium chloride 20 MEQ packet  Commonly known as:  KLOR-CON     pregabalin 75 MG capsule  Commonly known as:  LYRICA  Take 1 capsule by mouth Every 12 (Twelve) Hours.     raNITIdine 150 MG tablet  Commonly known as:  ZANTAC     sennosides-docusate 8.6-50 MG per tablet  Commonly known as:  PERICOLACE  Take 1 tablet by mouth 2 (Two) Times a Day.     Symbicort 160-4.5 MCG/ACT inhaler  Generic drug:  budesonide-formoterol     traMADol 50 MG tablet  Commonly known as:  ULTRAM     Vitamin D3 1.25 MG (83003 UT) capsule  Take 1 capsule by mouth Every 7 (Seven) Days.         * This list has 4 medication(s) that are the same as other medications   prescribed for you. Read the directions carefully, and ask your doctor or   other care provider to review them with you.                Final diagnoses:   Pulmonary congestion   Acute UTI            Donovan Koroma MD  05/06/20 8122

## 2020-05-06 NOTE — ED NOTES
Patient arrives to ER from Dr. Zhang's office with c/o SOB. She states she has asthma, but has been coughing more than usual. Per Dr. Zhang her O2 sats were low. 97% O2 upon arrival. No other complaints at this time. Gloves, face sheild, surgical mask wore while in patients room. Will await MD orders.      Mamta Valenzuela RN  05/06/20 [id number]

## 2020-05-07 ENCOUNTER — EPISODE CHANGES (OUTPATIENT)
Dept: CASE MANAGEMENT | Facility: OTHER | Age: 70
End: 2020-05-07

## 2020-05-07 NOTE — DISCHARGE INSTRUCTIONS
Follow-up with Dr. Zhang after your COVID is negative, if COVID positive, quarantine for 14 days.  Return to the emergency department if there is increased shortness of breath, chest pain, worse in any way at all decreased.  Decrease her salt intake.

## 2020-05-08 LAB
BACTERIA SPEC AEROBE CULT: ABNORMAL
BACTERIA SPEC AEROBE CULT: NORMAL

## 2020-05-11 ENCOUNTER — TELEPHONE (OUTPATIENT)
Dept: CARDIOLOGY | Facility: CLINIC | Age: 70
End: 2020-05-11

## 2020-05-11 ENCOUNTER — PATIENT OUTREACH (OUTPATIENT)
Dept: CASE MANAGEMENT | Facility: OTHER | Age: 70
End: 2020-05-11

## 2020-05-11 ENCOUNTER — EPISODE CHANGES (OUTPATIENT)
Dept: CASE MANAGEMENT | Facility: OTHER | Age: 70
End: 2020-05-11

## 2020-05-11 NOTE — OUTREACH NOTE
ED Potential Covid Discharge Follow-up    FU call to pt regarding ED visit with COVID testing answered by pts daughter Jazzy. She states her mother is feeling much better. They were called with negative results. Pt given instructions, as per CDC guidelines,  that even if test negative they are recommending self isolation. Pt should be fever free for72 hours without the use of medication. Symptom free for 72 hours such as cough or shortness of breath. Even though test results negative pt should follow up with their PCP. They have not made an apt with Dr Zhang yet but will do so today. She does have an apt with her cardiologist. Call to Dr Pal office and spoke with Dior. Notified of ED visit with negative COVID results.    Erika Naylor RN  Ambulatory     5/11/2020, 13:08

## 2020-05-14 NOTE — TELEPHONE ENCOUNTER
Dr. Armenta,  Do you need to see Mr. Hernandez in the office or is he okay to have a telehealth visit?  -nivia

## 2020-05-21 ENCOUNTER — TELEMEDICINE (OUTPATIENT)
Dept: CARDIOLOGY | Facility: CLINIC | Age: 70
End: 2020-05-21

## 2020-05-21 VITALS
HEIGHT: 63 IN | DIASTOLIC BLOOD PRESSURE: 69 MMHG | SYSTOLIC BLOOD PRESSURE: 116 MMHG | BODY MASS INDEX: 24.45 KG/M2 | WEIGHT: 138 LBS | HEART RATE: 77 BPM

## 2020-05-21 DIAGNOSIS — I50.32 CHRONIC DIASTOLIC CONGESTIVE HEART FAILURE (HCC): ICD-10-CM

## 2020-05-21 DIAGNOSIS — R06.02 SHORTNESS OF BREATH: ICD-10-CM

## 2020-05-21 DIAGNOSIS — R00.2 PALPITATIONS: Primary | ICD-10-CM

## 2020-05-21 DIAGNOSIS — E78.2 MIXED HYPERLIPIDEMIA: ICD-10-CM

## 2020-05-21 DIAGNOSIS — I10 ESSENTIAL HYPERTENSION: ICD-10-CM

## 2020-05-21 PROCEDURE — 99204 OFFICE O/P NEW MOD 45 MIN: CPT | Performed by: INTERNAL MEDICINE
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
## 2020-06-04 ENCOUNTER — HOSPITAL ENCOUNTER (OUTPATIENT)
Dept: CARDIOLOGY | Facility: HOSPITAL | Age: 70
Discharge: HOME | End: 2020-06-04
Admitting: INTERNAL MEDICINE

## 2020-06-04 ENCOUNTER — TELEPHONE (OUTPATIENT)
Dept: CARDIOLOGY | Facility: CLINIC | Age: 70
End: 2020-06-04

## 2020-06-04 VITALS
WEIGHT: 138 LBS | HEIGHT: 63 IN | DIASTOLIC BLOOD PRESSURE: 70 MMHG | BODY MASS INDEX: 24.45 KG/M2 | HEART RATE: 80 BPM | SYSTOLIC BLOOD PRESSURE: 130 MMHG

## 2020-06-04 LAB
ASCENDING AORTA: 2.7 CM
BH CV ECHO MEAS - ACS: 2 CM
BH CV ECHO MEAS - AI DEC SLOPE: 214.2 CM/SEC^2
BH CV ECHO MEAS - AI MAX PG: 51.5 MMHG
BH CV ECHO MEAS - AI MAX VEL: 359 CM/SEC
BH CV ECHO MEAS - AI P1/2T: 490.9 MSEC
BH CV ECHO MEAS - AO MAX PG (FULL): 12.1 MMHG
BH CV ECHO MEAS - AO MAX PG: 15.1 MMHG
BH CV ECHO MEAS - AO MEAN PG (FULL): 7.4 MMHG
BH CV ECHO MEAS - AO MEAN PG: 9 MMHG
BH CV ECHO MEAS - AO ROOT AREA (BSA CORRECTED): 1.8
BH CV ECHO MEAS - AO ROOT AREA: 7.2 CM^2
BH CV ECHO MEAS - AO ROOT DIAM: 3 CM
BH CV ECHO MEAS - AO V2 MAX: 194.6 CM/SEC
BH CV ECHO MEAS - AO V2 MEAN: 144.4 CM/SEC
BH CV ECHO MEAS - AO V2 VTI: 41.6 CM
BH CV ECHO MEAS - ASC AORTA: 2.7 CM
BH CV ECHO MEAS - AVA(I,A): 0.93 CM^2
BH CV ECHO MEAS - AVA(I,D): 0.93 CM^2
BH CV ECHO MEAS - AVA(V,A): 0.98 CM^2
BH CV ECHO MEAS - AVA(V,D): 0.98 CM^2
BH CV ECHO MEAS - BSA(HAYCOCK): 1.7 M^2
BH CV ECHO MEAS - BSA: 1.7 M^2
BH CV ECHO MEAS - BZI_BMI: 24.4 KILOGRAMS/M^2
BH CV ECHO MEAS - BZI_METRIC_HEIGHT: 160 CM
BH CV ECHO MEAS - BZI_METRIC_WEIGHT: 62.6 KG
BH CV ECHO MEAS - EDV(MOD-SP2): 61 ML
BH CV ECHO MEAS - EDV(MOD-SP4): 50 ML
BH CV ECHO MEAS - EDV(TEICH): 74.3 ML
BH CV ECHO MEAS - EF(CUBED): 76.2 %
BH CV ECHO MEAS - EF(MOD-BP): 62.3 %
BH CV ECHO MEAS - EF(MOD-SP2): 59 %
BH CV ECHO MEAS - EF(MOD-SP4): 62 %
BH CV ECHO MEAS - EF(TEICH): 68.7 %
BH CV ECHO MEAS - ESV(MOD-SP2): 25 ML
BH CV ECHO MEAS - ESV(MOD-SP4): 19 ML
BH CV ECHO MEAS - ESV(TEICH): 23.2 ML
BH CV ECHO MEAS - FS: 38 %
BH CV ECHO MEAS - IVS/LVPW: 1.1
BH CV ECHO MEAS - IVSD: 1.1 CM
BH CV ECHO MEAS - LAT PEAK E' VEL: 9.1 CM/SEC
BH CV ECHO MEAS - LV DIASTOLIC VOL/BSA (35-75): 30.3 ML/M^2
BH CV ECHO MEAS - LV MASS(C)D: 144.6 GRAMS
BH CV ECHO MEAS - LV MASS(C)DI: 87.6 GRAMS/M^2
BH CV ECHO MEAS - LV MAX PG: 3.1 MMHG
BH CV ECHO MEAS - LV MEAN PG: 1.6 MMHG
BH CV ECHO MEAS - LV SYSTOLIC VOL/BSA (12-30): 11.5 ML/M^2
BH CV ECHO MEAS - LV V1 MAX: 87.8 CM/SEC
BH CV ECHO MEAS - LV V1 MEAN: 59.8 CM/SEC
BH CV ECHO MEAS - LV V1 VTI: 17.7 CM
BH CV ECHO MEAS - LVIDD: 4.1 CM
BH CV ECHO MEAS - LVIDS: 2.5 CM
BH CV ECHO MEAS - LVLD AP2: 6.8 CM
BH CV ECHO MEAS - LVLD AP4: 6.3 CM
BH CV ECHO MEAS - LVLS AP2: 5.5 CM
BH CV ECHO MEAS - LVLS AP4: 5.5 CM
BH CV ECHO MEAS - LVOT AREA (M): 2.3 CM^2
BH CV ECHO MEAS - LVOT AREA: 2.2 CM^2
BH CV ECHO MEAS - LVOT DIAM: 1.7 CM
BH CV ECHO MEAS - LVPWD: 0.99 CM
BH CV ECHO MEAS - MED PEAK E' VEL: 7.6 CM/SEC
BH CV ECHO MEAS - MR MAX PG: 66.8 MMHG
BH CV ECHO MEAS - MR MAX VEL: 408.7 CM/SEC
BH CV ECHO MEAS - MV A DUR: 0.12 SEC
BH CV ECHO MEAS - MV A MAX VEL: 83.1 CM/SEC
BH CV ECHO MEAS - MV DEC SLOPE: 541.9 CM/SEC^2
BH CV ECHO MEAS - MV DEC TIME: 0.18 SEC
BH CV ECHO MEAS - MV E MAX VEL: 123 CM/SEC
BH CV ECHO MEAS - MV E/A: 1.5
BH CV ECHO MEAS - MV MAX PG: 7.4 MMHG
BH CV ECHO MEAS - MV MEAN PG: 3 MMHG
BH CV ECHO MEAS - MV P1/2T MAX VEL: 123.4 CM/SEC
BH CV ECHO MEAS - MV P1/2T: 66.7 MSEC
BH CV ECHO MEAS - MV V2 MAX: 136.4 CM/SEC
BH CV ECHO MEAS - MV V2 MEAN: 82 CM/SEC
BH CV ECHO MEAS - MV V2 VTI: 32.5 CM
BH CV ECHO MEAS - MVA P1/2T LCG: 1.8 CM^2
BH CV ECHO MEAS - MVA(P1/2T): 3.3 CM^2
BH CV ECHO MEAS - MVA(VTI): 1.2 CM^2
BH CV ECHO MEAS - PA ACC TIME: 0.12 SEC
BH CV ECHO MEAS - PA MAX PG (FULL): 3.2 MMHG
BH CV ECHO MEAS - PA MAX PG: 5.3 MMHG
BH CV ECHO MEAS - PA PR(ACCEL): 25.5 MMHG
BH CV ECHO MEAS - PA V2 MAX: 115.4 CM/SEC
BH CV ECHO MEAS - PULM A REVS DUR: 0.1 SEC
BH CV ECHO MEAS - PULM A REVS VEL: 27 CM/SEC
BH CV ECHO MEAS - PULM DIAS VEL: 95.5 CM/SEC
BH CV ECHO MEAS - PULM S/D: 0.63
BH CV ECHO MEAS - PULM SYS VEL: 60.4 CM/SEC
BH CV ECHO MEAS - PVA(V,A): 1.9 CM^2
BH CV ECHO MEAS - PVA(V,D): 1.9 CM^2
BH CV ECHO MEAS - QP/QS: 1.3
BH CV ECHO MEAS - RAP SYSTOLE: 3 MMHG
BH CV ECHO MEAS - RV MAX PG: 2.1 MMHG
BH CV ECHO MEAS - RV MEAN PG: 1.3 MMHG
BH CV ECHO MEAS - RV V1 MAX: 72.4 CM/SEC
BH CV ECHO MEAS - RV V1 MEAN: 53.9 CM/SEC
BH CV ECHO MEAS - RV V1 VTI: 16.8 CM
BH CV ECHO MEAS - RVOT AREA: 3.1 CM^2
BH CV ECHO MEAS - RVOT DIAM: 2 CM
BH CV ECHO MEAS - RVSP: 33 MMHG
BH CV ECHO MEAS - SI(AO): 181.7 ML/M^2
BH CV ECHO MEAS - SI(CUBED): 31.8 ML/M^2
BH CV ECHO MEAS - SI(LVOT): 23.4 ML/M^2
BH CV ECHO MEAS - SI(MOD-SP2): 21.8 ML/M^2
BH CV ECHO MEAS - SI(MOD-SP4): 18.8 ML/M^2
BH CV ECHO MEAS - SI(TEICH): 30.9 ML/M^2
BH CV ECHO MEAS - SUP REN AO DIAM: 1.4 CM
BH CV ECHO MEAS - SV(AO): 300.2 ML
BH CV ECHO MEAS - SV(CUBED): 52.6 ML
BH CV ECHO MEAS - SV(LVOT): 38.6 ML
BH CV ECHO MEAS - SV(MOD-SP2): 36 ML
BH CV ECHO MEAS - SV(MOD-SP4): 31 ML
BH CV ECHO MEAS - SV(RVOT): 51.8 ML
BH CV ECHO MEAS - SV(TEICH): 51 ML
BH CV ECHO MEAS - TAPSE (>1.6): 1.7 CM2
BH CV ECHO MEAS - TR MAX VEL: 275.4 CM/SEC
BH CV ECHO MEASUREMENTS AVERAGE E/E' RATIO: 14.73
BH CV XLRA - RV BASE: 3.6 CM
BH CV XLRA - RV LENGTH: 6.3 CM
BH CV XLRA - RV MID: 2.7 CM
BH CV XLRA - TDI S': 13 CM/SEC
LEFT ATRIUM VOLUME INDEX: 31 ML/M2
SINUS: 2.9 CM
STJ: 1.8 CM

## 2020-06-04 PROCEDURE — 93306 TTE W/DOPPLER COMPLETE: CPT

## 2020-06-04 PROCEDURE — 93306 TTE W/DOPPLER COMPLETE: CPT | Performed by: INTERNAL MEDICINE

## 2020-07-16 ENCOUNTER — TRANSCRIBE ORDERS (OUTPATIENT)
Dept: ADMINISTRATIVE | Facility: HOSPITAL | Age: 70
End: 2020-07-16

## 2020-07-16 DIAGNOSIS — M54.50 LOW BACK PAIN, UNSPECIFIED BACK PAIN LATERALITY, UNSPECIFIED CHRONICITY, UNSPECIFIED WHETHER SCIATICA PRESENT: Primary | ICD-10-CM

## 2020-07-22 ENCOUNTER — OFFICE VISIT (OUTPATIENT)
Dept: ORTHOPEDIC SURGERY | Facility: CLINIC | Age: 70
End: 2020-07-22

## 2020-07-22 VITALS — HEIGHT: 63 IN | WEIGHT: 148 LBS | BODY MASS INDEX: 26.22 KG/M2

## 2020-07-22 DIAGNOSIS — M25.511 RIGHT SHOULDER PAIN, UNSPECIFIED CHRONICITY: Primary | ICD-10-CM

## 2020-07-22 DIAGNOSIS — S42.034A CLOSED NONDISPLACED FRACTURE OF ACROMIAL END OF RIGHT CLAVICLE, INITIAL ENCOUNTER: ICD-10-CM

## 2020-07-22 DIAGNOSIS — M75.51 SUBACROMIAL BURSITIS OF RIGHT SHOULDER JOINT: ICD-10-CM

## 2020-07-22 PROCEDURE — 20610 DRAIN/INJ JOINT/BURSA W/O US: CPT | Performed by: ORTHOPAEDIC SURGERY

## 2020-07-22 PROCEDURE — 73030 X-RAY EXAM OF SHOULDER: CPT | Performed by: ORTHOPAEDIC SURGERY

## 2020-07-22 PROCEDURE — 99213 OFFICE O/P EST LOW 20 MIN: CPT | Performed by: ORTHOPAEDIC SURGERY

## 2020-07-22 RX ORDER — LIDOCAINE HYDROCHLORIDE 10 MG/ML
4 INJECTION, SOLUTION EPIDURAL; INFILTRATION; INTRACAUDAL; PERINEURAL
Status: COMPLETED | OUTPATIENT
Start: 2020-07-22 | End: 2020-07-22

## 2020-07-22 RX ORDER — BETAMETHASONE SODIUM PHOSPHATE AND BETAMETHASONE ACETATE 3; 3 MG/ML; MG/ML
6 INJECTION, SUSPENSION INTRA-ARTICULAR; INTRALESIONAL; INTRAMUSCULAR; SOFT TISSUE
Status: COMPLETED | OUTPATIENT
Start: 2020-07-22 | End: 2020-07-22

## 2020-07-22 RX ADMIN — LIDOCAINE HYDROCHLORIDE 4 ML: 10 INJECTION, SOLUTION EPIDURAL; INFILTRATION; INTRACAUDAL; PERINEURAL at 15:35

## 2020-07-22 RX ADMIN — BETAMETHASONE SODIUM PHOSPHATE AND BETAMETHASONE ACETATE 6 MG: 3; 3 INJECTION, SUSPENSION INTRA-ARTICULAR; INTRALESIONAL; INTRAMUSCULAR; SOFT TISSUE at 15:35
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
## 2020-07-23 ENCOUNTER — HOSPITAL ENCOUNTER (OUTPATIENT)
Dept: MAMMOGRAPHY | Facility: HOSPITAL | Age: 70
Discharge: HOME | End: 2020-07-23
Admitting: OBSTETRICS & GYNECOLOGY

## 2020-07-23 DIAGNOSIS — Z12.39 SCREENING BREAST EXAMINATION: ICD-10-CM

## 2020-07-23 PROCEDURE — 77067 SCR MAMMO BI INCL CAD: CPT

## 2020-07-23 PROCEDURE — 77063 BREAST TOMOSYNTHESIS BI: CPT

## 2020-07-28 ENCOUNTER — TRANSCRIBE ORDERS (OUTPATIENT)
Dept: ADMINISTRATIVE | Facility: HOSPITAL | Age: 70
End: 2020-07-28

## 2020-07-28 DIAGNOSIS — Z01.818 PREOP EXAMINATION: Primary | ICD-10-CM

## 2020-08-01 ENCOUNTER — LAB (OUTPATIENT)
Dept: LAB | Facility: HOSPITAL | Age: 70
End: 2020-08-01

## 2020-08-01 DIAGNOSIS — Z01.818 PREOP EXAMINATION: ICD-10-CM

## 2020-08-01 PROCEDURE — C9803 HOPD COVID-19 SPEC COLLECT: HCPCS

## 2020-08-01 PROCEDURE — U0002 COVID-19 LAB TEST NON-CDC: HCPCS

## 2020-08-01 PROCEDURE — U0004 COV-19 TEST NON-CDC HGH THRU: HCPCS

## 2020-08-02 LAB
REF LAB TEST METHOD: NORMAL
SARS-COV-2 RNA RESP QL NAA+PROBE: NOT DETECTED

## 2020-08-04 ENCOUNTER — HOSPITAL ENCOUNTER (OUTPATIENT)
Dept: GENERAL RADIOLOGY | Facility: HOSPITAL | Age: 70
Discharge: HOME | End: 2020-08-04

## 2020-08-04 ENCOUNTER — ANESTHESIA EVENT (OUTPATIENT)
Dept: PAIN MEDICINE | Facility: HOSPITAL | Age: 70
End: 2020-08-04

## 2020-08-04 ENCOUNTER — ANESTHESIA (OUTPATIENT)
Dept: PAIN MEDICINE | Facility: HOSPITAL | Age: 70
End: 2020-08-04

## 2020-08-04 ENCOUNTER — HOSPITAL ENCOUNTER (OUTPATIENT)
Dept: PAIN MEDICINE | Facility: HOSPITAL | Age: 70
Discharge: HOME | End: 2020-08-04
Admitting: ANESTHESIOLOGY

## 2020-08-04 VITALS
SYSTOLIC BLOOD PRESSURE: 136 MMHG | HEART RATE: 83 BPM | DIASTOLIC BLOOD PRESSURE: 83 MMHG | RESPIRATION RATE: 16 BRPM | OXYGEN SATURATION: 93 % | TEMPERATURE: 98 F

## 2020-08-04 DIAGNOSIS — M54.50 LOWER BACK PAIN: ICD-10-CM

## 2020-08-04 DIAGNOSIS — M54.50 LOW BACK PAIN, UNSPECIFIED BACK PAIN LATERALITY, UNSPECIFIED CHRONICITY, UNSPECIFIED WHETHER SCIATICA PRESENT: ICD-10-CM

## 2020-08-04 LAB
GLUCOSE BLDC GLUCOMTR-MCNC: 69 MG/DL (ref 70–130)
GLUCOSE BLDC GLUCOMTR-MCNC: 72 MG/DL (ref 70–130)

## 2020-08-04 PROCEDURE — 25010000002 METHYLPREDNISOLONE PER 80 MG: Performed by: ANESTHESIOLOGY

## 2020-08-04 PROCEDURE — 77003 FLUOROGUIDE FOR SPINE INJECT: CPT

## 2020-08-04 PROCEDURE — C1755 CATHETER, INTRASPINAL: HCPCS

## 2020-08-04 PROCEDURE — 0 IOPAMIDOL 41 % SOLUTION: Performed by: ANESTHESIOLOGY

## 2020-08-04 PROCEDURE — 82962 GLUCOSE BLOOD TEST: CPT

## 2020-08-04 RX ORDER — LIDOCAINE HYDROCHLORIDE 10 MG/ML
10 INJECTION, SOLUTION INFILTRATION; PERINEURAL ONCE
Status: COMPLETED | OUTPATIENT
Start: 2020-08-04 | End: 2020-08-04

## 2020-08-04 RX ORDER — METHYLPREDNISOLONE ACETATE 80 MG/ML
80 INJECTION, SUSPENSION INTRA-ARTICULAR; INTRALESIONAL; INTRAMUSCULAR; SOFT TISSUE ONCE
Status: COMPLETED | OUTPATIENT
Start: 2020-08-04 | End: 2020-08-04

## 2020-08-04 RX ADMIN — IOPAMIDOL 3 ML: 408 INJECTION, SOLUTION INTRATHECAL at 14:02

## 2020-08-04 RX ADMIN — METHYLPREDNISOLONE ACETATE 80 MG: 80 INJECTION, SUSPENSION INTRA-ARTICULAR; INTRALESIONAL; INTRAMUSCULAR; SOFT TISSUE at 14:03

## 2020-08-04 RX ADMIN — LIDOCAINE HYDROCHLORIDE 3 ML: 10 INJECTION, SOLUTION EPIDURAL; INFILTRATION; INTRACAUDAL; PERINEURAL at 13:57

## 2020-08-04 NOTE — ANESTHESIA PROCEDURE NOTES
PAIN Epidural block    Pre-sedation assessment completed: 8/4/2020 1:55 PM    Patient reassessed immediately prior to procedure    Patient location during procedure: pain clinic  Start Time: 8/4/2020 1:56 PM  Stop Time: 8/4/2020 2:04 PM  Indication:procedure for pain  Performed By  Anesthesiologist: Hallie Osborn MD  Preanesthetic Checklist  Completed: patient identified, site marked, surgical consent, pre-op evaluation, timeout performed, risks and benefits discussed and monitors and equipment checked  Prep:  Pt Position:prone  Sterile Tech:cap, gloves, sterile barrier and mask  Prep:chlorhexidine gluconate and isopropyl alcohol  Monitoring:continuous pulse oximetry and blood pressure monitoring  Procedure:Sedation: no     Approach:midline  Guidance: fluoroscopy  Location:lumbar  Level:3-4  Needle Type:Tuohy  Needle Gauge:22 G  Aspiration:negative  Medications:  Depomedrol:80 mg  Preservative Free Saline:5mL  Isovue:1mL    Post Assessment:  Post-procedure: band aid applied.  Pt Tolerance:patient tolerated the procedure well with no apparent complications  Complications:no
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
## 2020-09-23 ENCOUNTER — TRANSCRIBE ORDERS (OUTPATIENT)
Dept: ADMINISTRATIVE | Facility: HOSPITAL | Age: 70
End: 2020-09-23

## 2020-09-23 DIAGNOSIS — Z78.0 POSTMENOPAUSAL STATE: Primary | ICD-10-CM

## 2020-10-05 ENCOUNTER — OFFICE VISIT (OUTPATIENT)
Dept: OBSTETRICS AND GYNECOLOGY | Facility: CLINIC | Age: 70
End: 2020-10-05

## 2020-10-05 VITALS
SYSTOLIC BLOOD PRESSURE: 126 MMHG | DIASTOLIC BLOOD PRESSURE: 74 MMHG | BODY MASS INDEX: 28.76 KG/M2 | HEIGHT: 63 IN | WEIGHT: 162.3 LBS

## 2020-10-05 DIAGNOSIS — N32.81 OAB (OVERACTIVE BLADDER): ICD-10-CM

## 2020-10-05 DIAGNOSIS — Z01.419 PAP SMEAR, LOW-RISK: Primary | ICD-10-CM

## 2020-10-05 DIAGNOSIS — N39.0 URINARY TRACT INFECTION WITHOUT HEMATURIA, SITE UNSPECIFIED: ICD-10-CM

## 2020-10-05 DIAGNOSIS — Z13.9 SCREENING FOR CONDITION: ICD-10-CM

## 2020-10-05 DIAGNOSIS — K21.9 GASTROESOPHAGEAL REFLUX DISEASE, UNSPECIFIED WHETHER ESOPHAGITIS PRESENT: ICD-10-CM

## 2020-10-05 DIAGNOSIS — Z11.51 ENCOUNTER FOR SCREENING FOR HUMAN PAPILLOMAVIRUS (HPV): ICD-10-CM

## 2020-10-05 DIAGNOSIS — N95.2 VAGINAL ATROPHY: ICD-10-CM

## 2020-10-05 LAB
BILIRUB BLD-MCNC: NEGATIVE MG/DL
CLARITY, POC: CLEAR
COLOR UR: YELLOW
GLUCOSE UR STRIP-MCNC: NEGATIVE MG/DL
KETONES UR QL: NEGATIVE
LEUKOCYTE EST, POC: NEGATIVE
NITRITE UR-MCNC: POSITIVE MG/ML
PH UR: 5 [PH] (ref 5–8)
PROT UR STRIP-MCNC: NEGATIVE MG/DL
RBC # UR STRIP: NEGATIVE /UL
SP GR UR: 1 (ref 1–1.03)
UROBILINOGEN UR QL: NORMAL

## 2020-10-05 PROCEDURE — G0101 CA SCREEN;PELVIC/BREAST EXAM: HCPCS | Performed by: OBSTETRICS & GYNECOLOGY

## 2020-10-05 PROCEDURE — 81002 URINALYSIS NONAUTO W/O SCOPE: CPT | Performed by: OBSTETRICS & GYNECOLOGY

## 2020-10-05 RX ORDER — HYOSCYAMINE SULFATE 0.125 MG
TABLET ORAL
COMMUNITY
End: 2021-06-06

## 2020-10-05 RX ORDER — ERGOCALCIFEROL 1.25 MG/1
CAPSULE ORAL
COMMUNITY
Start: 2020-09-25 | End: 2022-03-31

## 2020-10-05 RX ORDER — PANTOPRAZOLE SODIUM 40 MG/1
TABLET, DELAYED RELEASE ORAL
COMMUNITY
Start: 2019-02-28 | End: 2021-01-29

## 2020-10-05 RX ORDER — NITROFURANTOIN 25; 75 MG/1; MG/1
100 CAPSULE ORAL 2 TIMES DAILY
Qty: 14 CAPSULE | Refills: 0 | Status: SHIPPED | OUTPATIENT
Start: 2020-10-05 | End: 2020-10-12

## 2020-10-05 RX ORDER — PIOGLITAZONEHYDROCHLORIDE 30 MG/1
30 TABLET ORAL DAILY
COMMUNITY
Start: 2020-09-25

## 2020-10-05 RX ORDER — POTASSIUM CHLORIDE 20 MEQ/1
TABLET, EXTENDED RELEASE ORAL
COMMUNITY
End: 2021-06-06

## 2020-10-05 RX ORDER — RANITIDINE 150 MG/1
150 CAPSULE ORAL
COMMUNITY
End: 2021-06-06

## 2020-10-05 RX ORDER — GLUCOSAM/CHON-MSM1/C/MANG/BOSW 500-416.6
TABLET ORAL
COMMUNITY
Start: 2020-09-14

## 2020-10-05 RX ORDER — CIPROFLOXACIN 500 MG/1
TABLET, FILM COATED ORAL
COMMUNITY
End: 2021-02-02

## 2020-10-05 RX ORDER — FLUCONAZOLE 150 MG/1
TABLET ORAL
COMMUNITY
End: 2021-01-29

## 2020-10-05 RX ORDER — CALCIUM CITRATE/VITAMIN D3 200MG-6.25
1 TABLET ORAL DAILY
COMMUNITY
Start: 2020-09-14

## 2020-10-05 RX ORDER — SITAGLIPTIN AND METFORMIN HYDROCHLORIDE 1000; 50 MG/1; MG/1
TABLET, FILM COATED, EXTENDED RELEASE ORAL
COMMUNITY
End: 2021-01-29

## 2020-10-05 RX ORDER — POLYMYXIN B SULFATE AND TRIMETHOPRIM 1; 10000 MG/ML; [USP'U]/ML
1 SOLUTION OPHTHALMIC EVERY 4 HOURS
COMMUNITY
End: 2021-06-06

## 2020-10-05 RX ORDER — DIPHENOXYLATE HYDROCHLORIDE AND ATROPINE SULFATE 2.5; .025 MG/1; MG/1
TABLET ORAL
COMMUNITY
End: 2021-06-06

## 2020-10-05 RX ORDER — NYSTATIN 100000 [USP'U]/G
POWDER TOPICAL
COMMUNITY
Start: 2019-02-28 | End: 2021-06-06

## 2020-10-05 RX ORDER — PNV NO.95/FERROUS FUM/FOLIC AC 28MG-0.8MG
1 TABLET ORAL DAILY
COMMUNITY
Start: 2020-09-25

## 2020-10-05 RX ORDER — ESTRADIOL 0.1 MG/G
1 CREAM VAGINAL 2 TIMES WEEKLY
Qty: 45 G | Refills: 6 | Status: SHIPPED | OUTPATIENT
Start: 2020-10-05 | End: 2021-06-06

## 2020-10-05 RX ORDER — MECLIZINE HCL 25 MG/1
25 TABLET, CHEWABLE ORAL 3 TIMES DAILY PRN
COMMUNITY
Start: 2020-08-27 | End: 2021-06-06

## 2020-10-05 RX ORDER — CONJUGATED ESTROGENS 0.62 MG/G
CREAM VAGINAL
COMMUNITY
End: 2020-10-05 | Stop reason: ALTCHOICE
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
## 2020-10-06 ENCOUNTER — APPOINTMENT (OUTPATIENT)
Dept: BONE DENSITY | Facility: HOSPITAL | Age: 70
End: 2020-10-06

## 2020-10-06 DIAGNOSIS — Z78.0 POSTMENOPAUSAL STATE: ICD-10-CM

## 2020-10-06 PROCEDURE — 77080 DXA BONE DENSITY AXIAL: CPT

## 2020-10-08 LAB
APPEARANCE UR: ABNORMAL
BACTERIA #/AREA URNS HPF: ABNORMAL /[HPF]
BACTERIA UR CULT: ABNORMAL
BACTERIA UR CULT: ABNORMAL
BILIRUB UR QL STRIP: NEGATIVE
COLOR UR: YELLOW
CYTOLOGIST CVX/VAG CYTO: NORMAL
CYTOLOGY CVX/VAG DOC CYTO: NORMAL
CYTOLOGY CVX/VAG DOC THIN PREP: NORMAL
DX ICD CODE: NORMAL
EPI CELLS #/AREA URNS HPF: ABNORMAL /HPF (ref 0–10)
GLUCOSE UR QL: NEGATIVE
HGB UR QL STRIP: NEGATIVE
HIV 1 & 2 AB SER-IMP: NORMAL
KETONES UR QL STRIP: NEGATIVE
LEUKOCYTE ESTERASE UR QL STRIP: ABNORMAL
Lab: NORMAL
MICRO URNS: ABNORMAL
NITRITE UR QL STRIP: POSITIVE
OTHER ANTIBIOTIC SUSC ISLT: ABNORMAL
OTHER STN SPEC: NORMAL
PH UR STRIP: 7.5 [PH] (ref 5–7.5)
PROT UR QL STRIP: NEGATIVE
RBC #/AREA URNS HPF: ABNORMAL /HPF (ref 0–2)
RECOM F/U CVX/VAG CYTO: NORMAL
SP GR UR: 1.01 (ref 1–1.03)
STAT OF ADQ CVX/VAG CYTO-IMP: NORMAL
UROBILINOGEN UR STRIP-MCNC: 0.2 MG/DL (ref 0.2–1)
WBC #/AREA URNS HPF: >30 /HPF (ref 0–5)

## 2020-10-09 NOTE — PROGRESS NOTES
PIP– patient has a UTI confirmed by culture.  She was given a prescription for Macrobid which should be sufficient.

## 2021-01-29 ENCOUNTER — HOSPITAL ENCOUNTER (OUTPATIENT)
Dept: GENERAL RADIOLOGY | Facility: HOSPITAL | Age: 71
Discharge: HOME | End: 2021-01-29
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
## 2021-01-29 PROBLEM — M47.816 LUMBAR SPONDYLOSIS: Status: ACTIVE | Noted: 2017-12-06

## 2021-01-29 PROBLEM — T30.0 THIRD DEGREE BURN: Status: ACTIVE | Noted: 2019-04-09

## 2021-01-29 PROBLEM — I10 ESSENTIAL HYPERTENSION: Status: ACTIVE | Noted: 2019-07-29

## 2021-01-29 PROBLEM — H40.9 GLAUCOMA: Status: ACTIVE | Noted: 2021-01-29

## 2021-01-29 PROCEDURE — 72040 X-RAY EXAM NECK SPINE 2-3 VW: CPT

## 2021-01-29 PROCEDURE — 72072 X-RAY EXAM THORAC SPINE 3VWS: CPT

## 2021-01-29 PROCEDURE — 73110 X-RAY EXAM OF WRIST: CPT

## 2021-01-29 PROCEDURE — 73130 X-RAY EXAM OF HAND: CPT

## 2021-02-02 ENCOUNTER — OFFICE VISIT (OUTPATIENT)
Dept: ORTHOPEDIC SURGERY | Facility: CLINIC | Age: 71
End: 2021-02-02

## 2021-02-02 VITALS — HEIGHT: 63 IN | BODY MASS INDEX: 27.64 KG/M2 | WEIGHT: 156 LBS

## 2021-02-02 DIAGNOSIS — S72.401D CLOSED FRACTURE OF DISTAL END OF RIGHT FEMUR WITH ROUTINE HEALING, UNSPECIFIED FRACTURE MORPHOLOGY, SUBSEQUENT ENCOUNTER: Primary | ICD-10-CM

## 2021-02-02 DIAGNOSIS — M16.11 PRIMARY OSTEOARTHRITIS OF RIGHT HIP: ICD-10-CM

## 2021-02-02 PROBLEM — A49.02 METHICILLIN RESISTANT STAPHYLOCOCCUS AUREUS INFECTION: Status: ACTIVE | Noted: 2019-02-20

## 2021-02-02 PROBLEM — D64.9 ANEMIA: Status: ACTIVE | Noted: 2021-02-02

## 2021-02-02 PROBLEM — E78.5 HYPERLIPIDEMIA: Status: ACTIVE | Noted: 2017-08-17

## 2021-02-02 PROBLEM — G47.33 OBSTRUCTIVE SLEEP APNEA OF ADULT: Status: ACTIVE | Noted: 2018-03-16

## 2021-02-02 PROBLEM — R55 EPISODE OF SYNCOPE: Status: ACTIVE | Noted: 2021-02-02

## 2021-02-02 PROBLEM — R41.0 DELIRIUM: Status: ACTIVE | Noted: 2018-07-08

## 2021-02-02 PROBLEM — R06.89 BREATHING DIFFICULT: Status: ACTIVE | Noted: 2021-02-02

## 2021-02-02 PROBLEM — K21.9 GASTROESOPHAGEAL REFLUX DISEASE: Status: ACTIVE | Noted: 2017-08-17

## 2021-02-02 PROBLEM — E11.9 TYPE 2 DIABETES MELLITUS: Status: ACTIVE | Noted: 2017-08-17

## 2021-02-02 PROBLEM — N32.81 OVERACTIVE BLADDER: Status: ACTIVE | Noted: 2018-05-24

## 2021-02-02 PROCEDURE — 73552 X-RAY EXAM OF FEMUR 2/>: CPT | Performed by: ORTHOPAEDIC SURGERY

## 2021-02-02 PROCEDURE — 99214 OFFICE O/P EST MOD 30 MIN: CPT | Performed by: ORTHOPAEDIC SURGERY

## 2021-02-02 RX ORDER — KETOROLAC TROMETHAMINE 5 MG/ML
SOLUTION OPHTHALMIC
COMMUNITY
Start: 2021-01-19 | End: 2021-06-06

## 2021-02-02 RX ORDER — BENZONATATE 100 MG/1
100 CAPSULE ORAL 3 TIMES DAILY PRN
COMMUNITY
Start: 2021-01-19 | End: 2021-06-06

## 2021-02-02 RX ORDER — CYCLOBENZAPRINE HCL 5 MG
5 TABLET ORAL 3 TIMES DAILY PRN
COMMUNITY
Start: 2021-01-19 | End: 2021-06-06

## 2021-02-02 RX ORDER — SOLIFENACIN SUCCINATE 5 MG/1
TABLET, FILM COATED ORAL
COMMUNITY
Start: 2021-01-14 | End: 2022-03-31
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
## 2021-02-22 ENCOUNTER — TELEPHONE (OUTPATIENT)
Dept: SPORTS MEDICINE | Facility: CLINIC | Age: 71
End: 2021-02-22

## 2021-02-22 NOTE — TELEPHONE ENCOUNTER
Patient called in to ask about her appointment tomorrow with Dr. Bowie (02/23/2021). She wanted to know if Dr. Bowie will give her an injection or not. I stated that I would not know for sure, but that this is something she would need to discuss at her visit tomorrow, and if Dr. Bowie was OK with giving her an injection at her visit, he would most certainly let her know that and arrange to do that in the office.   All information relayed to the patient was verbally understood.     Sanju

## 2021-02-23 ENCOUNTER — OFFICE VISIT (OUTPATIENT)
Dept: SPORTS MEDICINE | Facility: CLINIC | Age: 71
End: 2021-02-23

## 2021-02-23 VITALS
DIASTOLIC BLOOD PRESSURE: 74 MMHG | BODY MASS INDEX: 27.64 KG/M2 | HEART RATE: 85 BPM | OXYGEN SATURATION: 96 % | RESPIRATION RATE: 14 BRPM | HEIGHT: 63 IN | TEMPERATURE: 98.3 F | WEIGHT: 156 LBS | SYSTOLIC BLOOD PRESSURE: 138 MMHG

## 2021-02-23 DIAGNOSIS — M16.11 PRIMARY OSTEOARTHRITIS OF RIGHT HIP: Primary | ICD-10-CM

## 2021-02-23 PROCEDURE — 20611 DRAIN/INJ JOINT/BURSA W/US: CPT | Performed by: FAMILY MEDICINE

## 2021-02-23 RX ORDER — TRIAMCINOLONE ACETONIDE 40 MG/ML
40 INJECTION, SUSPENSION INTRA-ARTICULAR; INTRAMUSCULAR ONCE
Status: COMPLETED | OUTPATIENT
Start: 2021-02-23 | End: 2021-02-23

## 2021-02-23 RX ADMIN — TRIAMCINOLONE ACETONIDE 40 MG: 40 INJECTION, SUSPENSION INTRA-ARTICULAR; INTRAMUSCULAR at 15:57

## 2021-02-23 NOTE — PROGRESS NOTES
"Here today for right hip injection requested by Dr. Newberry.    Ultrasound-Guided Hip Injection Procedure Note    Right hip injection was discussed with the patient in detail, including indication, risks, benefits, and alternatives. Verbal consent was given for the procedure. Injection was performed by physician.  Injection site was identified by ultrasound examination, then cleaned with Betadine and alcohol swabs. Prior to needle insertion, ethyl chloride spray was used for surface anesthesia. Sterile technique was used. Ultrasound guidance was indicated due to proximity of neurovascular structures and injection accuracy.  A 22-gauge, 3.5\" spinal needle was guided to the anterior joint capsule under continuous direct ultrasound visualization. Injectate was seen filing the capsule and passed without difficulty. The needle was removed and a simple bandage was applied. The procedure was tolerated well without difficulty.    Injection mixture:  1% lidocaine without epinephrine: 2 mL  40 mg/mL triamcinolone acetonide: 1 mL     Diagnoses and all orders for this visit:    Primary osteoarthritis of right hip  -     triamcinolone acetonide (KENALOG-40) injection 40 mg       "

## 2021-03-16 ENCOUNTER — TRANSCRIBE ORDERS (OUTPATIENT)
Dept: ADMINISTRATIVE | Facility: HOSPITAL | Age: 71
End: 2021-03-16

## 2021-03-16 DIAGNOSIS — G44.309 POST-TRAUMATIC HEADACHE, NOT INTRACTABLE, UNSPECIFIED CHRONICITY PATTERN: Primary | ICD-10-CM

## 2021-03-23 ENCOUNTER — HOSPITAL ENCOUNTER (OUTPATIENT)
Dept: CT IMAGING | Facility: HOSPITAL | Age: 71
Discharge: HOME | End: 2021-03-23

## 2021-03-23 DIAGNOSIS — G44.309 POST-TRAUMATIC HEADACHE, NOT INTRACTABLE, UNSPECIFIED CHRONICITY PATTERN: ICD-10-CM

## 2021-04-13 ENCOUNTER — TELEPHONE (OUTPATIENT)
Dept: GASTROENTEROLOGY | Facility: CLINIC | Age: 71
End: 2021-04-13

## 2021-04-15 ENCOUNTER — PREP FOR SURGERY (OUTPATIENT)
Dept: OTHER | Facility: HOSPITAL | Age: 71
End: 2021-04-15

## 2021-04-15 DIAGNOSIS — Z86.010 PERSONAL HISTORY OF COLONIC POLYPS: ICD-10-CM

## 2021-04-15 DIAGNOSIS — Z12.11 ENCOUNTER FOR SCREENING FOR MALIGNANT NEOPLASM OF COLON: Primary | ICD-10-CM

## 2021-04-16 ENCOUNTER — HOSPITAL ENCOUNTER (OUTPATIENT)
Facility: HOSPITAL | Age: 71
Setting detail: HOSPITAL OUTPATIENT SURGERY
End: 2021-04-16
Attending: INTERNAL MEDICINE | Admitting: INTERNAL MEDICINE

## 2021-04-16 PROBLEM — Z86.010 PERSONAL HISTORY OF COLONIC POLYPS: Status: ACTIVE | Noted: 2021-04-16

## 2021-04-16 PROBLEM — Z12.11 ENCOUNTER FOR SCREENING FOR MALIGNANT NEOPLASM OF COLON: Status: ACTIVE | Noted: 2021-04-16

## 2021-04-16 NOTE — TELEPHONE ENCOUNTER
CALLED AND SPOKE PATIENT.  SCHEDULED AT [place] ON 08/25/2021 AT 12PM - ARRIVE 11AM.  WILL MAIL INSTRUCTIONS.    COVID TEST ON 08/23/2021, WILL CALL WITH TIME.  NEED TO SELF QUARANTINE UNTIL AFTER PROCEDURE.  SHE UNDERSTANDS.

## 2021-06-06 ENCOUNTER — APPOINTMENT (OUTPATIENT)
Dept: GENERAL RADIOLOGY | Facility: HOSPITAL | Age: 71
End: 2021-06-06

## 2021-06-06 ENCOUNTER — HOSPITAL ENCOUNTER (EMERGENCY)
Facility: HOSPITAL | Age: 71
Discharge: HOME | End: 2021-06-06
Attending: EMERGENCY MEDICINE | Admitting: EMERGENCY MEDICINE

## 2021-06-06 VITALS
DIASTOLIC BLOOD PRESSURE: 77 MMHG | WEIGHT: 165 LBS | OXYGEN SATURATION: 95 % | TEMPERATURE: 98.3 F | HEIGHT: 63 IN | RESPIRATION RATE: 20 BRPM | BODY MASS INDEX: 29.23 KG/M2 | SYSTOLIC BLOOD PRESSURE: 146 MMHG | HEART RATE: 83 BPM

## 2021-06-06 DIAGNOSIS — S80.01XA CONTUSION OF RIGHT KNEE, INITIAL ENCOUNTER: Primary | ICD-10-CM

## 2021-06-06 PROCEDURE — 99282 EMERGENCY DEPT VISIT SF MDM: CPT | Performed by: EMERGENCY MEDICINE

## 2021-06-06 PROCEDURE — 73562 X-RAY EXAM OF KNEE 3: CPT

## 2021-06-06 PROCEDURE — 99283 EMERGENCY DEPT VISIT LOW MDM: CPT

## 2021-06-06 PROCEDURE — 73590 X-RAY EXAM OF LOWER LEG: CPT

## 2021-06-06 RX ORDER — HYDROCODONE BITARTRATE AND ACETAMINOPHEN 5; 325 MG/1; MG/1
1 TABLET ORAL ONCE
Status: COMPLETED | OUTPATIENT
Start: 2021-06-06 | End: 2021-06-06

## 2021-06-06 RX ORDER — FENOFIBRATE 160 MG/1
160 TABLET ORAL DAILY
Status: ON HOLD | COMMUNITY
End: 2022-11-27

## 2021-06-06 RX ORDER — HYDROCODONE BITARTRATE AND ACETAMINOPHEN 5; 325 MG/1; MG/1
1 TABLET ORAL EVERY 4 HOURS PRN
Qty: 20 TABLET | Refills: 0 | Status: SHIPPED | OUTPATIENT
Start: 2021-06-06 | End: 2021-07-21 | Stop reason: ALTCHOICE

## 2021-06-06 RX ORDER — CEPHALEXIN 500 MG/1
500 CAPSULE ORAL 2 TIMES DAILY
COMMUNITY
End: 2021-07-21 | Stop reason: ALTCHOICE

## 2021-06-06 RX ORDER — PANTOPRAZOLE SODIUM 40 MG/1
40 TABLET, DELAYED RELEASE ORAL DAILY
COMMUNITY

## 2021-06-06 RX ADMIN — HYDROCODONE BITARTRATE AND ACETAMINOPHEN 1 TABLET: 5; 325 TABLET ORAL at 12:27

## 2021-06-06 NOTE — DISCHARGE INSTRUCTIONS
Ice the right knee for 20 minutes every 2 hours while awake for 2 to 3 days.  Elevate it.  Use your walker as needed.  Take Colace as needed as directed if you are taking the Norco for pain for constipation.  Follow-up with Dr. Hartman in 1 week.  Return the emergency department if there is increased pain, numbness, tingling, weakness, change in color or temperature, worse in any way at all.

## 2021-06-06 NOTE — ED PROVIDER NOTES
Subjective   History of Present Illness  History of Present Illness    Chief complaint: Knee pain    Location: Right knee    Quality/Severity: Severe, sharp pain    Timing/Onset/Duration: 4 AM    Modifying Factors: Hurts to move, no relief with Ultram    Associated Symptoms: No loss of consciousness.  No neck or back pain.  No chest pain or shortness of breath.  No abdominal pain.  No numbness, tingling, weakness, or change in color or temperature.  No nausea or vomiting.    Narrative: This 71-year-old white female rolled out of bed injuring her right knee.  She has had a knee replacement in the past.  She presents complaining of right knee pain.  The patient is not on blood thinners.    PCP:Linden Zhang MD      Review of Systems   Musculoskeletal:        Right knee pain   Neurological: Negative for weakness and numbness.        Medication List      CONTINUE taking these medications    True Metrix Meter w/Device kit     TRUEplus Lancets 33G misc        ASK your doctor about these medications    cephalexin 500 MG capsule  Commonly known as: KEFLEX     enalapril 10 MG tablet  Commonly known as: VASOTEC  Take 1 tablet by mouth Daily.     fenofibrate 160 MG tablet     ferrous sulfate 325 (65 Fe) MG tablet     Janumet XR  MG tablet  Generic drug: SITagliptin-metFORMIN HCl ER     LOSARTAN POTASSIUM PO     magnesium oxide 400 (241.3 Mg) MG tablet tablet  Commonly known as: MAGOX     metoprolol succinate XL 25 MG 24 hr tablet  Commonly known as: Toprol XL  Take 1 tablet by mouth Daily.     montelukast 10 MG tablet  Commonly known as: SINGULAIR  Take 1 tablet by mouth Every Night.     pantoprazole 40 MG EC tablet  Commonly known as: PROTONIX     PARoxetine 40 MG tablet  Commonly known as: PAXIL  Take 1 tablet by mouth Daily.     pioglitazone 30 MG tablet  Commonly known as: ACTOS     solifenacin 5 MG tablet  Commonly known as: VESICARE     True Metrix Blood Glucose Test test strip  Generic drug: glucose blood      * vitamin D 1.25 MG (98214 UT) capsule capsule  Commonly known as: ERGOCALCIFEROL     * VITAMIN D2 PO     Vitamin D3 1.25 MG (48342 UT) capsule  Take 1 capsule by mouth Every 7 (Seven) Days.         * This list has 2 medication(s) that are the same as other medications prescribed for you. Read the directions carefully, and ask your doctor or other care provider to review them with you.                Past Medical History:   Diagnosis Date   • Anxiety    • Asthma    • CHF (congestive heart failure) (CMS/[facility])    • Chronic pain disorder    • Chronic UTI    • Depression    • Diabetes (CMS/[facility])    • Endometriosis    • Fibroid    • GERD (gastroesophageal reflux disease)    • H/O CHF    • HTN (hypertension)    • Hyperlipidemia    • Incontinence    • Injury of back     spinal stenosis, chronic back pain   • Low back pain    • Lumbosacral disc disease    • MRSA infection (methicillin-resistant Staphylococcus aureus)     to left foot states approx 12-13 years ago    • Osteoarthritis    • Pulmonary congestion    • Sleep apnea     uses cpap   • Spinal stenosis    • Stroke (CMS/[facility])     pt states PMD has said she may be having mini strokes   • Vertigo        Allergies   Allergen Reactions   • Moxifloxacin Anaphylaxis   • Penicillins Palpitations and Shortness Of Breath     Tolerates ceftriaxone 7/2018   • Amoxicillin-Pot Clavulanate Diarrhea   • Codeine Other (See Comments) and Nausea And Vomiting   • Doxycycline Other (See Comments)   • Morphine Nausea And Vomiting   • Morphine And Related    • Sulfa Antibiotics Nausea And Vomiting   • Adhesive Tape Rash       Past Surgical History:   Procedure Laterality Date   • ABDOMINAL HERNIA REPAIR      with mesh   • CHOLECYSTECTOMY OPEN     • COLONOSCOPY     • DILATATION AND CURETTAGE     • ENDOSCOPY     • EPIDURAL BLOCK     • EXPLORATORY LAPAROTOMY      ectopic pregnancy   • FEMUR OPEN REDUCTION INTERNAL FIXATION Right 1/15/2018    Procedure: FEMUR DISTAL OPEN REDUCTION INTERNAL  FIXATION;  Surgeon: Moy Newberry MD;  Location: [facility];  Service:    • FRACTURE SURGERY     • HYSTERECTOMY      LUDWIG BSO fibroids and endometriosis   • SKIN GRAFT     • TOTAL KNEE ARTHROPLASTY Bilateral        Family History   Problem Relation Age of Onset   • Lung disease Mother    • Cancer Mother    • Breast cancer Mother    • Heart disease Father    • Diabetes Paternal Aunt    • Diabetes Paternal Uncle    • Diabetes Paternal Grandmother    • Diabetes Paternal Grandfather    • Ovarian cancer Neg Hx    • Colon cancer Neg Hx        Social History     Socioeconomic History   • Marital status:      Spouse name: Not on file   • Number of children: Not on file   • Years of education: Not on file   • Highest education level: Not on file   Tobacco Use   • Smoking status: Never Smoker   • Smokeless tobacco: Never Used   • Tobacco comment: caffeine use- coffe, coke   Vaping Use   • Vaping Use: Never used   Substance and Sexual Activity   • Alcohol use: No   • Drug use: No   • Sexual activity: Not Currently     Partners: Male     Birth control/protection: Surgical, Post-menopausal     Comment: LUDWIG BSO           Objective   Physical Exam  Vitals and nursing note reviewed.   Constitutional:       Appearance: Normal appearance.   Musculoskeletal:      Comments: There is tenderness upon palpation the anterior right knee.  The capillary refill is less than 2 seconds.  The sensation is intact.  There is decreased range of motion secondary to pain.  There is no joint laxity noted.  There is a 2+ dorsalis pedis pulse.  The extremity is otherwise without tenderness or deformity and neurovascular intact.   Skin:     Capillary Refill: Capillary refill takes less than 2 seconds.      Findings: No bruising.   Neurological:      General: No focal deficit present.      Mental Status: She is alert and oriented to person, place, and time.      Sensory: No sensory deficit.      Motor: No weakness.         Procedures            ED Course      13:34 EDT, 06/06/21:  The patient was reassessed.  She feels much better.  Her vital signs reviewed and are stable.  She has no new complaints.  Her pain is much improved.  The right lower extremity is neurovascularly intact.  The blood pressure is improved.  The blood pressure is 146/77.    13:34 EDT, 06/06/21:  Patient's diagnosis of right knee contusion was discussed with her.  The patient will have a right knee immobilizer applied.  She should ice the right knee and elevate it for 20 minutes every 2 hours while she is awake for 2 to 3 days.  She should use her walker as needed.  She should take Colace as needed as directed if she is taking the Norco for pain.  The patient should follow-up with Dr. Hartman within 1 week.  She should return to the emergency department there is increased pain, numbness, tingling, weakness, change in color or temperature, worse in any way at all.    13:35 EDT, 06/06/21:  The patient's right knee immobilizer was placed by technician, after placement, it was assessed by me and noted to be in good position with right lower extremity being neurovascular intact                                         MDM    Final diagnoses:   Contusion of right knee, initial encounter       ED Disposition  ED Disposition     None          No follow-up provider specified.       Medication List      No changes were made to your prescriptions during this visit.          Donovan Koroma MD  06/06/21 2310       Donovan Koroma MD  06/06/21 8162

## 2021-06-28 ENCOUNTER — TRANSCRIBE ORDERS (OUTPATIENT)
Dept: ADMINISTRATIVE | Facility: HOSPITAL | Age: 71
End: 2021-06-28

## 2021-06-28 DIAGNOSIS — Z12.31 VISIT FOR SCREENING MAMMOGRAM: Primary | ICD-10-CM

## 2021-07-21 ENCOUNTER — OFFICE VISIT (OUTPATIENT)
Dept: ORTHOPEDIC SURGERY | Facility: CLINIC | Age: 71
End: 2021-07-21

## 2021-07-21 VITALS
BODY MASS INDEX: 28 KG/M2 | HEART RATE: 73 BPM | WEIGHT: 158 LBS | SYSTOLIC BLOOD PRESSURE: 139 MMHG | HEIGHT: 63 IN | DIASTOLIC BLOOD PRESSURE: 67 MMHG

## 2021-07-21 DIAGNOSIS — M19.079 ARTHRITIS OF FOOT: ICD-10-CM

## 2021-07-21 DIAGNOSIS — R52 PAIN: ICD-10-CM

## 2021-07-21 DIAGNOSIS — S72.401D CLOSED FRACTURE OF DISTAL END OF RIGHT FEMUR WITH ROUTINE HEALING, UNSPECIFIED FRACTURE MORPHOLOGY, SUBSEQUENT ENCOUNTER: Primary | ICD-10-CM

## 2021-07-21 PROCEDURE — 73552 X-RAY EXAM OF FEMUR 2/>: CPT | Performed by: ORTHOPAEDIC SURGERY

## 2021-07-21 PROCEDURE — 73630 X-RAY EXAM OF FOOT: CPT | Performed by: ORTHOPAEDIC SURGERY

## 2021-07-21 PROCEDURE — 99214 OFFICE O/P EST MOD 30 MIN: CPT | Performed by: ORTHOPAEDIC SURGERY

## 2021-07-21 RX ORDER — CONJUGATED ESTROGENS 0.62 MG/G
CREAM VAGINAL
COMMUNITY

## 2021-07-21 RX ORDER — HYOSCYAMINE SULFATE 0.125 MG
125 TABLET ORAL 4 TIMES DAILY PRN
COMMUNITY
Start: 2021-07-07 | End: 2022-03-31

## 2021-07-21 RX ORDER — MAGNESIUM OXIDE 400 MG/1
2 TABLET ORAL DAILY
COMMUNITY
Start: 2021-06-15 | End: 2021-07-21 | Stop reason: ALTCHOICE

## 2021-07-21 RX ORDER — TRAMADOL HYDROCHLORIDE 50 MG/1
50 TABLET ORAL 3 TIMES DAILY PRN
COMMUNITY
Start: 2021-06-24 | End: 2022-11-16 | Stop reason: HOSPADM

## 2021-07-21 NOTE — PROGRESS NOTES
Subjective:     Patient ID: Juan Pablo Hernandez is a 71 y.o. female.    Chief Complaint:  Follow-up status post ORIF right distal femur periprosthetic fracture, 1/15/2018  Plan last visit-ultrasound-guided injection right hip  Right foot pain, new problem    History of Present Illness  Juan Pablo Hernandez presents to clinic today for evaluation of right foot pain. She sustained a fall out of bed on 06/06/2021 and subsequently visited the ER. Since then she has pain primarily to the right foot. She localizes pain to the dorsal midfoot region. She rates it as an 8/10 in severity and describes it as sharp in nature. The pain is worse with pressure, weight-bearing, and ambulation. Minimal improvement with rest. She has not tried any bracing or a boot. She denies any tingling or numbness.     The patient also reports minimal pain to the right knee and right hip pain. She has not received any injection to the hip.    The patient reports having trouble with the contralateral foot as well.     Social History     Occupational History   • Not on file   Tobacco Use   • Smoking status: Never Smoker   • Smokeless tobacco: Never Used   • Tobacco comment: caffeine use- coffe, coke   Vaping Use   • Vaping Use: Never used   Substance and Sexual Activity   • Alcohol use: No   • Drug use: No   • Sexual activity: Not Currently     Partners: Male     Birth control/protection: Surgical, Post-menopausal     Comment: LUDWIG SLAUGHTER      Past Medical History:   Diagnosis Date   • Anxiety    • Asthma    • CHF (congestive heart failure) (CMS/[facility])    • Chronic pain disorder    • Chronic UTI    • Depression    • Diabetes (CMS/[facility])    • Endometriosis    • Fibroid    • GERD (gastroesophageal reflux disease)    • H/O CHF    • HTN (hypertension)    • Hyperlipidemia    • Incontinence    • Injury of back     spinal stenosis, chronic back pain   • Low back pain    • Lumbosacral disc disease    • MRSA infection (methicillin-resistant Staphylococcus aureus)     to  left foot states approx 12-13 years ago    • Osteoarthritis    • Pulmonary congestion    • Sleep apnea     uses cpap   • Spinal stenosis    • Stroke (CMS/HCC)     pt states PMD has said she may be having mini strokes   • Vertigo      Past Surgical History:   Procedure Laterality Date   • ABDOMINAL HERNIA REPAIR      with mesh   • CHOLECYSTECTOMY OPEN     • COLONOSCOPY     • DILATATION AND CURETTAGE     • ENDOSCOPY     • EPIDURAL BLOCK     • EXPLORATORY LAPAROTOMY      ectopic pregnancy   • FEMUR OPEN REDUCTION INTERNAL FIXATION Right 1/15/2018    Procedure: FEMUR DISTAL OPEN REDUCTION INTERNAL FIXATION;  Surgeon: Moy Newberry MD;  Location: [facility] OR;  Service:    • FRACTURE SURGERY     • HYSTERECTOMY      LUDWIG BSO fibroids and endometriosis   • SKIN GRAFT     • TOTAL KNEE ARTHROPLASTY Bilateral        Family History   Problem Relation Age of Onset   • Lung disease Mother    • Cancer Mother    • Breast cancer Mother    • Heart disease Father    • Diabetes Paternal Aunt    • Diabetes Paternal Uncle    • Diabetes Paternal Grandmother    • Diabetes Paternal Grandfather    • Ovarian cancer Neg Hx    • Colon cancer Neg Hx          Review of Systems   Constitutional: Negative for chills, diaphoresis, fever and unexpected weight change.   HENT: Negative for hearing loss, nosebleeds, sore throat and tinnitus.    Eyes: Negative for pain and visual disturbance.   Respiratory: Negative for cough, shortness of breath and wheezing.    Cardiovascular: Negative for chest pain and palpitations.   Gastrointestinal: Negative for abdominal pain, diarrhea, nausea and vomiting.   Endocrine: Negative for cold intolerance, heat intolerance and polydipsia.   Genitourinary: Negative for difficulty urinating, dysuria and hematuria.   Musculoskeletal: Positive for arthralgias and myalgias. Negative for joint swelling.   Skin: Negative for rash and wound.   Allergic/Immunologic: Negative for environmental allergies.   Neurological:  "Negative for dizziness, syncope and numbness.   Hematological: Does not bruise/bleed easily.   Psychiatric/Behavioral: Negative for dysphoric mood and sleep disturbance. The patient is not nervous/anxious.            Objective:  Vitals:    07/21/21 1119   BP: 139/67   BP Location: Right arm   Pulse: 73   Weight: 71.7 kg (158 lb)   Height: 160 cm (63\")         07/21/21  1119   Weight: 71.7 kg (158 lb)     Body mass index is 27.99 kg/m².    Physical Exam    Vital signs reviewed.   General: No acute distress, alert and oriented  Eyes: conjunctiva clear; pupils equally round and reactive  ENT: external ears and nose atraumatic; oropharynx clear  CV: no peripheral edema  Resp: normal respiratory effort  Skin: no rashes or wounds; normal turgor  Psych: mood and affect appropriate; recent and remote memory intact      Ortho Exam       Right Knee-     Prior lateral and anterior incisions well healed  ROM 0-125 degrees  4/5 on flexion  4/5 on extension  No focal tenderness to palpation.     Effusion- Minimal  Stable opening on varus and valgus stress at 0 and 30    Right foot-    Maximal tenderness to midfoot region. No tenderness over medial and lateral malleolus  Minimal soft tissue swelling about the ankle. Moderate swelling about the foot.  Dorsiflexion 10 degrees, 4+/5 strength  Plantarflexion 45 degrees, 4+/5 strength  Inversion- 4+/5 strength  Eversion- 4+/5 strength  Flex and extend toes with 4+/5 strength  Increased pain with pronation of the midfoot. Pain primarily localized to TMT joints.  External Rotation Stress test- Negative  Tib Fib Compression test- Negative  Anterior Drawer- Negative  Talar Tilt test- Negative  Positive sensation light touch all distributions symmetric to contralateral side  Brisk cap refill all digits  1+ dorsalis pedis pulse    Imaging:  Right Femur X-Ray  Indication: Pain  AP and Lateral views    Findings:  Prior fracture well-healed with retained periprosthetic hardware noted adjacent " to total knee replacement with no evidence of failure of implant or loss of fixation  Compared to prior office x-rays    Right Foot X-Ray  Indication: Pain  AP, Lateral, and Oblique views    Findings:  No fracture  No bony lesion  Normal soft tissues  Moderate midfoot arthritic change noted    No prior studies were available for comparison.      Assessment:        1. Closed fracture of distal end of right femur with routine healing, unspecified fracture morphology, subsequent encounter    2. Pain    3. Arthritis of foot           Plan:          1. Discussed treatment options at length with patient at today's visit.  2. We will provide a short boot for foot stabilization. Mobilize as tolerated. If there is significant residual pain, we will consider referral to podiatry for further evaluation of the midfoot arthritis.   3. She can try soft tissue massage, ice, heat, and elevation to tolerance.  4. As she mobilizes, if she continue to have increased pain to her hip, we will consider scheduling a hip injection. If we do decide to proceed with the ultrasound-guided hip injection, we will set it up here in [place].      Juan Pablo Hernandez was in agreement with plan and had all questions answered.     Orders:  Orders Placed This Encounter   Procedures   • XR Femur 2 View Right   • XR Foot 3+ View Right       Medications:  No orders of the defined types were placed in this encounter.      Followup:  Return if symptoms worsen or fail to improve.    Diagnoses and all orders for this visit:    1. Closed fracture of distal end of right femur with routine healing, unspecified fracture morphology, subsequent encounter (Primary)  -     XR Femur 2 View Right    2. Pain  -     XR Foot 3+ View Right    3. Arthritis of foot          Dictated utilizing Dragon dictation     Scribed for Moy Newberry MD by Indigo Maloney.  07/21/21   15:10 EDT    I have personally performed the services described in this document as scribed  by the above individual, and it is both accurate and complete.  Moy Newberry MD  7/31/2021  16:00 EDT

## 2021-07-27 ENCOUNTER — HOSPITAL ENCOUNTER (OUTPATIENT)
Dept: MAMMOGRAPHY | Facility: HOSPITAL | Age: 71
Discharge: HOME | End: 2021-07-27
Admitting: OBSTETRICS & GYNECOLOGY

## 2021-07-27 DIAGNOSIS — Z12.31 VISIT FOR SCREENING MAMMOGRAM: ICD-10-CM

## 2021-07-27 PROCEDURE — 77063 BREAST TOMOSYNTHESIS BI: CPT

## 2021-07-27 PROCEDURE — 77067 SCR MAMMO BI INCL CAD: CPT

## 2021-08-24 ENCOUNTER — ANESTHESIA EVENT (OUTPATIENT)
Dept: PERIOP | Facility: HOSPITAL | Age: 71
End: 2021-08-24

## 2021-08-24 ENCOUNTER — TELEPHONE (OUTPATIENT)
Dept: GASTROENTEROLOGY | Facility: CLINIC | Age: 71
End: 2021-08-24

## 2021-08-24 RX ORDER — SODIUM CHLORIDE 0.9 % (FLUSH) 0.9 %
10 SYRINGE (ML) INJECTION EVERY 12 HOURS SCHEDULED
Status: CANCELLED | OUTPATIENT
Start: 2021-08-24

## 2021-08-24 RX ORDER — SODIUM CHLORIDE, SODIUM LACTATE, POTASSIUM CHLORIDE, CALCIUM CHLORIDE 600; 310; 30; 20 MG/100ML; MG/100ML; MG/100ML; MG/100ML
9 INJECTION, SOLUTION INTRAVENOUS CONTINUOUS PRN
Status: CANCELLED | OUTPATIENT
Start: 2021-08-24

## 2021-08-24 RX ORDER — SODIUM CHLORIDE 9 MG/ML
40 INJECTION, SOLUTION INTRAVENOUS AS NEEDED
Status: CANCELLED | OUTPATIENT
Start: 2021-08-24

## 2021-08-24 RX ORDER — SODIUM CHLORIDE 0.9 % (FLUSH) 0.9 %
10 SYRINGE (ML) INJECTION AS NEEDED
Status: CANCELLED | OUTPATIENT
Start: 2021-08-24

## 2021-08-24 RX ORDER — LIDOCAINE HYDROCHLORIDE 10 MG/ML
0.5 INJECTION, SOLUTION EPIDURAL; INFILTRATION; INTRACAUDAL; PERINEURAL ONCE AS NEEDED
Status: CANCELLED | OUTPATIENT
Start: 2021-08-24

## 2021-08-24 NOTE — TELEPHONE ENCOUNTER
SPOKE WITH PATIENT.  SCHEDULED FOR COLONOSCOPY TOMORROW, 08/25/2021.  NEED TO CANCEL AND DOES NOT WANT TO RESCHEDULE.

## 2021-08-25 ENCOUNTER — ANESTHESIA (OUTPATIENT)
Dept: PERIOP | Facility: HOSPITAL | Age: 71
End: 2021-08-25

## 2021-12-08 ENCOUNTER — APPOINTMENT (OUTPATIENT)
Dept: GENERAL RADIOLOGY | Facility: HOSPITAL | Age: 71
End: 2021-12-08

## 2021-12-08 ENCOUNTER — HOSPITAL ENCOUNTER (EMERGENCY)
Facility: HOSPITAL | Age: 71
Discharge: HOME | End: 2021-12-08
Attending: EMERGENCY MEDICINE | Admitting: EMERGENCY MEDICINE

## 2021-12-08 ENCOUNTER — APPOINTMENT (OUTPATIENT)
Dept: CT IMAGING | Facility: HOSPITAL | Age: 71
End: 2021-12-08

## 2021-12-08 VITALS
OXYGEN SATURATION: 98 % | SYSTOLIC BLOOD PRESSURE: 129 MMHG | TEMPERATURE: 97.9 F | HEART RATE: 60 BPM | DIASTOLIC BLOOD PRESSURE: 84 MMHG | HEIGHT: 63 IN | RESPIRATION RATE: 16 BRPM | BODY MASS INDEX: 27.46 KG/M2 | WEIGHT: 155 LBS
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
## 2021-12-08 DIAGNOSIS — S01.311A EAR LOBE LACERATION, RIGHT, INITIAL ENCOUNTER: Primary | ICD-10-CM

## 2021-12-08 DIAGNOSIS — S99.921A FOOT TRAUMA, RIGHT, INITIAL ENCOUNTER: ICD-10-CM

## 2021-12-08 PROCEDURE — 12011 RPR F/E/E/N/L/M 2.5 CM/<: CPT | Performed by: PHYSICIAN ASSISTANT

## 2021-12-08 PROCEDURE — 73630 X-RAY EXAM OF FOOT: CPT

## 2021-12-08 PROCEDURE — 99283 EMERGENCY DEPT VISIT LOW MDM: CPT

## 2021-12-08 PROCEDURE — 99282 EMERGENCY DEPT VISIT SF MDM: CPT | Performed by: PHYSICIAN ASSISTANT

## 2021-12-08 PROCEDURE — 70450 CT HEAD/BRAIN W/O DYE: CPT

## 2021-12-08 RX ORDER — PHENAZOPYRIDINE HYDROCHLORIDE 100 MG/1
TABLET, FILM COATED ORAL EVERY 8 HOURS SCHEDULED
COMMUNITY
End: 2022-03-31

## 2021-12-08 RX ORDER — MAGNESIUM OXIDE 400 MG/1
TABLET ORAL
COMMUNITY

## 2021-12-08 RX ORDER — LIDOCAINE HYDROCHLORIDE 20 MG/ML
10 INJECTION, SOLUTION INFILTRATION; PERINEURAL ONCE
Status: COMPLETED | OUTPATIENT
Start: 2021-12-08 | End: 2021-12-08

## 2021-12-08 RX ORDER — NITROFURANTOIN 25; 75 MG/1; MG/1
CAPSULE ORAL
COMMUNITY
End: 2022-11-16 | Stop reason: HOSPADM

## 2021-12-08 RX ORDER — ACETAMINOPHEN 500 MG
500 TABLET ORAL ONCE
Status: COMPLETED | OUTPATIENT
Start: 2021-12-08 | End: 2021-12-08

## 2021-12-08 RX ORDER — SOLIFENACIN SUCCINATE 10 MG/1
TABLET, FILM COATED ORAL
COMMUNITY

## 2021-12-08 RX ORDER — LOSARTAN POTASSIUM 25 MG/1
TABLET ORAL
COMMUNITY
End: 2022-03-31

## 2021-12-08 RX ORDER — HYOSCYAMINE SULFATE 0.12 MG/1
TABLET SUBLINGUAL
Status: ON HOLD | COMMUNITY
End: 2022-11-27

## 2021-12-08 RX ADMIN — LIDOCAINE HYDROCHLORIDE 10 ML: 20 INJECTION, SOLUTION INFILTRATION; PERINEURAL at 18:33

## 2021-12-08 RX ADMIN — ACETAMINOPHEN 500 MG: 500 TABLET, FILM COATED ORAL at 18:31
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
## 2022-01-08 ENCOUNTER — APPOINTMENT (OUTPATIENT)
Dept: GENERAL RADIOLOGY | Facility: HOSPITAL | Age: 72
End: 2022-01-08

## 2022-01-08 ENCOUNTER — HOSPITAL ENCOUNTER (EMERGENCY)
Facility: HOSPITAL | Age: 72
Discharge: HOME | End: 2022-01-08
Attending: EMERGENCY MEDICINE | Admitting: EMERGENCY MEDICINE

## 2022-01-08 VITALS
WEIGHT: 157 LBS | HEART RATE: 75 BPM | TEMPERATURE: 98 F | OXYGEN SATURATION: 97 % | HEIGHT: 63 IN | SYSTOLIC BLOOD PRESSURE: 113 MMHG | BODY MASS INDEX: 27.82 KG/M2 | RESPIRATION RATE: 18 BRPM | DIASTOLIC BLOOD PRESSURE: 73 MMHG
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
## 2022-01-08 PROCEDURE — 73630 X-RAY EXAM OF FOOT: CPT

## 2022-01-08 PROCEDURE — 99282 EMERGENCY DEPT VISIT SF MDM: CPT

## 2022-01-08 PROCEDURE — 99282 EMERGENCY DEPT VISIT SF MDM: CPT | Performed by: EMERGENCY MEDICINE

## 2022-01-08 RX ORDER — CLINDAMYCIN HYDROCHLORIDE 300 MG/1
300 CAPSULE ORAL 3 TIMES DAILY
Qty: 21 CAPSULE | Refills: 0 | Status: SHIPPED | OUTPATIENT
Start: 2022-01-08 | End: 2022-01-15

## 2022-01-08 NOTE — DISCHARGE INSTRUCTIONS
Please take antibiotics as prescribed.  Please also buy an over-the-counter probiotic from the pharmacy and take that during and for 2 days after your antibiotics.  Please follow-up with your primary care physician.  Return to the emergency room if you develop fevers and chills, chest pain, difficulty breathing, uncontrollable vomiting or for any other concerns.

## 2022-01-08 NOTE — ED PROVIDER NOTES
Subjective   History of Present Illness  71-year-old female presents to emergency room complaining of redness swelling and pain over the lateral left foot.  She says that she fell earlier in the week and was evaluated at another emergency room and had a open toe and a skin tear.  She says that her left foot was not bothering her at all at that time, but only started to hurt day before yesterday.  She says that it was a lot more swollen yesterday than it is today but there is still redness and inflammation with some discomfort.  She does not remember anything trauma or any insects biting her.  Review of Systems   Constitutional: Negative for chills and fever.       Past Medical History:   Diagnosis Date   • Anxiety    • Asthma    • CHF (congestive heart failure) ([facility])    • Chronic pain disorder    • Chronic UTI    • Depression    • Diabetes ([facility])    • Endometriosis    • Fibroid    • GERD (gastroesophageal reflux disease)    • H/O CHF    • HTN (hypertension)    • Hyperlipidemia    • Incontinence    • Injury of back     spinal stenosis, chronic back pain   • Low back pain    • Lumbosacral disc disease    • MRSA infection (methicillin-resistant Staphylococcus aureus)     to left foot states approx 12-13 years ago    • Osteoarthritis    • Pulmonary congestion    • Sleep apnea     uses cpap   • Spinal stenosis    • Stroke ([facility])     pt states PMD has said she may be having mini strokes   • Vertigo        Allergies   Allergen Reactions   • Moxifloxacin Anaphylaxis   • Penicillins Palpitations and Shortness Of Breath     Tolerates ceftriaxone 7/2018   • Amoxicillin-Pot Clavulanate Diarrhea   • Codeine Other (See Comments) and Nausea And Vomiting   • Doxycycline Other (See Comments)   • Morphine Nausea And Vomiting   • Morphine And Related    • Sulfa Antibiotics Nausea And Vomiting   • Adhesive Tape Rash   • Latex Rash       Past Surgical History:   Procedure Laterality Date   • ABDOMINAL HERNIA REPAIR      with mesh   •  CHOLECYSTECTOMY OPEN     • COLONOSCOPY     • DILATATION AND CURETTAGE     • ENDOSCOPY     • EPIDURAL BLOCK     • EXPLORATORY LAPAROTOMY      ectopic pregnancy   • FEMUR OPEN REDUCTION INTERNAL FIXATION Right 1/15/2018    Procedure: FEMUR DISTAL OPEN REDUCTION INTERNAL FIXATION;  Surgeon: Moy Newberry MD;  Location: [facility] OR;  Service:    • FRACTURE SURGERY     • HYSTERECTOMY      LUDWIG BSO fibroids and endometriosis   • SKIN GRAFT     • TOTAL KNEE ARTHROPLASTY Bilateral        Family History   Problem Relation Age of Onset   • Lung disease Mother    • Cancer Mother    • Breast cancer Mother    • Heart disease Father    • Diabetes Paternal Aunt    • Diabetes Paternal Uncle    • Diabetes Paternal Grandmother    • Diabetes Paternal Grandfather    • Ovarian cancer Neg Hx    • Colon cancer Neg Hx        Social History     Socioeconomic History   • Marital status:    Tobacco Use   • Smoking status: Never Smoker   • Smokeless tobacco: Never Used   • Tobacco comment: caffeine use- coffe, coke   Vaping Use   • Vaping Use: Never used   Substance and Sexual Activity   • Alcohol use: No   • Drug use: No   • Sexual activity: Not Currently     Partners: Male     Birth control/protection: Surgical, Post-menopausal     Comment: LUDWIG BSO           Objective   Physical Exam  INITIAL VITAL SIGNS: Reviewed by me.  Pulse ox normal  GENERAL: Alert. Well developed and well nourished. No respiratory distress.  HEAD: Normocephalic.   EYES: No conjunctival injection.  ENT: Oral mucosa is moist.  NECK: Supple. Full range of motion.  RESPIRATORY: Non-labored respirations.  EXTREMITIES: Left foot with approximately 2 cm diameter area of erythema overlying the base of the fifth metatarsal, it is tender to palpation with no significant swelling.  There is no fluctuance.      XR Foot 3+ View Left    Result Date: 1/8/2022  CR Foot Comp Min 3 Vws LT INDICATION: Pain and redness at the base of the fifth metatarsal COMPARISON: None  available FINDINGS: AP, lateral, and oblique views of the left foot.  No fracture or dislocation.  No bone erosion or destruction.  Postoperative changes are seen in the calcaneus posteriorly with multiple anchors. There is a plantar heel spur. Degenerative changes are seen at the first and third MTP joints and there is a bony bunion at the first MTP joint.     No radiographic evidence of osteomyelitis. Degenerative changes as described above. Postoperative changes in the calcaneus. Signer Name: Jose Neri MD  Signed: 1/8/2022 3:43 PM  Workstation Name: [id number]  [facility]      Procedures           ED Course  ED Course as of 01/08/22 1551   Sat Jan 08, 2022   1527 Diabetic patient appears to have an insect bite over the left lateral foot, however she did have a fall earlier in the week which potentially hurt that area without realizing it because she had a broken toe on the other foot.  Will get x-ray to rule out fracture [RO]   1547 No bony injury, no evidence for osteomyelitis, this appears more like an insect bite but due to the fact that she has diabetes and she says the redness seems to be spreading we will go ahead and give her some antibiotics. [RO]      ED Course User Index  [RO] Jose Moffett MD                                                 [facility]    Final diagnoses:   Bug bite with infection, initial encounter       ED Disposition  ED Disposition     ED Disposition Condition Comment    Discharge Linden Sparks MD  [address] [facility] [address]  [phone]    Call   To schedule follow-up appointment         Medication List      New Prescriptions    clindamycin 300 MG capsule  Commonly known as: CLEOCIN  Take 1 capsule by mouth 3 (Three) Times a Day for 7 days.        Changed    Vitamin D3 1.25 MG (32698 UT) capsule  Take 1 capsule by mouth Every 7 (Seven) Days.  What changed: additional instructions           Where to Get Your Medications       [facility] [place] [facility] [address] [facility] [address] - [phone]  - [phone] FX  [address] [address]    Phone: [phone]   · clindamycin 300 MG capsule          Jose Moffett MD  01/08/22 1908

## 2022-03-31 ENCOUNTER — APPOINTMENT (OUTPATIENT)
Dept: CT IMAGING | Facility: HOSPITAL | Age: 72
End: 2022-03-31

## 2022-03-31 ENCOUNTER — APPOINTMENT (OUTPATIENT)
Dept: GENERAL RADIOLOGY | Facility: HOSPITAL | Age: 72
End: 2022-03-31

## 2022-03-31 ENCOUNTER — HOSPITAL ENCOUNTER (EMERGENCY)
Facility: HOSPITAL | Age: 72
Discharge: HOME | End: 2022-03-31
Attending: EMERGENCY MEDICINE | Admitting: EMERGENCY MEDICINE

## 2022-03-31 VITALS
WEIGHT: 155 LBS | HEART RATE: 88 BPM | OXYGEN SATURATION: 92 % | TEMPERATURE: 99.2 F | BODY MASS INDEX: 27.46 KG/M2 | HEIGHT: 63 IN | DIASTOLIC BLOOD PRESSURE: 73 MMHG | SYSTOLIC BLOOD PRESSURE: 150 MMHG | RESPIRATION RATE: 18 BRPM

## 2022-03-31 DIAGNOSIS — S00.83XA TRAUMATIC HEMATOMA OF FOREHEAD, INITIAL ENCOUNTER: ICD-10-CM
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
## 2022-03-31 PROCEDURE — 72110 X-RAY EXAM L-2 SPINE 4/>VWS: CPT

## 2022-03-31 PROCEDURE — 99283 EMERGENCY DEPT VISIT LOW MDM: CPT

## 2022-03-31 PROCEDURE — 70450 CT HEAD/BRAIN W/O DYE: CPT

## 2022-03-31 PROCEDURE — 99283 EMERGENCY DEPT VISIT LOW MDM: CPT | Performed by: PHYSICIAN ASSISTANT

## 2022-03-31 PROCEDURE — 72072 X-RAY EXAM THORAC SPINE 3VWS: CPT

## 2022-03-31 RX ORDER — CIPROFLOXACIN 250 MG/1
1 TABLET, FILM COATED ORAL EVERY 12 HOURS
COMMUNITY
End: 2022-11-16 | Stop reason: HOSPADM

## 2022-03-31 RX ORDER — ACETAMINOPHEN 500 MG
500 TABLET ORAL ONCE
Status: COMPLETED | OUTPATIENT
Start: 2022-03-31 | End: 2022-03-31

## 2022-03-31 RX ORDER — TOLTERODINE 4 MG/1
1 CAPSULE, EXTENDED RELEASE ORAL EVERY 24 HOURS
COMMUNITY

## 2022-03-31 RX ADMIN — ACETAMINOPHEN 500 MG: 500 TABLET ORAL at 20:35

## 2022-04-01 ENCOUNTER — TELEPHONE (OUTPATIENT)
Dept: SOCIAL WORK | Facility: HOSPITAL | Age: 72
End: 2022-04-01

## 2022-04-01 NOTE — TELEPHONE ENCOUNTER
Rec'd email request from ER to follow up regarding patients visit to ER the evening of 3/31/22. Request to contact patients daughter, Jazzy Walters [phone], for information about home health.and area resources. Call placed to Jazzy and voice message left for family to contact patients PCP (Dr Linden Zhang) who can order home health. CM contact # provided for any additional questions/concerns.

## 2022-04-01 NOTE — ED NOTES
Attempted to call daughter so provider could review test results. No answer on cell phone or home phone

## 2022-04-01 NOTE — DISCHARGE INSTRUCTIONS
Follow with primary care provider. You should receive a call from  regarding additional help in the home.   Return with worsening symptoms.

## 2022-07-15 ENCOUNTER — OFFICE VISIT (OUTPATIENT)
Dept: ORTHOPEDIC SURGERY | Facility: CLINIC | Age: 72
End: 2022-07-15

## 2022-07-15 VITALS
BODY MASS INDEX: 27.46 KG/M2 | SYSTOLIC BLOOD PRESSURE: 141 MMHG | HEIGHT: 63 IN | HEART RATE: 79 BPM | WEIGHT: 155 LBS | DIASTOLIC BLOOD PRESSURE: 72 MMHG

## 2022-07-15 DIAGNOSIS — M19.011 GLENOHUMERAL ARTHRITIS, RIGHT: ICD-10-CM

## 2022-07-15 DIAGNOSIS — R52 PAIN: Primary | ICD-10-CM

## 2022-07-15 DIAGNOSIS — M19.012 GLENOHUMERAL ARTHRITIS, LEFT: ICD-10-CM

## 2022-07-15 DIAGNOSIS — S90.32XA CONTUSION OF LEFT FOOT, INITIAL ENCOUNTER: ICD-10-CM

## 2022-07-15 PROCEDURE — 73630 X-RAY EXAM OF FOOT: CPT | Performed by: ORTHOPAEDIC SURGERY

## 2022-07-15 PROCEDURE — 20610 DRAIN/INJ JOINT/BURSA W/O US: CPT | Performed by: ORTHOPAEDIC SURGERY

## 2022-07-15 PROCEDURE — 99214 OFFICE O/P EST MOD 30 MIN: CPT | Performed by: ORTHOPAEDIC SURGERY

## 2022-07-15 PROCEDURE — 73030 X-RAY EXAM OF SHOULDER: CPT | Performed by: ORTHOPAEDIC SURGERY

## 2022-07-15 RX ORDER — BETAMETHASONE SODIUM PHOSPHATE AND BETAMETHASONE ACETATE 3; 3 MG/ML; MG/ML
12 INJECTION, SUSPENSION INTRA-ARTICULAR; INTRALESIONAL; INTRAMUSCULAR; SOFT TISSUE
Status: COMPLETED | OUTPATIENT
Start: 2022-07-15 | End: 2022-07-15

## 2022-07-15 RX ORDER — ENOXAPARIN SODIUM 100 MG/ML
INJECTION SUBCUTANEOUS
Status: ON HOLD | COMMUNITY
End: 2022-11-27

## 2022-07-15 RX ORDER — LOSARTAN POTASSIUM 25 MG/1
TABLET ORAL
COMMUNITY
Start: 2022-06-28 | End: 2022-11-16 | Stop reason: HOSPADM

## 2022-07-15 RX ORDER — LIDOCAINE HYDROCHLORIDE 10 MG/ML
4 INJECTION, SOLUTION EPIDURAL; INFILTRATION; INTRACAUDAL; PERINEURAL
Status: COMPLETED | OUTPATIENT
Start: 2022-07-15 | End: 2022-07-15

## 2022-07-15 RX ORDER — DULOXETIN HYDROCHLORIDE 30 MG/1
CAPSULE, DELAYED RELEASE ORAL
COMMUNITY
Start: 2022-06-28 | End: 2022-11-16 | Stop reason: HOSPADM

## 2022-07-15 RX ADMIN — BETAMETHASONE SODIUM PHOSPHATE AND BETAMETHASONE ACETATE 12 MG: 3; 3 INJECTION, SUSPENSION INTRA-ARTICULAR; INTRALESIONAL; INTRAMUSCULAR; SOFT TISSUE at 12:23

## 2022-07-15 RX ADMIN — LIDOCAINE HYDROCHLORIDE 4 ML: 10 INJECTION, SOLUTION EPIDURAL; INFILTRATION; INTRACAUDAL; PERINEURAL at 12:23
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
## 2022-07-20 ENCOUNTER — TRANSCRIBE ORDERS (OUTPATIENT)
Dept: ADMINISTRATIVE | Facility: HOSPITAL | Age: 72
End: 2022-07-20

## 2022-07-20 DIAGNOSIS — Z92.89 HISTORY OF MAMMOGRAPHY, SCREENING: Primary | ICD-10-CM

## 2022-08-11 ENCOUNTER — TELEPHONE (OUTPATIENT)
Dept: ORTHOPEDIC SURGERY | Facility: CLINIC | Age: 72
End: 2022-08-11

## 2022-08-11 NOTE — TELEPHONE ENCOUNTER
Maite with [facility] called requested spinal xray reports be faxed over patient appointment.    Requested records were faxed and confirmation received.

## 2022-08-18 ENCOUNTER — HOSPITAL ENCOUNTER (OUTPATIENT)
Dept: MAMMOGRAPHY | Facility: HOSPITAL | Age: 72
Discharge: HOME | End: 2022-08-18
Admitting: OBSTETRICS & GYNECOLOGY

## 2022-08-18 DIAGNOSIS — Z92.89 HISTORY OF MAMMOGRAPHY, SCREENING: ICD-10-CM

## 2022-08-18 PROCEDURE — 77063 BREAST TOMOSYNTHESIS BI: CPT

## 2022-08-18 PROCEDURE — 77067 SCR MAMMO BI INCL CAD: CPT

## 2022-11-09 ENCOUNTER — TRANSCRIBE ORDERS (OUTPATIENT)
Dept: ADMINISTRATIVE | Facility: HOSPITAL | Age: 72
End: 2022-11-09

## 2022-11-09 ENCOUNTER — HOSPITAL ENCOUNTER (OUTPATIENT)
Dept: GENERAL RADIOLOGY | Facility: HOSPITAL | Age: 72
Discharge: HOME | End: 2022-11-09

## 2022-11-09 ENCOUNTER — APPOINTMENT (OUTPATIENT)
Dept: WOUND CARE | Facility: HOSPITAL | Age: 72
End: 2022-11-09

## 2022-11-09 DIAGNOSIS — L97.522 ULCER OF LEFT FOOT, WITH FAT LAYER EXPOSED: ICD-10-CM

## 2022-11-09 DIAGNOSIS — E11.59 TYPE 2 DIABETES MELLITUS WITH OTHER CIRCULATORY COMPLICATION, UNSPECIFIED WHETHER LONG TERM INSULIN USE: Primary | ICD-10-CM

## 2022-11-09 DIAGNOSIS — L97.522 ULCER OF LEFT FOOT, WITH FAT LAYER EXPOSED: Primary | ICD-10-CM

## 2022-11-09 PROCEDURE — 73630 X-RAY EXAM OF FOOT: CPT

## 2022-11-09 PROCEDURE — 97602 WOUND(S) CARE NON-SELECTIVE: CPT

## 2022-11-09 PROCEDURE — G0463 HOSPITAL OUTPT CLINIC VISIT: HCPCS

## 2022-11-14 ENCOUNTER — APPOINTMENT (OUTPATIENT)
Dept: GENERAL RADIOLOGY | Facility: HOSPITAL | Age: 72
End: 2022-11-14

## 2022-11-14 ENCOUNTER — HOSPITAL ENCOUNTER (OUTPATIENT)
Facility: HOSPITAL | Age: 72
Setting detail: OBSERVATION
Discharge: HOME | End: 2022-11-16
Attending: EMERGENCY MEDICINE | Admitting: STUDENT IN AN ORGANIZED HEALTH CARE EDUCATION/TRAINING PROGRAM

## 2022-11-14 ENCOUNTER — APPOINTMENT (OUTPATIENT)
Dept: CT IMAGING | Facility: HOSPITAL | Age: 72
End: 2022-11-14

## 2022-11-14 DIAGNOSIS — M47.816 LUMBAR SPONDYLOSIS: ICD-10-CM

## 2022-11-14 DIAGNOSIS — L98.499 ISCHEMIC ULCER, UNSPECIFIED ULCER STAGE: ICD-10-CM

## 2022-11-14 DIAGNOSIS — R53.81 PHYSICAL DECONDITIONING: ICD-10-CM

## 2022-11-14 DIAGNOSIS — G47.33 OSA (OBSTRUCTIVE SLEEP APNEA): ICD-10-CM

## 2022-11-14 DIAGNOSIS — M19.079 ARTHRITIS OF FOOT: ICD-10-CM

## 2022-11-14 DIAGNOSIS — Z96.651 HISTORY OF TOTAL RIGHT KNEE REPLACEMENT: ICD-10-CM

## 2022-11-14 DIAGNOSIS — M16.11 PRIMARY OSTEOARTHRITIS OF RIGHT HIP: ICD-10-CM

## 2022-11-14 DIAGNOSIS — I48.91 ATRIAL FIBRILLATION WITH RVR: Primary | ICD-10-CM

## 2022-11-14 DIAGNOSIS — R13.11 ORAL PHASE DYSPHAGIA: ICD-10-CM

## 2022-11-14 DIAGNOSIS — R41.841 COGNITIVE COMMUNICATION DEFICIT: ICD-10-CM

## 2022-11-14 LAB
ALBUMIN SERPL-MCNC: 4.4 G/DL (ref 3.5–5.2)
ALBUMIN/GLOB SERPL: 1.5 G/DL
ALP SERPL-CCNC: 51 U/L (ref 39–117)
ALT SERPL W P-5'-P-CCNC: 10 U/L (ref 1–33)
ANION GAP SERPL CALCULATED.3IONS-SCNC: 11.3 MMOL/L (ref 5–15)
AST SERPL-CCNC: 24 U/L (ref 1–32)
BASOPHILS # BLD AUTO: 0.06 10*3/MM3 (ref 0–0.2)
BASOPHILS NFR BLD AUTO: 0.6 % (ref 0–1.5)
BILIRUB SERPL-MCNC: 0.6 MG/DL (ref 0–1.2)
BUN SERPL-MCNC: 11 MG/DL (ref 8–23)
BUN/CREAT SERPL: 12.6 (ref 7–25)
CALCIUM SPEC-SCNC: 9.7 MG/DL (ref 8.6–10.5)
CHLORIDE SERPL-SCNC: 95 MMOL/L (ref 98–107)
CO2 SERPL-SCNC: 27.7 MMOL/L (ref 22–29)
CREAT SERPL-MCNC: 0.87 MG/DL (ref 0.57–1)
DEPRECATED RDW RBC AUTO: 49.3 FL (ref 37–54)
EGFRCR SERPLBLD CKD-EPI 2021: 70.5 ML/MIN/1.73
EOSINOPHIL # BLD AUTO: 0.16 10*3/MM3 (ref 0–0.4)
EOSINOPHIL NFR BLD AUTO: 1.7 % (ref 0.3–6.2)
ERYTHROCYTE [DISTWIDTH] IN BLOOD BY AUTOMATED COUNT: 14.6 % (ref 12.3–15.4)
GLOBULIN UR ELPH-MCNC: 3 GM/DL
GLUCOSE BLDC GLUCOMTR-MCNC: 131 MG/DL (ref 70–130)
GLUCOSE SERPL-MCNC: 169 MG/DL (ref 65–99)
HCT VFR BLD AUTO: 35.6 % (ref 34–46.6)
HGB BLD-MCNC: 11.5 G/DL (ref 12–15.9)
IMM GRANULOCYTES # BLD AUTO: 0.04 10*3/MM3 (ref 0–0.05)
IMM GRANULOCYTES NFR BLD AUTO: 0.4 % (ref 0–0.5)
LYMPHOCYTES # BLD AUTO: 1.14 10*3/MM3 (ref 0.7–3.1)
LYMPHOCYTES NFR BLD AUTO: 12 % (ref 19.6–45.3)
MAGNESIUM SERPL-MCNC: 1 MG/DL (ref 1.6–2.4)
MCH RBC QN AUTO: 30.2 PG (ref 26.6–33)
MCHC RBC AUTO-ENTMCNC: 32.3 G/DL (ref 31.5–35.7)
MCV RBC AUTO: 93.4 FL (ref 79–97)
MONOCYTES # BLD AUTO: 0.78 10*3/MM3 (ref 0.1–0.9)
MONOCYTES NFR BLD AUTO: 8.2 % (ref 5–12)
NEUTROPHILS NFR BLD AUTO: 7.3 10*3/MM3 (ref 1.7–7)
NEUTROPHILS NFR BLD AUTO: 77.1 % (ref 42.7–76)
NRBC BLD AUTO-RTO: 0 /100 WBC (ref 0–0.2)
NT-PROBNP SERPL-MCNC: 1217 PG/ML (ref 0–900)
PLATELET # BLD AUTO: 274 10*3/MM3 (ref 140–450)
PMV BLD AUTO: 9.8 FL (ref 6–12)
POTASSIUM SERPL-SCNC: 3.5 MMOL/L (ref 3.5–5.2)
PROT SERPL-MCNC: 7.4 G/DL (ref 6–8.5)
QT INTERVAL: 264 MS
RBC # BLD AUTO: 3.81 10*6/MM3 (ref 3.77–5.28)
SODIUM SERPL-SCNC: 134 MMOL/L (ref 136–145)
T4 FREE SERPL-MCNC: 1.3 NG/DL (ref 0.93–1.7)
TROPONIN T SERPL-MCNC: <0.01 NG/ML (ref 0–0.03)
TROPONIN T SERPL-MCNC: <0.01 NG/ML (ref 0–0.03)
TSH SERPL DL<=0.05 MIU/L-ACNC: 4.29 UIU/ML (ref 0.27–4.2)
WBC NRBC COR # BLD: 9.48 10*3/MM3 (ref 3.4–10.8)

## 2022-11-14 PROCEDURE — 99220 PR INITIAL OBSERVATION CARE/DAY 70 MINUTES: CPT | Performed by: INTERNAL MEDICINE

## 2022-11-14 PROCEDURE — 73030 X-RAY EXAM OF SHOULDER: CPT

## 2022-11-14 PROCEDURE — 25010000002 ENOXAPARIN PER 10 MG: Performed by: INTERNAL MEDICINE

## 2022-11-14 PROCEDURE — 85025 COMPLETE CBC W/AUTO DIFF WBC: CPT | Performed by: EMERGENCY MEDICINE

## 2022-11-14 PROCEDURE — 96366 THER/PROPH/DIAG IV INF ADDON: CPT

## 2022-11-14 PROCEDURE — G0378 HOSPITAL OBSERVATION PER HR: HCPCS

## 2022-11-14 PROCEDURE — 84484 ASSAY OF TROPONIN QUANT: CPT

## 2022-11-14 PROCEDURE — 71046 X-RAY EXAM CHEST 2 VIEWS: CPT

## 2022-11-14 PROCEDURE — 36415 COLL VENOUS BLD VENIPUNCTURE: CPT

## 2022-11-14 PROCEDURE — 93005 ELECTROCARDIOGRAM TRACING: CPT | Performed by: EMERGENCY MEDICINE

## 2022-11-14 PROCEDURE — 84484 ASSAY OF TROPONIN QUANT: CPT | Performed by: EMERGENCY MEDICINE

## 2022-11-14 PROCEDURE — 93005 ELECTROCARDIOGRAM TRACING: CPT | Performed by: INTERNAL MEDICINE

## 2022-11-14 PROCEDURE — 84443 ASSAY THYROID STIM HORMONE: CPT | Performed by: INTERNAL MEDICINE

## 2022-11-14 PROCEDURE — 25010000002 MAGNESIUM SULFATE 2 GM/50ML SOLUTION

## 2022-11-14 PROCEDURE — 73562 X-RAY EXAM OF KNEE 3: CPT

## 2022-11-14 PROCEDURE — 83880 ASSAY OF NATRIURETIC PEPTIDE: CPT | Performed by: EMERGENCY MEDICINE

## 2022-11-14 PROCEDURE — 25010000002 MAGNESIUM SULFATE IN D5W 1G/100ML (PREMIX) 1-5 GM/100ML-% SOLUTION

## 2022-11-14 PROCEDURE — 82962 GLUCOSE BLOOD TEST: CPT

## 2022-11-14 PROCEDURE — 96375 TX/PRO/DX INJ NEW DRUG ADDON: CPT

## 2022-11-14 PROCEDURE — 70450 CT HEAD/BRAIN W/O DYE: CPT

## 2022-11-14 PROCEDURE — 83735 ASSAY OF MAGNESIUM: CPT

## 2022-11-14 PROCEDURE — 96368 THER/DIAG CONCURRENT INF: CPT

## 2022-11-14 PROCEDURE — 80053 COMPREHEN METABOLIC PANEL: CPT | Performed by: EMERGENCY MEDICINE

## 2022-11-14 PROCEDURE — 93010 ELECTROCARDIOGRAM REPORT: CPT | Performed by: INTERNAL MEDICINE

## 2022-11-14 PROCEDURE — 73590 X-RAY EXAM OF LOWER LEG: CPT

## 2022-11-14 PROCEDURE — 84439 ASSAY OF FREE THYROXINE: CPT | Performed by: INTERNAL MEDICINE

## 2022-11-14 PROCEDURE — 99285 EMERGENCY DEPT VISIT HI MDM: CPT

## 2022-11-14 PROCEDURE — 96365 THER/PROPH/DIAG IV INF INIT: CPT

## 2022-11-14 RX ORDER — MAGNESIUM SULFATE HEPTAHYDRATE 40 MG/ML
2 INJECTION, SOLUTION INTRAVENOUS ONCE
Status: COMPLETED | OUTPATIENT
Start: 2022-11-14 | End: 2022-11-14

## 2022-11-14 RX ORDER — DILTIAZEM HYDROCHLORIDE 5 MG/ML
10 INJECTION INTRAVENOUS ONCE
Status: COMPLETED | OUTPATIENT
Start: 2022-11-14 | End: 2022-11-14

## 2022-11-14 RX ORDER — MINERAL OIL, PETROLATUM 425; 568 MG/G; MG/G
OINTMENT OPHTHALMIC DAILY PRN
Status: DISCONTINUED | OUTPATIENT
Start: 2022-11-14 | End: 2022-11-16 | Stop reason: HOSPADM

## 2022-11-14 RX ORDER — SODIUM CHLORIDE 0.9 % (FLUSH) 0.9 %
10 SYRINGE (ML) INJECTION AS NEEDED
Status: DISCONTINUED | OUTPATIENT
Start: 2022-11-14 | End: 2022-11-16 | Stop reason: HOSPADM

## 2022-11-14 RX ORDER — PAROXETINE HYDROCHLORIDE 20 MG/1
40 TABLET, FILM COATED ORAL DAILY
Status: DISCONTINUED | OUTPATIENT
Start: 2022-11-14 | End: 2022-11-16 | Stop reason: HOSPADM

## 2022-11-14 RX ORDER — DEXTROSE MONOHYDRATE 25 G/50ML
25 INJECTION, SOLUTION INTRAVENOUS
Status: DISCONTINUED | OUTPATIENT
Start: 2022-11-14 | End: 2022-11-16 | Stop reason: HOSPADM

## 2022-11-14 RX ORDER — SODIUM CHLORIDE 9 MG/ML
40 INJECTION, SOLUTION INTRAVENOUS AS NEEDED
Status: DISCONTINUED | OUTPATIENT
Start: 2022-11-14 | End: 2022-11-16 | Stop reason: HOSPADM

## 2022-11-14 RX ORDER — ACETAMINOPHEN 325 MG/1
650 TABLET ORAL EVERY 6 HOURS PRN
Status: DISCONTINUED | OUTPATIENT
Start: 2022-11-14 | End: 2022-11-16 | Stop reason: HOSPADM

## 2022-11-14 RX ORDER — ECHINACEA PURPUREA EXTRACT 125 MG
1 TABLET ORAL AS NEEDED
Status: DISCONTINUED | OUTPATIENT
Start: 2022-11-14 | End: 2022-11-14

## 2022-11-14 RX ORDER — SODIUM CHLORIDE 0.9 % (FLUSH) 0.9 %
10 SYRINGE (ML) INJECTION EVERY 12 HOURS SCHEDULED
Status: DISCONTINUED | OUTPATIENT
Start: 2022-11-14 | End: 2022-11-16 | Stop reason: HOSPADM

## 2022-11-14 RX ORDER — INSULIN ASPART 100 [IU]/ML
0-9 INJECTION, SOLUTION INTRAVENOUS; SUBCUTANEOUS
Status: DISCONTINUED | OUTPATIENT
Start: 2022-11-14 | End: 2022-11-16 | Stop reason: HOSPADM

## 2022-11-14 RX ORDER — NICOTINE POLACRILEX 4 MG
15 LOZENGE BUCCAL
Status: DISCONTINUED | OUTPATIENT
Start: 2022-11-14 | End: 2022-11-16 | Stop reason: HOSPADM

## 2022-11-14 RX ORDER — ENOXAPARIN SODIUM 100 MG/ML
40 INJECTION SUBCUTANEOUS EVERY 24 HOURS
Status: DISCONTINUED | OUTPATIENT
Start: 2022-11-14 | End: 2022-11-16 | Stop reason: HOSPADM

## 2022-11-14 RX ORDER — PANTOPRAZOLE SODIUM 40 MG/1
40 TABLET, DELAYED RELEASE ORAL DAILY
Status: DISCONTINUED | OUTPATIENT
Start: 2022-11-14 | End: 2022-11-16 | Stop reason: HOSPADM

## 2022-11-14 RX ORDER — MAGNESIUM SULFATE 1 G/100ML
1 INJECTION INTRAVENOUS ONCE
Status: COMPLETED | OUTPATIENT
Start: 2022-11-14 | End: 2022-11-14

## 2022-11-14 RX ORDER — FERROUS SULFATE TAB EC 324 MG (65 MG FE EQUIVALENT) 324 (65 FE) MG
324 TABLET DELAYED RESPONSE ORAL
Status: DISCONTINUED | OUTPATIENT
Start: 2022-11-15 | End: 2022-11-16 | Stop reason: HOSPADM

## 2022-11-14 RX ADMIN — METOPROLOL TARTRATE 25 MG: 25 TABLET, FILM COATED ORAL at 16:49

## 2022-11-14 RX ADMIN — PANTOPRAZOLE SODIUM 40 MG: 40 TABLET, DELAYED RELEASE ORAL at 19:03

## 2022-11-14 RX ADMIN — METOPROLOL TARTRATE 5 MG: 5 INJECTION INTRAVENOUS at 16:49

## 2022-11-14 RX ADMIN — SODIUM CHLORIDE 500 ML: 9 INJECTION, SOLUTION INTRAVENOUS at 14:44

## 2022-11-14 RX ADMIN — PAROXETINE HYDROCHLORIDE 40 MG: 20 TABLET, FILM COATED ORAL at 19:03

## 2022-11-14 RX ADMIN — DILTIAZEM HYDROCHLORIDE 10 MG: 5 INJECTION INTRAVENOUS at 14:35

## 2022-11-14 RX ADMIN — MAGNESIUM SULFATE HEPTAHYDRATE 1 G: 1 INJECTION, SOLUTION INTRAVENOUS at 16:11

## 2022-11-14 RX ADMIN — Medication 400 MG: at 19:03

## 2022-11-14 RX ADMIN — ENOXAPARIN SODIUM 40 MG: 40 INJECTION SUBCUTANEOUS at 19:01

## 2022-11-14 RX ADMIN — METOPROLOL TARTRATE 25 MG: 25 TABLET, FILM COATED ORAL at 21:44

## 2022-11-14 RX ADMIN — ACETAMINOPHEN 650 MG: 325 TABLET ORAL at 18:58

## 2022-11-14 RX ADMIN — SODIUM CHLORIDE 5 MG/HR: 900 INJECTION, SOLUTION INTRAVENOUS at 16:09

## 2022-11-14 RX ADMIN — Medication 10 ML: at 21:23

## 2022-11-14 RX ADMIN — MAGNESIUM SULFATE HEPTAHYDRATE 2 G: 40 INJECTION, SOLUTION INTRAVENOUS at 17:23

## 2022-11-15 ENCOUNTER — APPOINTMENT (OUTPATIENT)
Dept: CARDIOLOGY | Facility: HOSPITAL | Age: 72
End: 2022-11-15

## 2022-11-15 PROBLEM — S72.401A CLOSED FRACTURE OF RIGHT DISTAL FEMUR: Status: RESOLVED | Noted: 2018-01-13 | Resolved: 2022-11-15

## 2022-11-15 PROBLEM — A49.02 METHICILLIN RESISTANT STAPHYLOCOCCUS AUREUS INFECTION: Status: RESOLVED | Noted: 2019-02-20 | Resolved: 2022-11-15

## 2022-11-15 PROBLEM — Z12.11 ENCOUNTER FOR SCREENING FOR MALIGNANT NEOPLASM OF COLON: Status: RESOLVED | Noted: 2021-04-16 | Resolved: 2022-11-15

## 2022-11-15 PROBLEM — R55 EPISODE OF SYNCOPE: Status: RESOLVED | Noted: 2021-02-02 | Resolved: 2022-11-15

## 2022-11-15 PROBLEM — T30.0 THIRD DEGREE BURN: Status: RESOLVED | Noted: 2019-04-09 | Resolved: 2022-11-15

## 2022-11-15 PROBLEM — R41.0 DELIRIUM: Status: RESOLVED | Noted: 2018-07-08 | Resolved: 2022-11-15

## 2022-11-15 PROBLEM — R06.89 BREATHING DIFFICULT: Status: RESOLVED | Noted: 2021-02-02 | Resolved: 2022-11-15

## 2022-11-15 LAB
ANION GAP SERPL CALCULATED.3IONS-SCNC: 10 MMOL/L (ref 5–15)
BH CV ECHO MEAS - ACS: 1.97 CM
BH CV ECHO MEAS - AI P1/2T: 648.3 MSEC
BH CV ECHO MEAS - AO MAX PG: 8.8 MMHG
BH CV ECHO MEAS - AO MEAN PG: 5 MMHG
BH CV ECHO MEAS - AO ROOT DIAM: 2.46 CM
BH CV ECHO MEAS - AO V2 MAX: 148 CM/SEC
BH CV ECHO MEAS - AO V2 VTI: 28.2 CM
BH CV ECHO MEAS - AVA(I,D): 1.15 CM2
BH CV ECHO MEAS - EDV(CUBED): 29.8 ML
BH CV ECHO MEAS - EDV(MOD-SP2): 35 ML
BH CV ECHO MEAS - EDV(MOD-SP4): 45 ML
BH CV ECHO MEAS - EF(MOD-BP): 61.6 %
BH CV ECHO MEAS - EF(MOD-SP2): 60 %
BH CV ECHO MEAS - EF(MOD-SP4): 62.2 %
BH CV ECHO MEAS - ESV(CUBED): 11.4 ML
BH CV ECHO MEAS - ESV(MOD-SP2): 14 ML
BH CV ECHO MEAS - ESV(MOD-SP4): 17 ML
BH CV ECHO MEAS - FS: 27.5 %
BH CV ECHO MEAS - IVS/LVPW: 0.69 CM
BH CV ECHO MEAS - IVSD: 1.1 CM
BH CV ECHO MEAS - LA DIMENSION: 3.4 CM
BH CV ECHO MEAS - LV DIASTOLIC VOL/BSA (35-75): 26.1 CM2
BH CV ECHO MEAS - LV MASS(C)D: 138.1 GRAMS
BH CV ECHO MEAS - LV MAX PG: 3 MMHG
BH CV ECHO MEAS - LV MEAN PG: 2 MMHG
BH CV ECHO MEAS - LV SYSTOLIC VOL/BSA (12-30): 9.9 CM2
BH CV ECHO MEAS - LV V1 MAX: 85.9 CM/SEC
BH CV ECHO MEAS - LV V1 VTI: 11.7 CM
BH CV ECHO MEAS - LVIDD: 3.1 CM
BH CV ECHO MEAS - LVIDS: 2.25 CM
BH CV ECHO MEAS - LVOT AREA: 2.8 CM2
BH CV ECHO MEAS - LVOT DIAM: 1.88 CM
BH CV ECHO MEAS - LVPWD: 1.1 CM
BH CV ECHO MEAS - MR MAX PG: 70.1 MMHG
BH CV ECHO MEAS - MR MAX VEL: 418.7 CM/SEC
BH CV ECHO MEAS - MV DEC SLOPE: 768.8 CM/SEC2
BH CV ECHO MEAS - MV DEC TIME: 130 MSEC
BH CV ECHO MEAS - MV E MAX VEL: 116 CM/SEC
BH CV ECHO MEAS - MV MAX PG: 7.4 MMHG
BH CV ECHO MEAS - MV MEAN PG: 2.7 MMHG
BH CV ECHO MEAS - MV P1/2T: 53.8 MSEC
BH CV ECHO MEAS - MV V2 VTI: 28.2 CM
BH CV ECHO MEAS - MVA(P1/2T): 4.1 CM2
BH CV ECHO MEAS - MVA(VTI): 1.15 CM2
BH CV ECHO MEAS - PA ACC TIME: 0.1 SEC
BH CV ECHO MEAS - PA PR(ACCEL): 33.1 MMHG
BH CV ECHO MEAS - PA V2 MAX: 59.2 CM/SEC
BH CV ECHO MEAS - QP/QS: 1.1
BH CV ECHO MEAS - RAP SYSTOLE: 8 MMHG
BH CV ECHO MEAS - RV MAX PG: 1.4 MMHG
BH CV ECHO MEAS - RV V1 MAX: 59.2 CM/SEC
BH CV ECHO MEAS - RV V1 VTI: 11.7 CM
BH CV ECHO MEAS - RVOT DIAM: 1.97 CM
BH CV ECHO MEAS - RVSP: 47.5 MMHG
BH CV ECHO MEAS - SI(MOD-SP2): 12.2 ML/M2
BH CV ECHO MEAS - SI(MOD-SP4): 16.2 ML/M2
BH CV ECHO MEAS - SV(LVOT): 32.4 ML
BH CV ECHO MEAS - SV(MOD-SP2): 21 ML
BH CV ECHO MEAS - SV(MOD-SP4): 28 ML
BH CV ECHO MEAS - SV(RVOT): 35.8 ML
BH CV ECHO MEAS - TR MAX PG: 39.5 MMHG
BH CV ECHO MEAS - TR MAX VEL: 314.4 CM/SEC
BH CV XLRA - RV BASE: 4.4 CM
BH CV XLRA - RV LENGTH: 5.6 CM
BH CV XLRA - RV MID: 4.1 CM
BUN SERPL-MCNC: 12 MG/DL (ref 8–23)
BUN/CREAT SERPL: 15.2 (ref 7–25)
CALCIUM SPEC-SCNC: 9.3 MG/DL (ref 8.6–10.5)
CHLORIDE SERPL-SCNC: 99 MMOL/L (ref 98–107)
CO2 SERPL-SCNC: 26 MMOL/L (ref 22–29)
CREAT SERPL-MCNC: 0.79 MG/DL (ref 0.57–1)
EGFRCR SERPLBLD CKD-EPI 2021: 79.1 ML/MIN/1.73
GLUCOSE BLDC GLUCOMTR-MCNC: 100 MG/DL (ref 70–130)
GLUCOSE BLDC GLUCOMTR-MCNC: 102 MG/DL (ref 70–130)
GLUCOSE BLDC GLUCOMTR-MCNC: 122 MG/DL (ref 70–130)
GLUCOSE BLDC GLUCOMTR-MCNC: 131 MG/DL (ref 70–130)
GLUCOSE SERPL-MCNC: 114 MG/DL (ref 65–99)
LEFT ATRIUM VOLUME INDEX: 26.5 ML/M2
MAGNESIUM SERPL-MCNC: 1.9 MG/DL (ref 1.6–2.4)
MAXIMAL PREDICTED HEART RATE: 147 BPM
PHOSPHATE SERPL-MCNC: 2.9 MG/DL (ref 2.5–4.5)
POTASSIUM SERPL-SCNC: 3.6 MMOL/L (ref 3.5–5.2)
QT INTERVAL: 412 MS
QT INTERVAL: 414 MS
SINUS: 2.23 CM
SODIUM SERPL-SCNC: 135 MMOL/L (ref 136–145)
STJ: 2.31 CM
STRESS TARGET HR: 125 BPM

## 2022-11-15 PROCEDURE — 92610 EVALUATE SWALLOWING FUNCTION: CPT

## 2022-11-15 PROCEDURE — 93306 TTE W/DOPPLER COMPLETE: CPT

## 2022-11-15 PROCEDURE — 80048 BASIC METABOLIC PNL TOTAL CA: CPT | Performed by: STUDENT IN AN ORGANIZED HEALTH CARE EDUCATION/TRAINING PROGRAM

## 2022-11-15 PROCEDURE — 93005 ELECTROCARDIOGRAM TRACING: CPT | Performed by: INTERNAL MEDICINE

## 2022-11-15 PROCEDURE — G0378 HOSPITAL OBSERVATION PER HR: HCPCS

## 2022-11-15 PROCEDURE — 99214 OFFICE O/P EST MOD 30 MIN: CPT | Performed by: NURSE PRACTITIONER

## 2022-11-15 PROCEDURE — 93306 TTE W/DOPPLER COMPLETE: CPT | Performed by: INTERNAL MEDICINE

## 2022-11-15 PROCEDURE — 97161 PT EVAL LOW COMPLEX 20 MIN: CPT

## 2022-11-15 PROCEDURE — 82962 GLUCOSE BLOOD TEST: CPT

## 2022-11-15 PROCEDURE — 84100 ASSAY OF PHOSPHORUS: CPT | Performed by: STUDENT IN AN ORGANIZED HEALTH CARE EDUCATION/TRAINING PROGRAM

## 2022-11-15 PROCEDURE — 97165 OT EVAL LOW COMPLEX 30 MIN: CPT

## 2022-11-15 PROCEDURE — 83735 ASSAY OF MAGNESIUM: CPT | Performed by: INTERNAL MEDICINE

## 2022-11-15 PROCEDURE — 94799 UNLISTED PULMONARY SVC/PX: CPT

## 2022-11-15 PROCEDURE — 25010000002 ENOXAPARIN PER 10 MG: Performed by: STUDENT IN AN ORGANIZED HEALTH CARE EDUCATION/TRAINING PROGRAM

## 2022-11-15 PROCEDURE — 99226 PR SBSQ OBSERVATION CARE/DAY 35 MINUTES: CPT | Performed by: STUDENT IN AN ORGANIZED HEALTH CARE EDUCATION/TRAINING PROGRAM

## 2022-11-15 PROCEDURE — 93010 ELECTROCARDIOGRAM REPORT: CPT | Performed by: INTERNAL MEDICINE

## 2022-11-15 PROCEDURE — 96372 THER/PROPH/DIAG INJ SC/IM: CPT

## 2022-11-15 PROCEDURE — 94761 N-INVAS EAR/PLS OXIMETRY MLT: CPT

## 2022-11-15 RX ORDER — METOPROLOL TARTRATE 50 MG/1
50 TABLET, FILM COATED ORAL EVERY 12 HOURS SCHEDULED
Status: DISCONTINUED | OUTPATIENT
Start: 2022-11-15 | End: 2022-11-16 | Stop reason: HOSPADM

## 2022-11-15 RX ORDER — TRAMADOL HYDROCHLORIDE 50 MG/1
50 TABLET ORAL EVERY 8 HOURS PRN
Status: DISCONTINUED | OUTPATIENT
Start: 2022-11-15 | End: 2022-11-16 | Stop reason: HOSPADM

## 2022-11-15 RX ADMIN — METOPROLOL TARTRATE 25 MG: 25 TABLET, FILM COATED ORAL at 04:31

## 2022-11-15 RX ADMIN — COLLAGENASE SANTYL 1 APPLICATION: 250 OINTMENT TOPICAL at 16:28

## 2022-11-15 RX ADMIN — TRAMADOL HYDROCHLORIDE 50 MG: 50 TABLET, COATED ORAL at 20:32

## 2022-11-15 RX ADMIN — Medication 10 ML: at 22:00

## 2022-11-15 RX ADMIN — TRAMADOL HYDROCHLORIDE 50 MG: 50 TABLET, COATED ORAL at 12:40

## 2022-11-15 RX ADMIN — PAROXETINE HYDROCHLORIDE 40 MG: 20 TABLET, FILM COATED ORAL at 08:09

## 2022-11-15 RX ADMIN — METOPROLOL TARTRATE 50 MG: 50 TABLET, FILM COATED ORAL at 11:00

## 2022-11-15 RX ADMIN — ENOXAPARIN SODIUM 40 MG: 40 INJECTION SUBCUTANEOUS at 20:32

## 2022-11-15 RX ADMIN — Medication 10 ML: at 08:09

## 2022-11-15 RX ADMIN — ACETAMINOPHEN 650 MG: 325 TABLET ORAL at 08:17

## 2022-11-15 RX ADMIN — FERROUS SULFATE TAB EC 324 MG (65 MG FE EQUIVALENT) 324 MG: 324 (65 FE) TABLET DELAYED RESPONSE at 08:09

## 2022-11-15 RX ADMIN — Medication 400 MG: at 08:09

## 2022-11-15 RX ADMIN — PANTOPRAZOLE SODIUM 40 MG: 40 TABLET, DELAYED RELEASE ORAL at 08:09

## 2022-11-15 RX ADMIN — ACETAMINOPHEN 650 MG: 325 TABLET ORAL at 15:24

## 2022-11-15 RX ADMIN — METOPROLOL TARTRATE 50 MG: 50 TABLET, FILM COATED ORAL at 20:32

## 2022-11-16 ENCOUNTER — APPOINTMENT (OUTPATIENT)
Dept: WOUND CARE | Facility: HOSPITAL | Age: 72
End: 2022-11-16

## 2022-11-16 ENCOUNTER — HOME HEALTH ADMISSION (OUTPATIENT)
Dept: HOME HEALTH SERVICES | Facility: HOME HEALTHCARE | Age: 72
End: 2022-11-16

## 2022-11-16 ENCOUNTER — APPOINTMENT (OUTPATIENT)
Dept: CARDIOLOGY | Facility: HOSPITAL | Age: 72
End: 2022-11-16

## 2022-11-16 VITALS
DIASTOLIC BLOOD PRESSURE: 74 MMHG | OXYGEN SATURATION: 96 % | RESPIRATION RATE: 16 BRPM | TEMPERATURE: 97.5 F | HEART RATE: 107 BPM | SYSTOLIC BLOOD PRESSURE: 120 MMHG | BODY MASS INDEX: 27.11 KG/M2 | HEIGHT: 63 IN | WEIGHT: 153 LBS

## 2022-11-16 LAB
ANION GAP SERPL CALCULATED.3IONS-SCNC: 9.4 MMOL/L (ref 5–15)
BACTERIA UR QL AUTO: ABNORMAL /HPF
BASOPHILS # BLD AUTO: 0.06 10*3/MM3 (ref 0–0.2)
BASOPHILS NFR BLD AUTO: 0.9 % (ref 0–1.5)
BILIRUB UR QL STRIP: NEGATIVE
BUN SERPL-MCNC: 14 MG/DL (ref 8–23)
BUN/CREAT SERPL: 17.9 (ref 7–25)
CALCIUM SPEC-SCNC: 9.4 MG/DL (ref 8.6–10.5)
CHLORIDE SERPL-SCNC: 100 MMOL/L (ref 98–107)
CLARITY UR: ABNORMAL
CO2 SERPL-SCNC: 26.6 MMOL/L (ref 22–29)
COLOR UR: ABNORMAL
CREAT SERPL-MCNC: 0.78 MG/DL (ref 0.57–1)
DEPRECATED RDW RBC AUTO: 49 FL (ref 37–54)
EGFRCR SERPLBLD CKD-EPI 2021: 80.3 ML/MIN/1.73
EOSINOPHIL # BLD AUTO: 0.3 10*3/MM3 (ref 0–0.4)
EOSINOPHIL NFR BLD AUTO: 4.3 % (ref 0.3–6.2)
ERYTHROCYTE [DISTWIDTH] IN BLOOD BY AUTOMATED COUNT: 14.3 % (ref 12.3–15.4)
GLUCOSE BLDC GLUCOMTR-MCNC: 100 MG/DL (ref 70–130)
GLUCOSE BLDC GLUCOMTR-MCNC: 106 MG/DL (ref 70–130)
GLUCOSE BLDC GLUCOMTR-MCNC: 97 MG/DL (ref 70–130)
GLUCOSE SERPL-MCNC: 116 MG/DL (ref 65–99)
GLUCOSE UR STRIP-MCNC: NEGATIVE MG/DL
HCT VFR BLD AUTO: 32.6 % (ref 34–46.6)
HGB BLD-MCNC: 10.4 G/DL (ref 12–15.9)
HGB UR QL STRIP.AUTO: NEGATIVE
HYALINE CASTS UR QL AUTO: ABNORMAL /LPF
IMM GRANULOCYTES # BLD AUTO: 0.01 10*3/MM3 (ref 0–0.05)
IMM GRANULOCYTES NFR BLD AUTO: 0.1 % (ref 0–0.5)
KETONES UR QL STRIP: NEGATIVE
LEUKOCYTE ESTERASE UR QL STRIP.AUTO: ABNORMAL
LYMPHOCYTES # BLD AUTO: 1.47 10*3/MM3 (ref 0.7–3.1)
LYMPHOCYTES NFR BLD AUTO: 20.9 % (ref 19.6–45.3)
MAGNESIUM SERPL-MCNC: 1.6 MG/DL (ref 1.6–2.4)
MCH RBC QN AUTO: 29.8 PG (ref 26.6–33)
MCHC RBC AUTO-ENTMCNC: 31.9 G/DL (ref 31.5–35.7)
MCV RBC AUTO: 93.4 FL (ref 79–97)
MONOCYTES # BLD AUTO: 0.63 10*3/MM3 (ref 0.1–0.9)
MONOCYTES NFR BLD AUTO: 8.9 % (ref 5–12)
NEUTROPHILS NFR BLD AUTO: 4.57 10*3/MM3 (ref 1.7–7)
NEUTROPHILS NFR BLD AUTO: 64.9 % (ref 42.7–76)
NITRITE UR QL STRIP: NEGATIVE
PH UR STRIP.AUTO: 6 [PH] (ref 4.5–8)
PLATELET # BLD AUTO: 275 10*3/MM3 (ref 140–450)
PMV BLD AUTO: 10.4 FL (ref 6–12)
POTASSIUM SERPL-SCNC: 3.9 MMOL/L (ref 3.5–5.2)
PROT UR QL STRIP: ABNORMAL
RBC # BLD AUTO: 3.49 10*6/MM3 (ref 3.77–5.28)
RBC # UR STRIP: ABNORMAL /HPF
REF LAB TEST METHOD: ABNORMAL
SODIUM SERPL-SCNC: 136 MMOL/L (ref 136–145)
SP GR UR STRIP: 1.02 (ref 1–1.03)
SQUAMOUS #/AREA URNS HPF: ABNORMAL /HPF
UROBILINOGEN UR QL STRIP: ABNORMAL
WBC # UR STRIP: ABNORMAL /HPF
WBC NRBC COR # BLD: 7.04 10*3/MM3 (ref 3.4–10.8)

## 2022-11-16 PROCEDURE — 97530 THERAPEUTIC ACTIVITIES: CPT

## 2022-11-16 PROCEDURE — G0378 HOSPITAL OBSERVATION PER HR: HCPCS

## 2022-11-16 PROCEDURE — 85025 COMPLETE CBC W/AUTO DIFF WBC: CPT | Performed by: STUDENT IN AN ORGANIZED HEALTH CARE EDUCATION/TRAINING PROGRAM

## 2022-11-16 PROCEDURE — 99214 OFFICE O/P EST MOD 30 MIN: CPT | Performed by: STUDENT IN AN ORGANIZED HEALTH CARE EDUCATION/TRAINING PROGRAM

## 2022-11-16 PROCEDURE — 87070 CULTURE OTHR SPECIMN AEROBIC: CPT | Performed by: STUDENT IN AN ORGANIZED HEALTH CARE EDUCATION/TRAINING PROGRAM

## 2022-11-16 PROCEDURE — 81001 URINALYSIS AUTO W/SCOPE: CPT | Performed by: STUDENT IN AN ORGANIZED HEALTH CARE EDUCATION/TRAINING PROGRAM

## 2022-11-16 PROCEDURE — 99217 PR OBSERVATION CARE DISCHARGE MANAGEMENT: CPT | Performed by: STUDENT IN AN ORGANIZED HEALTH CARE EDUCATION/TRAINING PROGRAM

## 2022-11-16 PROCEDURE — 87205 SMEAR GRAM STAIN: CPT | Performed by: STUDENT IN AN ORGANIZED HEALTH CARE EDUCATION/TRAINING PROGRAM

## 2022-11-16 PROCEDURE — 82962 GLUCOSE BLOOD TEST: CPT

## 2022-11-16 PROCEDURE — 87077 CULTURE AEROBIC IDENTIFY: CPT | Performed by: STUDENT IN AN ORGANIZED HEALTH CARE EDUCATION/TRAINING PROGRAM

## 2022-11-16 PROCEDURE — 93246 EXT ECG>7D<15D RECORDING: CPT

## 2022-11-16 PROCEDURE — 80048 BASIC METABOLIC PNL TOTAL CA: CPT | Performed by: STUDENT IN AN ORGANIZED HEALTH CARE EDUCATION/TRAINING PROGRAM

## 2022-11-16 PROCEDURE — 83735 ASSAY OF MAGNESIUM: CPT | Performed by: STUDENT IN AN ORGANIZED HEALTH CARE EDUCATION/TRAINING PROGRAM

## 2022-11-16 PROCEDURE — 97110 THERAPEUTIC EXERCISES: CPT

## 2022-11-16 PROCEDURE — 92523 SPEECH SOUND LANG COMPREHEN: CPT

## 2022-11-16 PROCEDURE — 87186 SC STD MICRODIL/AGAR DIL: CPT | Performed by: STUDENT IN AN ORGANIZED HEALTH CARE EDUCATION/TRAINING PROGRAM

## 2022-11-16 RX ORDER — METOPROLOL TARTRATE 50 MG/1
50 TABLET, FILM COATED ORAL EVERY 12 HOURS SCHEDULED
Qty: 60 TABLET | Refills: 1 | Status: SHIPPED | OUTPATIENT
Start: 2022-11-16 | End: 2022-11-23

## 2022-11-16 RX ORDER — HYDROCODONE BITARTRATE AND ACETAMINOPHEN 5; 325 MG/1; MG/1
1 TABLET ORAL EVERY 4 HOURS PRN
Qty: 18 TABLET | Refills: 0 | Status: SHIPPED | OUTPATIENT
Start: 2022-11-16 | End: 2022-11-28 | Stop reason: HOSPADM

## 2022-11-16 RX ADMIN — Medication 10 ML: at 08:25

## 2022-11-16 RX ADMIN — METOPROLOL TARTRATE 50 MG: 50 TABLET, FILM COATED ORAL at 08:24

## 2022-11-16 RX ADMIN — FERROUS SULFATE TAB EC 324 MG (65 MG FE EQUIVALENT) 324 MG: 324 (65 FE) TABLET DELAYED RESPONSE at 08:24

## 2022-11-16 RX ADMIN — TRAMADOL HYDROCHLORIDE 50 MG: 50 TABLET, COATED ORAL at 05:55

## 2022-11-16 RX ADMIN — COLLAGENASE SANTYL 1 APPLICATION: 250 OINTMENT TOPICAL at 08:30

## 2022-11-16 RX ADMIN — PANTOPRAZOLE SODIUM 40 MG: 40 TABLET, DELAYED RELEASE ORAL at 08:24

## 2022-11-16 RX ADMIN — ACETAMINOPHEN 650 MG: 325 TABLET ORAL at 01:14

## 2022-11-16 RX ADMIN — TRAMADOL HYDROCHLORIDE 50 MG: 50 TABLET, COATED ORAL at 15:01

## 2022-11-16 RX ADMIN — ACETAMINOPHEN 650 MG: 325 TABLET ORAL at 08:24

## 2022-11-16 RX ADMIN — PAROXETINE HYDROCHLORIDE 40 MG: 20 TABLET, FILM COATED ORAL at 08:24

## 2022-11-16 RX ADMIN — WHITE PETROLATUM 57.7 %-MINERAL OIL 31.9 % EYE OINTMENT: at 12:11

## 2022-11-16 RX ADMIN — Medication 400 MG: at 08:24

## 2022-11-17 ENCOUNTER — READMISSION MANAGEMENT (OUTPATIENT)
Dept: CALL CENTER | Facility: HOSPITAL | Age: 72
End: 2022-11-17

## 2022-11-17 NOTE — OUTREACH NOTE
Prep Survey    Flowsheet Row Responses   [facility] facility patient discharged from? [place]   Is LACE score < 7 ? Yes   Emergency Room discharge w/ pulse ox? No   Eligibility Readm Mgmt   Discharge diagnosis Atrial fibrillation with RVR   Does the patient have one of the following disease processes/diagnoses(primary or secondary)? Other   Does the patient have Home health ordered? Yes   Is there a DME ordered? No   Prep survey completed? Yes          TARYN BECERRA - Registered Nurse

## 2022-11-18 LAB
BACTERIA SPEC AEROBE CULT: ABNORMAL
GRAM STN SPEC: ABNORMAL
GRAM STN SPEC: ABNORMAL

## 2022-11-18 RX ORDER — MINOCYCLINE HYDROCHLORIDE 100 MG/1
100 CAPSULE ORAL 2 TIMES DAILY
Qty: 14 CAPSULE | Refills: 0 | Status: SHIPPED | OUTPATIENT
Start: 2022-11-18 | End: 2022-11-25

## 2022-11-21 ENCOUNTER — READMISSION MANAGEMENT (OUTPATIENT)
Dept: CALL CENTER | Facility: HOSPITAL | Age: 72
End: 2022-11-21

## 2022-11-21 NOTE — OUTREACH NOTE
LAG < 7 Survey    Flowsheet Row Responses   [facility] facility patient discharged from? [place]   Does the patient have one of the following disease processes/diagnoses(primary or secondary)? Other   BHLAG <7 Attempt successful? No   Unsuccessful attempts Attempt 1          DIA Seymour Registered Nurse

## 2022-11-22 ENCOUNTER — HOSPITAL ENCOUNTER (OUTPATIENT)
Dept: ULTRASOUND IMAGING | Facility: HOSPITAL | Age: 72
Discharge: HOME | End: 2022-11-22
Admitting: NURSE PRACTITIONER

## 2022-11-22 ENCOUNTER — READMISSION MANAGEMENT (OUTPATIENT)
Dept: CALL CENTER | Facility: HOSPITAL | Age: 72
End: 2022-11-22

## 2022-11-22 DIAGNOSIS — E11.59 TYPE 2 DIABETES MELLITUS WITH OTHER CIRCULATORY COMPLICATION, UNSPECIFIED WHETHER LONG TERM INSULIN USE: ICD-10-CM

## 2022-11-22 PROCEDURE — 93923 UPR/LXTR ART STDY 3+ LVLS: CPT

## 2022-11-22 NOTE — OUTREACH NOTE
LAG < 7 Survey    Flowsheet Row Responses   [facility] patient discharged from? [place]   Does the patient have one of the following disease processes/diagnoses(primary or secondary)? Other   BHLAG <7 Attempt successful? Yes   Call start time 1649   Call end time 1652   Discharge diagnosis Atrial fibrillation with RVR   Person spoke with today (if not patient) and relationship Jazzy-daughter    Meds reviewed with patient/caregiver? Yes   Is the patient having any side effects they believe may be caused by any medication additions or changes? No   Does the patient have all medications ordered at discharge? Yes   Is the patient taking all medications as directed (includes completed medication regime)? Yes   Comments regarding appointments ultrasound on 11/22,  cards appt is on 11/23/22   Does the patient have a primary care provider?  Yes   Does the patient have an appointment with their PCP within 7 days of discharge? Greater than 7 days   Comments regarding PCP 11/30/22   What is preventing the patient from scheduling follow up appointments within 7 days of discharge? --  [unsure]   Nursing Interventions Verified appointment date/time/provider   Has the patient kept scheduled appointments due by today? Yes   Psychosocial issues? No   Did the patient receive a copy of their discharge instructions? Yes   Nursing interventions Reviewed instructions with patient   What is the patient's perception of their health status since discharge? Same   Is the patient/caregiver able to teach back signs and symptoms related to disease process for when to call PCP? Yes   Is the patient/caregiver able to teach back signs and symptoms related to disease process for when to call 911? Yes   Is the patient/caregiver able to teach back the hierarchy of who to call/visit for symptoms/problems? PCP, Specialist, Home health nurse, Urgent Care, ED, 911 Yes   If the patient is a current smoker, are they able to teach back resources for  cessation? Not a smoker   Graduated Yes   Is the patient interested in additional calls from an ambulatory ?  NOTE:  applies to high risk patients requiring additional follow-up. No          EDWIN JANG - Registered Nurse

## 2022-11-23 ENCOUNTER — OFFICE VISIT (OUTPATIENT)
Dept: CARDIOLOGY | Facility: CLINIC | Age: 72
End: 2022-11-23

## 2022-11-23 VITALS
DIASTOLIC BLOOD PRESSURE: 70 MMHG | HEART RATE: 105 BPM | BODY MASS INDEX: 27.11 KG/M2 | WEIGHT: 153 LBS | HEIGHT: 63 IN | SYSTOLIC BLOOD PRESSURE: 124 MMHG

## 2022-11-23 DIAGNOSIS — I48.91 ATRIAL FIBRILLATION WITH RVR: ICD-10-CM

## 2022-11-23 DIAGNOSIS — Z09 HOSPITAL DISCHARGE FOLLOW-UP: Primary | ICD-10-CM

## 2022-11-23 DIAGNOSIS — E11.9 TYPE 2 DIABETES MELLITUS WITHOUT COMPLICATION, WITHOUT LONG-TERM CURRENT USE OF INSULIN: ICD-10-CM

## 2022-11-23 PROCEDURE — 99214 OFFICE O/P EST MOD 30 MIN: CPT | Performed by: INTERNAL MEDICINE

## 2022-11-23 RX ORDER — METOPROLOL TARTRATE 100 MG/1
100 TABLET ORAL EVERY 12 HOURS SCHEDULED
Qty: 60 TABLET | Refills: 5 | Status: SHIPPED | OUTPATIENT
Start: 2022-11-23

## 2022-11-23 NOTE — PROGRESS NOTES
Subjective:     Encounter Date:11/23/22      Patient ID: Juan Pablo Hernandez is a 72 y.o. female.    Chief Complaint:  History of Present Illness    Dear Dr. Zhang,    I had the pleasure of seeing this patient in the office today.  She has a history of atrial fibrillation with RVR, diabetes mellitus type 2, hypertension, obstructive sleep apnea, and anxiety.  She also has history of falls, instability, and short-term memory loss.  She comes in for hospital follow-up.  She was just hospitalized with atrial fibrillation with RVR.  She was seen in consultation by Saurabh Cantu/Dr. Seo, and then saw Dr. Velasquez who placed a ZIO monitor prior to discharge.  She was scheduled for follow-up in our office and was inadvertently put on my schedule today.    She still feels pretty tired.  She also notes that she gets dyspneic if she does any type of walking at all.  Somewhat better than when she first came into the hospital but still more limited than she is used to being.  No chest pain or chest discomfort.  Occasional palpitations and occasional feeling of heart racing.  No dizziness or lightheadedness.  She has not had any chills or fevers.  Denies any lower extremity edema.  She has been compliant with her medical therapy.  No bleeding complications.    She was admitted November 14, 2022, discharged from November 16, 2022.  She was admitted after having injuries from a fall.  She is been having increasing weakness prior to the hospitalization.  S because of her reported history of multiple falls and injury she was not felt to be a candidate for anticoagulation.  He was found on arrival to be in atrial fibrillation with RVR which was a new diagnosis.  Echocardiogram, reviewed in detail below, showed significant biatrial enlargement it was believed that she had been in atrial fibrillation for some time prior to presentation.  She was discharged home on metoprolol tartrate 50 mg twice daily.    She has bilateral ankle wounds  "and is followed in wound clinic, she just had an ankle-brachial index that was nondiagnostic.    The following portions of the patient's history were reviewed and updated as appropriate: allergies, current medications, past family history, past medical history, past social history, past surgical history and problem list.    Procedures       Objective:     Vitals:    11/23/22 1424   BP: 124/70   Pulse: 105   Weight: 69.4 kg (153 lb)   Height: 160 cm (63\")     Body mass index is 27.1 kg/m².      Physical Exam    Data and records reviewed:     Lab Results   Component Value Date    GLUCOSE 116 (H) 11/16/2022    BUN 14 11/16/2022    CREATININE 0.78 11/16/2022    EGFRIFNONA 73 05/06/2020    BCR 17.9 11/16/2022    K 3.9 11/16/2022    CO2 26.6 11/16/2022    CALCIUM 9.4 11/16/2022    ALBUMIN 4.40 11/14/2022    LABIL2 1.5 07/08/2018    AST 24 11/14/2022    ALT 10 11/14/2022     Lab Results   Component Value Date    CHOL 106 01/19/2018     Lab Results   Component Value Date    TRIG 132 01/19/2018     Lab Results   Component Value Date    HDL 31 (L) 01/19/2018     Lab Results   Component Value Date    LDL 49 01/19/2018     Lab Results   Component Value Date    VLDL 26.4 01/19/2018     Lab Results   Component Value Date    LDLHDL 1.57 01/19/2018     CBC    CBC 11/14/22 11/16/22   WBC 9.48 7.04   RBC 3.81 3.49 (A)   Hemoglobin 11.5 (A) 10.4 (A)   Hematocrit 35.6 32.6 (A)   MCV 93.4 93.4   MCH 30.2 29.8   MCHC 32.3 31.9   RDW 14.6 14.3   Platelets 274 275   (A) Abnormal value            XR Chest 2 View    Result Date: 11/14/2022  Mild cardiomegaly with diminished lung volumes and elevated right hemidiaphragm. Patchy bilateral airspace disease. No pleural fluid or pneumothorax. Signer Name: Mike Xiong MD  Signed: 11/14/2022 3:41 PM  Workstation Name: [id number]  [facility]    XR Shoulder 2+ View Left    Result Date: 11/14/2022  No clearly acute findings. Osteoarthritic changes of the left shoulder. " Clinical aspects of the case will determine if MR of the left shoulder could provide additional information. Signer Name: Mike Xiong MD  Signed: 11/14/2022 3:46 PM  Workstation Name: [id number]  [facility]    XR Shoulder 2+ View Right    Result Date: 11/14/2022  1.  No clearly acute radiographic findings demonstrated. 2.  Prominent osteoarthritic changes of the right shoulder. Clinical aspects of the case will determine if MR of the right shoulder could provide additional information. Signer Name: Mike Xiong MD  Signed: 11/14/2022 3:44 PM  Workstation Name: [id number]  [facility]    XR Knee 3 View Left    Result Date: 11/14/2022  Left knee arthroplasty. No clearly acute radiographic findings. Signer Name: Mike Xiong MD  Signed: 11/14/2022 3:51 PM  Workstation Name: [id number]  [facility]    XR Tibia Fibula 2 View Right    Result Date: 11/14/2022  No clearly acute radiographic findings. No significant change from 6/6/2021. Signer Name: Mike Xiong MD  Signed: 11/14/2022 3:49 PM  Workstation Name: [id number]  [facility]    XR Foot 3+ View Left    Result Date: 11/9/2022  Chronic changes in the foot as above. No fracture, osteomyelitis, or evidence of soft tissue gas.  This report was finalized on 11/9/2022 4:21 PM by Dr. Jose Pedro MD.      CT Head Without Contrast    Result Date: 11/14/2022  Impression: 1. No clearly acute intracranial pathology demonstrated. 2. Mild cerebral atrophy and nonspecific periventricular hypodensities which are thought to represent white matter microvascular change. Signer Name: Mike Xiong MD  Signed: 11/14/2022 3:38 PM  Workstation Name: [id number]  [facility]    US Ankle / Brachial Indices Extremity Complete    Result Date: 11/22/2022   1. Nondiagnostic examination, suggestive of lower extremity arterial occlusive disease, but no reliable ankle pressures  could be obtained on either side.  This report was finalized on 11/22/2022 6:08 PM by Dr. Zuhair Yeager MD.      Results for orders placed during the hospital encounter of 11/14/22    Adult Transthoracic Echo Complete With Contrast if Necessary Per Protocol    Interpretation Summary  •  Left ventricular systolic function is low normal. Left ventricular ejection fraction appears to be 51 - 55%. Normal left ventricular cavity size noted. Left ventricular wall thickness is consistent with mild concentric hypertrophy. All left ventricular wall segments contract normally. Left ventricular diastolic function was indeterminate.  •  The right ventricular cavity is moderately dilated. Mildly reduced right ventricular systolic function noted.  •  The left atrial cavity is moderately dilated.  •  The right atrial cavity is severely dilated.  •  Mild to moderate mitral valve regurgitation is present.  •  Severe tricuspid valve regurgitation is present. Estimated right ventricular systolic pressure from tricuspid regurgitation is moderately elevated (45-55 mmHg).    CHADS-VASc Risk Assessment            6 Total Score    1 Hypertension    1 DM    2 PRIOR STROKE/TIA/THROMBO    1 Age 65-74    1 Sex: Female        Criteria that do not apply:    CHF    Age >/= 75    Vascular Disease              Assessment:          Diagnosis Plan   1. Hospital discharge follow-up        2. Atrial fibrillation with RVR ([facility])        3. Type 2 diabetes mellitus without complication, without long-term current use of insulin ([facility])               Plan:       1.  Persistent atrial fibrillation with RVR- heart rate was 100-110 at rest on exam even after sitting for about 10 minutes.  I will increase her metoprolol to tartrate from 50 mg to 100 mg twice daily.  She currently still has her ZIO in place and will be on for about another week so we will be able to assess her heart rate response with the up titration of her metoprolol.  We will see her back in the  office in 3 or 4 weeks.  She was supposed to see Dr. Velasquez and she would like to follow-up with him for that appointment  2.  Ankle wounds- follows with wound care, they just arrange for an ankle-brachial index that was nondiagnostic.  I reviewed her prior abdominopelvic CT and no overt significant vascular disease was noted on that study.    Thank you very much for allowing us to participate in the care of this pleasant patient.  Please don't hesitate to call if I can be of assistance in any way.      Current Outpatient Medications:   •  Blood Glucose Monitoring Suppl (True Metrix Meter) w/Device kit, CHECK BLOOD SUGAR EVERY DAY, Disp: , Rfl:   •  Cholecalciferol (VITAMIN D3) 22882 UNITS capsule, Take 1 capsule by mouth Every 7 (Seven) Days. (Patient taking differently: Take 50,000 Units by mouth Every 7 (Seven) Days. Takes on mondays), Disp: 4 capsule, Rfl: 0  •  conjugated estrogens (Premarin) 0.625 MG/GM vaginal cream, Premarin 0.625 mg/gram vaginal cream  APPLY 1/4 INCH RIBBON WITH FINGERTIP EVERY DAY for 3 days THEN EVERY OTHER DAY THEREAFTER, Disp: , Rfl:   •  Enoxaparin Sodium (LOVENOX) 40 MG/0.4ML solution prefilled syringe syringe, enoxaparin 40 mg/0.4 mL subcutaneous syringe, Disp: , Rfl:   •  Ergocalciferol (VITAMIN D2 PO), TAKE ONE CAPSULE BY MOUTH every week ON MONDAYS, Disp: , Rfl:   •  fenofibrate 160 MG tablet, Take 160 mg by mouth Daily., Disp: , Rfl:   •  ferrous sulfate 325 (65 Fe) MG tablet, Take 1 tablet by mouth Daily., Disp: , Rfl:   •  HYDROcodone-acetaminophen (Norco) 5-325 MG per tablet, Take 1 tablet by mouth Every 4 (Four) Hours As Needed for Severe Pain., Disp: 18 tablet, Rfl: 0  •  Hyoscyamine Sulfate SL 0.125 MG sublingual tablet, hyoscyamine 0.125 mg sublingual tablet  DISSOLVE 1 TABLET ON OR UNDER THE TONGUE BY MOUTH FOUR TIMES DAILY AS NEEDED, Disp: , Rfl:   •  magnesium oxide (MAG-OX) 400 MG tablet, magnesium oxide 400 mg (241.3 mg magnesium) tablet  TAKE TWO TABLETS BY MOUTH  EVERY EVENING, Disp: , Rfl:   •  metoprolol tartrate (LOPRESSOR) 100 MG tablet, Take 1 tablet by mouth Every 12 (Twelve) Hours., Disp: 60 tablet, Rfl: 5  •  minocycline (Minocin) 100 MG capsule, Take 1 capsule by mouth 2 (Two) Times a Day for 7 days., Disp: 14 capsule, Rfl: 0  •  montelukast (SINGULAIR) 10 MG tablet, Take 1 tablet by mouth Every Night., Disp: , Rfl:   •  pantoprazole (PROTONIX) 40 MG EC tablet, Take 40 mg by mouth Daily., Disp: , Rfl:   •  PARoxetine (PAXIL) 40 MG tablet, Take 1 tablet by mouth Daily., Disp: , Rfl:   •  pioglitazone (ACTOS) 30 MG tablet, Take 30 mg by mouth Daily., Disp: , Rfl:   •  SITagliptin-metFORMIN HCl ER (Janumet XR)  MG tablet, Janumet XR 50 mg-1,000 mg tablet,extended release  TAKE TWO TABLETS BY MOUTH EVERY DAY, Disp: , Rfl:   •  solifenacin (VESICARE) 10 MG tablet, solifenacin 10 mg tablet  TAKE ONE TABLET BY MOUTH EVERY DAY, Disp: , Rfl:   •  tolterodine LA (DETROL LA) 4 MG 24 hr capsule, Take 1 capsule by mouth Daily., Disp: , Rfl:   •  True Metrix Blood Glucose Test test strip, 1 each by Other route Daily. test blood sugar every day, Disp: , Rfl:   •  TRUEplus Lancets 33G misc, CHECK BLOOD SUGAR EVERY DAY, Disp: , Rfl:          Return in about 3 weeks (around 12/14/2022).

## 2022-11-27 ENCOUNTER — APPOINTMENT (OUTPATIENT)
Dept: GENERAL RADIOLOGY | Facility: HOSPITAL | Age: 72
End: 2022-11-27

## 2022-11-27 ENCOUNTER — HOSPITAL ENCOUNTER (OUTPATIENT)
Facility: HOSPITAL | Age: 72
Setting detail: OBSERVATION
Discharge: HOME | End: 2022-11-28
Attending: EMERGENCY MEDICINE | Admitting: HOSPITALIST

## 2022-11-27 DIAGNOSIS — I48.91 ATRIAL FIBRILLATION WITH RVR: Primary | ICD-10-CM

## 2022-11-27 LAB
ALBUMIN SERPL-MCNC: 3.6 G/DL (ref 3.5–5.2)
ALBUMIN/GLOB SERPL: 1.2 G/DL
ALP SERPL-CCNC: 62 U/L (ref 39–117)
ALT SERPL W P-5'-P-CCNC: 62 U/L (ref 1–33)
ANION GAP SERPL CALCULATED.3IONS-SCNC: 14.6 MMOL/L (ref 5–15)
APTT PPP: 35.1 SECONDS (ref 24.3–38.1)
ARTERIAL PATENCY WRIST A: ABNORMAL
AST SERPL-CCNC: 183 U/L (ref 1–32)
ATMOSPHERIC PRESS: 727 MMHG
BASE EXCESS BLDA CALC-SCNC: -1.9 MMOL/L (ref 0–2)
BASOPHILS # BLD AUTO: 0.08 10*3/MM3 (ref 0–0.2)
BASOPHILS NFR BLD AUTO: 0.8 % (ref 0–1.5)
BDY SITE: ABNORMAL
BILIRUB SERPL-MCNC: 0.8 MG/DL (ref 0–1.2)
BODY TEMPERATURE: 37 C
BUN SERPL-MCNC: 22 MG/DL (ref 8–23)
BUN/CREAT SERPL: 25.3 (ref 7–25)
CALCIUM SPEC-SCNC: 9.4 MG/DL (ref 8.6–10.5)
CHLORIDE SERPL-SCNC: 102 MMOL/L (ref 98–107)
CO2 SERPL-SCNC: 20.4 MMOL/L (ref 22–29)
CREAT SERPL-MCNC: 0.87 MG/DL (ref 0.57–1)
CRP SERPL-MCNC: 3.04 MG/DL (ref 0–0.5)
D-LACTATE SERPL-SCNC: 1.9 MMOL/L (ref 0.5–2)
D-LACTATE SERPL-SCNC: 2.6 MMOL/L (ref 0.5–2)
D-LACTATE SERPL-SCNC: 2.7 MMOL/L (ref 0.5–2)
D-LACTATE SERPL-SCNC: 3.2 MMOL/L (ref 0.5–2)
DEPRECATED RDW RBC AUTO: 48 FL (ref 37–54)
EGFRCR SERPLBLD CKD-EPI 2021: 70.5 ML/MIN/1.73
EOSINOPHIL # BLD AUTO: 0.14 10*3/MM3 (ref 0–0.4)
EOSINOPHIL NFR BLD AUTO: 1.4 % (ref 0.3–6.2)
ERYTHROCYTE [DISTWIDTH] IN BLOOD BY AUTOMATED COUNT: 14.3 % (ref 12.3–15.4)
ERYTHROCYTE [SEDIMENTATION RATE] IN BLOOD: 9 MM/HR (ref 0–30)
GLOBULIN UR ELPH-MCNC: 3 GM/DL
GLUCOSE BLDC GLUCOMTR-MCNC: 147 MG/DL (ref 70–130)
GLUCOSE BLDC GLUCOMTR-MCNC: 78 MG/DL (ref 70–130)
GLUCOSE BLDC GLUCOMTR-MCNC: 86 MG/DL (ref 70–130)
GLUCOSE SERPL-MCNC: 94 MG/DL (ref 65–99)
HAV IGM SERPL QL IA: NORMAL
HBA1C MFR BLD: 5.3 % (ref 4.8–5.6)
HBV CORE IGM SERPL QL IA: NORMAL
HBV SURFACE AG SERPL QL IA: NORMAL
HCO3 BLDA-SCNC: 23.1 MMOL/L (ref 20–26)
HCT VFR BLD AUTO: 34.7 % (ref 34–46.6)
HCV AB SER DONR QL: NORMAL
HGB BLD-MCNC: 11.3 G/DL (ref 12–15.9)
HGB BLDA-MCNC: 11.2 G/DL (ref 13.5–17.5)
IMM GRANULOCYTES # BLD AUTO: 0.04 10*3/MM3 (ref 0–0.05)
IMM GRANULOCYTES NFR BLD AUTO: 0.4 % (ref 0–0.5)
INR PPP: 1.59 (ref 0.9–1.1)
LYMPHOCYTES # BLD AUTO: 1.56 10*3/MM3 (ref 0.7–3.1)
LYMPHOCYTES NFR BLD AUTO: 15.1 % (ref 19.6–45.3)
Lab: ABNORMAL
MAGNESIUM SERPL-MCNC: 1.1 MG/DL (ref 1.6–2.4)
MCH RBC QN AUTO: 30.1 PG (ref 26.6–33)
MCHC RBC AUTO-ENTMCNC: 32.6 G/DL (ref 31.5–35.7)
MCV RBC AUTO: 92.3 FL (ref 79–97)
MODALITY: ABNORMAL
MONOCYTES # BLD AUTO: 0.96 10*3/MM3 (ref 0.1–0.9)
MONOCYTES NFR BLD AUTO: 9.3 % (ref 5–12)
NEUTROPHILS NFR BLD AUTO: 7.58 10*3/MM3 (ref 1.7–7)
NEUTROPHILS NFR BLD AUTO: 73 % (ref 42.7–76)
NRBC BLD AUTO-RTO: 0 /100 WBC (ref 0–0.2)
PCO2 BLDA: 39.7 MM HG (ref 35–45)
PCO2 TEMP ADJ BLD: 39.7 MM HG (ref 35–45)
PH BLDA: 7.37 PH UNITS (ref 7.35–7.45)
PH, TEMP CORRECTED: 7.37 PH UNITS (ref 7.35–7.45)
PLATELET # BLD AUTO: 263 10*3/MM3 (ref 140–450)
PMV BLD AUTO: 10.6 FL (ref 6–12)
PO2 BLDA: 169 MM HG (ref 83–108)
PO2 TEMP ADJ BLD: 169 MM HG (ref 83–108)
POTASSIUM SERPL-SCNC: 4.2 MMOL/L (ref 3.5–5.2)
POTASSIUM SERPL-SCNC: 4.2 MMOL/L (ref 3.5–5.2)
PROCALCITONIN SERPL-MCNC: 0.1 NG/ML (ref 0–0.25)
PROT SERPL-MCNC: 6.6 G/DL (ref 6–8.5)
PROTHROMBIN TIME: 19.2 SECONDS (ref 12.1–15)
QT INTERVAL: 290 MS
QT INTERVAL: 347 MS
RBC # BLD AUTO: 3.76 10*6/MM3 (ref 3.77–5.28)
SAO2 % BLDCOA: 99 % (ref 94–99)
SODIUM SERPL-SCNC: 137 MMOL/L (ref 136–145)
TROPONIN T SERPL-MCNC: 0.01 NG/ML (ref 0–0.03)
TROPONIN T SERPL-MCNC: 0.02 NG/ML (ref 0–0.03)
WBC NRBC COR # BLD: 10.36 10*3/MM3 (ref 3.4–10.8)

## 2022-11-27 PROCEDURE — 87040 BLOOD CULTURE FOR BACTERIA: CPT | Performed by: EMERGENCY MEDICINE

## 2022-11-27 PROCEDURE — 93010 ELECTROCARDIOGRAM REPORT: CPT | Performed by: STUDENT IN AN ORGANIZED HEALTH CARE EDUCATION/TRAINING PROGRAM

## 2022-11-27 PROCEDURE — 82962 GLUCOSE BLOOD TEST: CPT

## 2022-11-27 PROCEDURE — 85652 RBC SED RATE AUTOMATED: CPT | Performed by: EMERGENCY MEDICINE

## 2022-11-27 PROCEDURE — 84484 ASSAY OF TROPONIN QUANT: CPT | Performed by: INTERNAL MEDICINE

## 2022-11-27 PROCEDURE — 96361 HYDRATE IV INFUSION ADD-ON: CPT

## 2022-11-27 PROCEDURE — 83735 ASSAY OF MAGNESIUM: CPT | Performed by: INTERNAL MEDICINE

## 2022-11-27 PROCEDURE — 36415 COLL VENOUS BLD VENIPUNCTURE: CPT

## 2022-11-27 PROCEDURE — 86140 C-REACTIVE PROTEIN: CPT | Performed by: EMERGENCY MEDICINE

## 2022-11-27 PROCEDURE — 82803 BLOOD GASES ANY COMBINATION: CPT

## 2022-11-27 PROCEDURE — 73610 X-RAY EXAM OF ANKLE: CPT

## 2022-11-27 PROCEDURE — 99220 PR INITIAL OBSERVATION CARE/DAY 70 MINUTES: CPT | Performed by: INTERNAL MEDICINE

## 2022-11-27 PROCEDURE — G0378 HOSPITAL OBSERVATION PER HR: HCPCS

## 2022-11-27 PROCEDURE — 96375 TX/PRO/DX INJ NEW DRUG ADDON: CPT

## 2022-11-27 PROCEDURE — 83036 HEMOGLOBIN GLYCOSYLATED A1C: CPT | Performed by: INTERNAL MEDICINE

## 2022-11-27 PROCEDURE — 94761 N-INVAS EAR/PLS OXIMETRY MLT: CPT

## 2022-11-27 PROCEDURE — 85610 PROTHROMBIN TIME: CPT | Performed by: EMERGENCY MEDICINE

## 2022-11-27 PROCEDURE — 85025 COMPLETE CBC W/AUTO DIFF WBC: CPT | Performed by: EMERGENCY MEDICINE

## 2022-11-27 PROCEDURE — 80074 ACUTE HEPATITIS PANEL: CPT | Performed by: INTERNAL MEDICINE

## 2022-11-27 PROCEDURE — 84145 PROCALCITONIN (PCT): CPT | Performed by: EMERGENCY MEDICINE

## 2022-11-27 PROCEDURE — 84132 ASSAY OF SERUM POTASSIUM: CPT | Performed by: INTERNAL MEDICINE

## 2022-11-27 PROCEDURE — 99285 EMERGENCY DEPT VISIT HI MDM: CPT

## 2022-11-27 PROCEDURE — 96365 THER/PROPH/DIAG IV INF INIT: CPT

## 2022-11-27 PROCEDURE — 25010000002 ENOXAPARIN PER 10 MG: Performed by: INTERNAL MEDICINE

## 2022-11-27 PROCEDURE — 93005 ELECTROCARDIOGRAM TRACING: CPT | Performed by: EMERGENCY MEDICINE

## 2022-11-27 PROCEDURE — 93005 ELECTROCARDIOGRAM TRACING: CPT | Performed by: INTERNAL MEDICINE

## 2022-11-27 PROCEDURE — 25010000002 MAGNESIUM SULFATE 2 GM/50ML SOLUTION: Performed by: INTERNAL MEDICINE

## 2022-11-27 PROCEDURE — 84484 ASSAY OF TROPONIN QUANT: CPT | Performed by: EMERGENCY MEDICINE

## 2022-11-27 PROCEDURE — 80053 COMPREHEN METABOLIC PANEL: CPT | Performed by: EMERGENCY MEDICINE

## 2022-11-27 PROCEDURE — 85730 THROMBOPLASTIN TIME PARTIAL: CPT | Performed by: EMERGENCY MEDICINE

## 2022-11-27 PROCEDURE — 25010000002 FUROSEMIDE PER 20 MG: Performed by: INTERNAL MEDICINE

## 2022-11-27 PROCEDURE — 36600 WITHDRAWAL OF ARTERIAL BLOOD: CPT

## 2022-11-27 PROCEDURE — 71045 X-RAY EXAM CHEST 1 VIEW: CPT

## 2022-11-27 PROCEDURE — 83605 ASSAY OF LACTIC ACID: CPT | Performed by: EMERGENCY MEDICINE

## 2022-11-27 PROCEDURE — 96366 THER/PROPH/DIAG IV INF ADDON: CPT

## 2022-11-27 PROCEDURE — 94799 UNLISTED PULMONARY SVC/PX: CPT

## 2022-11-27 PROCEDURE — 96372 THER/PROPH/DIAG INJ SC/IM: CPT

## 2022-11-27 RX ORDER — METOPROLOL TARTRATE 50 MG/1
100 TABLET, FILM COATED ORAL EVERY 12 HOURS SCHEDULED
Status: DISCONTINUED | OUTPATIENT
Start: 2022-11-27 | End: 2022-11-28 | Stop reason: HOSPADM

## 2022-11-27 RX ORDER — INSULIN ASPART 100 [IU]/ML
0-9 INJECTION, SOLUTION INTRAVENOUS; SUBCUTANEOUS
Status: DISCONTINUED | OUTPATIENT
Start: 2022-11-27 | End: 2022-11-28 | Stop reason: HOSPADM

## 2022-11-27 RX ORDER — POTASSIUM CHLORIDE 20 MEQ/1
40 TABLET, EXTENDED RELEASE ORAL AS NEEDED
Status: DISCONTINUED | OUTPATIENT
Start: 2022-11-27 | End: 2022-11-28 | Stop reason: HOSPADM

## 2022-11-27 RX ORDER — ASPIRIN 81 MG/1
81 TABLET ORAL DAILY
Status: DISCONTINUED | OUTPATIENT
Start: 2022-11-28 | End: 2022-11-28 | Stop reason: HOSPADM

## 2022-11-27 RX ORDER — AMOXICILLIN 250 MG
2 CAPSULE ORAL 2 TIMES DAILY
Status: DISCONTINUED | OUTPATIENT
Start: 2022-11-27 | End: 2022-11-28 | Stop reason: HOSPADM

## 2022-11-27 RX ORDER — POTASSIUM CHLORIDE 1.5 G/1.77G
40 POWDER, FOR SOLUTION ORAL AS NEEDED
Status: DISCONTINUED | OUTPATIENT
Start: 2022-11-27 | End: 2022-11-28 | Stop reason: HOSPADM

## 2022-11-27 RX ORDER — PAROXETINE HYDROCHLORIDE 20 MG/1
40 TABLET, FILM COATED ORAL DAILY
Status: DISCONTINUED | OUTPATIENT
Start: 2022-11-27 | End: 2022-11-28 | Stop reason: HOSPADM

## 2022-11-27 RX ORDER — HYDROCODONE BITARTRATE AND ACETAMINOPHEN 5; 325 MG/1; MG/1
1 TABLET ORAL ONCE
Status: COMPLETED | OUTPATIENT
Start: 2022-11-27 | End: 2022-11-27

## 2022-11-27 RX ORDER — ACETAMINOPHEN 325 MG/1
650 TABLET ORAL EVERY 4 HOURS PRN
Status: DISCONTINUED | OUTPATIENT
Start: 2022-11-27 | End: 2022-11-28 | Stop reason: HOSPADM

## 2022-11-27 RX ORDER — SODIUM CHLORIDE 0.9 % (FLUSH) 0.9 %
10 SYRINGE (ML) INJECTION AS NEEDED
Status: DISCONTINUED | OUTPATIENT
Start: 2022-11-27 | End: 2022-11-28 | Stop reason: HOSPADM

## 2022-11-27 RX ORDER — MAGNESIUM SULFATE HEPTAHYDRATE 40 MG/ML
2 INJECTION, SOLUTION INTRAVENOUS AS NEEDED
Status: DISCONTINUED | OUTPATIENT
Start: 2022-11-27 | End: 2022-11-28 | Stop reason: HOSPADM

## 2022-11-27 RX ORDER — ENOXAPARIN SODIUM 100 MG/ML
40 INJECTION SUBCUTANEOUS DAILY
Status: DISCONTINUED | OUTPATIENT
Start: 2022-11-27 | End: 2022-11-28 | Stop reason: HOSPADM

## 2022-11-27 RX ORDER — FUROSEMIDE 10 MG/ML
20 INJECTION INTRAMUSCULAR; INTRAVENOUS ONCE
Status: COMPLETED | OUTPATIENT
Start: 2022-11-27 | End: 2022-11-27

## 2022-11-27 RX ORDER — NICOTINE POLACRILEX 4 MG
15 LOZENGE BUCCAL
Status: DISCONTINUED | OUTPATIENT
Start: 2022-11-27 | End: 2022-11-28 | Stop reason: HOSPADM

## 2022-11-27 RX ORDER — MAGNESIUM SULFATE HEPTAHYDRATE 40 MG/ML
4 INJECTION, SOLUTION INTRAVENOUS AS NEEDED
Status: DISCONTINUED | OUTPATIENT
Start: 2022-11-27 | End: 2022-11-28 | Stop reason: HOSPADM

## 2022-11-27 RX ORDER — BISACODYL 10 MG
10 SUPPOSITORY, RECTAL RECTAL DAILY PRN
Status: DISCONTINUED | OUTPATIENT
Start: 2022-11-27 | End: 2022-11-28 | Stop reason: HOSPADM

## 2022-11-27 RX ORDER — SODIUM CHLORIDE 9 MG/ML
75 INJECTION, SOLUTION INTRAVENOUS CONTINUOUS
Status: DISCONTINUED | OUTPATIENT
Start: 2022-11-27 | End: 2022-11-27

## 2022-11-27 RX ORDER — DEXTROSE MONOHYDRATE 25 G/50ML
25 INJECTION, SOLUTION INTRAVENOUS
Status: DISCONTINUED | OUTPATIENT
Start: 2022-11-27 | End: 2022-11-28 | Stop reason: HOSPADM

## 2022-11-27 RX ORDER — OXYBUTYNIN CHLORIDE 5 MG/1
10 TABLET, EXTENDED RELEASE ORAL DAILY
Status: DISCONTINUED | OUTPATIENT
Start: 2022-11-27 | End: 2022-11-28 | Stop reason: HOSPADM

## 2022-11-27 RX ORDER — PANTOPRAZOLE SODIUM 40 MG/1
40 TABLET, DELAYED RELEASE ORAL DAILY
Status: DISCONTINUED | OUTPATIENT
Start: 2022-11-27 | End: 2022-11-28 | Stop reason: HOSPADM

## 2022-11-27 RX ORDER — PIOGLITAZONEHYDROCHLORIDE 30 MG/1
30 TABLET ORAL DAILY
Status: DISCONTINUED | OUTPATIENT
Start: 2022-11-27 | End: 2022-11-28 | Stop reason: HOSPADM

## 2022-11-27 RX ORDER — SODIUM CHLORIDE 0.9 % (FLUSH) 0.9 %
10 SYRINGE (ML) INJECTION EVERY 12 HOURS SCHEDULED
Status: DISCONTINUED | OUTPATIENT
Start: 2022-11-27 | End: 2022-11-28 | Stop reason: HOSPADM

## 2022-11-27 RX ORDER — SODIUM CHLORIDE 9 MG/ML
40 INJECTION, SOLUTION INTRAVENOUS AS NEEDED
Status: DISCONTINUED | OUTPATIENT
Start: 2022-11-27 | End: 2022-11-28 | Stop reason: HOSPADM

## 2022-11-27 RX ORDER — BISACODYL 5 MG/1
5 TABLET, DELAYED RELEASE ORAL DAILY PRN
Status: DISCONTINUED | OUTPATIENT
Start: 2022-11-27 | End: 2022-11-28 | Stop reason: HOSPADM

## 2022-11-27 RX ORDER — FERROUS SULFATE TAB EC 324 MG (65 MG FE EQUIVALENT) 324 (65 FE) MG
324 TABLET DELAYED RESPONSE ORAL
Status: DISCONTINUED | OUTPATIENT
Start: 2022-11-27 | End: 2022-11-28 | Stop reason: HOSPADM

## 2022-11-27 RX ORDER — HYDROCODONE BITARTRATE AND ACETAMINOPHEN 5; 325 MG/1; MG/1
1 TABLET ORAL EVERY 4 HOURS PRN
Status: DISCONTINUED | OUTPATIENT
Start: 2022-11-27 | End: 2022-11-28 | Stop reason: HOSPADM

## 2022-11-27 RX ORDER — MONTELUKAST SODIUM 10 MG/1
10 TABLET ORAL NIGHTLY
Status: DISCONTINUED | OUTPATIENT
Start: 2022-11-27 | End: 2022-11-28 | Stop reason: HOSPADM

## 2022-11-27 RX ORDER — ONDANSETRON 2 MG/ML
4 INJECTION INTRAMUSCULAR; INTRAVENOUS EVERY 6 HOURS PRN
Status: DISCONTINUED | OUTPATIENT
Start: 2022-11-27 | End: 2022-11-28 | Stop reason: HOSPADM

## 2022-11-27 RX ORDER — NITROGLYCERIN 0.4 MG/1
0.4 TABLET SUBLINGUAL
Status: DISCONTINUED | OUTPATIENT
Start: 2022-11-27 | End: 2022-11-28 | Stop reason: HOSPADM

## 2022-11-27 RX ORDER — ASPIRIN 81 MG/1
324 TABLET, CHEWABLE ORAL ONCE
Status: COMPLETED | OUTPATIENT
Start: 2022-11-27 | End: 2022-11-27

## 2022-11-27 RX ORDER — POLYETHYLENE GLYCOL 3350 17 G/17G
17 POWDER, FOR SOLUTION ORAL DAILY PRN
Status: DISCONTINUED | OUTPATIENT
Start: 2022-11-27 | End: 2022-11-28 | Stop reason: HOSPADM

## 2022-11-27 RX ADMIN — PIOGLITAZONE HYDROCHLORIDE 30 MG: 30 TABLET ORAL at 08:19

## 2022-11-27 RX ADMIN — HYDROCODONE BITARTRATE AND ACETAMINOPHEN 1 TABLET: 5; 325 TABLET ORAL at 20:55

## 2022-11-27 RX ADMIN — HYDROCODONE BITARTRATE AND ACETAMINOPHEN 1 TABLET: 5; 325 TABLET ORAL at 17:07

## 2022-11-27 RX ADMIN — SENNOSIDES AND DOCUSATE SODIUM 2 TABLET: 50; 8.6 TABLET ORAL at 20:48

## 2022-11-27 RX ADMIN — METOPROLOL TARTRATE 100 MG: 50 TABLET, FILM COATED ORAL at 08:19

## 2022-11-27 RX ADMIN — Medication 10 ML: at 20:48

## 2022-11-27 RX ADMIN — MONTELUKAST 10 MG: 10 TABLET, FILM COATED ORAL at 20:48

## 2022-11-27 RX ADMIN — MAGNESIUM SULFATE HEPTAHYDRATE 2 G: 40 INJECTION, SOLUTION INTRAVENOUS at 13:57

## 2022-11-27 RX ADMIN — OXYBUTYNIN CHLORIDE 10 MG: 5 TABLET, EXTENDED RELEASE ORAL at 08:19

## 2022-11-27 RX ADMIN — MAGNESIUM SULFATE HEPTAHYDRATE 2 G: 40 INJECTION, SOLUTION INTRAVENOUS at 15:40

## 2022-11-27 RX ADMIN — PANTOPRAZOLE SODIUM 40 MG: 40 TABLET, DELAYED RELEASE ORAL at 08:20

## 2022-11-27 RX ADMIN — METOPROLOL TARTRATE 100 MG: 50 TABLET, FILM COATED ORAL at 20:55

## 2022-11-27 RX ADMIN — ASPIRIN 81 MG CHEWABLE TABLET 324 MG: 81 TABLET CHEWABLE at 10:15

## 2022-11-27 RX ADMIN — METOPROLOL TARTRATE 5 MG: 5 INJECTION INTRAVENOUS at 03:53

## 2022-11-27 RX ADMIN — Medication 800 MG: at 08:19

## 2022-11-27 RX ADMIN — FERROUS SULFATE TAB EC 324 MG (65 MG FE EQUIVALENT) 324 MG: 324 (65 FE) TABLET DELAYED RESPONSE at 08:20

## 2022-11-27 RX ADMIN — DILTIAZEM HYDROCHLORIDE 30 MG: 30 TABLET, FILM COATED ORAL at 17:07

## 2022-11-27 RX ADMIN — MAGNESIUM SULFATE HEPTAHYDRATE 2 G: 40 INJECTION, SOLUTION INTRAVENOUS at 17:07

## 2022-11-27 RX ADMIN — HYDROCODONE BITARTRATE AND ACETAMINOPHEN 1 TABLET: 5; 325 TABLET ORAL at 03:53

## 2022-11-27 RX ADMIN — SENNOSIDES AND DOCUSATE SODIUM 2 TABLET: 50; 8.6 TABLET ORAL at 08:25

## 2022-11-27 RX ADMIN — Medication 10 ML: at 08:20

## 2022-11-27 RX ADMIN — DILTIAZEM HYDROCHLORIDE 30 MG: 30 TABLET, FILM COATED ORAL at 12:31

## 2022-11-27 RX ADMIN — FUROSEMIDE 20 MG: 10 INJECTION, SOLUTION INTRAMUSCULAR; INTRAVENOUS at 15:41

## 2022-11-27 RX ADMIN — SILVER SULFADIAZINE 1 APPLICATION: 10 CREAM TOPICAL at 10:15

## 2022-11-27 RX ADMIN — HYDROCODONE BITARTRATE AND ACETAMINOPHEN 1 TABLET: 5; 325 TABLET ORAL at 08:39

## 2022-11-27 RX ADMIN — ENOXAPARIN SODIUM 40 MG: 40 INJECTION SUBCUTANEOUS at 08:20

## 2022-11-27 RX ADMIN — PAROXETINE HYDROCHLORIDE 40 MG: 20 TABLET, FILM COATED ORAL at 08:19

## 2022-11-27 RX ADMIN — SODIUM CHLORIDE 75 ML/HR: 9 INJECTION, SOLUTION INTRAVENOUS at 08:25

## 2022-11-28 ENCOUNTER — APPOINTMENT (OUTPATIENT)
Dept: ULTRASOUND IMAGING | Facility: HOSPITAL | Age: 72
End: 2022-11-28

## 2022-11-28 VITALS
RESPIRATION RATE: 18 BRPM | SYSTOLIC BLOOD PRESSURE: 110 MMHG | HEIGHT: 63 IN | WEIGHT: 152.7 LBS | BODY MASS INDEX: 27.05 KG/M2 | HEART RATE: 70 BPM | DIASTOLIC BLOOD PRESSURE: 60 MMHG | TEMPERATURE: 97.2 F | OXYGEN SATURATION: 92 %

## 2022-11-28 LAB
ALBUMIN SERPL-MCNC: 3.3 G/DL (ref 3.5–5.2)
ALBUMIN/GLOB SERPL: 1.3 G/DL
ALP SERPL-CCNC: 73 U/L (ref 39–117)
ALT SERPL W P-5'-P-CCNC: 85 U/L (ref 1–33)
ANION GAP SERPL CALCULATED.3IONS-SCNC: 12.8 MMOL/L (ref 5–15)
AST SERPL-CCNC: 217 U/L (ref 1–32)
BILIRUB SERPL-MCNC: 0.7 MG/DL (ref 0–1.2)
BUN SERPL-MCNC: 18 MG/DL (ref 8–23)
BUN/CREAT SERPL: 23.1 (ref 7–25)
CALCIUM SPEC-SCNC: 9 MG/DL (ref 8.6–10.5)
CHLORIDE SERPL-SCNC: 101 MMOL/L (ref 98–107)
CHOLEST SERPL-MCNC: 60 MG/DL (ref 0–200)
CK SERPL-CCNC: 49 U/L (ref 20–180)
CO2 SERPL-SCNC: 24.2 MMOL/L (ref 22–29)
CREAT SERPL-MCNC: 0.78 MG/DL (ref 0.57–1)
DEPRECATED RDW RBC AUTO: 49.1 FL (ref 37–54)
EGFRCR SERPLBLD CKD-EPI 2021: 80.3 ML/MIN/1.73
ERYTHROCYTE [DISTWIDTH] IN BLOOD BY AUTOMATED COUNT: 14.4 % (ref 12.3–15.4)
GLOBULIN UR ELPH-MCNC: 2.5 GM/DL
GLUCOSE BLDC GLUCOMTR-MCNC: 108 MG/DL (ref 70–130)
GLUCOSE BLDC GLUCOMTR-MCNC: 110 MG/DL (ref 70–130)
GLUCOSE SERPL-MCNC: 93 MG/DL (ref 65–99)
HCT VFR BLD AUTO: 34.1 % (ref 34–46.6)
HDLC SERPL-MCNC: 17 MG/DL (ref 40–60)
HGB BLD-MCNC: 10.8 G/DL (ref 12–15.9)
LDLC SERPL CALC-MCNC: 23 MG/DL (ref 0–100)
LDLC/HDLC SERPL: 1.28 {RATIO}
MAGNESIUM SERPL-MCNC: 1.9 MG/DL (ref 1.6–2.4)
MCH RBC QN AUTO: 29.8 PG (ref 26.6–33)
MCHC RBC AUTO-ENTMCNC: 31.7 G/DL (ref 31.5–35.7)
MCV RBC AUTO: 93.9 FL (ref 79–97)
PLATELET # BLD AUTO: 241 10*3/MM3 (ref 140–450)
PMV BLD AUTO: 10.6 FL (ref 6–12)
POTASSIUM SERPL-SCNC: 3.6 MMOL/L (ref 3.5–5.2)
PROT SERPL-MCNC: 5.8 G/DL (ref 6–8.5)
RBC # BLD AUTO: 3.63 10*6/MM3 (ref 3.77–5.28)
SODIUM SERPL-SCNC: 138 MMOL/L (ref 136–145)
TRIGL SERPL-MCNC: 106 MG/DL (ref 0–150)
VLDLC SERPL-MCNC: 20 MG/DL (ref 5–40)
WBC NRBC COR # BLD: 8.78 10*3/MM3 (ref 3.4–10.8)

## 2022-11-28 PROCEDURE — 82550 ASSAY OF CK (CPK): CPT | Performed by: INTERNAL MEDICINE

## 2022-11-28 PROCEDURE — 80053 COMPREHEN METABOLIC PANEL: CPT | Performed by: INTERNAL MEDICINE

## 2022-11-28 PROCEDURE — 96372 THER/PROPH/DIAG INJ SC/IM: CPT

## 2022-11-28 PROCEDURE — 82962 GLUCOSE BLOOD TEST: CPT

## 2022-11-28 PROCEDURE — 99217 PR OBSERVATION CARE DISCHARGE MANAGEMENT: CPT | Performed by: HOSPITALIST

## 2022-11-28 PROCEDURE — 97165 OT EVAL LOW COMPLEX 30 MIN: CPT

## 2022-11-28 PROCEDURE — 76705 ECHO EXAM OF ABDOMEN: CPT

## 2022-11-28 PROCEDURE — 0 MAGNESIUM SULFATE 4 GM/100ML SOLUTION: Performed by: INTERNAL MEDICINE

## 2022-11-28 PROCEDURE — 85027 COMPLETE CBC AUTOMATED: CPT | Performed by: INTERNAL MEDICINE

## 2022-11-28 PROCEDURE — 99214 OFFICE O/P EST MOD 30 MIN: CPT | Performed by: INTERNAL MEDICINE

## 2022-11-28 PROCEDURE — G0378 HOSPITAL OBSERVATION PER HR: HCPCS

## 2022-11-28 PROCEDURE — 80061 LIPID PANEL: CPT | Performed by: INTERNAL MEDICINE

## 2022-11-28 PROCEDURE — 83735 ASSAY OF MAGNESIUM: CPT | Performed by: INTERNAL MEDICINE

## 2022-11-28 PROCEDURE — 96366 THER/PROPH/DIAG IV INF ADDON: CPT

## 2022-11-28 PROCEDURE — 25010000002 ENOXAPARIN PER 10 MG: Performed by: INTERNAL MEDICINE

## 2022-11-28 RX ORDER — CLINDAMYCIN HYDROCHLORIDE 150 MG/1
450 CAPSULE ORAL EVERY 6 HOURS SCHEDULED
Qty: 57 CAPSULE | Refills: 0 | Status: SHIPPED | OUTPATIENT
Start: 2022-11-28 | End: 2022-12-03

## 2022-11-28 RX ORDER — L.ACID,PARA/B.BIFIDUM/S.THERM 8B CELL
1 CAPSULE ORAL DAILY
Qty: 10 EACH | Refills: 0 | Status: SHIPPED | OUTPATIENT
Start: 2022-11-29

## 2022-11-28 RX ORDER — L.ACID,PARA/B.BIFIDUM/S.THERM 8B CELL
1 CAPSULE ORAL DAILY
Status: DISCONTINUED | OUTPATIENT
Start: 2022-11-28 | End: 2022-11-28 | Stop reason: HOSPADM

## 2022-11-28 RX ORDER — ASPIRIN 81 MG/1
81 TABLET ORAL DAILY
Start: 2022-11-29

## 2022-11-28 RX ORDER — CLINDAMYCIN HYDROCHLORIDE 150 MG/1
450 CAPSULE ORAL EVERY 6 HOURS SCHEDULED
Status: DISCONTINUED | OUTPATIENT
Start: 2022-11-28 | End: 2022-11-28 | Stop reason: HOSPADM

## 2022-11-28 RX ADMIN — POTASSIUM CHLORIDE 40 MEQ: 1500 TABLET, EXTENDED RELEASE ORAL at 06:08

## 2022-11-28 RX ADMIN — FERROUS SULFATE TAB EC 324 MG (65 MG FE EQUIVALENT) 324 MG: 324 (65 FE) TABLET DELAYED RESPONSE at 08:44

## 2022-11-28 RX ADMIN — CLINDAMYCIN HYDROCHLORIDE 450 MG: 150 CAPSULE ORAL at 13:50

## 2022-11-28 RX ADMIN — METOPROLOL TARTRATE 100 MG: 50 TABLET, FILM COATED ORAL at 08:44

## 2022-11-28 RX ADMIN — POTASSIUM CHLORIDE 40 MEQ: 1500 TABLET, EXTENDED RELEASE ORAL at 10:25

## 2022-11-28 RX ADMIN — Medication 1 CAPSULE: at 13:50

## 2022-11-28 RX ADMIN — HYDROCODONE BITARTRATE AND ACETAMINOPHEN 1 TABLET: 5; 325 TABLET ORAL at 06:08

## 2022-11-28 RX ADMIN — HYDROCODONE BITARTRATE AND ACETAMINOPHEN 1 TABLET: 5; 325 TABLET ORAL at 10:25

## 2022-11-28 RX ADMIN — SENNOSIDES AND DOCUSATE SODIUM 2 TABLET: 50; 8.6 TABLET ORAL at 08:44

## 2022-11-28 RX ADMIN — MAGNESIUM SULFATE HEPTAHYDRATE 4 G: 40 INJECTION, SOLUTION INTRAVENOUS at 06:08

## 2022-11-28 RX ADMIN — PAROXETINE HYDROCHLORIDE 40 MG: 20 TABLET, FILM COATED ORAL at 08:43

## 2022-11-28 RX ADMIN — PIOGLITAZONE HYDROCHLORIDE 30 MG: 30 TABLET ORAL at 08:44

## 2022-11-28 RX ADMIN — OFLOXACIN 50000 UNITS: 300 TABLET, COATED ORAL at 08:43

## 2022-11-28 RX ADMIN — Medication 10 ML: at 08:46

## 2022-11-28 RX ADMIN — OXYBUTYNIN CHLORIDE 10 MG: 5 TABLET, EXTENDED RELEASE ORAL at 08:43

## 2022-11-28 RX ADMIN — ASPIRIN 81 MG: 81 TABLET, COATED ORAL at 08:44

## 2022-11-28 RX ADMIN — DILTIAZEM HYDROCHLORIDE 30 MG: 30 TABLET, FILM COATED ORAL at 00:15

## 2022-11-28 RX ADMIN — SILVER SULFADIAZINE 1 APPLICATION: 10 CREAM TOPICAL at 08:46

## 2022-11-28 RX ADMIN — PANTOPRAZOLE SODIUM 40 MG: 40 TABLET, DELAYED RELEASE ORAL at 08:43

## 2022-11-28 RX ADMIN — Medication 800 MG: at 08:43

## 2022-11-28 RX ADMIN — ENOXAPARIN SODIUM 40 MG: 40 INJECTION SUBCUTANEOUS at 08:45

## 2022-11-28 RX ADMIN — ACETAMINOPHEN 650 MG: 325 TABLET ORAL at 14:26

## 2022-11-28 RX ADMIN — DILTIAZEM HYDROCHLORIDE 30 MG: 30 TABLET, FILM COATED ORAL at 06:08

## 2022-11-29 ENCOUNTER — READMISSION MANAGEMENT (OUTPATIENT)
Dept: CALL CENTER | Facility: HOSPITAL | Age: 72
End: 2022-11-29

## 2022-11-29 NOTE — OUTREACH NOTE
Prep Survey    Flowsheet Row Responses   [facility] facility patient discharged from? [place]   Is LACE score < 7 ? Yes   Emergency Room discharge w/ pulse ox? No   Eligibility Readm Mgmt   Discharge diagnosis Atrial fibrillation with RVR   Does the patient have one of the following disease processes/diagnoses(primary or secondary)? Other   Does the patient have Home health ordered? No   Is there a DME ordered? No   Prep survey completed? Yes          VISHNU COLBERT - Registered Nurse

## 2022-11-30 ENCOUNTER — APPOINTMENT (OUTPATIENT)
Dept: CT IMAGING | Facility: HOSPITAL | Age: 72
End: 2022-11-30

## 2022-11-30 ENCOUNTER — APPOINTMENT (OUTPATIENT)
Dept: WOUND CARE | Facility: HOSPITAL | Age: 72
End: 2022-11-30

## 2022-11-30 ENCOUNTER — APPOINTMENT (OUTPATIENT)
Dept: GENERAL RADIOLOGY | Facility: HOSPITAL | Age: 72
End: 2022-11-30

## 2022-11-30 ENCOUNTER — APPOINTMENT (OUTPATIENT)
Dept: NUCLEAR MEDICINE | Facility: HOSPITAL | Age: 72
End: 2022-11-30

## 2022-11-30 ENCOUNTER — HOSPITAL ENCOUNTER (EMERGENCY)
Facility: HOSPITAL | Age: 72
Discharge: HOME | End: 2022-11-30
Attending: EMERGENCY MEDICINE | Admitting: EMERGENCY MEDICINE

## 2022-11-30 VITALS
RESPIRATION RATE: 20 BRPM | HEIGHT: 63 IN | TEMPERATURE: 97.8 F | HEART RATE: 56 BPM | OXYGEN SATURATION: 85 % | SYSTOLIC BLOOD PRESSURE: 122 MMHG | BODY MASS INDEX: 26.59 KG/M2 | WEIGHT: 150.1 LBS | DIASTOLIC BLOOD PRESSURE: 97 MMHG

## 2022-11-30 DIAGNOSIS — R07.9 CHEST PAIN, UNSPECIFIED TYPE: Primary | ICD-10-CM

## 2022-11-30 DIAGNOSIS — R52 GENERALIZED PAIN: ICD-10-CM

## 2022-11-30 DIAGNOSIS — R06.00 DYSPNEA, UNSPECIFIED TYPE: ICD-10-CM

## 2022-11-30 LAB
ALBUMIN SERPL-MCNC: 3.6 G/DL (ref 3.5–5.2)
ALBUMIN/GLOB SERPL: 1.2 G/DL
ALP SERPL-CCNC: 66 U/L (ref 39–117)
ALT SERPL W P-5'-P-CCNC: 72 U/L (ref 1–33)
ANION GAP SERPL CALCULATED.3IONS-SCNC: 11.6 MMOL/L (ref 5–15)
AST SERPL-CCNC: 123 U/L (ref 1–32)
BACTERIA UR QL AUTO: ABNORMAL /HPF
BASOPHILS # BLD AUTO: 0.03 10*3/MM3 (ref 0–0.2)
BASOPHILS NFR BLD AUTO: 0.3 % (ref 0–1.5)
BILIRUB SERPL-MCNC: 0.7 MG/DL (ref 0–1.2)
BILIRUB UR QL STRIP: ABNORMAL
BUN SERPL-MCNC: 19 MG/DL (ref 8–23)
BUN/CREAT SERPL: 21.8 (ref 7–25)
CALCIUM SPEC-SCNC: 9.3 MG/DL (ref 8.6–10.5)
CHLORIDE SERPL-SCNC: 100 MMOL/L (ref 98–107)
CLARITY UR: ABNORMAL
CO2 SERPL-SCNC: 21.4 MMOL/L (ref 22–29)
COLOR UR: ABNORMAL
CREAT SERPL-MCNC: 0.87 MG/DL (ref 0.57–1)
D DIMER PPP FEU-MCNC: 2.54 MCGFEU/ML (ref 0–0.73)
DEPRECATED RDW RBC AUTO: 50.8 FL (ref 37–54)
EGFRCR SERPLBLD CKD-EPI 2021: 70.5 ML/MIN/1.73
EOSINOPHIL # BLD AUTO: 0.13 10*3/MM3 (ref 0–0.4)
EOSINOPHIL NFR BLD AUTO: 1.2 % (ref 0.3–6.2)
ERYTHROCYTE [DISTWIDTH] IN BLOOD BY AUTOMATED COUNT: 15.1 % (ref 12.3–15.4)
FLUAV RNA RESP QL NAA+PROBE: NOT DETECTED
FLUBV RNA RESP QL NAA+PROBE: NOT DETECTED
GLOBULIN UR ELPH-MCNC: 3 GM/DL
GLUCOSE SERPL-MCNC: 125 MG/DL (ref 65–99)
GLUCOSE UR STRIP-MCNC: NEGATIVE MG/DL
HCT VFR BLD AUTO: 37.2 % (ref 34–46.6)
HGB BLD-MCNC: 11.6 G/DL (ref 12–15.9)
HGB UR QL STRIP.AUTO: ABNORMAL
HYALINE CASTS UR QL AUTO: ABNORMAL /LPF
IMM GRANULOCYTES # BLD AUTO: 0.06 10*3/MM3 (ref 0–0.05)
IMM GRANULOCYTES NFR BLD AUTO: 0.6 % (ref 0–0.5)
KETONES UR QL STRIP: NEGATIVE
LEUKOCYTE ESTERASE UR QL STRIP.AUTO: ABNORMAL
LYMPHOCYTES # BLD AUTO: 1.3 10*3/MM3 (ref 0.7–3.1)
LYMPHOCYTES NFR BLD AUTO: 12 % (ref 19.6–45.3)
MCH RBC QN AUTO: 29.1 PG (ref 26.6–33)
MCHC RBC AUTO-ENTMCNC: 31.2 G/DL (ref 31.5–35.7)
MCV RBC AUTO: 93.5 FL (ref 79–97)
MONOCYTES # BLD AUTO: 0.92 10*3/MM3 (ref 0.1–0.9)
MONOCYTES NFR BLD AUTO: 8.5 % (ref 5–12)
NEUTROPHILS NFR BLD AUTO: 77.4 % (ref 42.7–76)
NEUTROPHILS NFR BLD AUTO: 8.41 10*3/MM3 (ref 1.7–7)
NITRITE UR QL STRIP: NEGATIVE
NT-PROBNP SERPL-MCNC: 1982 PG/ML (ref 0–900)
PH UR STRIP.AUTO: 5.5 [PH] (ref 4.5–8)
PLATELET # BLD AUTO: 269 10*3/MM3 (ref 140–450)
PMV BLD AUTO: 10.5 FL (ref 6–12)
POTASSIUM SERPL-SCNC: 4.9 MMOL/L (ref 3.5–5.2)
PROT SERPL-MCNC: 6.6 G/DL (ref 6–8.5)
PROT UR QL STRIP: ABNORMAL
QT INTERVAL: 426 MS
RBC # BLD AUTO: 3.98 10*6/MM3 (ref 3.77–5.28)
RBC # UR STRIP: ABNORMAL /HPF
REF LAB TEST METHOD: ABNORMAL
SARS-COV-2 RNA RESP QL NAA+PROBE: NOT DETECTED
SODIUM SERPL-SCNC: 133 MMOL/L (ref 136–145)
SP GR UR STRIP: 1.03 (ref 1–1.03)
SQUAMOUS #/AREA URNS HPF: ABNORMAL /HPF
TROPONIN T SERPL-MCNC: 0.02 NG/ML (ref 0–0.03)
TROPONIN T SERPL-MCNC: <0.01 NG/ML (ref 0–0.03)
UROBILINOGEN UR QL STRIP: ABNORMAL
WBC # UR STRIP: ABNORMAL /HPF
WBC NRBC COR # BLD: 10.85 10*3/MM3 (ref 3.4–10.8)

## 2022-11-30 PROCEDURE — 36415 COLL VENOUS BLD VENIPUNCTURE: CPT

## 2022-11-30 PROCEDURE — 71046 X-RAY EXAM CHEST 2 VIEWS: CPT

## 2022-11-30 PROCEDURE — 87636 SARSCOV2 & INF A&B AMP PRB: CPT

## 2022-11-30 PROCEDURE — 25010000002 FUROSEMIDE PER 20 MG

## 2022-11-30 PROCEDURE — 0 TECHNETIUM ALBUMIN AGGREGATED: Performed by: EMERGENCY MEDICINE

## 2022-11-30 PROCEDURE — 99284 EMERGENCY DEPT VISIT MOD MDM: CPT

## 2022-11-30 PROCEDURE — 83880 ASSAY OF NATRIURETIC PEPTIDE: CPT

## 2022-11-30 PROCEDURE — 78582 LUNG VENTILAT&PERFUS IMAGING: CPT

## 2022-11-30 PROCEDURE — 84484 ASSAY OF TROPONIN QUANT: CPT

## 2022-11-30 PROCEDURE — A9540 TC99M MAA: HCPCS | Performed by: EMERGENCY MEDICINE

## 2022-11-30 PROCEDURE — 80053 COMPREHEN METABOLIC PANEL: CPT

## 2022-11-30 PROCEDURE — 85025 COMPLETE CBC W/AUTO DIFF WBC: CPT

## 2022-11-30 PROCEDURE — 93005 ELECTROCARDIOGRAM TRACING: CPT | Performed by: EMERGENCY MEDICINE

## 2022-11-30 PROCEDURE — 78580 LUNG PERFUSION IMAGING: CPT

## 2022-11-30 PROCEDURE — 81003 URINALYSIS AUTO W/O SCOPE: CPT

## 2022-11-30 PROCEDURE — 85379 FIBRIN DEGRADATION QUANT: CPT

## 2022-11-30 PROCEDURE — 81001 URINALYSIS AUTO W/SCOPE: CPT

## 2022-11-30 PROCEDURE — 96374 THER/PROPH/DIAG INJ IV PUSH: CPT

## 2022-11-30 PROCEDURE — 93010 ELECTROCARDIOGRAM REPORT: CPT | Performed by: INTERNAL MEDICINE

## 2022-11-30 RX ORDER — ACETAMINOPHEN 500 MG
500 TABLET ORAL ONCE
Status: COMPLETED | OUTPATIENT
Start: 2022-11-30 | End: 2022-11-30

## 2022-11-30 RX ORDER — AMANTADINE HYDROCHLORIDE 100 MG/1
TABLET ORAL
COMMUNITY
Start: 2022-10-06

## 2022-11-30 RX ORDER — POTASSIUM CHLORIDE 750 MG/1
TABLET, FILM COATED, EXTENDED RELEASE ORAL
COMMUNITY
Start: 2022-10-15

## 2022-11-30 RX ORDER — FUROSEMIDE 10 MG/ML
20 INJECTION INTRAMUSCULAR; INTRAVENOUS ONCE
Status: COMPLETED | OUTPATIENT
Start: 2022-11-30 | End: 2022-11-30

## 2022-11-30 RX ORDER — BENZONATATE 200 MG/1
CAPSULE ORAL
COMMUNITY

## 2022-11-30 RX ORDER — FUROSEMIDE 20 MG/1
TABLET ORAL
COMMUNITY

## 2022-11-30 RX ORDER — ASPIRIN 325 MG
325 TABLET ORAL ONCE
Status: COMPLETED | OUTPATIENT
Start: 2022-11-30 | End: 2022-11-30

## 2022-11-30 RX ORDER — HYOSCYAMINE SULFATE 0.125 MG
TABLET ORAL
COMMUNITY
Start: 2022-10-15

## 2022-11-30 RX ADMIN — KIT FOR THE PREPARATION OF TECHNETIUM TC 99M ALBUMIN AGGREGATED 1 DOSE: 2.5 INJECTION, POWDER, FOR SOLUTION INTRAVENOUS at 20:47

## 2022-11-30 RX ADMIN — FUROSEMIDE 20 MG: 10 INJECTION, SOLUTION INTRAMUSCULAR; INTRAVENOUS at 19:43

## 2022-11-30 RX ADMIN — ACETAMINOPHEN 500 MG: 500 TABLET, FILM COATED ORAL at 18:01

## 2022-11-30 RX ADMIN — ASPIRIN 325 MG: 325 TABLET ORAL at 14:19

## 2022-12-01 NOTE — DISCHARGE INSTRUCTIONS
Continue medication as directed.  Follow-up with your PCP as above.  Return to the ED for worsening symptoms or medical emergencies.  Continue to follow-up with wound care regarding the wound on your lower extremity.

## 2022-12-02 LAB
BACTERIA SPEC AEROBE CULT: NORMAL
BACTERIA SPEC AEROBE CULT: NORMAL

## 2022-12-06 ENCOUNTER — READMISSION MANAGEMENT (OUTPATIENT)
Dept: CALL CENTER | Facility: HOSPITAL | Age: 72
End: 2022-12-06

## 2022-12-06 NOTE — OUTREACH NOTE
LAG < 7 Survey    Flowsheet Row Responses   [facility] facility patient discharged from? [place]   Does the patient have one of the following disease processes/diagnoses(primary or secondary)? Other   BHLAG <7 Attempt successful? Yes   Call start time 1415   Revoke Readmitted  [admitted to [facility] with [facility]I per ]   Call end time 1416   Discharge diagnosis Atrial fibrillation with RVR   Person spoke with today (if not patient) and relationship           AKASH PARKER - Registered Nurse

## 2022-12-07 ENCOUNTER — APPOINTMENT (OUTPATIENT)
Dept: SLEEP MEDICINE | Facility: HOSPITAL | Age: 72
End: 2022-12-07

## 2022-12-12 LAB
MAXIMAL PREDICTED HEART RATE: 147 BPM
STRESS TARGET HR: 125 BPM

## 2022-12-12 PROCEDURE — 93248 EXT ECG>7D<15D REV&INTERPJ: CPT | Performed by: INTERNAL MEDICINE

## 2023-02-23 ENCOUNTER — TRANSCRIBE ORDERS (OUTPATIENT)
Dept: HOME HEALTH SERVICES | Facility: HOME HEALTHCARE | Age: 73
End: 2023-02-23
Payer: MEDICARE

## 2023-02-23 ENCOUNTER — HOME HEALTH ADMISSION (OUTPATIENT)
Dept: HOME HEALTH SERVICES | Facility: HOME HEALTHCARE | Age: 73
End: 2023-02-23
Payer: COMMERCIAL

## 2023-02-23 DIAGNOSIS — I73.9 PERIPHERAL VASCULAR DISEASE, UNSPECIFIED: Primary | ICD-10-CM

## 2023-05-25 ENCOUNTER — APPOINTMENT (OUTPATIENT)
Dept: CT IMAGING | Facility: HOSPITAL | Age: 73
End: 2023-05-25
Payer: MEDICARE

## 2023-05-25 ENCOUNTER — HOSPITAL ENCOUNTER (EMERGENCY)
Facility: HOSPITAL | Age: 73
Discharge: HOME | End: 2023-05-25
Attending: EMERGENCY MEDICINE
Payer: MEDICARE

## 2023-05-25 VITALS
TEMPERATURE: 98.7 F | HEIGHT: 63 IN | BODY MASS INDEX: 20.68 KG/M2 | RESPIRATION RATE: 18 BRPM | DIASTOLIC BLOOD PRESSURE: 70 MMHG | HEART RATE: 69 BPM | SYSTOLIC BLOOD PRESSURE: 133 MMHG | OXYGEN SATURATION: 100 % | WEIGHT: 116.7 LBS

## 2023-05-25 DIAGNOSIS — R10.9 ABDOMINAL PAIN, UNSPECIFIED ABDOMINAL LOCATION: Primary | ICD-10-CM

## 2023-05-25 DIAGNOSIS — N30.01 ACUTE CYSTITIS WITH HEMATURIA: ICD-10-CM

## 2023-05-25 LAB
ALBUMIN SERPL-MCNC: 2.9 G/DL (ref 3.5–5.2)
ALBUMIN/GLOB SERPL: 1 G/DL
ALP SERPL-CCNC: 75 U/L (ref 39–117)
ALT SERPL W P-5'-P-CCNC: 12 U/L (ref 1–33)
ANION GAP SERPL CALCULATED.3IONS-SCNC: 8.7 MMOL/L (ref 5–15)
AST SERPL-CCNC: 19 U/L (ref 1–32)
BACTERIA UR QL AUTO: ABNORMAL /HPF
BASOPHILS # BLD AUTO: 0.08 10*3/MM3 (ref 0–0.2)
BASOPHILS NFR BLD AUTO: 0.7 % (ref 0–1.5)
BILIRUB SERPL-MCNC: 0.2 MG/DL (ref 0–1.2)
BILIRUB UR QL STRIP: NEGATIVE
BUN SERPL-MCNC: 9 MG/DL (ref 8–23)
BUN/CREAT SERPL: 23.1 (ref 7–25)
CALCIUM SPEC-SCNC: 8.4 MG/DL (ref 8.6–10.5)
CHLORIDE SERPL-SCNC: 100 MMOL/L (ref 98–107)
CLARITY UR: ABNORMAL
CO2 SERPL-SCNC: 27.3 MMOL/L (ref 22–29)
COLOR UR: YELLOW
CREAT SERPL-MCNC: 0.39 MG/DL (ref 0.57–1)
D-LACTATE SERPL-SCNC: 2.3 MMOL/L (ref 0.5–2)
DEPRECATED RDW RBC AUTO: 59.1 FL (ref 37–54)
EGFRCR SERPLBLD CKD-EPI 2021: 104.6 ML/MIN/1.73
EOSINOPHIL # BLD AUTO: 0.49 10*3/MM3 (ref 0–0.4)
EOSINOPHIL NFR BLD AUTO: 4.5 % (ref 0.3–6.2)
ERYTHROCYTE [DISTWIDTH] IN BLOOD BY AUTOMATED COUNT: 15.7 % (ref 12.3–15.4)
GLOBULIN UR ELPH-MCNC: 2.9 GM/DL
GLUCOSE SERPL-MCNC: 100 MG/DL (ref 65–99)
GLUCOSE UR STRIP-MCNC: NEGATIVE MG/DL
HCT VFR BLD AUTO: 35 % (ref 34–46.6)
HGB BLD-MCNC: 11.4 G/DL (ref 12–15.9)
HGB UR QL STRIP.AUTO: ABNORMAL
HOLD SPECIMEN: NORMAL
HOLD SPECIMEN: NORMAL
HYALINE CASTS UR QL AUTO: ABNORMAL /LPF
IMM GRANULOCYTES # BLD AUTO: 0.02 10*3/MM3 (ref 0–0.05)
IMM GRANULOCYTES NFR BLD AUTO: 0.2 % (ref 0–0.5)
KETONES UR QL STRIP: NEGATIVE
LEUKOCYTE ESTERASE UR QL STRIP.AUTO: ABNORMAL
LIPASE SERPL-CCNC: 16 U/L (ref 13–60)
LYMPHOCYTES # BLD AUTO: 2.05 10*3/MM3 (ref 0.7–3.1)
LYMPHOCYTES NFR BLD AUTO: 19 % (ref 19.6–45.3)
MCH RBC QN AUTO: 33.1 PG (ref 26.6–33)
MCHC RBC AUTO-ENTMCNC: 32.6 G/DL (ref 31.5–35.7)
MCV RBC AUTO: 101.7 FL (ref 79–97)
MONOCYTES # BLD AUTO: 0.94 10*3/MM3 (ref 0.1–0.9)
MONOCYTES NFR BLD AUTO: 8.7 % (ref 5–12)
NEUTROPHILS NFR BLD AUTO: 66.9 % (ref 42.7–76)
NEUTROPHILS NFR BLD AUTO: 7.21 10*3/MM3 (ref 1.7–7)
NITRITE UR QL STRIP: POSITIVE
NRBC BLD AUTO-RTO: 0 /100 WBC (ref 0–0.2)
PH UR STRIP.AUTO: <=5 [PH] (ref 4.5–8)
PLATELET # BLD AUTO: 363 10*3/MM3 (ref 140–450)
PMV BLD AUTO: 10 FL (ref 6–12)
POTASSIUM SERPL-SCNC: 4.3 MMOL/L (ref 3.5–5.2)
PROT SERPL-MCNC: 5.8 G/DL (ref 6–8.5)
PROT UR QL STRIP: ABNORMAL
RBC # BLD AUTO: 3.44 10*6/MM3 (ref 3.77–5.28)
RBC # UR STRIP: ABNORMAL /HPF
REF LAB TEST METHOD: ABNORMAL
SODIUM SERPL-SCNC: 136 MMOL/L (ref 136–145)
SP GR UR STRIP: 1.02 (ref 1–1.03)
SQUAMOUS #/AREA URNS HPF: ABNORMAL /HPF
UROBILINOGEN UR QL STRIP: ABNORMAL
WBC # UR STRIP: ABNORMAL /HPF
WBC NRBC COR # BLD: 10.79 10*3/MM3 (ref 3.4–10.8)
WHOLE BLOOD HOLD COAG: NORMAL
WHOLE BLOOD HOLD SPECIMEN: NORMAL

## 2023-05-25 PROCEDURE — 99283 EMERGENCY DEPT VISIT LOW MDM: CPT

## 2023-05-25 PROCEDURE — 80053 COMPREHEN METABOLIC PANEL: CPT

## 2023-05-25 PROCEDURE — 36415 COLL VENOUS BLD VENIPUNCTURE: CPT

## 2023-05-25 PROCEDURE — 85025 COMPLETE CBC W/AUTO DIFF WBC: CPT

## 2023-05-25 PROCEDURE — 74176 CT ABD & PELVIS W/O CONTRAST: CPT

## 2023-05-25 PROCEDURE — P9612 CATHETERIZE FOR URINE SPEC: HCPCS

## 2023-05-25 PROCEDURE — 83690 ASSAY OF LIPASE: CPT

## 2023-05-25 PROCEDURE — 81001 URINALYSIS AUTO W/SCOPE: CPT

## 2023-05-25 PROCEDURE — 83605 ASSAY OF LACTIC ACID: CPT

## 2023-05-25 RX ORDER — SODIUM CHLORIDE 0.9 % (FLUSH) 0.9 %
10 SYRINGE (ML) INJECTION AS NEEDED
Status: DISCONTINUED | OUTPATIENT
Start: 2023-05-25 | End: 2023-05-25 | Stop reason: HOSPADM

## 2023-05-25 RX ORDER — DULOXETIN HYDROCHLORIDE 30 MG/1
30 CAPSULE, DELAYED RELEASE ORAL DAILY
COMMUNITY

## 2023-05-25 RX ORDER — NITROFURANTOIN 25; 75 MG/1; MG/1
100 CAPSULE ORAL EVERY 12 HOURS
Qty: 14 CAPSULE | Refills: 0 | Status: SHIPPED | OUTPATIENT
Start: 2023-05-25

## 2023-05-25 RX ORDER — NITROFURANTOIN 25; 75 MG/1; MG/1
100 CAPSULE ORAL ONCE
Status: COMPLETED | OUTPATIENT
Start: 2023-05-25 | End: 2023-05-25

## 2023-05-25 RX ORDER — DIGOXIN 125 MCG
125 TABLET ORAL
COMMUNITY

## 2023-05-25 RX ORDER — GABAPENTIN 300 MG/1
300 CAPSULE ORAL
COMMUNITY

## 2023-05-25 RX ORDER — ACYCLOVIR 400 MG/1
400 TABLET ORAL 3 TIMES DAILY
COMMUNITY
End: 2023-05-27

## 2023-05-25 RX ORDER — HYDROCODONE BITARTRATE AND ACETAMINOPHEN 5; 325 MG/1; MG/1
1 TABLET ORAL EVERY 4 HOURS PRN
COMMUNITY

## 2023-05-25 RX ORDER — ACETAMINOPHEN 325 MG/1
650 TABLET ORAL EVERY 6 HOURS PRN
COMMUNITY

## 2023-05-25 RX ADMIN — NITROFURANTOIN MONOHYDRATE/MACROCRYSTALLINE 100 MG: 25; 75 CAPSULE ORAL at 13:46

## 2023-05-25 NOTE — ED TRIAGE NOTES
Pt via [facility] from [facility] with c/o L sided abd pain that radiates to the R since this morning.   Last BM: yesterday   Pt denies any N/V  Pt given norco and maalox prior to EMS arrival to NH; pt reports a little relief.  Recent LLE above knee amputation.

## 2023-05-25 NOTE — ED NOTES
[facility] provided information for [facility]    [phone] confirmed with [facility], [phone] accepted the transport
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
## 2023-06-10 ENCOUNTER — APPOINTMENT (OUTPATIENT)
Dept: GENERAL RADIOLOGY | Facility: HOSPITAL | Age: 73
End: 2023-06-10
Payer: MEDICARE

## 2023-06-10 ENCOUNTER — APPOINTMENT (OUTPATIENT)
Dept: CT IMAGING | Facility: HOSPITAL | Age: 73
End: 2023-06-10
Payer: MEDICARE

## 2023-06-10 ENCOUNTER — HOSPITAL ENCOUNTER (OUTPATIENT)
Facility: HOSPITAL | Age: 73
Setting detail: OBSERVATION
Discharge: HOME | End: 2023-06-14
Attending: EMERGENCY MEDICINE | Admitting: ORTHOPAEDIC SURGERY
Payer: MEDICARE

## 2023-06-10 DIAGNOSIS — S82.831A CLOSED FRACTURE OF PROXIMAL END OF RIGHT TIBIA AND FIBULA, INITIAL ENCOUNTER: Primary | ICD-10-CM

## 2023-06-10 DIAGNOSIS — R41.841 COGNITIVE COMMUNICATION DEFICIT: ICD-10-CM

## 2023-06-10 DIAGNOSIS — S82.101A CLOSED FRACTURE OF PROXIMAL END OF RIGHT TIBIA AND FIBULA, INITIAL ENCOUNTER: Primary | ICD-10-CM

## 2023-06-10 LAB
ALBUMIN SERPL-MCNC: 2.9 G/DL (ref 3.5–5.2)
ALBUMIN/GLOB SERPL: 0.8 G/DL
ALP SERPL-CCNC: 63 U/L (ref 39–117)
ALT SERPL W P-5'-P-CCNC: 16 U/L (ref 1–33)
ANION GAP SERPL CALCULATED.3IONS-SCNC: 13.2 MMOL/L (ref 5–15)
AST SERPL-CCNC: 22 U/L (ref 1–32)
BASOPHILS # BLD AUTO: 0.06 10*3/MM3 (ref 0–0.2)
BASOPHILS NFR BLD AUTO: 0.6 % (ref 0–1.5)
BILIRUB SERPL-MCNC: 0.5 MG/DL (ref 0–1.2)
BUN SERPL-MCNC: 6 MG/DL (ref 8–23)
BUN/CREAT SERPL: 14.6 (ref 7–25)
CALCIUM SPEC-SCNC: 8.4 MG/DL (ref 8.6–10.5)
CHLORIDE SERPL-SCNC: 103 MMOL/L (ref 98–107)
CO2 SERPL-SCNC: 24.8 MMOL/L (ref 22–29)
CREAT SERPL-MCNC: 0.41 MG/DL (ref 0.57–1)
DEPRECATED RDW RBC AUTO: 55.4 FL (ref 37–54)
EGFRCR SERPLBLD CKD-EPI 2021: 103.4 ML/MIN/1.73
EOSINOPHIL # BLD AUTO: 0.13 10*3/MM3 (ref 0–0.4)
EOSINOPHIL NFR BLD AUTO: 1.4 % (ref 0.3–6.2)
ERYTHROCYTE [DISTWIDTH] IN BLOOD BY AUTOMATED COUNT: 14.8 % (ref 12.3–15.4)
GLOBULIN UR ELPH-MCNC: 3.5 GM/DL
GLUCOSE SERPL-MCNC: 95 MG/DL (ref 65–99)
HCT VFR BLD AUTO: 29.5 % (ref 34–46.6)
HGB BLD-MCNC: 9.4 G/DL (ref 12–15.9)
IMM GRANULOCYTES # BLD AUTO: 0.01 10*3/MM3 (ref 0–0.05)
IMM GRANULOCYTES NFR BLD AUTO: 0.1 % (ref 0–0.5)
LYMPHOCYTES # BLD AUTO: 1.35 10*3/MM3 (ref 0.7–3.1)
LYMPHOCYTES NFR BLD AUTO: 14.5 % (ref 19.6–45.3)
MCH RBC QN AUTO: 32.3 PG (ref 26.6–33)
MCHC RBC AUTO-ENTMCNC: 31.9 G/DL (ref 31.5–35.7)
MCV RBC AUTO: 101.4 FL (ref 79–97)
MONOCYTES # BLD AUTO: 0.76 10*3/MM3 (ref 0.1–0.9)
MONOCYTES NFR BLD AUTO: 8.2 % (ref 5–12)
NEUTROPHILS NFR BLD AUTO: 6.97 10*3/MM3 (ref 1.7–7)
NEUTROPHILS NFR BLD AUTO: 75.2 % (ref 42.7–76)
PLATELET # BLD AUTO: 427 10*3/MM3 (ref 140–450)
PMV BLD AUTO: 9.5 FL (ref 6–12)
POTASSIUM SERPL-SCNC: 3.5 MMOL/L (ref 3.5–5.2)
PROT SERPL-MCNC: 6.4 G/DL (ref 6–8.5)
RBC # BLD AUTO: 2.91 10*6/MM3 (ref 3.77–5.28)
SODIUM SERPL-SCNC: 141 MMOL/L (ref 136–145)
WBC NRBC COR # BLD: 9.28 10*3/MM3 (ref 3.4–10.8)

## 2023-06-10 PROCEDURE — 85025 COMPLETE CBC W/AUTO DIFF WBC: CPT | Performed by: EMERGENCY MEDICINE

## 2023-06-10 PROCEDURE — 94799 UNLISTED PULMONARY SVC/PX: CPT

## 2023-06-10 PROCEDURE — 73700 CT LOWER EXTREMITY W/O DYE: CPT

## 2023-06-10 PROCEDURE — 96375 TX/PRO/DX INJ NEW DRUG ADDON: CPT

## 2023-06-10 PROCEDURE — 73590 X-RAY EXAM OF LOWER LEG: CPT

## 2023-06-10 PROCEDURE — 80053 COMPREHEN METABOLIC PANEL: CPT | Performed by: EMERGENCY MEDICINE

## 2023-06-10 PROCEDURE — 99284 EMERGENCY DEPT VISIT MOD MDM: CPT

## 2023-06-10 PROCEDURE — 96374 THER/PROPH/DIAG INJ IV PUSH: CPT

## 2023-06-10 PROCEDURE — 94761 N-INVAS EAR/PLS OXIMETRY MLT: CPT

## 2023-06-10 PROCEDURE — 36415 COLL VENOUS BLD VENIPUNCTURE: CPT

## 2023-06-10 PROCEDURE — G0378 HOSPITAL OBSERVATION PER HR: HCPCS

## 2023-06-10 PROCEDURE — 96376 TX/PRO/DX INJ SAME DRUG ADON: CPT

## 2023-06-10 PROCEDURE — 25010000002 ONDANSETRON PER 1 MG: Performed by: EMERGENCY MEDICINE

## 2023-06-10 PROCEDURE — 73562 X-RAY EXAM OF KNEE 3: CPT

## 2023-06-10 PROCEDURE — 25010000002 HYDROMORPHONE PER 4 MG: Performed by: ORTHOPAEDIC SURGERY

## 2023-06-10 PROCEDURE — 25010000002 FENTANYL CITRATE (PF) 50 MCG/ML SOLUTION: Performed by: EMERGENCY MEDICINE

## 2023-06-10 RX ORDER — ONDANSETRON 2 MG/ML
4 INJECTION INTRAMUSCULAR; INTRAVENOUS ONCE
Status: COMPLETED | OUTPATIENT
Start: 2023-06-10 | End: 2023-06-10

## 2023-06-10 RX ORDER — TRAMADOL HYDROCHLORIDE 50 MG/1
100 TABLET ORAL EVERY 6 HOURS PRN
Status: DISCONTINUED | OUTPATIENT
Start: 2023-06-10 | End: 2023-06-14

## 2023-06-10 RX ORDER — HYDROCODONE BITARTRATE AND ACETAMINOPHEN 5; 325 MG/1; MG/1
1 TABLET ORAL ONCE
Status: COMPLETED | OUTPATIENT
Start: 2023-06-10 | End: 2023-06-10

## 2023-06-10 RX ORDER — FLUTICASONE PROPIONATE AND SALMETEROL 250; 50 UG/1; UG/1
POWDER RESPIRATORY (INHALATION)
COMMUNITY

## 2023-06-10 RX ORDER — ACYCLOVIR 400 MG/1
400 TABLET ORAL 3 TIMES DAILY
COMMUNITY
End: 2023-06-14 | Stop reason: HOSPADM

## 2023-06-10 RX ORDER — ALBUTEROL SULFATE 90 UG/1
2 AEROSOL, METERED RESPIRATORY (INHALATION) EVERY 4 HOURS PRN
COMMUNITY

## 2023-06-10 RX ORDER — METOPROLOL TARTRATE 50 MG/1
50 TABLET, FILM COATED ORAL EVERY 12 HOURS SCHEDULED
Status: DISCONTINUED | OUTPATIENT
Start: 2023-06-10 | End: 2023-06-14 | Stop reason: HOSPADM

## 2023-06-10 RX ORDER — ERGOCALCIFEROL 1.25 MG/1
50000 CAPSULE ORAL WEEKLY
COMMUNITY

## 2023-06-10 RX ORDER — FENTANYL CITRATE 50 UG/ML
25 INJECTION, SOLUTION INTRAMUSCULAR; INTRAVENOUS ONCE
Status: COMPLETED | OUTPATIENT
Start: 2023-06-10 | End: 2023-06-10

## 2023-06-10 RX ORDER — SODIUM CHLORIDE 9 MG/ML
75 INJECTION, SOLUTION INTRAVENOUS CONTINUOUS
Status: DISCONTINUED | OUTPATIENT
Start: 2023-06-10 | End: 2023-06-14 | Stop reason: HOSPADM

## 2023-06-10 RX ORDER — TRAMADOL HYDROCHLORIDE 50 MG/1
50 TABLET ORAL EVERY 6 HOURS PRN
Status: DISCONTINUED | OUTPATIENT
Start: 2023-06-10 | End: 2023-06-14

## 2023-06-10 RX ORDER — HYDROMORPHONE HYDROCHLORIDE 1 MG/ML
0.5 INJECTION, SOLUTION INTRAMUSCULAR; INTRAVENOUS; SUBCUTANEOUS
Status: DISCONTINUED | OUTPATIENT
Start: 2023-06-10 | End: 2023-06-14 | Stop reason: HOSPADM

## 2023-06-10 RX ORDER — ONDANSETRON 2 MG/ML
4 INJECTION INTRAMUSCULAR; INTRAVENOUS EVERY 6 HOURS PRN
Status: DISCONTINUED | OUTPATIENT
Start: 2023-06-10 | End: 2023-06-14 | Stop reason: HOSPADM

## 2023-06-10 RX ORDER — SODIUM CHLORIDE 0.9 % (FLUSH) 0.9 %
10 SYRINGE (ML) INJECTION AS NEEDED
Status: DISCONTINUED | OUTPATIENT
Start: 2023-06-10 | End: 2023-06-14 | Stop reason: HOSPADM

## 2023-06-10 RX ORDER — SENNA PLUS 8.6 MG/1
1 TABLET ORAL DAILY
COMMUNITY
End: 2023-06-14 | Stop reason: HOSPADM

## 2023-06-10 RX ADMIN — HYDROCODONE BITARTRATE AND ACETAMINOPHEN 1 TABLET: 5; 325 TABLET ORAL at 13:08

## 2023-06-10 RX ADMIN — ONDANSETRON 4 MG: 2 INJECTION INTRAMUSCULAR; INTRAVENOUS at 16:04

## 2023-06-10 RX ADMIN — SODIUM CHLORIDE 75 ML/HR: 9 INJECTION, SOLUTION INTRAVENOUS at 23:17

## 2023-06-10 RX ADMIN — FENTANYL CITRATE 25 MCG: 50 INJECTION, SOLUTION INTRAMUSCULAR; INTRAVENOUS at 16:05

## 2023-06-10 RX ADMIN — METOPROLOL TARTRATE 50 MG: 50 TABLET, FILM COATED ORAL at 23:17

## 2023-06-10 RX ADMIN — HYDROMORPHONE HYDROCHLORIDE 0.5 MG: 1 INJECTION, SOLUTION INTRAMUSCULAR; INTRAVENOUS; SUBCUTANEOUS at 19:38

## 2023-06-10 RX ADMIN — HYDROMORPHONE HYDROCHLORIDE 0.5 MG: 1 INJECTION, SOLUTION INTRAMUSCULAR; INTRAVENOUS; SUBCUTANEOUS at 21:36

## 2023-06-10 NOTE — Clinical Note
Level of Care: Med/Surg [1]   Diagnosis: Closed fracture of proximal end of right tibia and fibula, initial encounter [[id number]]   Admitting Physician: SANDY LEWIS [[id number]]   Attending Physician: SANDY LEWIS [[id number]]
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
## 2023-06-10 NOTE — PLAN OF CARE
Goal Outcome Evaluation:  Plan of Care Reviewed With: patient        Progress: no change  Outcome Evaluation: Admit to [facility] from ER. [place] to room, call light and plan of care. Orders received from Dr Newberry. All care explained and all questions answered. Patient verb understanding. Pt to be NPO after MN. Daughter to bedside and states she will call and update med list tonight after she leaves the hospital.

## 2023-06-11 PROCEDURE — G0378 HOSPITAL OBSERVATION PER HR: HCPCS

## 2023-06-11 PROCEDURE — 94761 N-INVAS EAR/PLS OXIMETRY MLT: CPT

## 2023-06-11 PROCEDURE — 96376 TX/PRO/DX INJ SAME DRUG ADON: CPT

## 2023-06-11 PROCEDURE — 94799 UNLISTED PULMONARY SVC/PX: CPT

## 2023-06-11 PROCEDURE — 25010000002 HYDROMORPHONE PER 4 MG: Performed by: ORTHOPAEDIC SURGERY

## 2023-06-11 PROCEDURE — 94664 DEMO&/EVAL PT USE INHALER: CPT

## 2023-06-11 PROCEDURE — 94640 AIRWAY INHALATION TREATMENT: CPT

## 2023-06-11 RX ORDER — BUDESONIDE AND FORMOTEROL FUMARATE DIHYDRATE 160; 4.5 UG/1; UG/1
2 AEROSOL RESPIRATORY (INHALATION)
Status: DISCONTINUED | OUTPATIENT
Start: 2023-06-11 | End: 2023-06-14 | Stop reason: HOSPADM

## 2023-06-11 RX ORDER — DIGOXIN 125 MCG
125 TABLET ORAL
Status: DISCONTINUED | OUTPATIENT
Start: 2023-06-12 | End: 2023-06-14 | Stop reason: HOSPADM

## 2023-06-11 RX ORDER — ALBUTEROL SULFATE 2.5 MG/3ML
2.5 SOLUTION RESPIRATORY (INHALATION) EVERY 6 HOURS PRN
Status: DISCONTINUED | OUTPATIENT
Start: 2023-06-11 | End: 2023-06-14 | Stop reason: HOSPADM

## 2023-06-11 RX ADMIN — HYDROMORPHONE HYDROCHLORIDE 0.5 MG: 1 INJECTION, SOLUTION INTRAMUSCULAR; INTRAVENOUS; SUBCUTANEOUS at 15:49

## 2023-06-11 RX ADMIN — METOPROLOL TARTRATE 50 MG: 50 TABLET, FILM COATED ORAL at 09:01

## 2023-06-11 RX ADMIN — HYDROMORPHONE HYDROCHLORIDE 0.5 MG: 1 INJECTION, SOLUTION INTRAMUSCULAR; INTRAVENOUS; SUBCUTANEOUS at 09:16

## 2023-06-11 RX ADMIN — HYDROMORPHONE HYDROCHLORIDE 0.5 MG: 1 INJECTION, SOLUTION INTRAMUSCULAR; INTRAVENOUS; SUBCUTANEOUS at 19:46

## 2023-06-11 RX ADMIN — HYDROMORPHONE HYDROCHLORIDE 0.5 MG: 1 INJECTION, SOLUTION INTRAMUSCULAR; INTRAVENOUS; SUBCUTANEOUS at 04:08

## 2023-06-11 RX ADMIN — METOPROLOL TARTRATE 50 MG: 50 TABLET, FILM COATED ORAL at 19:42

## 2023-06-11 RX ADMIN — TRAMADOL HYDROCHLORIDE 100 MG: 50 TABLET ORAL at 12:57

## 2023-06-11 RX ADMIN — BUDESONIDE AND FORMOTEROL FUMARATE DIHYDRATE 2 PUFF: 160; 4.5 AEROSOL RESPIRATORY (INHALATION) at 20:22

## 2023-06-11 RX ADMIN — ALBUTEROL SULFATE 2.5 MG: 2.5 SOLUTION RESPIRATORY (INHALATION) at 15:57

## 2023-06-11 RX ADMIN — HYDROMORPHONE HYDROCHLORIDE 0.5 MG: 1 INJECTION, SOLUTION INTRAMUSCULAR; INTRAVENOUS; SUBCUTANEOUS at 01:54

## 2023-06-11 RX ADMIN — APIXABAN 5 MG: 2.5 TABLET, FILM COATED ORAL at 19:42

## 2023-06-11 RX ADMIN — HYDROMORPHONE HYDROCHLORIDE 0.5 MG: 1 INJECTION, SOLUTION INTRAMUSCULAR; INTRAVENOUS; SUBCUTANEOUS at 07:27

## 2023-06-11 RX ADMIN — HYDROMORPHONE HYDROCHLORIDE 0.5 MG: 1 INJECTION, SOLUTION INTRAMUSCULAR; INTRAVENOUS; SUBCUTANEOUS at 13:43

## 2023-06-11 NOTE — PLAN OF CARE
Goal Outcome Evaluation:  Plan of Care Reviewed With: patient, daughter        Progress: no change  Outcome Evaluation: Patient has been in severe pain today and the IV meds give her short periods of relief, the PO meds do not give her any relief, per the patient. Rohith came and talked with pt and daughter today regarding tib-fib fx and discussed she may not be able to withstand surgery and may end up getting a cast and going home. Daughter said pt was in rehab at [facility] until this past Tuesday when she was dc'd from [place] after stomach issues. [facility] gave pt option to go home instead of rehab per the daughter. Daughter asks that we DO NOT discharge pt home - rather have her placed back at [facility] or another facility due to the fact that the daughter cannot care for the pt. Daughter also asks if we do perform surgery, can a port be placed due to her mother's poor vascular access. Passed this info to case rogerio and Rohith.
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
## 2023-06-11 NOTE — CASE MANAGEMENT/SOCIAL WORK
Continued Stay Note  KASSIDY Ford     Patient Name: Juan Pablo Hernandez  MRN: [id number]  Today's Date: 6/11/2023    Admit Date: 6/10/2023    Plan: DC to accepting SNF   Discharge Plan       Row Name 06/11/23 1639       Plan    Plan DC to accepting SNF    Plan Comments CCP tried to visit with pt twice today and she was sleeping.  CCP called dgt, Supriya, and discussed dc plans with her.  CCP introduced self, role and reviewed face sheet.  Pt had been in [facility] [place] until last Tuesday where she was dc 'd home instead of back to [facility].  Daughter is unable to provide the care pt needs and cannot meet pts needs at home. Pt requires total care.  Dgt is requesting pt go back to [facility] or another accepting SNF.  CCP advised her that a precert from insurance will need to happen before pt can dc there.  [facility]  CCP will follow. Thanks, Melonie BELTRAN                   Discharge Codes    No documentation.                       Melonie Kerns

## 2023-06-11 NOTE — DISCHARGE PLACEMENT REQUEST
"Esau Hernandez \"LUZ\" (73 y.o. Female)       Date of Birth   1950    Social Security Number       Address   [address] [place] [address]    Home Phone   [phone]    MRN   [id number]       [facility]    Marital Status                               Admission Date   6/10/23    Admission Type   Emergency    Admitting Provider   Moy Newberry MD    Attending Provider   Moy Newberry MD    Department, Room/Bed   [facility] MED SURG, 1408/1       Discharge Date       Discharge Disposition       Discharge Destination                                 Attending Provider: Moy Newberry MD    Allergies: Moxifloxacin, Penicillins, Amoxicillin-pot Clavulanate, Ciprofloxacin, Codeine, Doxycycline, Morphine, Morphine And Related, Sulfa Antibiotics, Adhesive Tape, Latex    Isolation: Contact   Infection: MRSA (11/18/22)   Code Status: Prior    Ht: 160 cm (63\")   Wt: 54.8 kg (120 lb 13 oz)    Admission Cmt: None   Principal Problem: Closed fracture of proximal end of right tibia and fibula, initial encounter [S82.101A,S82.831A]                   Active Insurance as of 6/10/2023       Primary Coverage       Payor Plan Insurance Group Employer/Plan Group    ANTHEM MEDICARE REPLACEMENT ANTHEM MEDICARE ADVANTAGE [id number]       Payor Plan Address Payor Plan Phone Number Payor Plan Fax Number Effective Dates    [address] [phone]  9/1/2022 - None Entered    [facility] [address]         Subscriber Name Subscriber Birth Date Member ID       ESAU HERNANDEZ 1950 [id number]               Secondary Coverage       Payor Plan Insurance Group Employer/Plan Group    [place]        Payor Plan Address Payor Plan Phone Number Payor Plan Fax Number Effective Dates    [address]   9/1/2021 - None Entered    [facility] [address]         Subscriber Name Subscriber Birth Date Member ID       ESAU HERNANDEZ 1950 " [id number]                     Emergency Contacts        (Rel.) Home Phone Work Phone Mobile Phone    Jazzy Walters (Daughter) [phone] -- [phone]    Daniel Hernandez (Spouse) [phone] -- [phone]    IvisYuridia (Daughter) [phone] -- --

## 2023-06-12 ENCOUNTER — APPOINTMENT (OUTPATIENT)
Dept: GENERAL RADIOLOGY | Facility: HOSPITAL | Age: 73
End: 2023-06-12
Payer: MEDICARE

## 2023-06-12 PROCEDURE — 25010000002 HYDROMORPHONE PER 4 MG: Performed by: ORTHOPAEDIC SURGERY

## 2023-06-12 PROCEDURE — 73560 X-RAY EXAM OF KNEE 1 OR 2: CPT

## 2023-06-12 PROCEDURE — 94799 UNLISTED PULMONARY SVC/PX: CPT

## 2023-06-12 PROCEDURE — G0378 HOSPITAL OBSERVATION PER HR: HCPCS

## 2023-06-12 PROCEDURE — 96376 TX/PRO/DX INJ SAME DRUG ADON: CPT

## 2023-06-12 PROCEDURE — 73590 X-RAY EXAM OF LOWER LEG: CPT

## 2023-06-12 RX ADMIN — APIXABAN 5 MG: 2.5 TABLET, FILM COATED ORAL at 11:14

## 2023-06-12 RX ADMIN — Medication 10 ML: at 08:26

## 2023-06-12 RX ADMIN — BUDESONIDE AND FORMOTEROL FUMARATE DIHYDRATE 2 PUFF: 160; 4.5 AEROSOL RESPIRATORY (INHALATION) at 09:26

## 2023-06-12 RX ADMIN — BUDESONIDE AND FORMOTEROL FUMARATE DIHYDRATE 2 PUFF: 160; 4.5 AEROSOL RESPIRATORY (INHALATION) at 19:42

## 2023-06-12 RX ADMIN — METOPROLOL TARTRATE 50 MG: 50 TABLET, FILM COATED ORAL at 08:26

## 2023-06-12 RX ADMIN — HYDROMORPHONE HYDROCHLORIDE 0.5 MG: 1 INJECTION, SOLUTION INTRAMUSCULAR; INTRAVENOUS; SUBCUTANEOUS at 00:20

## 2023-06-12 RX ADMIN — APIXABAN 5 MG: 2.5 TABLET, FILM COATED ORAL at 19:56

## 2023-06-12 RX ADMIN — TRAMADOL HYDROCHLORIDE 100 MG: 50 TABLET ORAL at 08:25

## 2023-06-12 RX ADMIN — DIGOXIN 125 MCG: 125 TABLET ORAL at 11:14

## 2023-06-12 RX ADMIN — METOPROLOL TARTRATE 50 MG: 50 TABLET, FILM COATED ORAL at 19:56

## 2023-06-12 NOTE — PLAN OF CARE
Goal Outcome Evaluation:  Plan of Care Reviewed With: patient        Progress: no change  Outcome Evaluation: vss. medicated once with po pain medication. pt anxious, aggrevated, confused of situation. Ace wrap currently remains in place, RLE elvevated. pt has no iv access, MD aware. awaiting placement for rehab. no other complaints at this time.

## 2023-06-12 NOTE — CASE MANAGEMENT/SOCIAL WORK
Continued Stay Note  KASSIDY Ford     Patient Name: Juan Pablo Hernandez  MRN: [id number]  Today's Date: 6/12/2023    Admit Date: 6/10/2023    Plan: SNF referrals   Discharge Plan       Row Name 06/12/23 1306       Plan    Plan SNF referrals    Patient/Family in Agreement with Plan yes    Plan Comments Rec'd return call from Claire/[facility] Reyes who states they are out of network with patients insurance. Follow up call to Daniella/[facility] liaison regarding referrals to [facility] and [facility], request return call with status update. DRE Mahmood/[facility]  requesting return call regarding clinicals faxed this am. Per Michelle/PT, she has not been able to work with patient this afternoon and will enter a note  stating such. CM will continue to follow.      Row Name 06/12/23 1140       Plan    Plan SNF referrals    Patient/Family in Agreement with Plan yes    Plan Comments Return call from patients daughter, Supriya. Updated that [facility] is able to accept pending precert and [facility] is following pending PT notes. Discussed additional referrals made and aske for family preference of facilities. Supriya states interested in [facility], [facility] and requests [facility] [place]. She will look up [facility] and consider it as a back up. She is not interested in [facility] as patient has been there before.  Spoke with Sobeida/[facility] Reyes and referral placed in [facility]. CM will continue to follow.                   Discharge Codes    No documentation.                       Jet Byrnes RN

## 2023-06-12 NOTE — NURSING NOTE
Call to Dr. Newberry- patient agitated and pulling at ace wrap on RLE. patient spilled her soda in bed and heel of foot/ace wrap was wet. Towels applied to absorb as much as possible. Bedding changed.  Per Rohith, the dressing will be changed tomorrow. Continue to obtain placement for patient for rehab.

## 2023-06-12 NOTE — CASE MANAGEMENT/SOCIAL WORK
Continued Stay Note  KASSIDY Ford     Patient Name: Juan Pablo Hernandez  MRN: [id number]  Today's Date: 6/12/2023    Admit Date: 6/10/2023    Plan: SNF referrals   Discharge Plan       Row Name 06/12/23 1140       Plan    Plan SNF referrals    Patient/Family in Agreement with Plan yes    Plan Comments Attempt to speak with patient, she is sleeping with no family present.Return call from patients Supriya stanley. Updated that [facility] is able to accept pending precert and [facility] is following pending PT notes. Discussed additional referrals made and asked for family preference of facilities. Supriya states interested in [facility] and requests referral to [facility] in [place]. She will look up [facility] and consider it as a back up. She is not interested in [facility] as patient has been there before.  Spoke with Sobeida/Baldemar Reyes and referral placed in [facility]. CM will continue to follow.      Row Name 06/12/23 0913       Plan    Plan SNF referrals    Plan Comments Rec'd call from [facility] who states they are unable to accept patient. LVM for patients Supriya stanley, updateing and requesting additional facilities for referral. CM will continue to follow.                   Discharge Codes    No documentation.                       Jet Byrnes RN

## 2023-06-12 NOTE — PLAN OF CARE
Problem: Adult Inpatient Plan of Care  Goal: Plan of Care Review  6/12/2023 0607 by Michelle Ribeiro, RN  Outcome: Ongoing, Progressing  Flowsheets  Taken 6/12/2023 0607 by Michelle Ribeiro, RN  Plan of Care Reviewed With: patient  Outcome Evaluation: pt states shes in severe pain. sleep throughout the night. recieved pain medication twice, see MAR. VSS. very irritable  6/12/2023 0606 by Michelle Ribeiro, RN  Outcome: Ongoing, Progressing   Goal Outcome Evaluation:

## 2023-06-12 NOTE — PROGRESS NOTES
"Adult Nutrition  Assessment/PES    Patient Name:  Juan Pablo Hernandez  YOB: 1950  MRN: [id number]  Admit Date:  6/10/2023    Assessment Date:  6/12/2023    Comments:  Pt confused/agitated during RD visit.  Will follow up at later time to further assess patient needs.     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, CHF and diabetes                Nutrition/Diet History     Row Name 06/12/23 1626          Nutrition/Diet History    Typical Intake (Food/Fluid/EN/PN) --  unable to obtain history from pt, agitated and confused, stated needed to call daughter                Labs/Tests/Procedures/Meds     Row Name 06/12/23 1627          Labs/Procedures/Meds    Lab Results Reviewed reviewed        Medications    Pertinent Medications Reviewed reviewed     Pertinent Medications Comments IV fluids at 75 ml/hour                  Estimated/Assessed Needs - Anthropometrics     Row Name 06/12/23 1620          Anthropometrics    Age for Calculations 73     Height for Calculation 1.6 m (5' 2.99\")     Weight for Calculation 54.8 kg (120 lb 13 oz)        Estimated/Assessed Needs    Additional Documentation Kearney-St. Jeor Equation (Group);Protein Requirements (Group);Fluid Requirements (Group)        Kearney-St. Jeor Equation    RMR (Kearney-St. Jeor Equation) 1017.125     Kearney-St. Jeor Activity Factors 1.4 - 1.5     Activity Factors (Kearney-St. Jeor) 1423.975 - 1525.6809        Protein Requirements    Weight Used For Protein Calculations 54.8 kg (120 lb 13 oz)     Est Protein Requirement Amount (gms/kg) 1.2 gm protein     Estimated Protein Requirements (gms/day) 65.76        Fluid Requirements    Estimated Fluid Requirement Method RDA Method                Nutrition Prescription Ordered     Row Name 06/12/23 1631 "          Nutrition Prescription PO    Current PO Diet Soft Texture     Texture Chopped  meats     Common Modifiers Cardiac                Evaluation of Received Nutrient/Fluid Intake     Row Name 06/12/23 1632          PO Evaluation    Number of Meals 3     % PO Intake 50%, 0%, 50%                   Problem/Interventions:   Problem 1     Row Name 06/12/23 1633          Nutrition Diagnoses Problem 1    Problem 1 --  Predicted suboptimal po intake as related to confusion/fracture and repair as evidenced by current intake                      Intervention Goal     Row Name 06/12/23 1635          Intervention Goal    PO Establish PO;PO intake (%)     PO Intake % 50 %  or greater of meals     Weight No significant weight loss                Nutrition Intervention     Row Name 06/12/23 1636          Nutrition Intervention    RD/Tech Action Follow Tx progress                  Education/Evaluation     Row Name 06/12/23 1636          Education    Education Education not appropriate at this time        Monitor/Evaluation    Monitor I&O;PO intake;Pertinent labs;Weight;Skin status                 Electronically signed by:  Jennifer Wilkerson RD  06/12/23 16:36 EDT

## 2023-06-12 NOTE — CASE MANAGEMENT/SOCIAL WORK
Continued Stay Note  KASSIDY Ford     Patient Name: Juan Pablo Hernandez  MRN: [id number]  Today's Date: 6/12/2023    Admit Date: 6/10/2023    Plan: SNF referrals   Discharge Plan       Row Name 06/12/23 2261       Plan    Plan SNF referrals    Plan Comments Rec'd voice mail from Daniella/[facility] liaison who states [facility] is out of network with patients insurnace and is unable to accept. Currently [facility] has only a semi-private bed available, patient is in contact isolation so they are unable to accept. If a private room becomes available, they will be happy to review again. Patient refused to work with PT today and insurance requires a precert. Will follow up in am for PT notes and  additional referrals for SNF placement. CM will continue to follow.      Row Name 06/12/23 5117       Plan    Plan SNF referrals    Patient/Family in Agreement with Plan yes    Plan Comments Rec'd return call from Claire/Baldemar Reyes who states they are out of network with patients insurance. Follow up call to Daniella/[facility] liaison regarding referrals to [facility] and [facility], request return call with status update. ASHLEY Mahmood/[facility]  requesting return call regarding clinicals faxed this am. Per Michelle/PT, she has not been able to work with patient this afternoon and will enter a note  stating such. CM will continue to follow.                   Discharge Codes    No documentation.                       Jet Byrnes RN

## 2023-06-12 NOTE — SIGNIFICANT NOTE
06/12/23 1441   OTHER   Discipline physical therapist   Rehab Time/Intention   Session Not Performed patient/family declined evaluation  (pt continues to display confusion, adamantly refuses therapy evaluation despite encouragement.  Daughter present and attempts to encourage patient to participate in evaluation however pt continues to refuse. Will follow up in AM to attempt evaluation.)

## 2023-06-12 NOTE — CASE MANAGEMENT/SOCIAL WORK
Continued Stay Note  KASSIDY Ford     Patient Name: Juan Pablo Hernandez  MRN: [id number]  Today's Date: 6/12/2023    Admit Date: 6/10/2023    Plan: SNF referrals   Discharge Plan       Row Name 06/12/23 0913       Plan    Plan SNF referrals    Plan Comments Rec'd call from [facility] who states they are unable to accept patient. LVM for patients daughter, Supriya Walters, to update and requesting additional facilities for referral. [facility] declined. [facility] are out of network with patients insurance. Referrals via epi to [facility], [facility], [facility]  & [facility]. Spoke to or LVM for each facility liaison. CM will continue to follow.                   Discharge Codes    No documentation.                       Jet Byrnes RN

## 2023-06-13 PROCEDURE — G0378 HOSPITAL OBSERVATION PER HR: HCPCS

## 2023-06-13 PROCEDURE — 94664 DEMO&/EVAL PT USE INHALER: CPT

## 2023-06-13 PROCEDURE — 94799 UNLISTED PULMONARY SVC/PX: CPT

## 2023-06-13 RX ADMIN — METOPROLOL TARTRATE 50 MG: 50 TABLET, FILM COATED ORAL at 20:05

## 2023-06-13 RX ADMIN — BUDESONIDE AND FORMOTEROL FUMARATE DIHYDRATE 2 PUFF: 160; 4.5 AEROSOL RESPIRATORY (INHALATION) at 20:54

## 2023-06-13 RX ADMIN — APIXABAN 5 MG: 2.5 TABLET, FILM COATED ORAL at 08:49

## 2023-06-13 RX ADMIN — APIXABAN 5 MG: 2.5 TABLET, FILM COATED ORAL at 20:05

## 2023-06-13 RX ADMIN — BUDESONIDE AND FORMOTEROL FUMARATE DIHYDRATE 2 PUFF: 160; 4.5 AEROSOL RESPIRATORY (INHALATION) at 07:54

## 2023-06-13 RX ADMIN — TRAMADOL HYDROCHLORIDE 50 MG: 50 TABLET ORAL at 15:05

## 2023-06-13 RX ADMIN — METOPROLOL TARTRATE 50 MG: 50 TABLET, FILM COATED ORAL at 08:49

## 2023-06-13 RX ADMIN — TRAMADOL HYDROCHLORIDE 50 MG: 50 TABLET ORAL at 16:33

## 2023-06-13 NOTE — SIGNIFICANT NOTE
"   06/13/23 1055   OTHER   Discipline physical therapist   Rehab Time/Intention   Session Not Performed patient/family declined evaluation  (PT: Attempted PT evaluation. Patient repeatedly refuses. Encouraged patient to participate, patient states \"I know it might be good for me, but I don't want to.\" Patient became agitated when questions were asked regarding potential discharge plans, patient states \"Quit asking me this stuff, it is none of your business.\" When asked if she would be agreeable if therapist returned later, patient states \"No, no I will not!\" Will attempt evaluation one additional time tomorrow, if patient continues to refuse will discharge from PT census. At this time, patient would likely benefit from discharge to ECF based on baseline functional deficits, current weight bearing restrictions, and refusal to actively participate in therapy evaluation.)       "

## 2023-06-13 NOTE — CASE MANAGEMENT/SOCIAL WORK
Continued Stay Note  KASSIDY Ford     Patient Name: Juan Pablo Hernandez  MRN: [id number]  Today's Date: 6/13/2023    Admit Date: 6/10/2023    Plan: to be determined   Discharge Plan       Row Name 06/13/23 9587       Plan    Plan to be determined    Plan Comments Follow up visit, patient is sitting up in bed eating lunch, she fraga not like the green beans. CM will return and follow up with patients daughter when she visits this afternoon to discuss dc planning. Spoke with Candis DO to update.. CM will continue to follow.      Row Name 06/13/23 1047       Plan    Plan SNF referrals    Plan Comments Rec'd call from patients daughter, Supriya. Updated - [place] is out of network with patients insurance. Facilities still willing to follow are [facility] - awaiting PT notes if patient works with therapy today in order to start precert. Supriya states they are not interested in [facility]. CM will also followup with [facility] - referral made yesterday. Discussion that in order for insurance to pay for SNF, patient must work with therapy. If she continues to refuse, then patient could go to a facility private pay. If family is interested in long term care, it would start as private pay and the facility would help the family with process for medicaid pending placement. Supriya states they are wanting patient to return back home.  She states she will be visiting patient this afternoon. Mamta/PT arrives to CM office and states patient has adamantly refused to work with therapy this morning. CM will continue to follow.                   Discharge Codes    No documentation.                       Jet Byrnes RN

## 2023-06-13 NOTE — CASE MANAGEMENT/SOCIAL WORK
Continued Stay Note  KASSIDY Ford     Patient Name: Juan Pablo Hernandez  MRN: [id number]  Today's Date: 6/13/2023    Admit Date: 6/10/2023    Plan: SNF referrals   Discharge Plan       Row Name 06/13/23 1047       Plan    Plan SNF referrals    Plan Comments Rec'd call from patients daughter, Supriya. Updated - [facility] is out of network with patients insurance. Facilities still willing to follow are [facility] - awaiting PT notes if patient works with therapy today in order to start precert. Spuriya states they are not interested in [facility]. CM will also followup with [facility] - referral made yesterday. Discussion that in order for insurance to pay for SNF, patient must work with therapy. If she continues to refuse, then patient could go to a facility private pay. If family is interested in long term care, it would start as private pay and the facility would help the family with process for medicaid pending placement. Supriya states they are wanting patient to return back home.  She states she will be visiting patient this afternoon. Mamta/PT arrives to CM office and states patient has adamantly refused to work with therapy this morning. CM will continue to follow.                   Discharge Codes    No documentation.                       Jet Byrnes RN

## 2023-06-13 NOTE — PLAN OF CARE
Goal Outcome Evaluation:  Plan of Care Reviewed With: patient        Progress: no change  Outcome Evaluation: Medicated twice for c/o pain; refusaing turns at times; refused oral meds except for pain med (spit out meds); drsg/splint changed by Dr Newberry; refused P.T.; VSS; plan TBD

## 2023-06-13 NOTE — PROGRESS NOTES
Nutrition Services    Patient Name:  Juan Pablo Hernandez  YOB: 1950  MRN: [id number]  Admit Date:  6/10/2023      Chart reviewed for further nutrition exam. Spoke w Rn who said was ok to see pt. Pt wakes but agitated & says get out of her room, she doesn't want to do this & wont take her medicine. Relayed to Rn who could hear from the nurses station but raising her voice.    Pt appears to have some buccal, tempora, shoulder/acromion muscle wasting but otherwise unable to assess physical nutrition status. Breakfast untouched at visit.       Will cont to follow and monitor.     Electronically signed by:  Renetta Crouch RD  06/13/23 13:06 EDT

## 2023-06-13 NOTE — PLAN OF CARE
"Goal Outcome Evaluation:  Plan of Care Reviewed With: patient        Progress: no change  Outcome Evaluation: VSS - no c/o pain, asked pt as start of shift if she was in pain and she said \"if I was, I wouldn't tell you anyway\" - rested well         "

## 2023-06-13 NOTE — CASE MANAGEMENT/SOCIAL WORK
"Continued Stay Note   [place]     Patient Name: Juan Pablo Hernandez  MRN: [id number]  Today's Date: 6/13/2023    Admit Date: 6/10/2023    Plan: plan  is undetermined   Discharge Plan       Row Name 06/13/23 1521       Plan    Plan plan  is undetermined    Patient/Family in Agreement with Plan yes    Plan Comments Follow up visit with patient. Patients  and daughter, Jazzy, present. Patient is aggitated and asking for pain medication. She becomes more frustrated and demanding, interrupting conversation with patients family. Discussion with Jazzy to update that patients insurance requires a precert and insurance will not pay for STR without PT notes from the hospital stay. Informed they can go to a facility private pay and that cost is typically in the $300/day range depending on the facility. Additionally, they can ask any facility about a medicaid pending bed if they are interested in long term care. Depending on income/assets, they may need to pay privately until they qualify for medicaid - the facility can provide details of the process. Jazzy states the patient may need an\"evaluation\" of mental status. Informed there is not a tata-psych facility that is able to manage medical needs. Listing of area facilities and private pay in home care givers provided to Jazzy. Message to Dr Newberry/Candis DO to update. CM will continue to follow.      Row Name 06/13/23 4147       Plan    Plan to be determined    Plan Comments Follow up visit, patient is sitting up in bed eating lunch, she fraga not like the green beans. CM will return and follow up iwht patients daughter when she visits this afternoon to discuss dc planning. [facility] Candis DO to update, request return call to discuss dc planning. CM will continue to follow.                   Discharge Codes    No documentation.                       Jet Byrnes RN    "

## 2023-06-14 VITALS
HEIGHT: 63 IN | WEIGHT: 120.81 LBS | SYSTOLIC BLOOD PRESSURE: 138 MMHG | DIASTOLIC BLOOD PRESSURE: 69 MMHG | TEMPERATURE: 97.9 F | BODY MASS INDEX: 21.41 KG/M2 | OXYGEN SATURATION: 98 % | RESPIRATION RATE: 18 BRPM | HEART RATE: 72 BPM

## 2023-06-14 PROCEDURE — G0378 HOSPITAL OBSERVATION PER HR: HCPCS

## 2023-06-14 PROCEDURE — 97161 PT EVAL LOW COMPLEX 20 MIN: CPT

## 2023-06-14 PROCEDURE — 92523 SPEECH SOUND LANG COMPREHEN: CPT

## 2023-06-14 RX ORDER — HYDROCODONE BITARTRATE AND ACETAMINOPHEN 5; 325 MG/1; MG/1
1 TABLET ORAL EVERY 4 HOURS PRN
Qty: 36 TABLET | Refills: 0 | Status: SHIPPED | OUTPATIENT
Start: 2023-06-14 | End: 2023-06-21

## 2023-06-14 RX ORDER — METOPROLOL TARTRATE 50 MG/1
50 TABLET, FILM COATED ORAL EVERY 12 HOURS SCHEDULED
Qty: 60 TABLET | Refills: 0 | Status: SHIPPED | OUTPATIENT
Start: 2023-06-14

## 2023-06-14 RX ORDER — HYDROCODONE BITARTRATE AND ACETAMINOPHEN 5; 325 MG/1; MG/1
1 TABLET ORAL EVERY 4 HOURS PRN
Status: DISCONTINUED | OUTPATIENT
Start: 2023-06-14 | End: 2023-06-14 | Stop reason: HOSPADM

## 2023-06-14 RX ADMIN — METOPROLOL TARTRATE 50 MG: 50 TABLET, FILM COATED ORAL at 09:44

## 2023-06-14 RX ADMIN — TRAMADOL HYDROCHLORIDE 100 MG: 50 TABLET ORAL at 10:30

## 2023-06-14 RX ADMIN — DIGOXIN 125 MCG: 125 TABLET ORAL at 13:24

## 2023-06-14 RX ADMIN — HYDROCODONE BITARTRATE AND ACETAMINOPHEN 1 TABLET: 5; 325 TABLET ORAL at 16:35

## 2023-06-14 RX ADMIN — APIXABAN 5 MG: 2.5 TABLET, FILM COATED ORAL at 09:44

## 2023-06-14 NOTE — CASE MANAGEMENT/SOCIAL WORK
Continued Stay Note  KASSIDY Ford     Patient Name: Juan Pablo Hernandez  MRN: [id number]  Today's Date: 6/14/2023    Admit Date: 6/10/2023    Plan: Plan home w family   Discharge Plan       Row Name 06/14/23 1606       Plan    Plan Plan home w family    Plan Comments Per Katherine/Donita, patients wc is less than 5 years old and rec'd from [facility]. Insurance will not cover cost of another wc. Krista lift requires a prior auth - informed patient is set to dc today, review of patient situation and [facility] krista [facility] to the home today and follow up if prior auth is not obtained. Call to aJzzy to update regarding DME. Informed she needs to contact [facility] to obtain elevated leg rests for wc. [facility] will contact Jazzy to arrange for delivery of krista lift to the home today.  Informed  contact # for [facility], [facility] and [employer] services will be printed in [place] paperwork.Jazzy verbalizes understanding. Page for  transportation services and [facility] Brochure provided to Zuleima ROBERTS to include in [place] paperwork. Zule[employer] ROBERTS and Candis Hernandez APRN updated. Plan for patient to [place] home today via ambulance transport.      Row Name 06/14/23 1421       Plan    Plan plan home with family    Plan Comments Follow up call to patients daughter, Jazzy. Discussed needed DME. Patient currently has a wc, but it does not have elevated leg rest, she is unsure how old the wc is and thinks it came from [facility]. Patient has bsc, hospital bed and transport chair. Explained insurance will not pay for a wc if one has been provided in the past 5 years. CM will order wc w elevated leg rests and krista lift for home. DWO signed and referral to [facility]. They will run insurance and see if wc is covered. If not covered, family can contact [facility] to obtain elevated leg rests for wc. Jazzy verbalizes understanding. Equipment will be delivered to the home per [facility] once approved and  Katherine/Donita will contact Jazzy to make delivery arrangements. Jazzy asks how she will transport patient. Informed patient will qualify for EMS transport home. CM will provide lisiting of transportation services that include wc/stretcher transport - there is a charge for these services. Family can also call # on patient insurance card to see if any transportation services are provided by insurance. Jazzy asks if Nsg can be ordered to give patient a bath at home. HH services cannot be ordered for personal care. CM will provide information on how to apply for [employer] service that include personal care. CM will continue to follow and assist with dc needs.      Row Name 06/14/23 3357       Plan    Plan home with family    Patient/Family in Agreement with Plan other (see comments)    Plan Comments Spoke with Mamta/PT this morning who states patient was agreeable to sit at side of bed/see PT eval notes. Case reviewed with Candis DO, she states patients daughter does not want patient dc'd home. Reviewed  options of care that have been discussed with daughter, Jazzy. 1. Patient does not qualify for STR - precert is needed, patient does not meet criteria set by insurance. 2. Listing of in home private pay care givers has been given to daughter for additional help in the home. 3. Discussion of going to a facility private pay has been discussed with daughter. 4. Discussion of long term placement/medicaid pending process has been discussed with daughter - she would need to contact the preferred facility regarding financial information needed to qualify for medicaid pending. Candis DO  calls patients daughter. Candis DO will contact daughter and inform of plan to dc home. Informed CM can arrange for DME if needed and orhto can order. Will continue to follow.                   Discharge Codes    No documentation.                       Jet Byrnes RN

## 2023-06-14 NOTE — PROGRESS NOTES
Diagnosis: Closed treatment right periprosthetic proximal tibia fracture    Subjective:Patient states pain reasonably controlled, stable in splint, denies fevers chills or sweats overnight, denies any residual numbness or tingling to operative extremity.  No calf pain or swelling.  Has intermittently had confusion and then combative with staff    Objective:  Vitals:    06/13/23 1948 06/13/23 2054 06/14/23 0123 06/14/23 0642   BP: 136/83   125/68   BP Location: Right arm   Right arm   Patient Position: Lying   Lying   Pulse: 84 75  70   Resp: 18 18  20   Temp: 99.5 °F (37.5 °C)  97.5 °F (36.4 °C) 96.8 °F (36 °C)   TempSrc: Oral  Oral Oral   SpO2: 97% 99%  97%   Weight:       Height:           Results from last 7 days   Lab Units 06/10/23  1600   WBC 10*3/mm3 9.28   HEMOGLOBIN g/dL 9.4*   HEMATOCRIT % 29.5*   PLATELETS 10*3/mm3 427       Results from last 7 days   Lab Units 06/10/23  1600   SODIUM mmol/L 141   POTASSIUM mmol/L 3.5   CHLORIDE mmol/L 103   CO2 mmol/L 24.8   BUN mg/dL 6*   CREATININE mg/dL 0.41*   GLUCOSE mg/dL 95   CALCIUM mg/dL 8.4*       Exam:    Right knee- dressing clean, dry, intact   Flex and extend toes and ankle   No calf pain, negative Homans sign   Positive sensation light touch right foot   Brisk cap refill all digits, palpable dorsalis pedis pulse    Impression: Closed treatment right proximal tibia periprosthetic fracture    Plan:  1. 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 Pain control-tramadol  3. DVT prophylaxis-Eliquis  4. Disposition-discharge planning with case management, will be difficult placement secondary to medical limitations

## 2023-06-14 NOTE — PROGRESS NOTES
POD# Closed treatment right periprosthetic proximal tibia fracture     Subjective: Juan Pablo Hernandez resting in bed this afternoon, pain at the right lower extremity with any motion.  Splint was changed 1 day ago.  She is alert to self and location today, denies any presence of numbness or tingling at the right lower extremity.    Objective:  Vitals:    06/13/23 2054 06/14/23 0123 06/14/23 0642 06/14/23 1324   BP:   125/68    BP Location:   Right arm    Patient Position:   Lying    Pulse: 75  70 76   Resp: 18  20    Temp:  97.5 °F (36.4 °C) 96.8 °F (36 °C)    TempSrc:  Oral Oral    SpO2: 99%  97%    Weight:       Height:           Results from last 7 days   Lab Units 06/10/23  1600   WBC 10*3/mm3 9.28   HEMOGLOBIN g/dL 9.4*   HEMATOCRIT % 29.5*   PLATELETS 10*3/mm3 427       Results from last 7 days   Lab Units 06/10/23  1600   SODIUM mmol/L 141   POTASSIUM mmol/L 3.5   CHLORIDE mmol/L 103   CO2 mmol/L 24.8   BUN mg/dL 6*   CREATININE mg/dL 0.41*   GLUCOSE mg/dL 95   CALCIUM mg/dL 8.4*       Exam:    Right lower extremity examined  Splint clean dry and intact  Flex/extend all digits right foot  Positive sensation the dorsum and plantar aspect of the foot    Impression: Closed treatment right proximal tibia periprosthetic fracture    Plan:  1. PT/OT- nonweightbearing right lower extremity, splint in place at all times, mobilization into extent possible which is significantly limited secondary to above-knee amputation on the left side as well   2. Pain control-Norco  3. DVT prophylaxis-Eliquis  4. Wound care-splint remain intact until follow-up in office in 1 week  5. Disposition-Home today with family, strict nonweightbearing right lower extremity, will need Krista lift as coordinated by case management, wheelchair and elevated leg rest necessary for the right lower extremity.  Follow-up in ortho office in 1 week, follow-up primary care within the next 1 week.

## 2023-06-14 NOTE — CASE MANAGEMENT/SOCIAL WORK
Case Management Discharge Note      Final Note: dc home         Selected Continued Care - Discharged on 6/14/2023 Admission date: 6/10/2023 - Discharge disposition: Home or Self Care      Destination    No services have been selected for the patient.                Durable Medical Equipment    No services have been selected for the patient.                Dialysis/Infusion    No services have been selected for the patient.                Home Medical Care    No services have been selected for the patient.                Therapy    No services have been selected for the patient.                Community Resources    No services have been selected for the patient.                Community & DME    No services have been selected for the patient.                         Final Discharge Disposition Code: 01 - home or self-care
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
## 2023-06-14 NOTE — DISCHARGE INSTR - OTHER ORDERS
[facility] SERVICES - [phone]  You may apply for a variety of services  thru [facility].The fee for services are income based and some services may be free. You will be mailed a packet of information with instructions on how to apply for services.    You may also contact  [facility] Office [phone]  They have services to assist seniors in the home and can assist with [facility] application.    [facility] -  [phone]  Supplying Krista lift for home

## 2023-06-14 NOTE — CASE MANAGEMENT/SOCIAL WORK
Continued Stay Note  KASSIDY Ford     Patient Name: Juan Pablo Hernandez  MRN: [id number]  Today's Date: 6/14/2023    Admit Date: 6/10/2023    Plan: plan home with family   Discharge Plan       Row Name 06/14/23 5366       Plan    Plan plan home with family    Plan Comments Follow up call to patients daughter, Jazzy. Discussed needed DME. Patient currently has a wc, but it does not have elevated leg rest, she is unsure how old the wc is and thinks it came from [place]. Patient has bsc, hospital bed and transport chair. Explained insurance will not pay for a wc if one has been provided in the past 5 years. CM will order wc w elevated leg rests and lina lift for home. [facility] signed and referral to [employer]. They will run insurance and see if wc is covered. If not covered, family can contact [place] to obtain elevated leg rests for wc. Jazzy verbalizes understanding. Equipment will be delivered to the home per [employer] once approved and [facility] to make [facility] arrangements. Jazzy asks how she will transport patient. Informed patient will qualify for EMS transport home. CM will provide lisiting of transportation services that include wc/stretcher transport - there is a charge for these services. Family can also call # on patient insurance card to see if any transportation services are provided by insurance. Jazzy asks if Nsg can be ordered to give patient a bath at home. HH services cannot be ordered for personal care. CM provided [employer] brochure and  information on how to apply for [employer] services that include personal care added to AVS. CM will continue to follow and assist with dc needs.      Row Name 06/14/23 7804       Plan    Plan home with family    Patient/Family in Agreement with Plan other (see comments)    Plan Comments Spoke with Mamta/PT this morning who states patient was agreeable to sit at side of bed/see PT eval notes. Case reviewed with Candis  David DO, she states patients daughter does not want patient dc'd home. Reviewed  options of care that have been discussed with daughterJazzy. 1. Patient does not qualify for STR - precert is needed, patient does not meet criteria set by insurance. 2. Listing of in home private pay care givers has been given to daughter for additional help in the home. 3. Discussion of going to a facility private pay has been discussed with daughter. 4. Discussion of long term placement/medicaid pending process has been discussed with daughter - she would need to contact the preferred facility regarding financial information needed to qualify for medicaid pending. Candis DO  calls patients daughter. Candis DO will contact daughter and inform of plan to dc home. Informed CM can arrange for DME if needed and orhto can order. Will continue to follow.                   Discharge Codes    No documentation.                       Jet Byrnes RN
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
## 2023-06-14 NOTE — PLAN OF CARE
Goal Outcome Evaluation:  Plan of Care Reviewed With: patient, other (see comments) (RN, Zuleima)           Outcome Evaluation: SLP: Completed speech and language eval using the FROMAJE. She demonstrates severe deficits on the FROMAJE overall (score of 16 placing her in the severe dementia or depression category and correlates to a Level 5 on the Global Deterioration Scale. Severe deficits in Function, Arithmetic and Judgement. Moderate deficits in Reasoning, Orientation and Memory. Mild to normal Emotion. Pt demonstrates consistent moderate to severe deficits of cognition. Note issues since admission w/confusion and yelling out, not cooperating with staff at times. Currently cooperative and agreeable. No significant changes in cognition since Nov 16, 2022 when she demonstrated a score of 7/30 on SLUMS. Recommend: 24 hour supervision and assistance. May benefit from some type of geriatric pysch treatment as outpatient or home health if needed and available. No further SLP indicated at this time as pt appears to be at her baseline status.

## 2023-06-14 NOTE — PLAN OF CARE
Goal Outcome Evaluation:  Plan of Care Reviewed With: patient        Progress: no change  Outcome Evaluation: VSS, on ra, external catheter in place, cath bcare completed, pleasant and cooperative with care, no behaviors noted, prn med changed to [place] per pt request, medicateed x1 for c/o r leg pain, see e-mar.
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
## 2023-06-14 NOTE — CASE MANAGEMENT/SOCIAL WORK
Continued Stay Note  KASSIDY Ford     Patient Name: Juan Pablo Hernandez  MRN: [id number]  Today's Date: 6/14/2023    Admit Date: 6/10/2023    Plan: home with family   Discharge Plan       Row Name 06/14/23 1314       Plan    Plan home with family    Patient/Family in Agreement with Plan other (see comments)    Plan Comments Spoke with Mamta/PT this morning who states patient was agreeable to sit at side of bed/see PT eval notes. Case reviewed with Candis DO, she states patients daughter does not want patient dc'd home. Reviewed  options of care that have been discussed with daughterJazzy. 1. Patient does not qualify for STR - precert is needed, patient does not meet criteria set by insurance. 2. Listing of in home private pay care givers has been given to daughter for additional help in the home. 3. Discussion of going to a facility private pay has been discussed with daughter. 4. Discussion of long term placement/medicaid pending process has been discussed with daughter - she would need to contact the preferred facility regarding financial information needed to qualify for medicaid pending. 5. Daughter asked for psych eval for patient.  This can be arranged outpatient through PCP - patients confusion/behavior is not a new diagnosis and would not change dc options. No tata-psych facility will accept a patient with medical needs. No SNF facility will accept a patient with psych needs.Candis DO will contact daughter and inform of plan to dc home. Informed CM can arrange for DME if needed and orhto can order. Will continue to follow.                   Discharge Codes    No documentation.                       Jet Byrnes RN

## 2023-06-14 NOTE — PLAN OF CARE
Goal Outcome Evaluation:  Plan of Care Reviewed With: patient        Progress: no change  Outcome Evaluation: pleasantly confused - pt refused external catheter - VSS - no c/o pain through the night - several calls out to staff - rested on and off through night

## 2023-06-14 NOTE — PLAN OF CARE
Goal Outcome Evaluation:  Plan of Care Reviewed With: patient           Outcome Evaluation: PT Evaluation Complete: Patient initially not agreeable to participate however with encouragement agrees. Patient requires mod/max A x 2 for supine to/from sit transfer with significant cues for technique and sequencing. Patient initially requires max A for static sitting, with cues for midline and extended time patient able to progress to CGA. Patient requires min/mod A x for dynamic sititng balance. Patient is currently NWB on the right with an AKA on the left. Patient with chronic B UE ROM and strength deficits which do not allow for adequate scooting along EOB or ability to safely attempt slide board transfers. At this time recommend lina lift for all transfers out of bed.  At baseline, patient was lifted by caregiver to/from w/c and bed. Recommend continued 24/7 care at home with use of mechanical lift, if this level of care is not available recommend extended care facility placement. No further skilled therapy needs at this time.

## 2023-06-14 NOTE — CASE MANAGEMENT/SOCIAL WORK
Continued Stay Note  KASSIDY Ford     Patient Name: Juan Pablo Hernandez  MRN: [id number]  Today's Date: 6/14/2023    Admit Date: 6/10/2023    Plan: Plan home w family   Discharge Plan       Row Name 06/14/23 1606       Plan    Plan Plan home w family    Plan Comments Per Katherine/Donita, patients wc is less than 5 years old and rec'd from [facility]. Insurance will not cover cost of another wc. Krista lift requires a prior auth - informed patient is set to dc today, review of patient situation and [facility] is agreeable to deliver krista lift to the home today and follow up if prior auth is not obtained. Call to Jazzy to update regarding DME. Informed she needs to contact [facility] to obtain elevated leg rests for wc. [facility] will contact Jazzy to arrange for delivery of krista lift to the home today.  Informed  contact # for [facility], [facility] and [facility] services will be printed in [place] paperwork.Jazzy verbalizes understanding. Page for  transportation services and [facility] Brochure provided to Zuleima ROBERTS to include in dc paperwork. Zuleima ROBERTS and Candis Hernandez APRN updated. Plan for patient to [place] home today via ambulance transport.      Row Name 06/14/23 4494       Plan    Plan plan home with family    Plan Comments Rec'd message from Katherine[facility] stating patients daughter spoke with [email or web address] and requested krista lift be delivered to the home tomorrow afternoon (6/15). Plan for delivery of krista lift to patient home on 6/15 per family request.                   Discharge Codes    No documentation.                 Expected Discharge Date and Time       Expected Discharge Date Expected Discharge Time    Jun 14, 2023               Jet Byrnes RN

## 2023-06-14 NOTE — PLAN OF CARE
Goal Outcome Evaluation:  Plan of Care Reviewed With: patient        Progress: no change  Outcome Evaluation: VSS, on ra, external catheter in place, cath bcare completed, pleasant and cooperative with care, no behaviors noted, prn med changed to [place] per pt request, medicateed x1 for c/o r leg pain, see e-mar.

## 2023-06-14 NOTE — NURSING NOTE
Nursing IP/EMS Transfer  Juan Pablo Hernandez  73 y.o.  female    HPI :   Chief Complaint   Patient presents with   • Fall       Admitting doctor:   Moy Newberry MD    Admitting diagnosis:   The encounter diagnosis was Closed fracture of proximal end of right tibia and fibula, initial encounter.    Code status:   Current Code Status     Date Active Code Status Order ID Comments User Context       Prior          Allergies:   Moxifloxacin, Penicillins, Amoxicillin-pot clavulanate, Ciprofloxacin, Codeine, Doxycycline, Morphine, Morphine and related, Sulfa antibiotics, Adhesive tape, and Latex    Isolation:   No active isolations    Intake and Output    Intake/Output Summary (Last 24 hours) at 6/14/2023 1628  Last data filed at 6/14/2023 1527  Gross per 24 hour   Intake 360 ml   Output 400 ml   Net -40 ml       Weight:       06/10/23  1742   Weight: 54.8 kg (120 lb 13 oz)       Most recent vitals:   Vitals:    06/14/23 0123 06/14/23 0642 06/14/23 1324 06/14/23 1527   BP:  125/68  138/69   BP Location:  Right arm  Right arm   Patient Position:  Lying  Lying   Pulse:  70 76 72   Resp:  20  18   Temp: 97.5 °F (36.4 °C) 96.8 °F (36 °C)  97.9 °F (36.6 °C)   TempSrc: Oral Oral  Oral   SpO2:  97%  98%   Weight:       Height:           Active LDAs/IV Access:   Lines, Drains & Airways     Active LDAs     None                Labs (abnormal labs have a star):   Lab Results (last 24 hours)     ** No results found for the last 24 hours. **           EKG:   No orders to display       Current Medications:     Current Facility-Administered Medications:   •  albuterol (PROVENTIL) nebulizer solution 0.083% 2.5 mg/3mL, 2.5 mg, Nebulization, Q6H PRN, Moy Newberry MD, 2.5 mg at 06/11/23 1557  •  apixaban (ELIQUIS) tablet 5 mg, 5 mg, Oral, Q12H, Moy Newberry MD, 5 mg at 06/14/23 0944  •  budesonide-formoterol (SYMBICORT) 160-4.5 MCG/ACT inhaler 2 puff, 2 puff, Inhalation, BID - RT, Moy Newberry MD, 2 puff at 06/13/23  2054  •  digoxin (LANOXIN) tablet 125 mcg, 125 mcg, Oral, Daily, Moy Newberry MD, 125 mcg at 06/14/23 1324  •  HYDROcodone-acetaminophen (NORCO) 5-325 MG per tablet 1 tablet, 1 tablet, Oral, Q4H PRN, Candis Hernandez APRN  •  HYDROmorphone (DILAUDID) injection 0.5 mg, 0.5 mg, Intravenous, Q2H PRN, Moy Newberry MD, 0.5 mg at 06/12/23 0020  •  metoprolol tartrate (LOPRESSOR) tablet 50 mg, 50 mg, Oral, Q12H, Moy Newberry MD, 50 mg at 06/14/23 0944  •  ondansetron (ZOFRAN) injection 4 mg, 4 mg, Intravenous, Q6H PRN, Moy Newberry MD  •  [COMPLETED] Insert Peripheral IV, , , Once **AND** sodium chloride 0.9 % flush 10 mL, 10 mL, Intravenous, PRN, Jabier Palomares MD, 10 mL at 06/12/23 0826  •  sodium chloride 0.9 % infusion, 75 mL/hr, Intravenous, Continuous, Moy Newberry MD, Last Rate: 75 mL/hr at 06/11/23 1659, 75 mL/hr at 06/11/23 1659     Imaging results:  Imaging Results (Last 72 Hours)     Procedure Component Value Units Date/Time    XR Knee 1 or 2 View Right [[id number]] Collected: 06/12/23 0841     Updated: 06/12/23 0845    Narrative:      XR KNEE 1 OR 2 VW RIGHT-: 6/12/2023 8:25 AM     INDICATION:   Follow-up tibial fracture and fibular fracture. The leg is been  splinted..     COMPARISON:   06/10/2023 knee series, 06/10/2023 CT scan.     FINDINGS:  2 view(s) of the right knee.  The 6 oblique nondisplaced fractures of  the proximal tibia and the proximal fibular head fracture are stable.  There is a total knee prosthesis present as well as a metal plate  attached the distal femur.. No bone erosion or destruction. .       Impression:      No change in proximal tibia and fibular fractures.     This report was finalized on 6/12/2023 8:42 AM by Dr. Oumar Garnica MD.       XR Tibia Fibula 2 View Right [[id number]] Collected: 06/12/23 0838     Updated: 06/12/23 0843    Narrative:      XR TIBIA FIBULA 2 VW RIGHT-: 6/12/2023 8:25 AM     INDICATION:   Evaluate fracture stability after  splinting.     COMPARISON:   None available.     FINDINGS:   2 views of the right tibia/fibula. There is a left knee prosthesis  present. There is a partial fiberglass cast around the leg. On the AP  view there is an oblique nondisplaced fracture visible within the  proximal third of the tibial metaphysis. There is also a proximal  fibular fracture which is nondisplaced.       Impression:      Nondisplaced oblique fractures of the proximal tibia and nondisplaced  fractures of the proximal fibula are unchanged from yesterday's CT scan.     This report was finalized on 6/12/2023 8:41 AM by Dr. Oumar Garnica MD.              Ambulatory status:   - *non weight bearing**    Social issues:   Social History     Socioeconomic History   • Marital status:    Tobacco Use   • Smoking status: Never   • Smokeless tobacco: Never   • Tobacco comments:     caffeine use- coffe, coke   Vaping Use   • Vaping Use: Never used   Substance and Sexual Activity   • Alcohol use: No   • Drug use: No   • Sexual activity: Not Currently     Partners: Male     Birth control/protection: Surgical, Post-menopausal     Comment: LUDWIG SLAUGHTER       NIH Stroke Scale:         Zuleima Maldonado RN  06/14/23 16:28 EDT

## 2023-06-15 ENCOUNTER — READMISSION MANAGEMENT (OUTPATIENT)
Dept: CALL CENTER | Facility: HOSPITAL | Age: 73
End: 2023-06-15
Payer: MEDICARE
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
## 2023-06-15 NOTE — OUTREACH NOTE
Prep Survey      Flowsheet Row Responses   [facility] facility patient discharged from? [place]   Is LACE score < 7 ? Yes   Eligibility Readm Mgmt   Discharge diagnosis Closed fracture of proximal end of right tibia and fibula,   Does the patient have one of the following disease processes/diagnoses(primary or secondary)? Other   Does the patient have Home health ordered? No   Is there a DME ordered? No   Prep survey completed? Yes            Anju JANG - Registered Nurse

## 2023-06-19 ENCOUNTER — READMISSION MANAGEMENT (OUTPATIENT)
Dept: CALL CENTER | Facility: HOSPITAL | Age: 73
End: 2023-06-19
Payer: MEDICARE

## 2023-06-19 NOTE — OUTREACH NOTE
LAG < 7 Survey      Flowsheet Row Responses   [facility] facility patient discharged from? [facility]   Does the patient have one of the following disease processes/diagnoses(primary or secondary)? Other   BHLAG <7 Attempt successful? No   Unsuccessful attempts Attempt 1            Tamara COLBERT - Registered Nurse

## 2023-07-17 ENCOUNTER — APPOINTMENT (OUTPATIENT)
Dept: WOUND CARE | Facility: HOSPITAL | Age: 73
End: 2023-07-17
Payer: MEDICARE

## 2023-07-17 PROCEDURE — G0463 HOSPITAL OUTPT CLINIC VISIT: HCPCS

## 2023-07-24 ENCOUNTER — APPOINTMENT (OUTPATIENT)
Dept: WOUND CARE | Facility: HOSPITAL | Age: 73
End: 2023-07-24
Payer: MEDICARE

## 2023-07-24 PROCEDURE — G0463 HOSPITAL OUTPT CLINIC VISIT: HCPCS

## 2023-07-28 ENCOUNTER — APPOINTMENT (OUTPATIENT)
Dept: WOUND CARE | Facility: HOSPITAL | Age: 73
End: 2023-07-28
Payer: MEDICARE

## 2023-07-28 ENCOUNTER — TELEPHONE (OUTPATIENT)
Dept: ORTHOPEDIC SURGERY | Facility: CLINIC | Age: 73
End: 2023-07-28
Payer: MEDICARE

## 2023-07-28 DIAGNOSIS — S82.101A CLOSED FRACTURE OF PROXIMAL END OF RIGHT TIBIA AND FIBULA, INITIAL ENCOUNTER: Primary | ICD-10-CM

## 2023-07-28 DIAGNOSIS — S82.831A CLOSED FRACTURE OF PROXIMAL END OF RIGHT TIBIA AND FIBULA, INITIAL ENCOUNTER: Primary | ICD-10-CM

## 2023-07-28 RX ORDER — HYDROCODONE BITARTRATE AND ACETAMINOPHEN 5; 325 MG/1; MG/1
1 TABLET ORAL EVERY 12 HOURS PRN
Qty: 30 TABLET | Refills: 0 | Status: SHIPPED | OUTPATIENT
Start: 2023-07-28

## 2023-07-28 NOTE — TELEPHONE ENCOUNTER
Patient's daughter called stating that Mary's right leg pain is not controlled by the Tylenol 500mg anymore. Mary wants to know if you could send in Hydrocodone acetaminophen 5-325mg that she was taking before she requested the tylenol prescription. Please advise, thank you.     Last seen: 7/03/2023  Next visit: 8/02/93 (Candis DO)    DX:  Closed fracture of proximal end of right tibia and fibula, initial encounter

## 2023-08-02 ENCOUNTER — OFFICE VISIT (OUTPATIENT)
Dept: ORTHOPEDIC SURGERY | Facility: CLINIC | Age: 73
End: 2023-08-02
Payer: MEDICARE

## 2023-08-02 ENCOUNTER — APPOINTMENT (OUTPATIENT)
Dept: WOUND CARE | Facility: HOSPITAL | Age: 73
End: 2023-08-02
Payer: MEDICARE

## 2023-08-02 VITALS — BODY MASS INDEX: 21.26 KG/M2 | HEIGHT: 63 IN | WEIGHT: 120 LBS

## 2023-08-02 DIAGNOSIS — S82.831A CLOSED FRACTURE OF PROXIMAL END OF RIGHT TIBIA AND FIBULA, INITIAL ENCOUNTER: Primary | ICD-10-CM

## 2023-08-02 DIAGNOSIS — S82.101A CLOSED FRACTURE OF PROXIMAL END OF RIGHT TIBIA AND FIBULA, INITIAL ENCOUNTER: Primary | ICD-10-CM

## 2023-08-02 PROCEDURE — 99024 POSTOP FOLLOW-UP VISIT: CPT | Performed by: NURSE PRACTITIONER

## 2023-08-02 PROCEDURE — G0463 HOSPITAL OUTPT CLINIC VISIT: HCPCS

## 2023-08-11 ENCOUNTER — APPOINTMENT (OUTPATIENT)
Dept: WOUND CARE | Facility: HOSPITAL | Age: 73
End: 2023-08-11
Payer: MEDICARE

## 2023-08-11 PROCEDURE — G0463 HOSPITAL OUTPT CLINIC VISIT: HCPCS

## 2023-08-18 ENCOUNTER — APPOINTMENT (OUTPATIENT)
Dept: WOUND CARE | Facility: HOSPITAL | Age: 73
End: 2023-08-18
Payer: MEDICARE

## 2023-08-24 ENCOUNTER — OFFICE VISIT (OUTPATIENT)
Dept: ORTHOPEDIC SURGERY | Facility: CLINIC | Age: 73
End: 2023-08-24
Payer: MEDICARE

## 2023-08-24 ENCOUNTER — APPOINTMENT (OUTPATIENT)
Dept: WOUND CARE | Facility: HOSPITAL | Age: 73
End: 2023-08-24
Payer: MEDICARE

## 2023-08-24 VITALS — WEIGHT: 120 LBS | HEIGHT: 63 IN | BODY MASS INDEX: 21.26 KG/M2

## 2023-08-24 DIAGNOSIS — M19.022: ICD-10-CM

## 2023-08-24 DIAGNOSIS — M25.522 PAIN OF BOTH ELBOWS: Primary | ICD-10-CM

## 2023-08-24 DIAGNOSIS — M19.021: ICD-10-CM

## 2023-08-24 DIAGNOSIS — S82.831A CLOSED FRACTURE OF PROXIMAL END OF RIGHT TIBIA AND FIBULA, INITIAL ENCOUNTER: ICD-10-CM

## 2023-08-24 DIAGNOSIS — S82.101A CLOSED FRACTURE OF PROXIMAL END OF RIGHT TIBIA AND FIBULA, INITIAL ENCOUNTER: ICD-10-CM

## 2023-08-24 DIAGNOSIS — M25.521 PAIN OF BOTH ELBOWS: Primary | ICD-10-CM

## 2023-08-24 PROCEDURE — G0463 HOSPITAL OUTPT CLINIC VISIT: HCPCS

## 2023-08-26 ENCOUNTER — HOME HEALTH ADMISSION (OUTPATIENT)
Dept: HOME HEALTH SERVICES | Facility: HOME HEALTHCARE | Age: 73
End: 2023-08-26
Payer: MEDICARE
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
## (undated) DEVICE — IMPLANTABLE DEVICE
Type: IMPLANTABLE DEVICE | Site: FEMUR | Status: NON-FUNCTIONAL
Removed: 2018-01-15

## (undated) DEVICE — GLV SURG SENSICARE ORTHO PF LF 7 STRL

## (undated) DEVICE — BNDG ELAS ELITE V/CLOSE 4IN 5YD LF

## (undated) DEVICE — STCKNT IMPERV 14IN STRL

## (undated) DEVICE — Device

## (undated) DEVICE — TOWEL,OR,DSP,ST,BLUE,STD,4/PK,20PK/CS: Brand: MEDLINE

## (undated) DEVICE — SPNG LAP 18X18IN LF STRL PK/5

## (undated) DEVICE — FRAZIER SUCTION INSTRUMENT 10 FR W/CONTROL VENT & OBTURATOR: Brand: FRAZIER

## (undated) DEVICE — 3M™ STERI-DRAPE™ U-DRAPE 1015: Brand: STERI-DRAPE™

## (undated) DEVICE — WEBRIL* CAST PADDING: Brand: DEROYAL

## (undated) DEVICE — 3M™ IOBAN™ 2 ANTIMICROBIAL INCISE DRAPE 6651EZ: Brand: IOBAN™ 2

## (undated) DEVICE — PK BASIC ORTHO 90

## (undated) DEVICE — DRP Z/FRICTION 10X16IN

## (undated) DEVICE — DRP C/ARM 41X74IN

## (undated) DEVICE — GLV SURG SENSICARE W/ALOE PF LF 7.5 STRL

## (undated) DEVICE — RL COTN ABSORB/COMPR 1/2LB 6IN 13FT 1PU

## (undated) DEVICE — BNDG ELAS ELITE V/CLOSE 6IN 5YD LF STRL

## (undated) DEVICE — SPNG GZ WOVN 4X4IN 12PLY 10/BX STRL

## (undated) DEVICE — SUT VIC 2/0 CT1 CR8 18IN J839D

## (undated) DEVICE — SYS SKIN CLS DERMABOND PRINEO W/22CM MESH TP

## (undated) DEVICE — 1010 S-DRAPE TOWEL DRAPE 10/BX: Brand: STERI-DRAPE™

## (undated) DEVICE — KWIRE ZPS TROC PT 1.6X150MM NS
Type: IMPLANTABLE DEVICE | Site: FEMUR | Status: NON-FUNCTIONAL
Removed: 2018-01-15

## (undated) DEVICE — DISPOSABLE TOURNIQUET CUFF SINGLE BLADDER, SINGLE PORT AND LUER LOCK CONNECTOR: Brand: COLOR CUFF

## (undated) DEVICE — ISO/ALC 70PCT 16OZ

## (undated) DEVICE — BOWL PLSTC LG 32OZ BLU STRL

## (undated) DEVICE — DRAPE,REIN 53X77,STERILE: Brand: MEDLINE

## (undated) DEVICE — 3M™ MEDIPORE™ H SOFT CLOTH SURGICAL TAPE 2864, 4 INCH X 10 YARD (10CM X 9,14M), 12 ROLLS/CASE: Brand: 3M™ MEDIPORE™

## (undated) DEVICE — COVER,TABLE,HEAVY DUTY,79"X110",STRL: Brand: MEDLINE

## (undated) DEVICE — PROXIMATE RH ROTATING HEAD SKIN STAPLERS (35 WIDE) CONTAINS 35 STAINLESS STEEL STAPLES: Brand: PROXIMATE

## (undated) DEVICE — DRAPE,U/ SHT,SPLIT,PLAS,STERIL: Brand: MEDLINE

## (undated) DEVICE — IRRIGATOR BULB 60CC

## (undated) DEVICE — PAD,ABDOMINAL,8"X10",ST,LF: Brand: MEDLINE

## (undated) DEVICE — SUT VIC 0 CT1 CR8 18IN J840D

## (undated) DEVICE — GLV SURG NEOLON 2G PF LF 7.5 STRL

## (undated) DEVICE — NDL SPINE 22G 5IN BLK

## (undated) DEVICE — SYR CONTRL LUERLOK 10CC

## (undated) DEVICE — DRSNG PAD ABD 8X10IN STRL

## (undated) DEVICE — APPL CHLORAPREP W/TINT 26ML ORNG

## (undated) DEVICE — TRY SKINPREP WET CHG 4PCT SPNG HIB

## (undated) DEVICE — PREP SOL POVIDONE/IODINE BT 4OZ